# Patient Record
Sex: FEMALE | Race: WHITE | Employment: OTHER | URBAN - METROPOLITAN AREA
[De-identification: names, ages, dates, MRNs, and addresses within clinical notes are randomized per-mention and may not be internally consistent; named-entity substitution may affect disease eponyms.]

---

## 2017-06-20 ENCOUNTER — HOSPITAL ENCOUNTER (OUTPATIENT)
Dept: WOUND CARE | Age: 77
Discharge: HOME | End: 2017-06-20
Attending: PHYSICAL MEDICINE & REHABILITATION
Payer: MEDICARE

## 2017-06-20 ENCOUNTER — HOSPITAL ENCOUNTER (OUTPATIENT)
Dept: GENERAL RADIOLOGY | Age: 77
Discharge: HOME | End: 2017-06-20
Attending: PHYSICAL MEDICINE & REHABILITATION
Payer: MEDICARE

## 2017-06-20 ENCOUNTER — HOSPITAL ENCOUNTER (OUTPATIENT)
Dept: ULTRASOUND IMAGING | Age: 77
Discharge: HOME | End: 2017-06-20
Attending: PHYSICAL MEDICINE & REHABILITATION
Payer: MEDICARE

## 2017-06-20 DIAGNOSIS — S81.802A WOUND OF LEFT LEG: ICD-10-CM

## 2017-06-20 DIAGNOSIS — S81.802A UNSPECIFIED OPEN WOUND, LEFT LOWER LEG, INITIAL ENCOUNTER: ICD-10-CM

## 2017-06-20 PROCEDURE — 11042 DBRDMT SUBQ TIS 1ST 20SQCM/<: CPT

## 2017-06-20 PROCEDURE — 99215 OFFICE O/P EST HI 40 MIN: CPT

## 2017-06-27 PROCEDURE — 99213 OFFICE O/P EST LOW 20 MIN: CPT

## 2017-06-27 PROCEDURE — 97597 DBRDMT OPN WND 1ST 20 CM/<: CPT

## 2017-06-27 PROCEDURE — 29581 APPL MULTLAYER CMPRN SYS LEG: CPT

## 2017-06-30 PROCEDURE — 29581 APPL MULTLAYER CMPRN SYS LEG: CPT

## 2017-07-03 ENCOUNTER — HOSPITAL ENCOUNTER (OUTPATIENT)
Dept: WOUND CARE | Age: 77
Discharge: HOME | End: 2017-07-03
Attending: PHYSICAL MEDICINE & REHABILITATION
Payer: MEDICARE

## 2017-07-03 PROCEDURE — 77030013575 HC DRSG HYDROFERA HOLL -B

## 2017-07-03 PROCEDURE — 99214 OFFICE O/P EST MOD 30 MIN: CPT

## 2017-07-03 PROCEDURE — 29581 APPL MULTLAYER CMPRN SYS LEG: CPT

## 2017-07-03 PROCEDURE — A6021 COLLAGEN DRESSING <=16 SQ IN: HCPCS

## 2017-07-10 PROCEDURE — 29581 APPL MULTLAYER CMPRN SYS LEG: CPT

## 2017-07-10 PROCEDURE — 99214 OFFICE O/P EST MOD 30 MIN: CPT

## 2017-07-13 PROCEDURE — 29581 APPL MULTLAYER CMPRN SYS LEG: CPT

## 2017-07-17 PROBLEM — F51.01 PRIMARY INSOMNIA: Status: ACTIVE | Noted: 2017-07-17

## 2017-07-17 PROCEDURE — 29581 APPL MULTLAYER CMPRN SYS LEG: CPT

## 2017-07-20 PROCEDURE — 29581 APPL MULTLAYER CMPRN SYS LEG: CPT

## 2017-07-24 PROCEDURE — 29581 APPL MULTLAYER CMPRN SYS LEG: CPT

## 2017-07-28 PROCEDURE — 29581 APPL MULTLAYER CMPRN SYS LEG: CPT

## 2017-08-03 ENCOUNTER — HOSPITAL ENCOUNTER (OUTPATIENT)
Dept: WOUND CARE | Age: 77
Discharge: HOME | End: 2017-08-03
Attending: PHYSICAL MEDICINE & REHABILITATION
Payer: MEDICARE

## 2017-08-03 PROCEDURE — 29581 APPL MULTLAYER CMPRN SYS LEG: CPT

## 2017-08-07 PROCEDURE — 99213 OFFICE O/P EST LOW 20 MIN: CPT

## 2017-08-14 PROCEDURE — 99212 OFFICE O/P EST SF 10 MIN: CPT

## 2018-03-28 PROBLEM — E66.01 SEVERE OBESITY (BMI 35.0-39.9) WITH COMORBIDITY (HCC): Status: ACTIVE | Noted: 2018-03-28

## 2018-04-17 ENCOUNTER — HOSPITAL ENCOUNTER (OUTPATIENT)
Dept: WOUND CARE | Age: 78
Discharge: HOME | End: 2018-04-17
Attending: PHYSICAL MEDICINE & REHABILITATION
Payer: MEDICARE

## 2018-04-17 PROCEDURE — 29581 APPL MULTLAYER CMPRN SYS LEG: CPT

## 2018-04-17 PROCEDURE — 99215 OFFICE O/P EST HI 40 MIN: CPT

## 2018-04-17 PROCEDURE — 97597 DBRDMT OPN WND 1ST 20 CM/<: CPT

## 2018-04-20 ENCOUNTER — HOSPITAL ENCOUNTER (OUTPATIENT)
Dept: WOUND CARE | Age: 78
Discharge: HOME | End: 2018-04-20
Attending: PHYSICAL MEDICINE & REHABILITATION
Payer: MEDICARE

## 2018-04-20 PROCEDURE — 29581 APPL MULTLAYER CMPRN SYS LEG: CPT

## 2018-04-24 ENCOUNTER — HOSPITAL ENCOUNTER (OUTPATIENT)
Dept: WOUND CARE | Age: 78
Discharge: HOME | End: 2018-04-24
Attending: PHYSICAL MEDICINE & REHABILITATION
Payer: MEDICARE

## 2018-04-24 PROCEDURE — 29581 APPL MULTLAYER CMPRN SYS LEG: CPT

## 2018-04-24 PROCEDURE — 99213 OFFICE O/P EST LOW 20 MIN: CPT

## 2018-04-27 ENCOUNTER — HOSPITAL ENCOUNTER (OUTPATIENT)
Dept: WOUND CARE | Age: 78
Discharge: HOME | End: 2018-04-27
Attending: PHYSICAL MEDICINE & REHABILITATION
Payer: MEDICARE

## 2018-04-27 PROCEDURE — 29581 APPL MULTLAYER CMPRN SYS LEG: CPT

## 2018-05-01 ENCOUNTER — HOSPITAL ENCOUNTER (OUTPATIENT)
Dept: WOUND CARE | Age: 78
Discharge: HOME | End: 2018-05-01
Attending: PHYSICAL MEDICINE & REHABILITATION
Payer: MEDICARE

## 2018-05-01 PROCEDURE — 99213 OFFICE O/P EST LOW 20 MIN: CPT

## 2018-05-01 PROCEDURE — 29581 APPL MULTLAYER CMPRN SYS LEG: CPT

## 2018-05-07 ENCOUNTER — HOSPITAL ENCOUNTER (OUTPATIENT)
Dept: WOUND CARE | Age: 78
Discharge: HOME | End: 2018-05-07
Attending: PHYSICAL MEDICINE & REHABILITATION
Payer: MEDICARE

## 2018-05-07 PROCEDURE — 99213 OFFICE O/P EST LOW 20 MIN: CPT

## 2018-05-07 PROCEDURE — 29581 APPL MULTLAYER CMPRN SYS LEG: CPT

## 2018-05-14 ENCOUNTER — HOSPITAL ENCOUNTER (OUTPATIENT)
Dept: WOUND CARE | Age: 78
Discharge: HOME | End: 2018-05-14
Attending: PHYSICAL MEDICINE & REHABILITATION
Payer: MEDICARE

## 2018-05-14 PROCEDURE — 99213 OFFICE O/P EST LOW 20 MIN: CPT

## 2018-05-21 ENCOUNTER — HOSPITAL ENCOUNTER (OUTPATIENT)
Dept: WOUND CARE | Age: 78
Discharge: HOME | End: 2018-05-21
Attending: PHYSICAL MEDICINE & REHABILITATION
Payer: MEDICARE

## 2018-05-21 PROCEDURE — 99213 OFFICE O/P EST LOW 20 MIN: CPT

## 2018-05-21 PROCEDURE — 77030020057 HC DRSG MTRX CLLGN STGN -B

## 2018-05-29 ENCOUNTER — APPOINTMENT (OUTPATIENT)
Dept: WOUND CARE | Age: 78
End: 2018-05-29
Attending: PHYSICAL MEDICINE & REHABILITATION
Payer: MEDICARE

## 2018-09-11 ENCOUNTER — HOSPITAL ENCOUNTER (OUTPATIENT)
Dept: WOUND CARE | Age: 78
Discharge: HOME | End: 2018-09-11
Attending: SURGERY
Payer: MEDICARE

## 2018-09-11 ENCOUNTER — APPOINTMENT (OUTPATIENT)
Dept: GENERAL RADIOLOGY | Age: 78
DRG: 603 | End: 2018-09-11
Attending: SURGERY
Payer: MEDICARE

## 2018-09-11 ENCOUNTER — HOSPITAL ENCOUNTER (INPATIENT)
Age: 78
LOS: 1 days | Discharge: HOME HEALTH | DRG: 603 | End: 2018-09-14
Attending: SURGERY | Admitting: SURGERY
Payer: MEDICARE

## 2018-09-11 VITALS
SYSTOLIC BLOOD PRESSURE: 108 MMHG | DIASTOLIC BLOOD PRESSURE: 52 MMHG | TEMPERATURE: 98.7 F | OXYGEN SATURATION: 95 % | HEIGHT: 64 IN | WEIGHT: 214 LBS | BODY MASS INDEX: 36.54 KG/M2 | HEART RATE: 94 BPM

## 2018-09-11 PROBLEM — L03.116 CELLULITIS AND ABSCESS OF LEFT LOWER EXTREMITY: Status: ACTIVE | Noted: 2018-09-11

## 2018-09-11 PROBLEM — L02.416 CELLULITIS AND ABSCESS OF LEFT LOWER EXTREMITY: Status: ACTIVE | Noted: 2018-09-11

## 2018-09-11 LAB
CREAT SERPL-MCNC: 1.78 MG/DL (ref 0.6–1)
CRP SERPL-MCNC: 13.8 MG/DL (ref 0–0.9)
ERYTHROCYTE [DISTWIDTH] IN BLOOD BY AUTOMATED COUNT: 13.8 %
ERYTHROCYTE [SEDIMENTATION RATE] IN BLOOD: 119 MM/HR (ref 0–30)
HCT VFR BLD AUTO: 30.3 % (ref 35.8–46.3)
HGB BLD-MCNC: 9.7 G/DL (ref 11.7–15.4)
MCH RBC QN AUTO: 30.5 PG (ref 26.1–32.9)
MCHC RBC AUTO-ENTMCNC: 32 G/DL (ref 31.4–35)
MCV RBC AUTO: 95.3 FL (ref 79.6–97.8)
NRBC # BLD: 0 K/UL (ref 0–0.2)
PLATELET # BLD AUTO: 332 K/UL (ref 150–450)
PMV BLD AUTO: 10.9 FL (ref 9.4–12.3)
RBC # BLD AUTO: 3.18 M/UL (ref 4.05–5.2)
WBC # BLD AUTO: 10.5 K/UL (ref 4.3–11.1)

## 2018-09-11 PROCEDURE — 86140 C-REACTIVE PROTEIN: CPT

## 2018-09-11 PROCEDURE — 87070 CULTURE OTHR SPECIMN AEROBIC: CPT

## 2018-09-11 PROCEDURE — 99214 OFFICE O/P EST MOD 30 MIN: CPT

## 2018-09-11 PROCEDURE — 65270000029 HC RM PRIVATE

## 2018-09-11 PROCEDURE — 99218 HC RM OBSERVATION: CPT

## 2018-09-11 PROCEDURE — 11042 DBRDMT SUBQ TIS 1ST 20SQCM/<: CPT

## 2018-09-11 PROCEDURE — 87077 CULTURE AEROBIC IDENTIFY: CPT

## 2018-09-11 PROCEDURE — 87075 CULTR BACTERIA EXCEPT BLOOD: CPT

## 2018-09-11 PROCEDURE — 82565 ASSAY OF CREATININE: CPT

## 2018-09-11 PROCEDURE — 74011250636 HC RX REV CODE- 250/636: Performed by: SURGERY

## 2018-09-11 PROCEDURE — 74011000258 HC RX REV CODE- 258: Performed by: SURGERY

## 2018-09-11 PROCEDURE — 87186 SC STD MICRODIL/AGAR DIL: CPT

## 2018-09-11 PROCEDURE — 85652 RBC SED RATE AUTOMATED: CPT

## 2018-09-11 PROCEDURE — 85027 COMPLETE CBC AUTOMATED: CPT

## 2018-09-11 PROCEDURE — 73562 X-RAY EXAM OF KNEE 3: CPT

## 2018-09-11 PROCEDURE — 74011250637 HC RX REV CODE- 250/637: Performed by: SURGERY

## 2018-09-11 PROCEDURE — 36415 COLL VENOUS BLD VENIPUNCTURE: CPT

## 2018-09-11 PROCEDURE — 87040 BLOOD CULTURE FOR BACTERIA: CPT

## 2018-09-11 RX ORDER — GABAPENTIN 100 MG/1
100 CAPSULE ORAL 2 TIMES DAILY
Status: DISCONTINUED | OUTPATIENT
Start: 2018-09-11 | End: 2018-09-14 | Stop reason: HOSPADM

## 2018-09-11 RX ORDER — ACETAMINOPHEN 325 MG/1
650 TABLET ORAL
Status: DISCONTINUED | OUTPATIENT
Start: 2018-09-11 | End: 2018-09-14 | Stop reason: HOSPADM

## 2018-09-11 RX ORDER — SODIUM CHLORIDE 0.9 % (FLUSH) 0.9 %
5-10 SYRINGE (ML) INJECTION AS NEEDED
Status: DISCONTINUED | OUTPATIENT
Start: 2018-09-11 | End: 2018-09-14 | Stop reason: HOSPADM

## 2018-09-11 RX ORDER — SODIUM CHLORIDE 0.9 % (FLUSH) 0.9 %
5-10 SYRINGE (ML) INJECTION EVERY 8 HOURS
Status: DISCONTINUED | OUTPATIENT
Start: 2018-09-11 | End: 2018-09-14 | Stop reason: HOSPADM

## 2018-09-11 RX ORDER — ATORVASTATIN CALCIUM 40 MG/1
40 TABLET, FILM COATED ORAL DAILY
Status: DISCONTINUED | OUTPATIENT
Start: 2018-09-12 | End: 2018-09-14 | Stop reason: HOSPADM

## 2018-09-11 RX ORDER — ENOXAPARIN SODIUM 100 MG/ML
40 INJECTION SUBCUTANEOUS EVERY 24 HOURS
Status: DISCONTINUED | OUTPATIENT
Start: 2018-09-12 | End: 2018-09-14 | Stop reason: HOSPADM

## 2018-09-11 RX ORDER — OXYCODONE AND ACETAMINOPHEN 5; 325 MG/1; MG/1
1 TABLET ORAL
Status: DISCONTINUED | OUTPATIENT
Start: 2018-09-11 | End: 2018-09-14 | Stop reason: HOSPADM

## 2018-09-11 RX ORDER — PANTOPRAZOLE SODIUM 40 MG/1
40 TABLET, DELAYED RELEASE ORAL
Status: DISCONTINUED | OUTPATIENT
Start: 2018-09-12 | End: 2018-09-14 | Stop reason: HOSPADM

## 2018-09-11 RX ORDER — POTASSIUM CHLORIDE 20 MEQ/1
20 TABLET, EXTENDED RELEASE ORAL 2 TIMES DAILY
Status: DISCONTINUED | OUTPATIENT
Start: 2018-09-11 | End: 2018-09-12

## 2018-09-11 RX ORDER — LEVOTHYROXINE SODIUM 50 UG/1
25 TABLET ORAL DAILY
Status: DISCONTINUED | OUTPATIENT
Start: 2018-09-12 | End: 2018-09-14 | Stop reason: HOSPADM

## 2018-09-11 RX ORDER — TRAZODONE HYDROCHLORIDE 50 MG/1
50 TABLET ORAL
Status: DISCONTINUED | OUTPATIENT
Start: 2018-09-11 | End: 2018-09-14 | Stop reason: HOSPADM

## 2018-09-11 RX ADMIN — PIPERACILLIN SODIUM,TAZOBACTAM SODIUM 3.38 G: 3; .375 INJECTION, POWDER, FOR SOLUTION INTRAVENOUS at 21:05

## 2018-09-11 RX ADMIN — POTASSIUM CHLORIDE 20 MEQ: 20 TABLET, EXTENDED RELEASE ORAL at 21:09

## 2018-09-11 RX ADMIN — TRAZODONE HYDROCHLORIDE 50 MG: 50 TABLET ORAL at 21:09

## 2018-09-11 RX ADMIN — Medication 1 AMPULE: at 21:09

## 2018-09-11 RX ADMIN — ENOXAPARIN SODIUM 40 MG: 40 INJECTION, SOLUTION INTRAVENOUS; SUBCUTANEOUS at 23:51

## 2018-09-11 RX ADMIN — GABAPENTIN 100 MG: 100 CAPSULE ORAL at 21:09

## 2018-09-11 RX ADMIN — VANCOMYCIN HYDROCHLORIDE 2500 MG: 10 INJECTION, POWDER, LYOPHILIZED, FOR SOLUTION INTRAVENOUS at 20:58

## 2018-09-11 RX ADMIN — Medication 10 ML: at 21:02

## 2018-09-11 NOTE — PROGRESS NOTES
Called lab about needing blood draw prior to hanging antibiotics; Esteban العراقي states they are on the way

## 2018-09-11 NOTE — H&P
Admission Note Patient: Fozia Siddiqi MRN: [id number]  SSN: [id number] YOB: 1940  Age: 70 y.o. Sex: female Subjective: Chief Complaint: 
Left leg cellulitis with underlying hardware History of Present Illness:    
Wound Caused By: Trauma, fall ~ 8/30/2018 Associated Signs and Symptoms: Knee wound, pain , swelling Timing: constant Quality: wound and cellulitis Severity: Full thickness wound Has been seen at urgent care twice, started on PO abx and had IM injection x 1. Cellulitis has persisted. Evaluated today at wound center and discussed with ortho who concur that aggressive course warranted in order to avoid secondary infection of her knee hardware. Past Medical History:  
Diagnosis Date  Calculus of kidney  GERD (gastroesophageal reflux disease)  Headache(784.0)  Hypercholesterolemia  Hypertension  Primary insomnia 7/17/2017  Thyroid disease Past Surgical History:  
Procedure Laterality Date  HX COLONOSCOPY  Jan 2013 4 polyps, repeat 5 years  HX COLONOSCOPY  7/5/2016  
 repeat 5 yrs tubular adenoma  HX SALPINGO-OOPHORECTOMY  REMOVAL OF KIDNEY STONE Family History Problem Relation Age of Onset  Hypertension Mother  Arthritis-rheumatoid Mother  High Cholesterol Mother  Broken Bones Mother  Heart Failure Father  Heart Attack Father  Hypertension Brother  High Cholesterol Brother  Arthritis-osteo Brother Knee  High Cholesterol Brother  Hypertension Brother  Heart Attack Brother  Heart Attack Paternal Grandmother  Heart Attack Paternal Grandfather Social History Substance Use Topics  Smoking status: Never Smoker  Smokeless tobacco: Never Used  Alcohol use No  
   
Prior to Admission medications Medication Sig Start Date End Date Taking? Authorizing Provider atorvastatin (LIPITOR) 40 mg tablet Take 1 Tab by mouth daily. 8/1/18   Aminata Mooney MD  
levothyroxine (SYNTHROID) 25 mcg tablet Take 1 Tab by mouth every morning. 8/1/18   Aminata Mooney MD  
gabapentin (NEURONTIN) 100 mg capsule Take 1 Cap by mouth two (2) times a day. 8/1/18   Aminata Mooney MD  
traZODone (DESYREL) 50 mg tablet Take 1 Tab by mouth nightly. 8/1/18   Aminata Mooney MD  
losartan-hydroCHLOROthiazide (HYZAAR) 100-25 mg per tablet Take 1 Tab by mouth daily. 7/26/18   Joss Doherty MD  
potassium chloride (K-DUR, KLOR-CON) 20 mEq tablet TAKE 1 TABLET BY MOUTH 2 TIMES A DAY Patient taking differently: TAKE 1 TABLET BY MOUTH ONCE DAILY 7/10/18   Aminata Mooney MD  
FLOVENT  mcg/actuation inhaler  10/28/15   Historical Provider  
acetaminophen (TYLENOL ARTHRITIS PAIN) 650 mg CR tablet Take 650 mg by mouth every eight (8) hours. Historical Provider  
albuterol (VENTOLIN HFA) 90 mcg/actuation inhaler Take  by inhalation. Historical Provider PRILOSEC OTC PO take 1 Tab by mouth every morning. Historical Provider CALCIUM CITRATE + PO take 1 Tab by mouth two (2) times a day. Historical Provider Allergies Allergen Reactions  Pneumovax 23 [Pneumococcal 23-Janell Ps Vaccine] Other (comments) fever Review of Systems: 
 
CONSTITUTIONAL: No fever, chills HEAD: No headache EYES: No visual loss ENT: No hearing loss SKIN: No rash CARDIOVASCULAR: No chest pain RESPIRATORY: No shortness of breath GASTROINTESTINAL: No nausea, vomiting GENITOURINARY: No excessive urination NEUROLOGICAL: No weakness MUSCULOSKELETAL: No muscle pain. No neck pain HEMATOLOGIC: No easy bleeding LYMPHATICS: No lymphedema. PSYCHIATRIC: No current depression ENDOCRINOLOGIC: No high sugars ALLERGIES: No history of asthma, hives, eczema or rhinitis. Immunization History Administered Date(s) Administered  Influenza High Dose Vaccine PF 09/08/2016, 11/28/2017  Influenza Vaccine 10/10/2013, 10/09/2014  Influenza Vaccine PF 10/26/2015  Influenza Vaccine Split 10/29/2012  Pneumococcal Polysaccharide (PPSV-23) 06/28/2007, 08/27/2007  TDAP Vaccine 04/12/2012, 10/29/2012  Tdap 04/12/2012, 10/29/2012  Zoster 10/04/2011  Zoster Vaccine, Live 10/04/2011 There is no height or weight on file to calculate BMI. Counseling regarding nutrition done: No  
 
Current medications: 
Current Facility-Administered Medications Medication Dose Route Frequency Provider Last Rate Last Dose  sodium chloride (NS) flush 5-10 mL  5-10 mL IntraVENous Q8H Rickey Emmanuel MD      
 sodium chloride (NS) flush 5-10 mL  5-10 mL IntraVENous PRN Rickey Emmanuel MD      
 [START ON 9/12/2018] enoxaparin (LOVENOX) injection 40 mg  40 mg SubCUTAneous Q24H Rickey Emmanuel MD      
 alcohol 62% (NOZIN) nasal  1 Ampule  1 Ampule Topical Q12H Rickey Emmanuel MD      
 [START ON 9/12/2018] atorvastatin (LIPITOR) tablet 40 mg  40 mg Oral DAILY Rickey Emmanuel MD      
 [START ON 9/12/2018] levothyroxine (SYNTHROID) tablet 25 mcg  25 mcg Oral DAILY Rickey Emmanuel MD      
 gabapentin (NEURONTIN) capsule 100 mg  100 mg Oral BID Rickey Emmanuel MD      
 traZODone (DESYREL) tablet 50 mg  50 mg Oral QHS Rickey Emmanuel MD      
 [START ON 9/12/2018] losartan/hydroCHLOROthiazide (HYZAAR) 100/25 mg   Oral DAILY Rickey Emmanuel MD      
 potassium chloride (K-DUR, KLOR-CON) SR tablet 20 mEq  20 mEq Oral BID Rickey Emmanuel MD      
 [START ON 9/12/2018] pantoprazole (PROTONIX) tablet 40 mg  40 mg Oral ACB Champ Brooks MD      
 piperacillin-tazobactam (ZOSYN) 4.5 g in 0.9% sodium chloride (MBP/ADV) 100 mL MBP  4.5 g IntraVENous Helen Ganser, MD      
 
 
 
Objective:  
 
Physical Exam:  
 
 
General: well developed, well nourished Psych: cooperative. Pleasant. Neuro: alert and oriented to person/place/situation. Derm: Normal turgor for age. Left leg with swelling, redness. Marked. 2+ pitting edema HEENT: Normocephalic, atraumatic. EOMI. Neck: Normal range of motion. Chest: Good air entry bilaterally. Cardio: RRR Abdomen: Soft, nontender Left knee with FROM active and passive. Knee with wound 6 x 2 cm eschar, underlying fat, ecchymoses. Prepatellar fluid collection, fluctuant. Overlying skin insensate. Ulcer Description: Wound Knee Left; Anterior (Active) DRESSING STATUS Clean, dry, and intact 9/11/2018  2:23 PM  
Non-Pressure Injury Full thickness (subcut/muscle) 9/11/2018  2:23 PM  
Wound Length (cm) 6.5 cm 9/11/2018  2:23 PM  
Wound Width (cm) 2.2 cm 9/11/2018  2:23 PM  
Wound Depth (cm) 0.2 9/11/2018  2:23 PM  
Wound Surface area (cm^2) 14.3 cm^2 9/11/2018  2:23 PM  
Condition of Base Eschar;Tebbetts 9/11/2018  2:23 PM  
Condition of Edges Rolled/curled 9/11/2018  2:23 PM  
Epithelialization (%) 0 9/11/2018  2:23 PM  
Tissue Type Black 9/11/2018  2:23 PM  
Tissue Type Percent Black 50 % 9/11/2018  2:23 PM  
Tissue Type Percent Pink 25 9/11/2018  2:23 PM  
Tissue Type Percent Red 25 9/11/2018  2:23 PM  
Drainage Amount  Small  9/11/2018  2:23 PM  
Drainage Color Serous;Clear 9/11/2018  2:23 PM  
Wound Odor None 9/11/2018  2:23 PM  
Periwound Skin Condition Ecchymosis; Erythema, blanchable 9/11/2018  2:23 PM  
Cleansing and Cleansing Agents  Normal saline 9/11/2018  2:23 PM  
Number of days:0 Procedure:  
 
Procedure:  
Excisional debridement of left knee wound Indications: Left knee wound Operative Findings:  Left knee wound, underlying exposed fat. Some viable. Anesthesia:  Insensate Description of Procedure:  
Prior to procedure informed consent obtained from patient follow discussion of risks, benefits and alternatives. Surgical timeout performed.    
 
Devitalized skin and subcutaneous fat was sharply, surgically, excisionally, debrided to the level of more healthy appearing fat and some punctate bleeding using scissors and 15 blade. Minimal bleeding (<1 cc) encountered. No overt purulence encountered Initial wound measurements:  
6.5 x 2.2 x unknown cm Final wound measurements:   
6.5 x 2.3 x 5mm 100% debrided 6.5 x 2.3 x 5mmof material removed Material removed: Eschar, skin, subcutaneous fat Planned frequency of debridements:  Unknown, will continue debridement as tissue demarcates until healthy wound bed is encountered. Anesthesia: Insensate Assessment:  
 
Left knee wound and cellulitis, effusion, underlying hardware with cellulitis Plan:  
 
Debrided today. Plan to admit for IV abx given risk to underlying hardware, failure to resolve with aggressive outpatient treatment. Will check CBC, inflammatory markers. Consult ID and ortho. Discussed with Dr Jocelyne Hartmann and he is aware of patient. Will send blood cultures and wound cultures although sensitivity lowered by oral antibiotics that have already been started. Continue home meds for bp, thyroid, cholesterol, GERD. Signed By: Shadia Thomas MD   
 September 11, 2018

## 2018-09-11 NOTE — PROGRESS NOTES
09/11/18 4740 Dual Skin Pressure Injury Assessment Second Care Provider (Based on [address] [place] [address]) Stephy Flaherty RN Left knee with open area. Dry gauze covering knee. Compression sleeve to left leg. No skin breakdown noted to sacrum.

## 2018-09-11 NOTE — WOUND CARE
[address]  [address], [address] [facility]  Phone: [phone]  Fax: [phone]    Patient: Kita Lopez MRN: [id number]  SSN: [id number]    YOB: 1940  Age: 70 y.o. Sex: female       Return Appointment: Patient to call for follow up upon discharge from hospital with Ramso Rock MD    Instructions: Dry gauze dressing applied; to be admitted to Critical access hospital    Should you experience increased redness, swelling, pain, foul odor, size of wound(s), or have a temperature over 101 degrees please contact the [address] [place] [address] Road at [phone] or if after hours contact your primary care physician or go to the hospital emergency department.     Signed By: Dorys Peterson RN     September 11, 2018

## 2018-09-11 NOTE — WOUND CARE
09/11/18 1423   Wound Knee Left; Anterior   Date First Assessed/Time First Assessed: 09/11/18 1422   POA: Yes  Wound Type: Trauma  Location: Knee  Wound Description (Optional): #3  Orientation: Left; Anterior   DRESSING STATUS Clean, dry, and intact   DRESSING TYPE (Telfa and Band-aid)   Non-Pressure Injury Full thickness (subcut/muscle)   Wound Length (cm) 6.5 cm   Wound Width (cm) 2.2 cm   Wound Depth (cm) 0.2   Wound Surface area (cm^2) 14.3 cm^2   Condition of Base Eschar;Pink   Condition of Edges Rolled/curled   Epithelialization (%) 0   Tissue Type Black   Tissue Type Percent Black 50 %   Tissue Type Percent Pink 25   Tissue Type Percent Red 25   Drainage Amount  Small    Drainage Color Serous;Clear   Wound Odor None   Periwound Skin Condition Ecchymosis; Erythema, blanchable   Cleansing and Cleansing Agents  Normal saline

## 2018-09-11 NOTE — IP AVS SNAPSHOT
[facility] 
 
 
 145 [facility] [address] [facility] 
[phone] Patient: Niyah Perez MRN: [id number] EXU:[id number] About your hospitalization You were admitted on:  September 11, 2018 You last received care in the:  [facility] 2 SURGICAL You were discharged on:  September 14, 2018 Why you were hospitalized Your primary diagnosis was:  Not on File Your diagnoses also included:  Cellulitis And Abscess Of Left Lower Extremity Follow-up Information Follow up With Details Comments Contact Info Sonal Perez MD   [address] 
[phone] Your Scheduled Appointments Saturday September 15, 2018 To Be Determined START OF CARE with Andrés Jackson RN  
[facility] ([address]) [facility] ([address] [place]) Wednesday September 19, 2018  8:30 AM EDT  
RICH MAMMO SCREENING with SFE RICH BI ROOM 1 [address] [place] [address] ([facility]) [place] [address]  
[phone] ***** NOTE: Appointments for the Mobile Mammography UNIT CANNOT be made on My Chart *****  PATIENT ARRIVAL - Please report 30 minutes early to check in. GENERAL INSTRUCTIONS -  On the day of your exam do not use any bath powder, deodorant or lotions on the chest or armpit area. Wear two-piece outfit for ease of changing. Allow at least 1 hour for test. -  If scheduled at [facility], please register on the 1st floor before going upstairs. [address] [facility]. 2nd floor [facility] Wednesday September 19, 2018  8:50 AM EDT  
DEXA BONE DENSITY STDY AXIAL with SFE DEXA BI [employer] DEXA [address] [place] [address] ([address])  [place] 
 [address] [place] [address]  
[phone] MEDICATIONS -  Patient must bring a complete list of all medications currently taking to include prescriptions, over-the-counter meds, herbals, vitamins & any dietary supplements -  Patient must discontinue use of calcium, vitamins, or calcium supplements including antacids (calcium based) 24 hours before scan. GENERAL INSTRUCTIONS - Men should wear a jogging suit with NO metal.  Women should wear a sports bra with NO metal as well as clothes with no metal or buttons. PATIENT ARRIVAL -  Please report 15 minutes early to check in  
  
    
[facility] [address] [place] [address]. 2nd floor 66 [facility] for [facility] Discharge Orders None A check carly indicates which time of day the medication should be taken. My Medications CHANGE how you take these medications Instructions Each Dose to Equal  
 Morning Noon Evening Bedtime  
 potassium chloride 20 mEq tablet Commonly known as:  K-DURSUSANOR-CON What changed:  See the new instructions. TAKE 1 TABLET BY MOUTH 2 TIMES A DAY  
     
  
   
   
  
   
  
  
CONTINUE taking these medications Instructions Each Dose to Equal  
 Morning Noon Evening Bedtime  
 atorvastatin 40 mg tablet Commonly known as:  LIPITOR Take 1 Tab by mouth daily. 40 mg CALCIUM CITRATE + PO  
   
 take 1 Tab by mouth two (2) times a day. 1 Tab FLOVENT  mcg/actuation inhaler Generic drug:  fluticasone Notes to Patient:  Take on as needed schedule 
  
   
      
   
   
   
  
 gabapentin 100 mg capsule Commonly known as:  NEURONTIN Take 1 Cap by mouth two (2) times a day. 100 mg  
    
  
   
   
   
  
  
 levothyroxine 25 mcg tablet Commonly known as:  synthroid Take 1 Tab by mouth every morning. 25 mcg losartan-hydroCHLOROthiazide 100-25 mg per tablet Commonly known as:  HYZAAR Take 1 Tab by mouth daily. 1 Tab PRILOSEC OTC PO  
   
 take 1 Tab by mouth every morning. 1 Tab  
    
  
   
   
   
  
 traZODone 50 mg tablet Commonly known as:  Migel Purple Take 1 Tab by mouth nightly. 50 mg  
    
   
   
   
  
  
 TYLENOL ARTHRITIS PAIN 650 mg Tber Generic drug:  acetaminophen Notes to Patient:  Take on as needed schedule Take 650 mg by mouth every eight (8) hours. 650 mg VENTOLIN HFA 90 mcg/actuation inhaler Generic drug:  albuterol Notes to Patient:  Take on as needed schedule Take  by inhalation. Discharge Instructions DISCHARGE SUMMARY from Nurse PATIENT INSTRUCTIONS: 
 
After general anesthesia or intravenous sedation, for 24 hours or while taking prescription Narcotics: · Limit your activities · Do not drive and operate hazardous machinery · Do not make important personal or business decisions · Do  not drink alcoholic beverages · If you have not urinated within 8 hours after discharge, please contact your surgeon on call. Report the following to your surgeon: 
· Excessive pain, swelling, redness or odor of or around the surgical area · Temperature over 100.5 · Nausea and vomiting lasting longer than 4 hours or if unable to take medications · Any signs of decreased circulation or nerve impairment to extremity: change in color, persistent  numbness, tingling, coldness or increase pain · Any questions What to do at Home: 
Recommended activity: Activity as tolerated, per MD instructions If you experience any of the following symptoms fever > 100.5, nausea, vomiting, pain, chest pain and/or shortness of breath to ER please follow up with MD. 
 
*  Please give a list of your current medications to your Primary Care Provider. *  Please update this list whenever your medications are discontinued, doses are 
    changed, or new medications (including over-the-counter products) are added. *  Please carry medication information at all times in case of emergency situations. These are general instructions for a healthy lifestyle: No smoking/ No tobacco products/ Avoid exposure to second hand smoke Surgeon General's Warning:  Quitting smoking now greatly reduces serious risk to your health. Obesity, smoking, and sedentary lifestyle greatly increases your risk for illness A healthy diet, regular physical exercise & weight monitoring are important for maintaining a healthy lifestyle You may be retaining fluid if you have a history of heart failure or if you experience any of the following symptoms:  Weight gain of 3 pounds or more overnight or 5 pounds in a week, increased swelling in our hands or feet or shortness of breath while lying flat in bed. Please call your doctor as soon as you notice any of these symptoms; do not wait until your next office visit. Recognize signs and symptoms of STROKE: 
 
F-face looks uneven A-arms unable to move or move unevenly S-speech slurred or non-existent T-time-call 911 as soon as signs and symptoms begin-DO NOT go Back to bed or wait to see if you get better-TIME IS BRAIN. Warning Signs of HEART ATTACK Call 911 if you have these symptoms: 
? Chest discomfort. Most heart attacks involve discomfort in the center of the chest that lasts more than a few minutes, or that goes away and comes back. It can feel like uncomfortable pressure, squeezing, fullness, or pain. ? Discomfort in other areas of the upper body. Symptoms can include pain or discomfort in one or both arms, the back, neck, jaw, or stomach. ? Shortness of breath with or without chest discomfort. ? Other signs may include breaking out in a cold sweat, nausea, or lightheadedness. Don't wait more than five minutes to call [address]! Fast action can save your life. Calling 911 is almost always the fastest way to get lifesaving treatment. Emergency Medical Services staff can begin treatment when they arrive  up to an hour sooner than if someone gets to the hospital by car. The discharge information has been reviewed with the patient. The patient verbalized understanding. Discharge medications reviewed with the patient and appropriate educational materials and side effects teaching were provided. ___________________________________________________________________________________________________________________________________ Cellulitis: Care Instructions Your Care Instructions Cellulitis is a skin infection caused by bacteria, most often strep or staph. It often occurs after a break in the skin from a scrape, cut, bite, or puncture, or after a rash. Cellulitis may be treated without doing tests to find out what caused it. But your doctor may do tests, if needed, to look for a specific bacteria, like methicillin-resistant Staphylococcus aureus (MRSA). The doctor has checked you carefully, but problems can develop later. If you notice any problems or new symptoms, get medical treatment right away. Follow-up care is a key part of your treatment and safety. Be sure to make and go to all appointments, and call your doctor if you are having problems. It's also a good idea to know your test results and keep a list of the medicines you take. How can you care for yourself at home? · Take your antibiotics as directed. Do not stop taking them just because you feel better. You need to take the full course of antibiotics. · Prop up the infected area on pillows to reduce pain and swelling. Try to keep the area above the level of your heart as often as you can.  
· If your doctor told you how to care for your wound, follow your doctor's instructions. If you did not get instructions, follow this general advice: ¨ Wash the wound with clean water 2 times a day. Don't use hydrogen peroxide or alcohol, which can slow healing. ¨ You may cover the wound with a thin layer of petroleum jelly, such as Vaseline, and a nonstick bandage. ¨ Apply more petroleum jelly and replace the bandage as needed. · Be safe with medicines. Take pain medicines exactly as directed. ¨ If the doctor gave you a prescription medicine for pain, take it as prescribed. ¨ If you are not taking a prescription pain medicine, ask your doctor if you can take an over-the-counter medicine. To prevent cellulitis in the future · Try to prevent cuts, scrapes, or other injuries to your skin. Cellulitis most often occurs where there is a break in the skin. · If you get a scrape, cut, mild burn, or bite, wash the wound with clean water as soon as you can to help avoid infection. Don't use hydrogen peroxide or alcohol, which can slow healing. · If you have swelling in your legs (edema), support stockings and good skin care may help prevent leg sores and cellulitis. · Take care of your feet, especially if you have diabetes or other conditions that increase the risk of infection. Wear shoes and socks. Do not go barefoot. If you have athlete's foot or other skin problems on your feet, talk to your doctor about how to treat them. When should you call for help? Call your doctor now or seek immediate medical care if: 
  · You have signs that your infection is getting worse, such as: 
¨ Increased pain, swelling, warmth, or redness. ¨ Red streaks leading from the area. ¨ Pus draining from the area. ¨ A fever.  
  · You get a rash.  
 Watch closely for changes in your health, and be sure to contact your doctor if: 
  · You do not get better as expected. Where can you learn more? Go to http://jenna-maya.info/. Curtis Billings in the search box to learn more about \"Cellulitis: Care Instructions. \" Current as of: May 10, 2017 Content Version: 11.7 © 3654-9228 [employer]here[employer]. Care instructions adapted under license by [employer] (which disclaims liability or warranty for this information). If you have questions about a medical condition or this instruction, always ask your healthcare professional. [address] any warranty or liability for your use of this information. Skin Abscess: Care Instructions Your Care Instructions A skin abscess is a bacterial infection that forms a pocket of pus. A boil is a kind of skin abscess. The doctor may have cut an opening in the abscess so that the pus can drain out. You may have gauze in the cut so that the abscess will stay open and keep draining. You may need antibiotics. You will need to follow up with your doctor to make sure the infection has gone away. The doctor has checked you carefully, but problems can develop later. If you notice any problems or new symptoms, get medical treatment right away. Follow-up care is a key part of your treatment and safety. Be sure to make and go to all appointments, and call your doctor if you are having problems. It's also a good idea to know your test results and keep a list of the medicines you take. How can you care for yourself at home? · Apply warm and dry compresses, a heating pad set on low, or a hot water bottle 3 or 4 times a day for pain. Keep a cloth between the heat source and your skin. · If your doctor prescribed antibiotics, take them as directed. Do not stop taking them just because you feel better. You need to take the full course of antibiotics. · Take pain medicines exactly as directed. ¨ If the doctor gave you a prescription medicine for pain, take it as prescribed.  
¨ If you are not taking a prescription pain medicine, ask your doctor if you can take an over-the-counter medicine. · Keep your bandage clean and dry. Change the bandage whenever it gets wet or dirty, or at least one time a day. · If the abscess was packed with gauze: 
¨ Keep follow-up appointments to have the gauze changed or removed. If the doctor instructed you to remove the gauze, gently pull out all of the gauze when your doctor tells you to. ¨ After the gauze is removed, soak the area in warm water for 15 to 20 minutes 2 times a day, until the wound closes. When should you call for help? Call your doctor now or seek immediate medical care if: 
  · You have signs of worsening infection, such as: 
¨ Increased pain, swelling, warmth, or redness. ¨ Red streaks leading from the infected skin. ¨ Pus draining from the wound. ¨ A fever.  
 Watch closely for changes in your health, and be sure to contact your doctor if: 
  · You do not get better as expected. Where can you learn more? Go to http://jenna-maya.info/. Enter E794 in the search box to learn more about \"Skin Abscess: Care Instructions. \" Current as of: May 10, 2017 Content Version: 11.7 © 0081-5783 [employer]. Care instructions adapted under license by [employer] (which disclaims liability or warranty for this information). If you have questions about a medical condition or this instruction, always ask your healthcare professional. [place] [address] any warranty or liability for your use of this information. ACO Transitions of Care Introducing [employer] [address] Aga Garza offers a voluntary care coordination program to provide high quality service and care to [facility] fee-for-service beneficiaries. Martín Bauer was designed to help you enhance your health and well-being through the following services: ? Transitions of Care  support for individuals who are transitioning from one care setting to another (example: Hospital to home). ? Chronic and Complex Care Coordination  support for individuals and caregivers of those with serious or chronic illnesses or with more than one chronic (ongoing) condition and those who take a number of different medications. If you meet specific medical criteria, a [facility] may call you directly to coordinate your care with your primary care physician and your other care providers. For questions about the [facility] [place] programs, please, contact your physicians office. For general questions or additional information about Accountable Care Organizations: 
Please visit www.medicare.gov/acos. html or call 1-800-MEDICARE (2-635.896.3669) TTY users should call 9-452.482.9848. Introducing [facility] & HEALTH SERVICES! Dear Johnathon Leyva: Thank you for requesting a Mobibeam account. Our records indicate that you already have an active Mobibeam account. You can access your account anytime at [email or web address] [email or web address] Did you know that you can access your hospital and ER discharge instructions at any time in Mobibeam? You can also review all of your test results from your hospital stay or ER visit. Additional Information If you have questions, please visit the Frequently Asked Questions section of the Mobibeam website at [email or web address] Remember, Mobibeam is NOT to be used for urgent needs. For medical emergencies, dial 911. Now available from your iPhone and Android! Introducing Yasmani Ambriz As a [facility] patient, I wanted to make you aware of our electronic visit tool called Yasmani Ambriz. [facility] 24/7 allows you to connect within minutes with a medical provider 24 hours a day, seven days a week via a mobile device or tablet or logging into a secure website from your computer. You can access Yasmani Ambriz from anywhere in the [place]. A virtual visit might be right for you when you have a simple condition and feel like you just dont want to get out of bed, or cant get away from work for an appointment, when your regular [facility] provider is not available (evenings, weekends or holidays), or when youre out of town and need minor care. Electronic visits cost only $49 and if the Proenza Schouer 24/Nurien Software provider determines a prescription is needed to treat your condition, one can be electronically transmitted to a nearby pharmacy*. Please take a moment to enroll today if you have not already done so. The enrollment process is free and takes just a few minutes. To enroll, please download the Urge jessica to your tablet or phone, or visit [email or web address] to enroll on your computer. And, as an [address] [place] [address] patient with a 15Five account, the results of your visits will be scanned into your electronic medical record and your primary care provider will be able to view the scanned results. We urge you to continue to see your regular [facility] provider for your ongoing medical care. And while your primary care provider may not be the one available when you seek a Yasmani Lockemiranda virtual visit, the peace of mind you get from getting a real diagnosis real time can be priceless. For more information on Yasmani MEDArchonmaria estherfin, view our Frequently Asked Questions (FAQs) at [email or web address]. Sincerely, 
 
Layne Celeste MD 
Chief Medical Officer Anna Garza *:  certain medications cannot be prescribed via Yasmani MEDArchonmaria estherInSilico Medicine Unresulted Labs-Please follow up with your PCP about these lab tests Order Current Status CULTURE, BLOOD Preliminary result Providers Seen During Your Hospitalization Provider Specialty Primary office phone Olga Lidia Ramirez MD Plastic Surgery [phone] Immunizations Administered for This Admission Name Date Influenza Vaccine (Quad) PF 9/14/2018 Your Primary Care Physician (PCP) Primary Care Physician Office Phone Office Fax Ambrocio Bowling [phone] You are allergic to the following Allergen Reactions Pneumovax 23 (Pneumococcal 23-Janell Ps Vaccine) Other (comments) fever Recent Documentation Weight BMI OB Status Smoking Status 97.1 kg 36.73 kg/m2 Hysterectomy Never Smoker Emergency Contacts Name Discharge Info Relation Home Work Mobile Ana Luisa Mendes  Daughter [21] [phone] Yoni Lebron  Spouse [3] [phone] Patient Belongings The following personal items are in your possession at time of discharge: 
  Dental Appliances: With patient, Uppers, Lowers  Visual Aid: Glasses      Home Medications: None   Jewelry: None  Clothing: With patient, Undergarments, Socks, Shirt, Pants, Footwear    Other Valuables: At bedside Please provide this summary of care documentation to your next provider. Signatures-by signing, you are acknowledging that this After Visit Summary has been reviewed with you and you have received a copy. Patient Signature:  ____________________________________________________________ Date:  ____________________________________________________________  
  
[facility] Provider Signature:  ____________________________________________________________ Date:  ____________________________________________________________

## 2018-09-11 NOTE — PROCEDURES
Wound Center Debridement    Patient: Misty Small MRN: [id number]  SSN: [id number]    YOB: 1940  Age: 70 y.o.   Sex: female      September 11, 2018     Procedure Performed By: Migel Vargas MD    Wound: 3 Left  Trauma Other part of lower leg To Fat Layer    Type of Debridement:  Surgical      Time Out Taken: Yes    Pain Control: N/A    Level: Skin/Subcutaneous Tissue    Type of Tissue Removed: Necrotic/Eschar    Frequency of Debridements: PRN    Consent in chart     Instrument: Blade and Forceps     Bleeding: Minimal     Hemostasis: n/a      Procedural Pain: 0    Post-Procedural Pain: 0    Pre-Debridement Measurements: 6.5 x 2.2 x 0.2 cm    Post-Debridement Measurements: 6.5 x 2.3 x 0.3 cm    Surface Area Debrided: 14 sq. cm    Response to Procedure: tolerated the procedure well with no complications

## 2018-09-12 PROCEDURE — 99218 HC RM OBSERVATION: CPT

## 2018-09-12 PROCEDURE — 74011250636 HC RX REV CODE- 250/636: Performed by: SURGERY

## 2018-09-12 PROCEDURE — 74011250637 HC RX REV CODE- 250/637: Performed by: SURGERY

## 2018-09-12 PROCEDURE — 77030027138 HC INCENT SPIROMETER -A

## 2018-09-12 PROCEDURE — 74011000258 HC RX REV CODE- 258: Performed by: SURGERY

## 2018-09-12 RX ADMIN — TRAZODONE HYDROCHLORIDE 50 MG: 50 TABLET ORAL at 22:09

## 2018-09-12 RX ADMIN — POTASSIUM CHLORIDE 20 MEQ: 20 TABLET, EXTENDED RELEASE ORAL at 08:37

## 2018-09-12 RX ADMIN — GABAPENTIN 100 MG: 100 CAPSULE ORAL at 16:50

## 2018-09-12 RX ADMIN — Medication 10 ML: at 22:10

## 2018-09-12 RX ADMIN — OXYCODONE AND ACETAMINOPHEN 1 TABLET: 5; 325 TABLET ORAL at 01:16

## 2018-09-12 RX ADMIN — PIPERACILLIN SODIUM,TAZOBACTAM SODIUM 3.38 G: 3; .375 INJECTION, POWDER, FOR SOLUTION INTRAVENOUS at 11:22

## 2018-09-12 RX ADMIN — HYDROCHLOROTHIAZIDE: 25 TABLET ORAL at 08:36

## 2018-09-12 RX ADMIN — Medication 1 AMPULE: at 08:37

## 2018-09-12 RX ADMIN — GABAPENTIN 100 MG: 100 CAPSULE ORAL at 08:37

## 2018-09-12 RX ADMIN — PIPERACILLIN SODIUM,TAZOBACTAM SODIUM 3.38 G: 3; .375 INJECTION, POWDER, FOR SOLUTION INTRAVENOUS at 05:04

## 2018-09-12 RX ADMIN — ATORVASTATIN CALCIUM 40 MG: 40 TABLET, FILM COATED ORAL at 08:37

## 2018-09-12 RX ADMIN — LEVOTHYROXINE SODIUM 25 MCG: 50 TABLET ORAL at 08:37

## 2018-09-12 RX ADMIN — Medication 1 AMPULE: at 20:01

## 2018-09-12 RX ADMIN — PANTOPRAZOLE SODIUM 40 MG: 40 TABLET, DELAYED RELEASE ORAL at 06:35

## 2018-09-12 RX ADMIN — PIPERACILLIN SODIUM,TAZOBACTAM SODIUM 3.38 G: 3; .375 INJECTION, POWDER, FOR SOLUTION INTRAVENOUS at 19:58

## 2018-09-12 RX ADMIN — VANCOMYCIN HYDROCHLORIDE 750 MG: 10 INJECTION, POWDER, LYOPHILIZED, FOR SOLUTION INTRAVENOUS at 20:55

## 2018-09-12 NOTE — PROGRESS NOTES
Admission Note Patient: Aviva Newman MRN: [id number]  SSN: [id number] YOB: 1940  Age: 70 y.o. Sex: female Subjective: Chief Complaint: 
Left leg cellulitis with underlying hardware History of Present Illness:    
Wound Caused By: Trauma, fall ~ 8/30/2018 Associated Signs and Symptoms: Knee wound, pain , swelling Timing: constant Quality: wound and cellulitis Severity: Full thickness wound Has been seen at urgent care twice, started on PO abx and had IM injection x 1. Cellulitis has persisted. Evaluated today at wound center and discussed with ortho who concur that aggressive course warranted in order to avoid secondary infection of her knee hardware. 9/12/2018 No fevers or chills overnight. Has been in bed. No leukocytosis. ESR and CRP appropriately elevated. Knee XR looks ok. Cultures (wound and blood) negative. Seen by ID and ortho consulted. Has some CKD3 but denies any acute kidney issues. Past Medical History:  
Diagnosis Date  Calculus of kidney  GERD (gastroesophageal reflux disease)  Headache(784.0)  Hypercholesterolemia  Hypertension  Primary insomnia 7/17/2017  Thyroid disease Past Surgical History:  
Procedure Laterality Date  HX COLONOSCOPY  Jan 2013 4 polyps, repeat 5 years  HX COLONOSCOPY  7/5/2016  
 repeat 5 yrs tubular adenoma  HX SALPINGO-OOPHORECTOMY  REMOVAL OF KIDNEY STONE Family History Problem Relation Age of Onset  Hypertension Mother  Arthritis-rheumatoid Mother  High Cholesterol Mother  Broken Bones Mother  Heart Failure Father  Heart Attack Father  Hypertension Brother  High Cholesterol Brother  Arthritis-osteo Brother Knee  High Cholesterol Brother  Hypertension Brother  Heart Attack Brother  Heart Attack Paternal Grandmother  Heart Attack Paternal Grandfather Social History Substance Use Topics  Smoking status: Never Smoker  Smokeless tobacco: Never Used  Alcohol use No  
   
Prior to Admission medications Medication Sig Start Date End Date Taking? Authorizing Provider  
atorvastatin (LIPITOR) 40 mg tablet Take 1 Tab by mouth daily. 8/1/18   Merlin Aj MD  
levothyroxine (SYNTHROID) 25 mcg tablet Take 1 Tab by mouth every morning. 8/1/18   Merlin Aj MD  
gabapentin (NEURONTIN) 100 mg capsule Take 1 Cap by mouth two (2) times a day. 8/1/18   Merlin Aj MD  
traZODone (DESYREL) 50 mg tablet Take 1 Tab by mouth nightly. 8/1/18   Merlin Aj MD  
losartan-hydroCHLOROthiazide (HYZAAR) 100-25 mg per tablet Take 1 Tab by mouth daily. 7/26/18   Joss Doherty MD  
potassium chloride (K-DUR, KLOR-CON) 20 mEq tablet TAKE 1 TABLET BY MOUTH 2 TIMES A DAY Patient taking differently: TAKE 1 TABLET BY MOUTH ONCE DAILY 7/10/18   Merlin Aj MD  
FLOVENT  mcg/actuation inhaler  10/28/15   Historical Provider  
acetaminophen (TYLENOL ARTHRITIS PAIN) 650 mg CR tablet Take 650 mg by mouth every eight (8) hours. Historical Provider  
albuterol (VENTOLIN HFA) 90 mcg/actuation inhaler Take  by inhalation. Historical Provider PRILOSEC OTC PO take 1 Tab by mouth every morning. Historical Provider CALCIUM CITRATE + PO take 1 Tab by mouth two (2) times a day. Historical Provider Allergies Allergen Reactions  Pneumovax 23 [Pneumococcal 23-Janell Ps Vaccine] Other (comments) fever Review of Systems: 
 
CONSTITUTIONAL: No fever, chills HEAD: No headache EYES: No visual loss ENT: No hearing loss SKIN: No rash CARDIOVASCULAR: No chest pain RESPIRATORY: No shortness of breath GASTROINTESTINAL: No nausea, vomiting GENITOURINARY: No excessive urination NEUROLOGICAL: No weakness MUSCULOSKELETAL: Left leg swelling, pain as above HEMATOLOGIC: No easy bleeding LYMPHATICS: No lymphedema. PSYCHIATRIC: No current depression ENDOCRINOLOGIC: No high sugars ALLERGIES: No history of asthma, hives, eczema or rhinitis. Immunization History Administered Date(s) Administered  Influenza High Dose Vaccine PF 09/08/2016, 11/28/2017  Influenza Vaccine 10/10/2013, 10/09/2014  Influenza Vaccine PF 10/26/2015  Influenza Vaccine Split 10/29/2012  Pneumococcal Polysaccharide (PPSV-23) 06/28/2007, 08/27/2007  TDAP Vaccine 04/12/2012, 10/29/2012  Tdap 04/12/2012, 10/29/2012  Zoster 10/04/2011  Zoster Vaccine, Live 10/04/2011 There is no height or weight on file to calculate BMI. Counseling regarding nutrition done: No  
 
Current medications: 
Current Facility-Administered Medications Medication Dose Route Frequency Provider Last Rate Last Dose  vancomycin (VANCOCIN) 750 mg in  ml infusion  750 mg IntraVENous Q24H Alfredo Guaman MD      
 sodium chloride (NS) flush 5-10 mL  5-10 mL IntraVENous Q8H Alfredo Guaman MD   10 mL at 09/11/18 2102  sodium chloride (NS) flush 5-10 mL  5-10 mL IntraVENous PRN Alfredo Guaman MD      
 enoxaparin (LOVENOX) injection 40 mg  40 mg SubCUTAneous Q24H Alfredo Guaman MD   40 mg at 09/11/18 2351  alcohol 62% (NOZIN) nasal  1 Ampule  1 Ampule Topical Q12H Alfredo Guaman MD   1 Ampule at 09/12/18 0837  
 atorvastatin (LIPITOR) tablet 40 mg  40 mg Oral DAILY Alfredo Guaman MD   40 mg at 09/12/18 3072  levothyroxine (SYNTHROID) tablet 25 mcg  25 mcg Oral DAILY Alfredo Guaman MD   25 mcg at 09/12/18 0837  
 gabapentin (NEURONTIN) capsule 100 mg  100 mg Oral BID Alfredo Guaman MD   100 mg at 09/12/18 0837  
 traZODone (DESYREL) tablet 50 mg  50 mg Oral QHS Alfredo Guaman MD   50 mg at 09/11/18 2109  losartan/hydroCHLOROthiazide (HYZAAR) 100/25 mg   Oral DAILY Mark Brooks MD      
 pantoprazole (PROTONIX) tablet 40 mg  40 mg Oral ACB Alfredo Guaman MD   40 mg at 09/12/18 9715  acetaminophen (TYLENOL) tablet 650 mg  650 mg Oral Q6H PRN Mark Anu MD Cecilia      
 oxyCODONE-acetaminophen (PERCOCET) 5-325 mg per tablet 1 Tab  1 Tab Oral Q4H PRN Riri Munoz MD   1 Tab at 09/12/18 7505  piperacillin-tazobactam (ZOSYN) 3.375 g in 0.9% sodium chloride (MBP/ADV) 100 mL  3.375 g IntraVENous Q8H Riri Munoz MD 25 mL/hr at 09/12/18 1122 3.375 g at 09/12/18 1122 Objective:  
 
Physical Exam:  
 
Visit Vitals  /71 (BP 1 Location: Right arm, BP Patient Position: At rest)  Pulse 77  Temp 98.5 °F (36.9 °C)  Resp 18  SpO2 92% General: well developed, well nourished Psych: cooperative. Pleasant. Neuro: alert and oriented to person/place/situation. Derm: Normal turgor for age. Left leg with swelling, redness. Marked. 2+ pitting edema. HEENT: Normocephalic, atraumatic. EOMI. Neck: Normal range of motion. Chest: Good air entry bilaterally. Cardio: RRR Abdomen: Soft, nontender Left knee with FROM active and passive. Knee with wound 6 x 2 cm wound, some fat in place, underlying fat, ecchymoses. Prepatellar fluid collection, fluctuant. Overlying skin insensate. Assessment:  
 
Left knee wound and cellulitis, effusion, underlying hardware Plan: - Will follow ortho rec - Appreciate ID input. Discussed vancomycin toxicity with patient and CKD and she understands. Hopefully can de-escalate that as soon as possible. - Follow cultures - Continue home meds for bp, thyroid, cholesterol, GERD 
- Up and OOB. Will consult PT as well. Signed By: Riri Munoz MD   
 September 12, 2018

## 2018-09-12 NOTE — PROGRESS NOTES
END OF SHIFT NOTE: Dressing from wound care c/d/i; pain well controlled with percocet throughout shift. INTAKE/OUTPUT 
09/11 0701 - 09/12 0700 In: -  
Out: 200 [Urine:200] Voiding: YES Catheter: NO 
Drain:   
 
 
Stool:  0 occurrences. Characteristics: 
  
 
Emesis: 0 occurrences. Characteristics: VITAL SIGNS Patient Vitals for the past 12 hrs: 
 Temp Pulse Resp BP SpO2  
09/12/18 0407 98.5 °F (36.9 °C) 82 21 122/69 93 % 09/12/18 0008 99.1 °F (37.3 °C) 88 21 115/76 95 % 09/11/18 1925 98.5 °F (36.9 °C) 84 22 138/76 94 % Pain Assessment Pain Intensity 1: 4 (09/12/18 0116) Pain Location 1: Head 
Pain Intervention(s) 1: Medication (see MAR) Ambulating Yes Shift report given to oncoming nurse at the bedside.  
 
David Machuca RN

## 2018-09-12 NOTE — CONSULTS
Infectious Disease Consult    Today's Date: 9/12/2018   Admit Date: 9/11/2018    Impression:   · L knee cellulitis complicated by TKA and tendon/ligament repair with mesh  · PVD-wears compression hose   · Hx of yina partial TKAs (2009)  · TIARRA    Plan:   ·  Continue Vancomycin and Zosyn for now. Will adjust once gram stain results from wound cx. · Await ortho recommendations-would like to evaluate if HW affected. Anti-infectives:   · Vanc (9/11-  · Zosyn (9/11-    Subjective:   Date of Consultation:  September 12, 2018  Referring Physician: Cecilia     Patient is a 70 y.o. female admitted from [facility] wound [place] on 9/11 for IV abx related to L knee cellulitis. She states site occurred after she fell in her bedroom ~2 weeks ago. Patient notes hx of poor wound healing with previous traumas and went to urgent care 9/2--she was given prescription for Keflex. Site continued to worsen and went back to urgent care on 9/7--at that time, she was given prescription for doxycycline, IM CTX, and order for wound care. Yesterday, she went to wound care, where Dr Stewart Cardenas performed debridement. Cx pending. She was admitted due to appearance and concerns for TKA/mesh involvement. Ortho consulted. Afebrile without leukocytosis. , CRP 13.8, creatinine 1.78. Started on Vanc/Zosyn. She notes subjective fevers and sweats at home. Denies diarrhea, N/V, SOB, dysuria.     -Significant PMH for HTN, CKD3, OA s/p yina TKAs, PVD. 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 9) with comorbidity ([address] E66.01    Cellulitis and abscess of left lower extremity L03.116, L02.416     Past Medical History:   Diagnosis Date    Calculus of kidney     GERD (gastroesophageal reflux disease)     Headache(784.0)     Hypercholesterolemia     Hypertension     Primary insomnia 7/17/2017    Thyroid disease       Family History   Problem Relation Age of Onset    Hypertension Mother     Arthritis-rheumatoid Mother     High Cholesterol Mother     Broken Bones Mother     Heart Failure Father     Heart Attack Father     Hypertension Brother     High Cholesterol Brother     Arthritis-osteo Brother      Knee    High Cholesterol Brother     Hypertension Brother     Heart Attack Brother     Heart Attack Paternal Grandmother     Heart Attack Paternal Grandfather       Social History   Substance Use Topics    Smoking status: Never Smoker    Smokeless tobacco: Never Used    Alcohol use No     Past Surgical History:   Procedure Laterality Date    HX COLONOSCOPY  Jan 2013    4 polyps, repeat 5 years    HX COLONOSCOPY  7/5/2016    repeat 5 yrs tubular adenoma    HX SALPINGO-OOPHORECTOMY      REMOVAL OF KIDNEY STONE        Prior to Admission medications    Medication Sig Start Date End Date Taking? Authorizing Provider   atorvastatin (LIPITOR) 40 mg tablet Take 1 Tab by mouth daily. 8/1/18   Amintaa Mooney MD   levothyroxine (SYNTHROID) 25 mcg tablet Take 1 Tab by mouth every morning. 8/1/18   Aminata Mooney MD   gabapentin (NEURONTIN) 100 mg capsule Take 1 Cap by mouth two (2) times a day. 8/1/18   Aminata Mooney MD   traZODone (DESYREL) 50 mg tablet Take 1 Tab by mouth nightly. 8/1/18   Aminata Mooney MD   losartan-hydroCHLOROthiazide (HYZAAR) 100-25 mg per tablet Take 1 Tab by mouth daily.  7/26/18   Aminata Mooney MD   potassium chloride (K-DUR, KLOR-CON) 20 mEq tablet TAKE 1 TABLET BY MOUTH 2 TIMES A DAY  Patient taking differently: TAKE 1 TABLET BY MOUTH ONCE DAILY 7/10/18   Cassandra Gardner MD   FLOVENT  mcg/actuation inhaler  10/28/15   Historical Provider   acetaminophen (TYLENOL ARTHRITIS PAIN) 650 mg CR tablet Take 650 mg by mouth every eight (8) hours. Historical Provider   albuterol (VENTOLIN HFA) 90 mcg/actuation inhaler Take  by inhalation. Historical Provider   PRILOSEC OTC PO take 1 Tab by mouth every morning. Historical Provider   CALCIUM CITRATE + PO take 1 Tab by mouth two (2) times a day. Historical Provider       Allergies   Allergen Reactions    Pneumovax 23 [Pneumococcal 23-Janell Ps Vaccine] Other (comments)     fever        Review of Systems:  A comprehensive review of systems was negative except for that written in the History of Present Illness. Objective:     Visit Vitals    /66 (BP 1 Location: Right arm, BP Patient Position: At rest)    Pulse 63    Temp 98.5 °F (36.9 °C)    Resp 19    SpO2 95%     Temp (24hrs), Av.7 °F (37.1 °C), Min:98.5 °F (36.9 °C), Max:99.1 °F (37.3 °C)       Lines:  Peripheral IV:            Physical Exam:    General:  Alert, cooperative, well noursished, well developed, appears stated age   Eyes:  Sclera anicteric. Pupils equally round and reactive to light. Mouth/Throat: Mucous membranes normal, oral pharynx clear   Neck: Supple   Lungs:   Clear to auscultation bilaterally, good effort   CV:  Regular rate and rhythm,no murmur, click, rub or gallop   Abdomen:   Soft, non-tender. bowel sounds normal. non-distended   Extremities: + 3 pitting edema to LLE. L anterior medial knee wound (6.5x2. 3x0.3cm), + erythema down calf to proximal knee, calor, tenderness   Skin: Skin color, texture, turgor normal. no acute rash or lesions   Lymph nodes: Cervical and supraclavicular normal   Musculoskeletal: No swelling or deformity   Lines/Devices:  Intact, no erythema, drainage or tenderness   Psych: Alert and oriented, normal mood affect given the setting       Data Review:     CBC:  Recent Labs      09/11/18 2004   WBC  10.5   HGB  9.7*   HCT  30.3*   PLT  332       BMP:  Recent Labs      09/11/18 2004   CREA  1.78*       LFTS:  No results for input(s): TBILI, ALT, SGOT, AP, TP, ALB in the last 72 hours. Microbiology:     All Micro Results     Procedure Component Value Units Date/Time    CULTURE, BLOOD [[id number]] Collected:  09/11/18 2004    Order Status:  Completed Specimen:  Blood from Blood Updated:  09/12/18 0747     Special Requests: --        RIGHT  Antecubital       Culture result: NO GROWTH AFTER 11 HOURS             Imaging:   L knee xray WNL.      Signed By: Toby Benavides NP     September 12, 2018

## 2018-09-12 NOTE — PROGRESS NOTES
Spoke to Ms. Nayak in room 201 about Case Management and discharge planning. She is here for a left knee wound. Her discharge plan is to return home with her  (he has pulmonary fibrosis) in the [address] [place] [address].  Ms. Darell Austin says she was independent with ADLs prior to his admit. She has assistance from her children (e.g., groceries, medication), including her son who works here at [employer] as a pharmacist.   
 
Await medical workup, then re-discuss discharge plan. Will follow and assist with discharge planning. Care Management Interventions Plan discussed with Pt/Family/Caregiver:  Yes

## 2018-09-12 NOTE — PROGRESS NOTES
Pharmacokinetic Consult to Pharmacist 
 
Ellis Novak is a 70 y.o. female being treated for left leg cellulitis w/ underlying hardware with Zosyn and vancomycin. Lab Results Component Value Date/Time BUN 14 07/24/2018 10:30 AM  
 Creatinine 1.78 (H) 09/11/2018 08:04 PM  
 WBC 10.5 09/11/2018 08:04 PM  
  
Estimated Creatinine Clearance: 30.4 mL/min (based on Cr of 1.78). CULTURES: 
All Micro Results Procedure Component Value Units Date/Time CULTURE, BLOOD [[id number]] Collected:  09/11/18 2004 Order Status:  Completed Specimen:  Blood from Blood Updated:  09/11/18 2030 Day 1 of vancomycin. Goal trough is 15-20. Vancomycin dose initiated at 2500 mg x 1 then 750 mg Q 24 hrs. The calculated trough is 16 and a trough will be drawn prior to the 3rd dose. Will continue to follow patient. Thank you, OBIE! Cecilia, Pharm D, [facility] Clinical [facility]

## 2018-09-13 LAB
CREAT SERPL-MCNC: 1.22 MG/DL (ref 0.6–1)
HCT VFR BLD AUTO: 27.9 % (ref 35.8–46.3)
HGB BLD-MCNC: 8.9 G/DL (ref 11.7–15.4)
POTASSIUM SERPL-SCNC: 3.7 MMOL/L (ref 3.5–5.1)

## 2018-09-13 PROCEDURE — G8979 MOBILITY GOAL STATUS: HCPCS

## 2018-09-13 PROCEDURE — 99218 HC RM OBSERVATION: CPT

## 2018-09-13 PROCEDURE — G8978 MOBILITY CURRENT STATUS: HCPCS

## 2018-09-13 PROCEDURE — 36569 INSJ PICC 5 YR+ W/O IMAGING: CPT | Performed by: INTERNAL MEDICINE

## 2018-09-13 PROCEDURE — 74011000258 HC RX REV CODE- 258: Performed by: INTERNAL MEDICINE

## 2018-09-13 PROCEDURE — 97161 PT EVAL LOW COMPLEX 20 MIN: CPT

## 2018-09-13 PROCEDURE — 74011250636 HC RX REV CODE- 250/636: Performed by: INTERNAL MEDICINE

## 2018-09-13 PROCEDURE — 36415 COLL VENOUS BLD VENIPUNCTURE: CPT

## 2018-09-13 PROCEDURE — 82565 ASSAY OF CREATININE: CPT

## 2018-09-13 PROCEDURE — 97530 THERAPEUTIC ACTIVITIES: CPT

## 2018-09-13 PROCEDURE — 74011000258 HC RX REV CODE- 258: Performed by: SURGERY

## 2018-09-13 PROCEDURE — 74011250636 HC RX REV CODE- 250/636: Performed by: SURGERY

## 2018-09-13 PROCEDURE — 85018 HEMOGLOBIN: CPT

## 2018-09-13 PROCEDURE — 65270000029 HC RM PRIVATE

## 2018-09-13 PROCEDURE — 74011250637 HC RX REV CODE- 250/637: Performed by: SURGERY

## 2018-09-13 PROCEDURE — 77030020263 HC SOL INJ SOD CL0.9% LFCR 1000ML

## 2018-09-13 PROCEDURE — 84132 ASSAY OF SERUM POTASSIUM: CPT

## 2018-09-13 PROCEDURE — 02HV33Z INSERTION OF INFUSION DEVICE INTO SUPERIOR VENA CAVA, PERCUTANEOUS APPROACH: ICD-10-PCS | Performed by: INTERNAL MEDICINE

## 2018-09-13 RX ORDER — VANCOMYCIN/0.9 % SOD CHLORIDE 1.5G/250ML
1500 PLASTIC BAG, INJECTION (ML) INTRAVENOUS EVERY 24 HOURS
Status: DISCONTINUED | OUTPATIENT
Start: 2018-09-13 | End: 2018-09-14

## 2018-09-13 RX ORDER — POTASSIUM CHLORIDE 20 MEQ/1
20 TABLET, EXTENDED RELEASE ORAL DAILY
Status: DISCONTINUED | OUTPATIENT
Start: 2018-09-13 | End: 2018-09-14 | Stop reason: HOSPADM

## 2018-09-13 RX ADMIN — PIPERACILLIN SODIUM,TAZOBACTAM SODIUM 3.38 G: 3; .375 INJECTION, POWDER, FOR SOLUTION INTRAVENOUS at 03:25

## 2018-09-13 RX ADMIN — POTASSIUM CHLORIDE 20 MEQ: 20 TABLET, EXTENDED RELEASE ORAL at 13:31

## 2018-09-13 RX ADMIN — GABAPENTIN 100 MG: 100 CAPSULE ORAL at 08:56

## 2018-09-13 RX ADMIN — PANTOPRAZOLE SODIUM 40 MG: 40 TABLET, DELAYED RELEASE ORAL at 06:39

## 2018-09-13 RX ADMIN — Medication 1 AMPULE: at 08:55

## 2018-09-13 RX ADMIN — ATORVASTATIN CALCIUM 40 MG: 40 TABLET, FILM COATED ORAL at 08:56

## 2018-09-13 RX ADMIN — HYDROCHLOROTHIAZIDE: 25 TABLET ORAL at 08:56

## 2018-09-13 RX ADMIN — Medication 1 AMPULE: at 22:47

## 2018-09-13 RX ADMIN — Medication 10 ML: at 22:00

## 2018-09-13 RX ADMIN — LEVOTHYROXINE SODIUM 25 MCG: 50 TABLET ORAL at 07:55

## 2018-09-13 RX ADMIN — Medication 10 ML: at 06:41

## 2018-09-13 RX ADMIN — ENOXAPARIN SODIUM 40 MG: 40 INJECTION, SOLUTION INTRAVENOUS; SUBCUTANEOUS at 00:07

## 2018-09-13 RX ADMIN — GABAPENTIN 100 MG: 100 CAPSULE ORAL at 17:55

## 2018-09-13 RX ADMIN — CEFTRIAXONE SODIUM 2 G: 2 INJECTION, POWDER, FOR SOLUTION INTRAMUSCULAR; INTRAVENOUS at 13:31

## 2018-09-13 RX ADMIN — Medication 10 ML: at 13:31

## 2018-09-13 RX ADMIN — VANCOMYCIN HYDROCHLORIDE 1500 MG: 10 INJECTION, POWDER, LYOPHILIZED, FOR SOLUTION INTRAVENOUS at 14:24

## 2018-09-13 RX ADMIN — TRAZODONE HYDROCHLORIDE 50 MG: 50 TABLET ORAL at 22:47

## 2018-09-13 NOTE — PHYSICIAN ADVISORY
Letter of Determination: Upgrade from Observation to Inpatient Status This patient was originally hospitalized as Outpatient Status with Observation Services on 9/11/2018 for cellulitis to left knee. This patient now meets for Inpatient Admission in accordance with CMS regulation Section 43 .3. Specifically, patient's stay is now over Two Midnights and was medically necessary. The patient's stay was medically necessary based on history of prior total knee arthroplasty with hardware in place, failure of outpatient management, and treatment with intraveous vancomycin and Zosyn. Consistent with CMS guidelines, patient meets for inpatient status. It is our recommendation that this patient's hospitalization status should be upgraded from OBSERVATION to INPATIENT status.  
  
The final decision regarding the patient's hospitalization status depends on the attending physician's judgement. Donavon Chaim KEENAN, JACOB, Physician Advisor ARELY Bradshaw

## 2018-09-13 NOTE — PROGRESS NOTES
Dispo update:  CM consult from ID noted. [phone]phone [phone][facility] to cost out the IV Vancomycin and IV Rocephin if they were given at home. Then will discuss with Ms. Nayak in room 201 as to whether she prefers to do infusions at home, or go to infusion center daily. Also, faxed order to [facility] (fax [phone]; phone [phone]) for rolling walker. Will order home health PT after decision made about IV abx (home versus OP infusion center), and whether home health RN is needed, or not.

## 2018-09-13 NOTE — PROGRESS NOTES
Pharmacokinetic Consult to Pharmacist 
 
Armida Franca is a 70 y.o. female being treated for left leg cellulitis w/ underlying hardware with ceftriaxone and vancomycin. Lab Results Component Value Date/Time BUN 14 07/24/2018 10:30 AM  
 Creatinine 1.22 (H) 09/13/2018 04:01 AM  
 WBC 10.5 09/11/2018 08:04 PM  
  
Estimated Creatinine Clearance: 44.4 mL/min (based on Cr of 1.22). CULTURES: 
All Micro Results Procedure Component Value Units Date/Time CULTURE, BLOOD [[id number]] Collected:  09/11/18 2004 Order Status:  Completed Specimen:  Blood from Blood Updated:  09/13/18 0575 Special Requests: --     
  RIGHT Antecubital 
  
  Culture result: NO GROWTH 2 DAYS Day 3 of vancomycin. Goal trough is 15-20. Markers of renal function have improved since admit. I will empirically adjust the dose to 1500mg q24h to begin now based on patient specific parameters. If plan to discharge on vancomycin, I would consider a trough level within the next 48-72h to assess appropriateness of regimen given change in renal function. Will continue to follow patient. Please call with any questions. Thank you, Emma Barnhart, PharmD, [facility] Clinical Pharmacist 
[phone]

## 2018-09-13 NOTE — PROGRESS NOTES
Infectious Disease Note Today's Date: 2018 Admit Date: 2018 Impression: · L knee cellulitis complicated by partial TKA · PVD-wears compression hose · Hx of yina partial TKAs () · TIARRA Plan:  
·  Continue Vancomycin. Start Rocephin. Duration TBD · Await ortho recommendations-would like to evaluate if HW affected. Dr Nugent Ip to discuss with Wash Leidy today Anti-infectives: · Vanc (- 
· Zosyn (-)--CTX (- Subjective:  
Updated patient on plans. I will ask CM to run home abx cost for patient. Allergies Allergen Reactions  Pneumovax 23 [Pneumococcal 23-Janell Ps Vaccine] Other (comments) fever Review of Systems:  A comprehensive review of systems was negative except for that written in the History of Present Illness. Objective:  
 
Visit Vitals  /70 (BP 1 Location: Right arm, BP Patient Position: At rest)  Pulse 86  Temp 98.4 °F (36.9 °C)  Resp 18  SpO2 91% Temp (24hrs), Av.1 °F (37.3 °C), Min:98 °F (36.7 °C), Max:100.1 °F (37.8 °C) Lines:  Peripheral IV:   
   
 
 
Physical Exam:   
General:  Alert, cooperative, well noursished, well developed, appears stated age Eyes:  Sclera anicteric. Pupils equally round and reactive to light. Mouth/Throat: Mucous membranes normal, oral pharynx clear Neck: Supple Lungs:   Clear to auscultation bilaterally, good effort CV:  Regular rate and rhythm,no murmur, click, rub or gallop Abdomen:   Soft, non-tender. bowel sounds normal. non-distended Extremities: + 3 pitting edema to LLE. L anterior medial knee wound (6.5x2. 3x0.3cm), + erythema down calf to proximal knee, calor, tenderness Skin: Skin color, texture, turgor normal. no acute rash or lesions Lymph nodes: Cervical and supraclavicular normal  
Musculoskeletal: No swelling or deformity Lines/Devices:  Intact, no erythema, drainage or tenderness Psych: Alert and oriented, normal mood affect given the setting Data Review: CBC: 
Recent Labs  
   09/13/18 
 0401  09/11/18 2004 WBC   --   10.5 HGB  8.9*  9.7* HCT  27.9*  30.3* PLT   --   332 BMP: 
Recent Labs  
   09/13/18 
 0401  09/11/18 2004 CREA  1.22*  1.78* K  3.7   --   
 
 
LFTS: 
No results for input(s): TBILI, ALT, SGOT, AP, TP, ALB in the last 72 hours. Microbiology:  
 
All Micro Results Procedure Component Value Units Date/Time CULTURE, BLOOD [[id number]] Collected:  09/11/18 2004 Order Status:  Completed Specimen:  Blood from Blood Updated:  09/13/18 6992 Special Requests: --     
  RIGHT Antecubital 
  
  Culture result: NO GROWTH 2 DAYS Imaging:  
L knee xray WNL. Signed By: Noelle Nolan NP September 13, 2018

## 2018-09-13 NOTE — PROGRESS NOTES
PICC line placement ordered for long term IV antibiotics. PICC will be placed tomorrow morning before patient discharges. Spoke with primary nurse, Yolette Keyes and pt has working PIV.

## 2018-09-13 NOTE — PROGRESS NOTES
Problem: Mobility Impaired (Adult and Pediatric) Goal: *Acute Goals and Plan of Care (Insert Text) Discharge Goals: 
(1.)Ms. Miri Ford will move from supine to sit and sit to supine , scoot up and down and roll side to side in bed with INDEPENDENT within 7 treatment day(s). (2.)Ms. Miri Ford will transfer from bed to chair and chair to bed with MODIFIED INDEPENDENCE using the least restrictive device within 7 treatment day(s). (3.)Ms. Miri Ford will ambulate with MODIFIED INDEPENDENCE for 500+ feet with the least restrictive device within 7 treatment day(s). (4.)Ms. Miri Ford will ascend/descends 4 steps with handrail with STAND BY ASSIST within 7 treatment days. ________________________________________________________________________________________________ PHYSICAL THERAPY: Initial Assessment, Treatment Day: Day of Assessment, AM 9/13/2018 OBSERVATION: Hospital Day: 3 [place] / Product Type: Medicare /  
  
NAME/AGE/GENDER: Benita Amaro is a 70 y.o. female PRIMARY DIAGNOSIS: lt leg cellulitis Cellulitis and abscess of left lower extremity <principal problem not specified> <principal problem not specified> 
  
  
ICD-10: Treatment Diagnosis:  
 · Generalized Muscle Weakness (M62.81) · Difficulty in walking, Not elsewhere classified (R26.2) · History of falling (Z91.81) Precaution/Allergies: 
Pneumovax 23 [pneumococcal 23-layton ps vaccine] ASSESSMENT:  
 
Ms. Miri Ford is a 70 y.o. Female with primary diagnosis listed above. Pt is sitting in bedside chair upon contact, A&O x 4, and agreeable to PT Evaluation. Pt states she lives with  in two story home with all needs on first floor with 4 steps to enter with a hand rail, and a walk in shower with seat and grab bars.  Pt states she is independent with ambulation at baseline, though she has been increasingly unsteady and grabbing at furniture when she walks around the house since her fall two weeks ago when she hurt her L knee. Pt states she drives and is the caregiver for her , who requires full time assistance. Pt states she has 4 grown children around that can help if she needs it. Pt reports 1/10 L knee pain currently. Pt performed STS with SBA, demonstrating good static standing balance. Pt ambulated 100ft in hallway with CGA and HHA x1, demonstrating slow, antalgic gait with weight shifted off of L leg and decreased R step length before returning to bedside chair. Pt became unsteady towards end of ambulation, reaching for furniture with other hand and requiring cuing for safety awareness and pacing. Discussed benefits of AD use to improve safety with ambulation and decrease fall risk, pt agreeable to AD use with encouragement. Pt performed additional STS with SBA and RW. Pt ambulated 20ft around room with RW and SBA, demonstrating steadier/smoother gait with verbal/visual cuing for RW management before returning to sitting EOB. Discussed DC needs and PT POC with patient. Pt left upright in bedside chair with all needs met and within reach. Eamon Montanez will benefit from skilled PT (medically necessary) to address decreased strength, decreased balance, decreased functional tolerance, decreased cardiopulmonary endurance affecting participation in basic ADLs and functional tasks. Discussed DC needs with SW. Pt may benefit from HHPT upon discharge from hospital. 
 
This section established at most recent assessment PROBLEM LIST (Impairments causing functional limitations): 1. Decreased Strength 2. Decreased Transfer Abilities 3. Decreased Ambulation Ability/Technique 4. Decreased Balance 5. Increased Pain 6. Decreased Activity Tolerance 7. Decreased Pacing Skills 8. Decreased Work Simplification/Energy Conservation Techniques 9.  Decreased Gentry with Home Exercise Program 
 INTERVENTIONS PLANNED: (Benefits and precautions of physical therapy have been discussed with the patient.) 1. Balance Exercise 2. Bed Mobility 3. Family Education 4. Gait Training 5. Home Exercise Program (HEP) 6. Manual Therapy 7. Neuromuscular Re-education/Strengthening 8. Range of Motion (ROM) 9. Therapeutic Activites 10. Therapeutic Exercise/Strengthening 11. Transfer Training 12. Group Therapy TREATMENT PLAN: Frequency/Duration: 3 times a week for duration of hospital stay Rehabilitation Potential For Stated Goals: Good RECOMMENDED REHABILITATION/EQUIPMENT: (at time of discharge pending progress): Due to the probability of continued deficits (see above) this patient will likely need continued skilled physical therapy after discharge. Equipment:  
? Walkers, Type: Rolling [place]  HISTORY:  
History of Present Injury/Illness (Reason for Referral): Chief Complaint: 
Left leg cellulitis with underlying hardware 
  
History of Present Illness:    
Wound Caused By: Trauma, fall ~ 8/30/2018 Associated Signs and Symptoms: Knee wound, pain , swelling Timing: constant Quality: wound and cellulitis Severity: Full thickness wound 
  
Has been seen at urgent care twice, started on PO abx and had IM injection x 1. Cellulitis has persisted. Evaluated today at wound center and discussed with ortho who concur that aggressive course warranted in order to avoid secondary infection of her knee hardware. Past Medical History/Comorbidities: Ms. Rafi Morales  has a past medical history of Calculus of kidney; GERD (gastroesophageal reflux disease); Headache(784.0); Hypercholesterolemia; Hypertension; Primary insomnia (7/17/2017); and Thyroid disease. Ms. Rafi Morales  has a past surgical history that includes hx salpingo-oophorectomy; pr removal of kidney stone; hx colonoscopy (Jan 2013); and hx colonoscopy (7/5/2016). Social History/Living Environment:  
Home Environment: Private residence # Steps to Enter: 4 Rails to Enter: Yes Hand Rails : Right Wheelchair Ramp: No 
One/Two Story Residence: Two story, live on 1st floor Living Alone: No 
Support Systems: Spouse/Significant Other/Partner Patient Expects to be Discharged to[de-identified] Private residence Current DME Used/Available at Home: Grab bars, Shower chair, Wheelchair, Walker, rolling Tub or Shower Type: Shower Prior Level of Function/Work/Activity: 
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  
Most Recent Physical Functioning:  
Gross Assessment: 
AROM: Within functional limits Strength: Generally decreased, functional 
Coordination: Within functional limits Tone: Normal 
Sensation: Impaired (decreased in L foot compared to R) Posture: 
  
Balance: 
Sitting: Intact Standing: Impaired Standing - Static: Good Standing - Dynamic : Fair Bed Mobility: 
  
Wheelchair Mobility: 
  
Transfers: 
Sit to Stand: Stand-by assistance Stand to Sit: Stand-by assistance Gait: 
  
Base of Support: Shift to right Speed/Nathalie: Shuffled; Slow Step Length: Right shortened Gait Abnormalities: Antalgic;Decreased step clearance; Path deviations Distance (ft): 100 Feet (ft) (+20) Assistive Device: Other (comment); Walker, rolling (HHA x 1) Ambulation - Level of Assistance: Contact guard assistance; Other (comment) (SBA with RW) Interventions: Visual/Demos; Verbal cues; Safety awareness training Body Structures Involved: 1. Heart 2. Lungs 3. Bones 4. Joints 5. Muscles 6. Ligaments Body Functions Affected: 1. Cardio 2. Respiratory 3. Neuromusculoskeletal 
4. Movement Related Activities and Participation Affected: 1. Mobility 2. Self Care 3. Domestic Life 4. Interpersonal Interactions and Relationships 5. Community, Social and [employer] [place] Number of elements that affect the Plan of Care: 4+: HIGH COMPLEXITY CLINICAL PRESENTATION:  
Presentation: Evolving clinical presentation with changing clinical characteristics: MODERATE COMPLEXITY CLINICAL DECISION MAKING:  
[facility] AM-PAC 6 Clicks Basic Mobility Inpatient Short Form How much difficulty does the patient currently have. .. Unable A Lot A Little None 1. Turning over in bed (including adjusting bedclothes, sheets and blankets)? [] 1   [] 2   [] 3   [x] 4  
2. Sitting down on and standing up from a chair with arms ( e.g., wheelchair, bedside commode, etc.)   [] 1   [] 2   [x] 3   [] 4  
3. Moving from lying on back to sitting on the side of the bed? [] 1   [] 2   [] 3   [x] 4 How much help from another person does the patient currently need. .. Total A Lot A Little None 4. Moving to and from a bed to a chair (including a wheelchair)? [] 1   [] 2   [x] 3   [] 4  
5. Need to walk in hospital room? [] 1   [] 2   [x] 3   [] 4  
6. Climbing 3-5 steps with a railing? [] 1   [x] 2   [] 3   [] 4  
© 2007, Trustees of [facility], under license to [employer]. All rights reserved Score:  Initial: 19 Most Recent: X (Date: -- ) Interpretation of Tool:  Represents activities that are increasingly more difficult (i.e. Bed mobility, Transfers, Gait). Score 24 23 22-20 19-15 14-10 9-7 6 Modifier CH CI CJ CK CL CM CN   
 
? Mobility - Walking and Moving Around:  
  - CURRENT STATUS: CK - 40%-59% impaired, limited or restricted  - GOAL STATUS: CJ - 20%-39% impaired, limited or restricted  - D/C STATUS:  ---------------To be determined--------------- [place] / [place] B / Product Type: Medicare /   
 
Medical Necessity:    
· Patient demonstrates good rehab potential due to higher previous functional level. Reason for Services/Other Comments: · Patient continues to require skilled intervention due to impaired activity tolerance and functional mobility. Use of outcome tool(s) and clinical judgement create a POC that gives a: Clear prediction of patient's progress: LOW COMPLEXITY  
  
 
 
 
TREATMENT:  
(In addition to Assessment/Re-Assessment sessions the following treatments were rendered) Pre-treatment Symptoms/Complaints: \"My knee hurts when I move\" Pain: Initial:  
Pain Intensity 1: 1 Pain Location 1: Knee Pain Orientation 1: Left Pain Intervention(s) 1: Ambulation/Increased Activity  Post Session:  Increased with mobility, 1/10 after In addition to PT Evaluation: 
Therapeutic Activity: (    8 minutes): Therapeutic activities including Chair transfers, Ambulation on level ground and education on AD use, fall prevention, RW management, safety awareness, and pacing to improve mobility, strength, balance and coordination. Required minimal Visual/Demos; Verbal cues; Safety awareness training to promote dynamic balance in standing. Braces/Orthotics/Lines/Etc:  
· O2 Device: Room air Treatment/Session Assessment:   
· Response to Treatment:  See above · Interdisciplinary Collaboration:  
o Physical Therapist 
o Registered Nurse 
o  · After treatment position/precautions:  
o Up in chair 
o Bed/Chair-wheels locked 
o Bed in low position 
o Call light within reach · Compliance with Program/Exercises: Will assess as treatment progresses. · Recommendations/Intent for next treatment session: \"Next visit will focus on advancements to more challenging activities and reduction in assistance provided\". Total Treatment Duration: PT Patient Time In/Time Out Time In: 1030 Time Out: 1050 Deny Hartman, PT

## 2018-09-13 NOTE — CONSULTS
[facility]    Willy Boyle  MR#: [id number]  : 1940  ACCOUNT #: [de-identified]   DATE OF SERVICE: 2018    CHIEF COMPLAINT:  Left knee wound and cellulitis. HISTORY OF PRESENT ILLNESS:  This is a 61-year-old female who has been admitted to the care of Dr. Kevin Jordan. She reports that about 2 weeks ago she fell and sustained a direct blow to the anterior aspect of her left knee. She has been under the care of someone at an urgent treatment center, and more recently Dr. Kevin Jordan, for a wound about the medial aspect of the left knee. She has a laceration that does not seem to penetrate through the dermis that is about 3 inches x 3/4 of an inch medial to the left patella. There is a conflicting history from my office personal. The patient has clarified her history for me. 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 This procedure is in this patient's chart at our office. By looking at this lady's x-rays from this admission it is apparent that such a procedure was not done on  this patient. This patient reports that she probably has not seen Dr. Fatimah Esquivel in a couple of years and that she has not had any surgery on her left knee since . She said she had a partial knee replacement done in  and that it has been doing well until the recent injury. PAST FAMILY AND SOCIAL HISTORY:  I have no significant additions or deletions to that already recorded in the chart. PHYSICAL EXAMINATION:    GENERAL APPEARANCE:  She is alert, oriented and an excellent historian. The exam takes place with her sitting on the edge of her hospital bed. EXTREMITIES:  Her left knee has a parapatellar incision, consistent with total knee replacement.   It is slightly lateral to the patella. She has an open wound that is about 3 inches x 3/4 of an inch on the medial aspect slightly inferior to the patella. She can fully extend her knee. There is redness that begins at the level cephalad edge of the patella and extends distally to the proximal tibial area ( about 2\" below the tibial tubecle). There is fluctuance inferior to the patella encompassing a circular area that is about 1.5 inches in diameter. There does not seem to be an effusion of the knee. Her knee is not unusually tender; however, she reports that she has neuropathy and can \"barely feel her feet. \"  The toes have brisk capillary filling, but I cannot feel a posterior tibial or tibial pulse. X-RAYS:  Three x-rays of the left knee are reviewed by me and these show a knee arthroplasty in place that is a unicondylar medial knee replacement. There are speckled calcifications in the subcutaneous fat noted anterior to the tibia and there is ossification at the inferior pole of the patella that may represent ossification of the patellar ligament. I do not note a fracture. DIAGNOSIS:  Cellulitis of the left knee. DISCUSSION:  Our interoffice communication indicated that Dr. Willean Shone will evaluate and treat this problem. Based on her exam now, I do not believe any urgent surgery is needed. I think her long-term plan is best optimized if her operating surgeon, Dr. Willean Shone, makes decisions in conjunction with the treatment team here.       Norbert Ly MD       [facility] / R[facility]  D: 09/12/2018 17:43     T: 09/12/2018 18:04  JOB #: [id number]  CC: Minda Denver MD

## 2018-09-14 ENCOUNTER — HOME HEALTH ADMISSION (OUTPATIENT)
Dept: HOME HEALTH SERVICES | Facility: HOME HEALTH | Age: 78
End: 2018-09-14
Payer: MEDICARE

## 2018-09-14 VITALS
WEIGHT: 214 LBS | BODY MASS INDEX: 36.73 KG/M2 | SYSTOLIC BLOOD PRESSURE: 121 MMHG | DIASTOLIC BLOOD PRESSURE: 68 MMHG | HEART RATE: 90 BPM | RESPIRATION RATE: 18 BRPM | TEMPERATURE: 99.6 F | OXYGEN SATURATION: 94 %

## 2018-09-14 PROCEDURE — 90686 IIV4 VACC NO PRSV 0.5 ML IM: CPT | Performed by: SURGERY

## 2018-09-14 PROCEDURE — 74011250636 HC RX REV CODE- 250/636: Performed by: SURGERY

## 2018-09-14 PROCEDURE — 90471 IMMUNIZATION ADMIN: CPT

## 2018-09-14 PROCEDURE — 77030018719 HC DRSG PTCH ANTIMIC J&J -A

## 2018-09-14 PROCEDURE — 74011000258 HC RX REV CODE- 258: Performed by: NURSE PRACTITIONER

## 2018-09-14 PROCEDURE — 77030018786 HC NDL GD F/USND BARD -B

## 2018-09-14 PROCEDURE — 74011250636 HC RX REV CODE- 250/636: Performed by: NURSE PRACTITIONER

## 2018-09-14 PROCEDURE — 74011250637 HC RX REV CODE- 250/637: Performed by: SURGERY

## 2018-09-14 PROCEDURE — 76937 US GUIDE VASCULAR ACCESS: CPT

## 2018-09-14 PROCEDURE — C1751 CATH, INF, PER/CENT/MIDLINE: HCPCS

## 2018-09-14 RX ADMIN — INFLUENZA VIRUS VACCINE 0.5 ML: 15; 15; 15; 15 SUSPENSION INTRAMUSCULAR at 17:27

## 2018-09-14 RX ADMIN — CEFEPIME HYDROCHLORIDE 2 G: 2 INJECTION, POWDER, FOR SOLUTION INTRAVENOUS at 11:18

## 2018-09-14 RX ADMIN — Medication 5 ML: at 06:24

## 2018-09-14 RX ADMIN — LEVOTHYROXINE SODIUM 25 MCG: 50 TABLET ORAL at 07:46

## 2018-09-14 RX ADMIN — ENOXAPARIN SODIUM 40 MG: 40 INJECTION, SOLUTION INTRAVENOUS; SUBCUTANEOUS at 00:00

## 2018-09-14 RX ADMIN — POTASSIUM CHLORIDE 20 MEQ: 20 TABLET, EXTENDED RELEASE ORAL at 07:48

## 2018-09-14 RX ADMIN — PANTOPRAZOLE SODIUM 40 MG: 40 TABLET, DELAYED RELEASE ORAL at 06:23

## 2018-09-14 RX ADMIN — PANTOPRAZOLE SODIUM 40 MG: 40 TABLET, DELAYED RELEASE ORAL at 07:30

## 2018-09-14 RX ADMIN — GABAPENTIN 100 MG: 100 CAPSULE ORAL at 07:46

## 2018-09-14 RX ADMIN — Medication 1 AMPULE: at 07:45

## 2018-09-14 RX ADMIN — ATORVASTATIN CALCIUM 40 MG: 40 TABLET, FILM COATED ORAL at 07:46

## 2018-09-14 RX ADMIN — HYDROCHLOROTHIAZIDE: 25 TABLET ORAL at 07:48

## 2018-09-14 NOTE — PROGRESS NOTES
Infectious Disease Note Today's Date: 2018 Admit Date: 2018 Impression: · L knee cellulitis complicated by ?hematoma to partial TKA · PVD-wears compression hose · Hx of yina partial TKAs () · TIARRA-improved Plan: · Switch to Cefepime 2g IV q8h Duration: minimum 2 weeks, EOT 18. · Patient has an office appt with Maria D Michel next week · Place PICC. OPAT order written. ID appt:  at 940AM 
 
35 min spent with patient/ 50% c/c/c as in discussing outpt abx plans with case management and patient, coordinating ID plans. Anti-infectives: · Vanc (-) · Zosyn (-)--CTX (-) · Cefepime (- Subjective:  
 69 yo female seen at [facility] for L knee cellulitis. She states site occurred after she fell in her bedroom. Patient notes hx of poor wound healing with previous traumas and went to urgent care --she was given prescription for Keflex. Site continued to worsen and went back to urgent care on --at that time, she was given prescription for doxycycline, IM CTX, and order for wound care. She went to wound care, where Dr Dori Astudillo performed debridement. Cx+ GNRx2. Discharged home with Cefepime for 2 weeks. -Doing well, L knee stable-no change appreciated overnight. Allergies Allergen Reactions  Pneumovax 23 [Pneumococcal 23-Janell Ps Vaccine] Other (comments) fever Review of Systems:  A comprehensive review of systems was negative except for that written in the History of Present Illness. Objective:  
 
Visit Vitals  /71  Pulse 79  Temp 98.7 °F (37.1 °C)  Resp 17  SpO2 93% Temp (24hrs), Av.2 °F (37.3 °C), Min:98.2 °F (36.8 °C), Max:100 °F (37.8 °C) Lines:  Peripheral IV:   
   
 
 
Physical Exam:   
General:  Alert, cooperative, well noursished, well developed, appears stated age Eyes:  Sclera anicteric. Pupils equally round and reactive to light. Mouth/Throat: Mucous membranes normal, oral pharynx clear Neck: Supple Lungs:   Clear to auscultation bilaterally, good effort CV:  Regular rate and rhythm,no murmur, click, rub or gallop Abdomen:   Soft, non-tender. bowel sounds normal. non-distended Extremities: + 3 pitting edema to LLE. L anterior medial knee wound (6.5x2. 3x0.3cm), + erythema down calf to proximal knee, calor, tenderness Skin: Skin color, texture, turgor normal. no acute rash or lesions Lymph nodes: Cervical and supraclavicular normal  
Musculoskeletal: No swelling or deformity Lines/Devices:  Intact, no erythema, drainage or tenderness Psych: Alert and oriented, normal mood affect given the setting Data Review: CBC: 
Recent Labs  
   09/13/18 
 0401  09/11/18 2004 WBC   --   10.5 HGB  8.9*  9.7* HCT  27.9*  30.3* PLT   --   332 BMP: 
Recent Labs  
   09/13/18 0401 09/11/18 2004 CREA  1.22*  1.78* K  3.7   --   
 
 
LFTS: 
No results for input(s): TBILI, ALT, SGOT, AP, TP, ALB in the last 72 hours. Microbiology:  
 
All Micro Results Procedure Component Value Units Date/Time CULTURE, BLOOD [[id number]] Collected:  09/11/18 2004 Order Status:  Completed Specimen:  Blood from Blood Updated:  09/14/18 0730 Special Requests: --     
  RIGHT Antecubital 
  
  Culture result: NO GROWTH 3 DAYS Imaging:  
L knee xray WNL. Signed By: Toby Benavides NP September 14, 2018

## 2018-09-14 NOTE — PROGRESS NOTES
PICC PLACEMENT NOTE PRE-PROCEDURE VERIFICATION Correct Patient: Yes (Time out preformed)Heather Reina rn in agreement with time out. Consent Procedure: Yes Appropriate Site: Yes Temperature: Temp: 98.7 °F (37.1 °C), Temperature Source: Temp Source: Oral 
 
Recent Labs  
   09/13/18 
 0401  09/11/18 2004 CREA  1.22*  1.78* PLT   --   332 WBC   --   10.5 Allergies: Pneumovax 23 [pneumococcal 23-layton ps vaccine] Education materials for PICC Care given to family: yes. See Patient Education activity for further details. PICC Booklet placed on bedside table. PROCEDURE DETAIL A double lumen PICC line was started for antibiotic therapy. The following documentation is in addition to the PICC properties in the lines/airways flowsheet : 
Lot #: [id number] 
xylocaine used: yes Mid-Arm Circumference: 39 (cm) Internal Catheter Length: 38 (cm) Internal Catheter Total Length: 38 (cm) Vein Selection for PICC:right basilic Central Line Bundle followed yes Complication Related to Insertion: none Ports flushed with positive blood return in each port. Guidewires removed intact. The placement was verified by ecg technology: yes. The ecg technology results state the tip location is on the right side and the tip overlies the lower  superior vena cava. Primary nurse notified. Line is okay to use: yes Carina Lebron RN

## 2018-09-14 NOTE — PROGRESS NOTES
Dispo update:  [facility] updated about revised IV abx order (Cefepime 2 grams IV q 12 hours). Await rounding by Dr. Justa Hughes, and if medically stable, discharge home.

## 2018-09-14 NOTE — PROGRESS NOTES
Admission Note Patient: Yaritza Cochran MRN: [id number]  SSN: [id number] YOB: 1940  Age: 70 y.o. Sex: female Subjective: Chief Complaint: 
Left leg cellulitis with underlying hardware History of Present Illness:    
Wound Caused By: Trauma, fall ~ 8/30/2018 Associated Signs and Symptoms: Knee wound, pain , swelling Timing: constant Quality: wound and cellulitis Severity: Full thickness wound Has been seen at urgent care twice, started on PO abx and had IM injection x 1. Cellulitis has persisted. Evaluated today at wound center and discussed with ortho who concur that aggressive course warranted in order to avoid secondary infection of her knee hardware. 9/12/2018 No fevers or chills overnight. Has been in bed. No leukocytosis. ESR and CRP appropriately elevated. Knee XR looks ok. Cultures (wound and blood) negative. Seen by ID and ortho consulted. Has some CKD3 but denies any acute kidney issues. 9/13/2018 Overall unchanged. Scheduled for PICC placement. Past Medical History:  
Diagnosis Date  Calculus of kidney  GERD (gastroesophageal reflux disease)  Headache(784.0)  Hypercholesterolemia  Hypertension  Primary insomnia 7/17/2017  Thyroid disease Past Surgical History:  
Procedure Laterality Date  HX COLONOSCOPY  Jan 2013 4 polyps, repeat 5 years  HX COLONOSCOPY  7/5/2016  
 repeat 5 yrs tubular adenoma  HX SALPINGO-OOPHORECTOMY  REMOVAL OF KIDNEY STONE Family History Problem Relation Age of Onset  Hypertension Mother  Arthritis-rheumatoid Mother  High Cholesterol Mother  Broken Bones Mother  Heart Failure Father  Heart Attack Father  Hypertension Brother  High Cholesterol Brother  Arthritis-osteo Brother Knee  High Cholesterol Brother  Hypertension Brother  Heart Attack Brother  Heart Attack Paternal Grandmother  Heart Attack Paternal Grandfather Social History Substance Use Topics  Smoking status: Never Smoker  Smokeless tobacco: Never Used  Alcohol use No  
   
Prior to Admission medications Medication Sig Start Date End Date Taking? Authorizing Provider  
atorvastatin (LIPITOR) 40 mg tablet Take 1 Tab by mouth daily. 8/1/18  Yes Joss Doherty MD  
levothyroxine (SYNTHROID) 25 mcg tablet Take 1 Tab by mouth every morning. 8/1/18  Yes Joss Doherty MD  
gabapentin (NEURONTIN) 100 mg capsule Take 1 Cap by mouth two (2) times a day. 8/1/18  Yes Ramon Elliott MD  
traZODone (DESYREL) 50 mg tablet Take 1 Tab by mouth nightly. 8/1/18  Yes Joss Doherty MD  
losartan-hydroCHLOROthiazide (HYZAAR) 100-25 mg per tablet Take 1 Tab by mouth daily. 7/26/18  Yes Joss Doherty MD  
potassium chloride (K-DUR, KLOR-CON) 20 mEq tablet TAKE 1 TABLET BY MOUTH 2 TIMES A DAY Patient taking differently: TAKE 1 TABLET BY MOUTH ONCE DAILY 7/10/18  Yes Ramon Elliott MD  
FLOVENT  mcg/actuation inhaler  10/28/15  Yes Historical Provider PRILOSEC OTC PO take 1 Tab by mouth every morning. Yes Historical Provider CALCIUM CITRATE + PO take 1 Tab by mouth two (2) times a day. Yes Historical Provider  
acetaminophen (TYLENOL ARTHRITIS PAIN) 650 mg CR tablet Take 650 mg by mouth every eight (8) hours. Historical Provider  
albuterol (VENTOLIN HFA) 90 mcg/actuation inhaler Take  by inhalation. Historical Provider Allergies Allergen Reactions  Pneumovax 23 [Pneumococcal 23-Janell Ps Vaccine] Other (comments) fever Review of Systems: 
 
CONSTITUTIONAL: No fever, chills HEAD: No headache EYES: No visual loss ENT: No hearing loss SKIN: No rash CARDIOVASCULAR: No chest pain RESPIRATORY: No shortness of breath GASTROINTESTINAL: No nausea, vomiting GENITOURINARY: No excessive urination NEUROLOGICAL: No weakness MUSCULOSKELETAL: Left leg swelling, pain as above HEMATOLOGIC: No easy bleeding LYMPHATICS: No lymphedema. PSYCHIATRIC: No current depression ENDOCRINOLOGIC: No high sugars ALLERGIES: No history of asthma, hives, eczema or rhinitis. Immunization History Administered Date(s) Administered  Influenza High Dose Vaccine PF 09/08/2016, 11/28/2017  Influenza Vaccine 10/10/2013, 10/09/2014  Influenza Vaccine PF 10/26/2015  Influenza Vaccine Split 10/29/2012  Pneumococcal Polysaccharide (PPSV-23) 06/28/2007, 08/27/2007  TDAP Vaccine 04/12/2012, 10/29/2012  Tdap 04/12/2012, 10/29/2012  Zoster 10/04/2011  Zoster Vaccine, Live 10/04/2011 There is no height or weight on file to calculate BMI. Counseling regarding nutrition done: No  
 
Current medications: 
Current Facility-Administered Medications Medication Dose Route Frequency Provider Last Rate Last Dose  influenza vaccine 2018-19 (6 mos+)(PF) (FLUARIX QUAD/FLULAVAL QUAD) injection 0.5 mL  0.5 mL Intra[facility] PRIOR TO DISCHARGE Matilde Vasquez MD      
 cefTRIAXone (ROCEPHIN) 2 g in 0.9% sodium chloride (MBP/ADV) 50 mL  2 g IntraVENous Q24H Luis Eduardo Anaya  mL/hr at 09/13/18 1331 2 g at 09/13/18 1331  
 vancomycin (VANCOCIN) 1500 mg in  ml infusion  1,500 mg IntraVENous Q24H Luis Eduardo Anaya .3 mL/hr at 09/13/18 1424 1,500 mg at 09/13/18 1424  potassium chloride (K-DUR, KLOR-CON) SR tablet 20 mEq  20 mEq Oral DAILY Yoanna Brooks MD   20 mEq at 09/13/18 1331  
 sodium chloride (NS) flush 5-10 mL  5-10 mL IntraVENous Q8H Yoanna Brooks MD   10 mL at 09/13/18 1331  
 sodium chloride (NS) flush 5-10 mL  5-10 mL IntraVENous PRN Matilde Vasquez MD      
 enoxaparin (LOVENOX) injection 40 mg  40 mg SubCUTAneous Q24H Matilde Vasquez MD   40 mg at 09/13/18 0007  alcohol 62% (NOZIN) nasal  1 Ampule  1 Ampule Topical Q12H Matilde Vasquez MD   1 Ampule at 09/13/18 3063  atorvastatin (LIPITOR) tablet 40 mg  40 mg Oral DAILY Jose Jackson MD   40 mg at 09/13/18 7926  levothyroxine (SYNTHROID) tablet 25 mcg  25 mcg Oral DAILY Iannadine Neida Brooks MD   25 mcg at 09/13/18 0755  
 gabapentin (NEURONTIN) capsule 100 mg  100 mg Oral BID Jose Jackson MD   100 mg at 09/13/18 1755  traZODone (DESYREL) tablet 50 mg  50 mg Oral QHS Jose Jackson MD   50 mg at 09/12/18 2209  losartan/hydroCHLOROthiazide (HYZAAR) 100/25 mg   Oral DAILY Vaishail Brooks MD      
 pantoprazole (PROTONIX) tablet 40 mg  40 mg Oral ACB Jose Jackson MD   40 mg at 09/13/18 7068  acetaminophen (TYLENOL) tablet 650 mg  650 mg Oral Q6H PRN Jose Jackson MD      
 oxyCODONE-acetaminophen (PERCOCET) 5-325 mg per tablet 1 Tab  1 Tab Oral Q4H PRN Jose Jackson MD   1 Tab at 09/12/18 0850 Objective:  
 
Physical Exam:  
 
Visit Vitals  /73  Pulse 85  Temp 99.1 °F (37.3 °C)  Resp 18  SpO2 94% General: well developed, well nourished Psych: cooperative. Pleasant. Neuro: alert and oriented to person/place/situation. Derm: Normal turgor for age. Left leg with swelling, redness. Marked. 2+ pitting edema. HEENT: Normocephalic, atraumatic. EOMI. Neck: Normal range of motion. Chest: Good air entry bilaterally. Cardio: RRR Abdomen: Soft, nontender Left knee with FROM active and passive. Knee with wound 6 x 2 cm wound, some underlying fat, ecchymoses. Prepatellar fluid collection, fluctuant. Overlying skin insensate. Lama type lesion with larger area of skin questionable. Assessment:  
 
Left knee wound and cellulitis, effusion, underlying hardware, unimproved despite aggressive inpatient treatment. Plan: - Reviewed ortho note. Will discuss. - Appreciate ID input. Discussed vancomycin toxicity with patient and CKD and she understands. Hopefully can de-escalate that as soon as possible. - Follow cultures - Continue home meds for bp, thyroid, cholesterol, GERD 
- Up and OOB. Will consult PT as well. Signed By: Ivory Lee MD   
 September 13, 2018

## 2018-09-14 NOTE — PROGRESS NOTES
Dispo update:  Spoke to MsTherese Oumou Alston in room 201 again about discharge planning. Agreed to home IV abx (Cefepime 2 grams IV q 8 hours) with services from [facility] (faxed them new orders at fax [phone] and updated Ms. Delaney Gómez, RN liaison) and [facility] RN and PT. Order, face to face, and referral placed for [facility]. Await PICC placement, visit from [facility] liaison, Cefepime dose, and then anticipate discharge home later today.   Also updated Dr. Minerva Childers MD.

## 2018-09-14 NOTE — DISCHARGE INSTRUCTIONS
DISCHARGE SUMMARY from Nurse    PATIENT INSTRUCTIONS:    After general anesthesia or intravenous sedation, for 24 hours or while taking prescription Narcotics:  · Limit your activities  · Do not drive and operate hazardous machinery  · Do not make important personal or business decisions  · Do  not drink alcoholic beverages  · If you have not urinated within 8 hours after discharge, please contact your surgeon on call. Report the following to your surgeon:  · Excessive pain, swelling, redness or odor of or around the surgical area  · Temperature over 100.5  · Nausea and vomiting lasting longer than 4 hours or if unable to take medications  · Any signs of decreased circulation or nerve impairment to extremity: change in color, persistent  numbness, tingling, coldness or increase pain  · Any questions    What to do at Home:  Recommended activity: Activity as tolerated, per MD instructions    If you experience any of the following symptoms fever > 100.5, nausea, vomiting, pain, chest pain and/or shortness of breath to ER please follow up with MD.    *  Please give a list of your current medications to your Primary Care Provider. *  Please update this list whenever your medications are discontinued, doses are      changed, or new medications (including over-the-counter products) are added. *  Please carry medication information at all times in case of emergency situations. These are general instructions for a healthy lifestyle:    No smoking/ No tobacco products/ Avoid exposure to second hand smoke  Surgeon General's Warning:  Quitting smoking now greatly reduces serious risk to your health.     Obesity, smoking, and sedentary lifestyle greatly increases your risk for illness    A healthy diet, regular physical exercise & weight monitoring are important for maintaining a healthy lifestyle    You may be retaining fluid if you have a history of heart failure or if you experience any of the following symptoms: Weight gain of 3 pounds or more overnight or 5 pounds in a week, increased swelling in our hands or feet or shortness of breath while lying flat in bed. Please call your doctor as soon as you notice any of these symptoms; do not wait until your next office visit. Recognize signs and symptoms of STROKE:    F-face looks uneven    A-arms unable to move or move unevenly    S-speech slurred or non-existent    T-time-call 911 as soon as signs and symptoms begin-DO NOT go       Back to bed or wait to see if you get better-TIME IS BRAIN. Warning Signs of HEART ATTACK     Call 911 if you have these symptoms:   Chest discomfort. Most heart attacks involve discomfort in the center of the chest that lasts more than a few minutes, or that goes away and comes back. It can feel like uncomfortable pressure, squeezing, fullness, or pain.  Discomfort in other areas of the upper body. Symptoms can include pain or discomfort in one or both arms, the back, neck, jaw, or stomach.  Shortness of breath with or without chest discomfort.  Other signs may include breaking out in a cold sweat, nausea, or lightheadedness. Don't wait more than five minutes to call 911 - MINUTES MATTER! Fast action can save your life. Calling 911 is almost always the fastest way to get lifesaving treatment. Emergency Medical Services staff can begin treatment when they arrive -- up to an hour sooner than if someone gets to the hospital by car. The discharge information has been reviewed with the patient. The patient verbalized understanding. Discharge medications reviewed with the patient and appropriate educational materials and side effects teaching were provided. ___________________________________________________________________________________________________________________________________       Cellulitis: Care Instructions  Your Care Instructions    Cellulitis is a skin infection caused by bacteria, most often strep or staph.  It often occurs after a break in the skin from a scrape, cut, bite, or puncture, or after a rash. Cellulitis may be treated without doing tests to find out what caused it. But your doctor may do tests, if needed, to look for a specific bacteria, like methicillin-resistant Staphylococcus aureus (MRSA). The doctor has checked you carefully, but problems can develop later. If you notice any problems or new symptoms, get medical treatment right away. Follow-up care is a key part of your treatment and safety. Be sure to make and go to all appointments, and call your doctor if you are having problems. It's also a good idea to know your test results and keep a list of the medicines you take. How can you care for yourself at home? · Take your antibiotics as directed. Do not stop taking them just because you feel better. You need to take the full course of antibiotics. · Prop up the infected area on pillows to reduce pain and swelling. Try to keep the area above the level of your heart as often as you can. · If your doctor told you how to care for your wound, follow your doctor's instructions. If you did not get instructions, follow this general advice:  ¨ Wash the wound with clean water 2 times a day. Don't use hydrogen peroxide or alcohol, which can slow healing. ¨ You may cover the wound with a thin layer of petroleum jelly, such as Vaseline, and a nonstick bandage. ¨ Apply more petroleum jelly and replace the bandage as needed. · Be safe with medicines. Take pain medicines exactly as directed. ¨ If the doctor gave you a prescription medicine for pain, take it as prescribed. ¨ If you are not taking a prescription pain medicine, ask your doctor if you can take an over-the-counter medicine. To prevent cellulitis in the future  · Try to prevent cuts, scrapes, or other injuries to your skin. Cellulitis most often occurs where there is a break in the skin.   · If you get a scrape, cut, mild burn, or bite, wash the wound with clean water as soon as you can to help avoid infection. Don't use hydrogen peroxide or alcohol, which can slow healing. · If you have swelling in your legs (edema), support stockings and good skin care may help prevent leg sores and cellulitis. · Take care of your feet, especially if you have diabetes or other conditions that increase the risk of infection. Wear shoes and socks. Do not go barefoot. If you have athlete's foot or other skin problems on your feet, talk to your doctor about how to treat them. When should you call for help? Call your doctor now or seek immediate medical care if:    · You have signs that your infection is getting worse, such as:  ¨ Increased pain, swelling, warmth, or redness. ¨ Red streaks leading from the area. ¨ Pus draining from the area. ¨ A fever.     · You get a rash.    Watch closely for changes in your health, and be sure to contact your doctor if:    · You do not get better as expected. Where can you learn more? Go to http://jenna-maya.info/. Cassandra Red in the search box to learn more about \"Cellulitis: Care Instructions. \"  Current as of: May 10, 2017  Content Version: 11.7  © 4442-0413 [employer]. Care instructions adapted under license by [employer] (which disclaims liability or warranty for this information). If you have questions about a medical condition or this instruction, always ask your healthcare professional. [place] [address] any warranty or liability for your use of this information. Skin Abscess: Care Instructions  Your Care Instructions    A skin abscess is a bacterial infection that forms a pocket of pus. A boil is a kind of skin abscess. The doctor may have cut an opening in the abscess so that the pus can drain out. You may have gauze in the cut so that the abscess will stay open and keep draining. You may need antibiotics.  You will need to follow up with your doctor to make sure the infection has gone away. The doctor has checked you carefully, but problems can develop later. If you notice any problems or new symptoms, get medical treatment right away. Follow-up care is a key part of your treatment and safety. Be sure to make and go to all appointments, and call your doctor if you are having problems. It's also a good idea to know your test results and keep a list of the medicines you take. How can you care for yourself at home? · Apply warm and dry compresses, a heating pad set on low, or a hot water bottle 3 or 4 times a day for pain. Keep a cloth between the heat source and your skin. · If your doctor prescribed antibiotics, take them as directed. Do not stop taking them just because you feel better. You need to take the full course of antibiotics. · Take pain medicines exactly as directed. ¨ If the doctor gave you a prescription medicine for pain, take it as prescribed. ¨ If you are not taking a prescription pain medicine, ask your doctor if you can take an over-the-counter medicine. · Keep your bandage clean and dry. Change the bandage whenever it gets wet or dirty, or at least one time a day. · If the abscess was packed with gauze:  ¨ Keep follow-up appointments to have the gauze changed or removed. If the doctor instructed you to remove the gauze, gently pull out all of the gauze when your doctor tells you to. ¨ After the gauze is removed, soak the area in warm water for 15 to 20 minutes 2 times a day, until the wound closes. When should you call for help? Call your doctor now or seek immediate medical care if:    · You have signs of worsening infection, such as:  ¨ Increased pain, swelling, warmth, or redness. ¨ Red streaks leading from the infected skin. ¨ Pus draining from the wound. ¨ A fever.    Watch closely for changes in your health, and be sure to contact your doctor if:    · You do not get better as expected. Where can you learn more?   Go to http://jenna-maya.info/. Enter Y029 in the search box to learn more about \"Skin Abscess: Care Instructions. \"  Current as of: May 10, 2017  Content Version: 11.7  © 1198-9895 [employer], [facility]. Care instructions adapted under license by [employer] (which disclaims liability or warranty for this information). If you have questions about a medical condition or this instruction, always ask your healthcare professional. [place] [address] any warranty or liability for your use of this information.

## 2018-09-14 NOTE — PROGRESS NOTES
[facility] Face to Face Encounter Patients Name: Marco A Jackson    YOB: 1940 Ordering Physician: Dr. Mechelle Wadsworth MD  
 
Primary Diagnosis: lt leg cellulitis Cellulitis and abscess of left lower extremity Cellulitis and abscess of left lower extremity Date of Face to Face:   9/14/2018 Face to Face Encounter findings are related to primary reason for home care:   yes. 1. I certify that the patient needs intermittent care as follows: skilled nursing care:  teaching/training of IV abx, PICC dressing changes, blood draws 
physical therapy: strengthening, stretching/ROM, transfer training, gait/stair training, balance training and pt/caregiver education 2. I certify that this patient is homebound, that is: 1) patient requires the use of a walker device, special transportation, or assistance of another to leave the home; or 2) patient's condition makes leaving the home medically contraindicated; and 3) patient has a normal inability to leave the home and leaving the home requires considerable and taxing effort. Patient may leave the home for infrequent and short duration for medical reasons, and occasional absences for non-medical reasons. Homebound status is due to the following functional limitations: Patient currently under activity restrictions secondary to recent surgical procedure, this hinders their ability to safely leave the home. 3. I certify that this patient is under my care and that I, or a nurse practitioner or [phone], or clinical nurse specialist, or certified nurse midwife, working with me, had a Face-to-Face Encounter that meets the physician Face-to-Face Encounter requirements. The following are the clinical findings from the [address] [place] [address] encounter that support the need for skilled services and is a summary of the encounter: see hospital chart See hospital chart Mino Meyers RN 
9/14/2018 THE FOLLOWING TO BE COMPLETED BY THE COMMUNITY PHYSICIAN: 
 
I concur with the findings described above from the F2F encounter that this patient is homebound and in need of a skilled service. Certifying Physician: _____________________________________ Printed Certifying Physician Name: _____________________________________ Date: _________________

## 2018-09-15 ENCOUNTER — HOME CARE VISIT (OUTPATIENT)
Dept: SCHEDULING | Facility: HOME HEALTH | Age: 78
End: 2018-09-15
Payer: MEDICARE

## 2018-09-15 LAB
BACTERIA SPEC CULT: ABNORMAL
BACTERIA SPEC CULT: ABNORMAL
GRAM STN SPEC: ABNORMAL
GRAM STN SPEC: ABNORMAL
SERVICE CMNT-IMP: ABNORMAL

## 2018-09-15 PROCEDURE — 400013 HH SOC

## 2018-09-15 PROCEDURE — 3331090001 HH PPS REVENUE CREDIT

## 2018-09-15 PROCEDURE — 3331090002 HH PPS REVENUE DEBIT

## 2018-09-15 PROCEDURE — G0299 HHS/HOSPICE OF RN EA 15 MIN: HCPCS

## 2018-09-16 VITALS
DIASTOLIC BLOOD PRESSURE: 80 MMHG | TEMPERATURE: 98.9 F | OXYGEN SATURATION: 95 % | HEART RATE: 105 BPM | RESPIRATION RATE: 20 BRPM | SYSTOLIC BLOOD PRESSURE: 140 MMHG

## 2018-09-16 LAB
BACTERIA SPEC CULT: NORMAL
SERVICE CMNT-IMP: NORMAL

## 2018-09-16 PROCEDURE — 3331090002 HH PPS REVENUE DEBIT

## 2018-09-16 PROCEDURE — 3331090001 HH PPS REVENUE CREDIT

## 2018-09-17 ENCOUNTER — HOME CARE VISIT (OUTPATIENT)
Dept: SCHEDULING | Facility: HOME HEALTH | Age: 78
End: 2018-09-17
Payer: MEDICARE

## 2018-09-17 ENCOUNTER — HOSPITAL ENCOUNTER (OUTPATIENT)
Dept: LAB | Age: 78
Discharge: HOME | End: 2018-09-17
Attending: INTERNAL MEDICINE
Payer: MEDICARE

## 2018-09-17 ENCOUNTER — PATIENT OUTREACH (OUTPATIENT)
Dept: CASE MANAGEMENT | Age: 78
End: 2018-09-17

## 2018-09-17 LAB
ALBUMIN SERPL-MCNC: 2.6 G/DL (ref 3.2–4.6)
ALBUMIN/GLOB SERPL: 0.5 {RATIO}
ALP SERPL-CCNC: 69 U/L (ref 50–130)
ALT SERPL-CCNC: 27 U/L (ref 12–65)
AST SERPL-CCNC: 20 U/L (ref 15–37)
BASOPHILS # BLD: 0.1 K/UL (ref 0–0.2)
BASOPHILS NFR BLD: 1 % (ref 0–2)
BILIRUB DIRECT SERPL-MCNC: 0.1 MG/DL
BILIRUB SERPL-MCNC: 0.3 MG/DL (ref 0.2–1.1)
CREAT SERPL-MCNC: 1.8 MG/DL (ref 0.6–1)
CRP SERPL-MCNC: 4.6 MG/DL (ref 0–0.9)
DIFFERENTIAL METHOD BLD: ABNORMAL
EOSINOPHIL # BLD: 0.1 K/UL (ref 0–0.8)
EOSINOPHIL NFR BLD: 1 % (ref 0.5–7.8)
ERYTHROCYTE [DISTWIDTH] IN BLOOD BY AUTOMATED COUNT: 13.4 %
GLOBULIN SER CALC-MCNC: 5.1 G/DL (ref 2.3–3.5)
HCT VFR BLD AUTO: 32.2 % (ref 35.8–46.3)
HGB BLD-MCNC: 9.8 G/DL (ref 11.7–15.4)
IMM GRANULOCYTES # BLD: 0.1 K/UL (ref 0–0.5)
IMM GRANULOCYTES NFR BLD AUTO: 1 % (ref 0–5)
LYMPHOCYTES # BLD: 1.3 K/UL (ref 0.5–4.6)
LYMPHOCYTES NFR BLD: 13 % (ref 13–44)
MCH RBC QN AUTO: 29.8 PG (ref 26.1–32.9)
MCHC RBC AUTO-ENTMCNC: 30.4 G/DL (ref 31.4–35)
MCV RBC AUTO: 97.9 FL (ref 79.6–97.8)
MONOCYTES # BLD: 1.1 K/UL (ref 0.1–1.3)
MONOCYTES NFR BLD: 11 % (ref 4–12)
NEUTS SEG # BLD: 7.5 K/UL (ref 1.7–8.2)
NEUTS SEG NFR BLD: 74 % (ref 43–78)
NRBC # BLD: 0 K/UL (ref 0–0.2)
PLATELET # BLD AUTO: 321 K/UL (ref 150–450)
PMV BLD AUTO: 11.3 FL (ref 9.4–12.3)
PROT SERPL-MCNC: 7.7 G/DL
RBC # BLD AUTO: 3.29 M/UL (ref 4.05–5.2)
WBC # BLD AUTO: 10.1 K/UL (ref 4.3–11.1)

## 2018-09-17 PROCEDURE — 82565 ASSAY OF CREATININE: CPT

## 2018-09-17 PROCEDURE — G0299 HHS/HOSPICE OF RN EA 15 MIN: HCPCS

## 2018-09-17 PROCEDURE — 85025 COMPLETE CBC W/AUTO DIFF WBC: CPT

## 2018-09-17 PROCEDURE — 3331090001 HH PPS REVENUE CREDIT

## 2018-09-17 PROCEDURE — A6402 STERILE GAUZE <= 16 SQ IN: HCPCS

## 2018-09-17 PROCEDURE — 80076 HEPATIC FUNCTION PANEL: CPT

## 2018-09-17 PROCEDURE — 86140 C-REACTIVE PROTEIN: CPT

## 2018-09-17 PROCEDURE — A4450 NON-WATERPROOF TAPE: HCPCS

## 2018-09-17 PROCEDURE — 3331090002 HH PPS REVENUE DEBIT

## 2018-09-17 NOTE — PROGRESS NOTES
This note will not be viewable in [address] Transition of Care Discharge Follow-up Questionnaire Date/Time of Call: 
 9/17/18 12:59pm  
What was the patient hospitalized for? Cellulitis And Abscess Of Left Lower Extremity Does the patient understand his/her diagnosis and/or treatment and what happened during the hospitalization? Yes, spoke with patient, she states her understanding of diagnosis and treatment; and is agreeable to call Patient is doing well, reports her daughter is a nurse and is looking after her care Did the patient receive discharge instructions? Yes   
CM Assessed Risk for Readmission:  
 
 
Patient stated Risk for Readmission: Low to moderate r/t diagnosis symptoms not resolving If this were to get worse Review any discharge instructions (see discharge instructions/AVS in [facility]). Ask patient if they understand these. Do they have any questions? Reviewed, understanding is stated, no questions at this time. Were home services ordered (nursing, PT, OT, ST, etc.)? Yes, [facility] RN/PT & [facility] for IV therapy If so, has the first visit occurred? If not, why? (Assist with coordination of services if necessary.) 
 yes Was any DME ordered? Yes, rolling walker - [employer] If so, has it been received? If not, why?  (Assist patient in obtaining DME orders &/or equipment if necessary.) yes Complete a review of all medications (new, continued and discontinued meds per the D/C instructions and medication tab in [facility]). Completed Started IV antibiotic therapy; no changes to routine medications Were all new prescriptions filled? If not, why?  (Assist patient in obtaining medications if necessary  escalate for CCM &/or SW if ongoing issues are verbalized by pt or anticipated) Yes Does the patient understand the purpose and dosing instructions for all medications? (If patient has questions, provide explanation and education.) Yes, understanding of medications Does the patient have any problems in performing ADLs? (If patient is unable to perform ADLs  what is the limiting factor(s)? Do they have a support system that can assist? If no support system is present, discuss possible assistance that they may be able to obtain. Escalate for CCM/SW if ongoing issues are verbalized by pt or anticipated) Independent with ADLs Does the patient have all follow-up appointments scheduled? 7 day f/up with PCP?  
(f/up with PCP may be w/in 14 days if patient has a f/up with their specialist w/in 7 days) 7-14 day f/up with specialist?  
(or per discharge instructions) If f/up has not been made  what actions has the care coordinator made to accomplish this? Has transportation been arranged? Yes Dr. Pipe Hickman- patient declines at this time stating I have an appointment with everyone under the sun, Ill hold off seeing him right now Dr. Humberto Man 9/21/18 Yes, no transportation issues Any other questions or concerns expressed by the patient? No further questions or concerns at this time. Patient states her gratitude for follow up. Contact information for [facility] was given, instructed to call with questions or concerns. Schedule next appointment with LARISSA MENEZES Coordinator or refer to RN Case Manager/ per the workflow guidelines. When is care coordinators next follow-up call scheduled? If referred for CCM  what RN care manager was the referral assigned? Community Care Coordinator will follow per workflow guidelines. 3 to 4 weeks PAIGE Call Completed By: Fortunato Stauffer LPN Community Care Coordinator

## 2018-09-18 VITALS
OXYGEN SATURATION: 98 % | HEART RATE: 73 BPM | TEMPERATURE: 97.9 F | SYSTOLIC BLOOD PRESSURE: 136 MMHG | DIASTOLIC BLOOD PRESSURE: 80 MMHG | RESPIRATION RATE: 20 BRPM

## 2018-09-18 PROCEDURE — 3331090001 HH PPS REVENUE CREDIT

## 2018-09-18 PROCEDURE — 3331090002 HH PPS REVENUE DEBIT

## 2018-09-19 LAB
BACTERIA SPEC CULT: NORMAL
SERVICE CMNT-IMP: NORMAL

## 2018-09-19 PROCEDURE — 3331090001 HH PPS REVENUE CREDIT

## 2018-09-19 PROCEDURE — 3331090002 HH PPS REVENUE DEBIT

## 2018-09-20 ENCOUNTER — HOME CARE VISIT (OUTPATIENT)
Dept: SCHEDULING | Facility: HOME HEALTH | Age: 78
End: 2018-09-20
Payer: MEDICARE

## 2018-09-20 PROCEDURE — 3331090002 HH PPS REVENUE DEBIT

## 2018-09-20 PROCEDURE — 3331090001 HH PPS REVENUE CREDIT

## 2018-09-21 ENCOUNTER — HOSPITAL ENCOUNTER (OUTPATIENT)
Dept: WOUND CARE | Age: 78
Discharge: HOME | End: 2018-09-21
Attending: SURGERY
Payer: MEDICARE

## 2018-09-21 VITALS
RESPIRATION RATE: 18 BRPM | HEART RATE: 97 BPM | OXYGEN SATURATION: 96 % | WEIGHT: 214.2 LBS | BODY MASS INDEX: 36.57 KG/M2 | HEIGHT: 64 IN | DIASTOLIC BLOOD PRESSURE: 94 MMHG | SYSTOLIC BLOOD PRESSURE: 152 MMHG | TEMPERATURE: 97.9 F

## 2018-09-21 PROCEDURE — 29581 APPL MULTLAYER CMPRN SYS LEG: CPT

## 2018-09-21 PROCEDURE — 3331090002 HH PPS REVENUE DEBIT

## 2018-09-21 PROCEDURE — 3331090001 HH PPS REVENUE CREDIT

## 2018-09-21 NOTE — WOUND CARE
[address] [place] [address] [address] Phone: [phone] Fax: [phone] Patient: Claudio Ellis MRN: [id number]  SSN: [id number] YOB: 1940  Age: 70 y.o. Sex: female Return Appointment: 1 week with Stewart Joseph MD 
 
Instructions: Cleanse with normal saline. Apply Hydrofera Ready to wound base. Cover with Coversite. Change dressing 3 times per week. Wrap left lower leg from toes to just below knee with Coban 2. Change wrap in 1 week at wound center. Return to wound center in 1 week. Should you experience increased redness, swelling, pain, foul odor, size of wound(s), or have a temperature over 101 degrees please contact the [address] [place] [address] Road at [phone] or if after hours contact your primary care physician or go to the hospital emergency department. Signed By: Gerry Vazquez, PT, [address] [facility],[address] September 21, 2018

## 2018-09-21 NOTE — PROGRESS NOTES
Patient: Page Boogie MRN: [id number]  SSN: [id number] YOB: 1940  Age: 70 y.o. Sex: female   
  
Subjective:  
  
Chief Complaint: 
Left leg cellulitis with underlying hardware 
  
History of Present Illness:    
Wound Caused By: Trauma, fall ~ 8/30/2018 Associated Signs and Symptoms: Knee wound, pain , swelling Timing: constant Quality: wound and cellulitis Severity: Full thickness wound 
  
9/13/2018 Has been seen at urgent care twice, started on PO abx and had IM injection x 1. Cellulitis has persisted. Evaluated today at wound center and discussed with ortho who concur that aggressive course warranted in order to avoid secondary infection of her knee hardware. 9/21/2018 Admittted following previous evaluation in wound clinic. Cultures grew GNR and has been on IV abx. Re-evaluated by Dr Sandee Clancy earlier this week. Treating for bursitis. Sees ID next week. Getting twice daily infusions.  
  
  
  
    
Past Medical History:  
Diagnosis Date  Calculus of kidney    
 GERD (gastroesophageal reflux disease)    
 Headache(784.0)    
 Hypercholesterolemia    
 Hypertension    
 Primary insomnia 7/17/2017  Thyroid disease    
  
     
Past Surgical History:  
Procedure Laterality Date  HX COLONOSCOPY   Jan 2013  
  4 polyps, repeat 5 years  HX COLONOSCOPY   7/5/2016  
  repeat 5 yrs tubular adenoma  HX SALPINGO-OOPHORECTOMY      
 REMOVAL OF KIDNEY STONE      
  
      
Family History Problem Relation Age of Onset  Hypertension Mother    
 Arthritis-rheumatoid Mother    
 High Cholesterol Mother    
 Broken Bones Mother    
 Heart Failure Father    
 Heart Attack Father    
 Hypertension Brother    
 High Cholesterol Brother    
 Arthritis-osteo Brother    
    Knee  High Cholesterol Brother    
 Hypertension Brother    
 Heart Attack Brother    
 Heart Attack Paternal Grandmother    
 Heart Attack Paternal Grandfather    
  
    
Social History Substance Use Topics  Smoking status: Never Smoker  Smokeless tobacco: Never Used  Alcohol use No  
   
       
Prior to Admission medications Medication Sig Start Date End Date Taking? Authorizing Provider  
atorvastatin (LIPITOR) 40 mg tablet Take 1 Tab by mouth daily. 8/1/18   Yes Joss Doherty MD  
levothyroxine (SYNTHROID) 25 mcg tablet Take 1 Tab by mouth every morning. 8/1/18   Yes Joss Doherty MD  
gabapentin (NEURONTIN) 100 mg capsule Take 1 Cap by mouth two (2) times a day. 8/1/18   Yes Joss Doherty MD  
traZODone (DESYREL) 50 mg tablet Take 1 Tab by mouth nightly. 8/1/18   Yes Joss Doherty MD  
losartan-hydroCHLOROthiazide (HYZAAR) 100-25 mg per tablet Take 1 Tab by mouth daily. 7/26/18   Yes Christi Weir MD  
potassium chloride (K-DUR, KLOR-CON) 20 mEq tablet TAKE 1 TABLET BY MOUTH 2 TIMES A DAY Patient taking differently: TAKE 1 TABLET BY MOUTH ONCE DAILY 7/10/18   Yes Christi Weir MD  
FLOVENT  mcg/actuation inhaler   10/28/15   Yes Historical Provider PRILOSEC OTC PO take 1 Tab by mouth every morning.     Yes Historical Provider CALCIUM CITRATE + PO take 1 Tab by mouth two (2) times a day.     Yes Historical Provider  
acetaminophen (TYLENOL ARTHRITIS PAIN) 650 mg CR tablet Take 650 mg by mouth every eight (8) hours.       Historical Provider  
albuterol (VENTOLIN HFA) 90 mcg/actuation inhaler Take  by inhalation.       Historical Provider  
  
     
Allergies Allergen Reactions  Pneumovax 23 [Pneumococcal 23-Janell Ps Vaccine] Other (comments)  
    fever  
  
  
Review of Systems: 
  
CONSTITUTIONAL: No fever, chills HEAD: No headache EYES: No visual loss ENT: No hearing loss SKIN: No rash CARDIOVASCULAR: No chest pain RESPIRATORY: No shortness of breath GASTROINTESTINAL: No nausea, vomiting GENITOURINARY: No excessive urination NEUROLOGICAL: No weakness MUSCULOSKELETAL: Left leg swelling, pain as above HEMATOLOGIC: No easy bleeding LYMPHATICS: No lymphedema. PSYCHIATRIC: No current depression ENDOCRINOLOGIC: No high sugars ALLERGIES: No history of asthma, hives, eczema or rhinitis. 
  
  
    
Immunization History Administered Date(s) Administered  Influenza High Dose Vaccine PF 09/08/2016, 11/28/2017  Influenza Vaccine 10/10/2013, 10/09/2014  Influenza Vaccine PF 10/26/2015  Influenza Vaccine Split 10/29/2012  Pneumococcal Polysaccharide (PPSV-23) 06/28/2007, 08/27/2007  TDAP Vaccine 04/12/2012, 10/29/2012  Tdap 04/12/2012, 10/29/2012  Zoster 10/04/2011  Zoster Vaccine, Live 10/04/2011  
  
  
There is no height or weight on file to calculate BMI. 
  
Counseling regarding nutrition done: No  
  
Current medications: 
         
Current Facility-Administered Medications Medication Dose Route Frequency Provider Last Rate Last Dose  influenza vaccine 2018-19 (6 mos+)(PF) (FLUARIX QUAD/FLULAVAL QUAD) injection 0.5 mL  0.5 mL IntraMUSCular PRIOR TO DISCHARGE Evette Laurent MD        
 cefTRIAXone (ROCEPHIN) 2 g in 0.9% sodium chloride (MBP/ADV) 50 mL  2 g IntraVENous Q24H Dayana Coley  mL/hr at 09/13/18 1331 2 g at 09/13/18 1331  
 vancomycin (VANCOCIN) 1500 mg in  ml infusion  1,500 mg IntraVENous Q24H Dayana Coley .3 mL/hr at 09/13/18 1424 1,500 mg at 09/13/18 1424  potassium chloride (K-DUR, KLOR-CON) SR tablet 20 mEq  20 mEq Oral DAILY Evette Laurent MD    20 mEq at 09/13/18 1331  
 sodium chloride (NS) flush 5-10 mL  5-10 mL IntraVENous Q8H Evette Laurent MD    10 mL at 09/13/18 1331  
 sodium chloride (NS) flush 5-10 mL  5-10 mL IntraVENous PRN Evette Laurent MD        
 enoxaparin (LOVENOX) injection 40 mg  40 mg SubCUTAneous Q24H Evette Laurent MD    40 mg at 09/13/18 0007  alcohol 62% (NOZIN) nasal  1 Ampule  1 Ampule Topical Q12H Evette Laurent MD    1 Ampule at 09/13/18 0180  atorvastatin (LIPITOR) tablet 40 mg  40 mg Oral DAILY Pricilla Richardson MD    40 mg at 09/13/18 3372  levothyroxine (SYNTHROID) tablet 25 mcg  25 mcg Oral DAILY Pricilla Richardson MD    25 mcg at 09/13/18 0755  
 gabapentin (NEURONTIN) capsule 100 mg  100 mg Oral BID Pricilla Richardson MD    100 mg at 09/13/18 1755  traZODone (DESYREL) tablet 50 mg  50 mg Oral QHS Pricilla Richardson MD    50 mg at 09/12/18 2209  losartan/hydroCHLOROthiazide (HYZAAR) 100/25 mg    Oral DAILY Pricilla Richardson MD        
 pantoprazole (PROTONIX) tablet 40 mg  40 mg Oral ACB Pricilla Richardson MD    40 mg at 09/13/18 1555  acetaminophen (TYLENOL) tablet 650 mg  650 mg Oral Q6H PRN Pricilla Richardson MD        
 oxyCODONE-acetaminophen (PERCOCET) 5-325 mg per tablet 1 Tab  1 Tab Oral Q4H PRN Pricilla Richardson MD    1 Tab at 09/12/18 0116  
  
  
  
Objective:  
  
Physical Exam:  
Visit Vitals  BP (!) 152/94 (BP 1 Location: Left arm)  Pulse 97  Temp 97.9 °F (36.6 °C)  Resp 18  Ht 5' 4\" (1.626 m)  Wt 97.2 kg (214 lb 3.2 oz)  SpO2 96%  BMI 36.77 kg/m2  
 
  
  
General: well developed, well nourished Psych: cooperative. Pleasant. Neuro: alert and oriented to person/place/situation. Derm: Normal turgor for age. Left leg with swelling, redness. Marked. 2+ pitting edema. HEENT: Normocephalic, atraumatic. EOMI. Neck: Normal range of motion. Chest: Good air entry bilaterally. Cardio: RRR Abdomen: Soft, nontender 
  
Left knee with FROM active and passive. Knee with wound 6 x 2 cm wound, some underlying fat, ecchymoses. Prepatellar fluid collection, fluctuant. Overlying skin insensate. Lama type lesion with larger area of skin questionable. Wound Knee Left; Anterior (Active) DRESSING STATUS Clean, dry, and intact 9/11/2018  2:23 PM  
Non-Pressure Injury Full thickness (subcut/muscle) 9/11/2018  2:23 PM  
Wound Length (cm) 6.5 cm 9/11/2018  2:23 PM  
Wound Width (cm) 2.2 cm 9/11/2018  2:23 PM  
 Wound Depth (cm) 0.2 9/11/2018  2:23 PM  
Wound Surface area (cm^2) 14.3 cm^2 9/11/2018  2:23 PM  
Condition of Base Eschar;[place] 9/11/2018  2:23 PM  
Condition of Edges Rolled/curled 9/11/2018  2:23 PM  
Epithelialization (%) 0 9/11/2018  2:23 PM  
Tissue Type Black 9/11/2018  2:23 PM  
Tissue Type Percent Black 50 % 9/11/2018  2:23 PM  
Tissue Type Percent Pink 25 9/11/2018  2:23 PM  
Tissue Type Percent Red 25 9/11/2018  2:23 PM  
Drainage Amount  Small  9/11/2018  2:23 PM  
Drainage Color Serous;Clear 9/11/2018  2:23 PM  
Wound Odor None 9/11/2018  2:23 PM  
Periwound Skin Condition Ecchymosis; Erythema, blanchable 9/11/2018  2:23 PM  
Cleansing and Cleansing Agents  Normal saline 9/11/2018  2:23 PM  
Number of days:10  
   
  
  
Assessment:  
  
Left knee wound, cellulitis, improving. 
  
Plan:  
  
  
- Reviewed ortho recs. Continue aggressive wound care. Restart compression for edema. Contact for any worsening cellulitis.  Daughter nurse and son pharmacist helping with infusions.

## 2018-09-21 NOTE — PROGRESS NOTES
09/21/18 1021 Wound Knee Left; Anterior Date First Assessed/Time First Assessed: 09/11/18 1422   POA: Yes  Wound Type: Trauma  Location: Knee  Wound Description (Optional): #3  Orientation: Left; Anterior DRESSING STATUS Clean, dry, and intact DRESSING TYPE (dry gauze and tape to secure) Non-Pressure Injury Full thickness (subcut/muscle) Wound Length (cm) 4.5 cm Wound Width (cm) 1.5 cm Wound Depth (cm) 0.2 Wound Surface area (cm^2) 6.75 cm^2 Change in Wound Size % 52.8 Condition of Base Eschar;Slough; Epithelializing Condition of Edges Open Epithelialization (%) 10 Tissue Type Percent Black 50 % Tissue Type Percent Red 40 Drainage Amount  Small Drainage Color Serosanguinous Wound Odor None Periwound Skin Condition Erythema, non-blanchable Cleansing and Cleansing Agents  Normal saline Dressing Type Applied (Hydrofera Ready, Coversite) Procedure Tolerated Well

## 2018-09-22 PROCEDURE — 3331090002 HH PPS REVENUE DEBIT

## 2018-09-22 PROCEDURE — 3331090001 HH PPS REVENUE CREDIT

## 2018-09-23 PROCEDURE — 3331090002 HH PPS REVENUE DEBIT

## 2018-09-23 PROCEDURE — 3331090001 HH PPS REVENUE CREDIT

## 2018-09-24 ENCOUNTER — HOME CARE VISIT (OUTPATIENT)
Dept: SCHEDULING | Facility: HOME HEALTH | Age: 78
End: 2018-09-24
Payer: MEDICARE

## 2018-09-24 ENCOUNTER — HOSPITAL ENCOUNTER (OUTPATIENT)
Dept: LAB | Age: 78
Discharge: HOME | End: 2018-09-24
Attending: INTERNAL MEDICINE
Payer: MEDICARE

## 2018-09-24 VITALS
TEMPERATURE: 97.5 F | OXYGEN SATURATION: 97 % | HEART RATE: 89 BPM | RESPIRATION RATE: 28 BRPM | DIASTOLIC BLOOD PRESSURE: 70 MMHG | SYSTOLIC BLOOD PRESSURE: 116 MMHG

## 2018-09-24 LAB
ALBUMIN SERPL-MCNC: 2.9 G/DL (ref 3.2–4.6)
ALBUMIN/GLOB SERPL: 0.6 {RATIO}
ALP SERPL-CCNC: 79 U/L (ref 50–136)
ALT SERPL-CCNC: 17 U/L (ref 12–65)
AST SERPL-CCNC: 18 U/L (ref 15–37)
BASOPHILS # BLD: 0.1 K/UL (ref 0–0.2)
BASOPHILS NFR BLD: 1 % (ref 0–2)
BILIRUB DIRECT SERPL-MCNC: 0.1 MG/DL
BILIRUB SERPL-MCNC: 0.4 MG/DL (ref 0.2–1.1)
CREAT SERPL-MCNC: 1.42 MG/DL (ref 0.6–1)
CRP SERPL-MCNC: 0.5 MG/DL (ref 0–0.9)
DIFFERENTIAL METHOD BLD: ABNORMAL
EOSINOPHIL # BLD: 0.1 K/UL (ref 0–0.8)
EOSINOPHIL NFR BLD: 1 % (ref 0.5–7.8)
ERYTHROCYTE [DISTWIDTH] IN BLOOD BY AUTOMATED COUNT: 13.2 %
GLOBULIN SER CALC-MCNC: 4.6 G/DL (ref 2.3–3.5)
HCT VFR BLD AUTO: 31.3 % (ref 35.8–46.3)
HGB BLD-MCNC: 9.9 G/DL (ref 11.7–15.4)
IMM GRANULOCYTES # BLD: 0 K/UL (ref 0–0.5)
IMM GRANULOCYTES NFR BLD AUTO: 1 % (ref 0–5)
LYMPHOCYTES # BLD: 1.4 K/UL (ref 0.5–4.6)
LYMPHOCYTES NFR BLD: 19 % (ref 13–44)
MCH RBC QN AUTO: 29.6 PG (ref 26.1–32.9)
MCHC RBC AUTO-ENTMCNC: 31.6 G/DL (ref 31.4–35)
MCV RBC AUTO: 93.7 FL (ref 79.6–97.8)
MONOCYTES # BLD: 0.9 K/UL (ref 0.1–1.3)
MONOCYTES NFR BLD: 12 % (ref 4–12)
NEUTS SEG # BLD: 4.8 K/UL (ref 1.7–8.2)
NEUTS SEG NFR BLD: 66 % (ref 43–78)
NRBC # BLD: 0 K/UL (ref 0–0.2)
PLATELET # BLD AUTO: 203 K/UL (ref 150–450)
PMV BLD AUTO: 12.2 FL (ref 9.4–12.3)
PROT SERPL-MCNC: 7.5 G/DL
RBC # BLD AUTO: 3.34 M/UL (ref 4.05–5.2)
WBC # BLD AUTO: 7.3 K/UL (ref 4.3–11.1)

## 2018-09-24 PROCEDURE — 80076 HEPATIC FUNCTION PANEL: CPT

## 2018-09-24 PROCEDURE — G0299 HHS/HOSPICE OF RN EA 15 MIN: HCPCS

## 2018-09-24 PROCEDURE — 3331090002 HH PPS REVENUE DEBIT

## 2018-09-24 PROCEDURE — A4216 STERILE WATER/SALINE, 10 ML: HCPCS

## 2018-09-24 PROCEDURE — 86140 C-REACTIVE PROTEIN: CPT

## 2018-09-24 PROCEDURE — A6219 GAUZE <= 16 SQ IN W/BORDER: HCPCS

## 2018-09-24 PROCEDURE — A6210 FOAM DRG >16<=48 SQ IN W/O B: HCPCS

## 2018-09-24 PROCEDURE — A6216 NON-STERILE GAUZE<=16 SQ IN: HCPCS

## 2018-09-24 PROCEDURE — A6454 SELF-ADHER BAND W>=3" <5"/YD: HCPCS

## 2018-09-24 PROCEDURE — 82565 ASSAY OF CREATININE: CPT

## 2018-09-24 PROCEDURE — 3331090001 HH PPS REVENUE CREDIT

## 2018-09-24 PROCEDURE — 85025 COMPLETE CBC W/AUTO DIFF WBC: CPT

## 2018-09-25 PROCEDURE — 3331090002 HH PPS REVENUE DEBIT

## 2018-09-25 PROCEDURE — 3331090001 HH PPS REVENUE CREDIT

## 2018-09-26 PROCEDURE — 3331090001 HH PPS REVENUE CREDIT

## 2018-09-26 PROCEDURE — 3331090002 HH PPS REVENUE DEBIT

## 2018-09-27 ENCOUNTER — HOME CARE VISIT (OUTPATIENT)
Dept: SCHEDULING | Facility: HOME HEALTH | Age: 78
End: 2018-09-27
Payer: MEDICARE

## 2018-09-27 VITALS
DIASTOLIC BLOOD PRESSURE: 78 MMHG | SYSTOLIC BLOOD PRESSURE: 136 MMHG | RESPIRATION RATE: 18 BRPM | HEART RATE: 87 BPM | TEMPERATURE: 97.7 F | OXYGEN SATURATION: 95 %

## 2018-09-27 PROCEDURE — G0299 HHS/HOSPICE OF RN EA 15 MIN: HCPCS

## 2018-09-27 PROCEDURE — 3331090001 HH PPS REVENUE CREDIT

## 2018-09-27 PROCEDURE — 3331090002 HH PPS REVENUE DEBIT

## 2018-09-28 PROCEDURE — 3331090001 HH PPS REVENUE CREDIT

## 2018-09-28 PROCEDURE — 3331090002 HH PPS REVENUE DEBIT

## 2018-09-28 NOTE — DISCHARGE SUMMARY
Physician Discharge Summary     Patient ID:  Kyrie Mckinnon  [id number]  47 y.o.  1940    Allergies: Pneumovax 23 [pneumococcal 23-layton ps vaccine]    Admit Date: 9/11/2018    Discharge Date: 9/14/2018    * Admission Diagnoses: lt leg cellulitis  Cellulitis and abscess of left lower extremity  Cellulitis and abscess of left lower extremity    * Discharge Diagnoses:     Hospital Problems as of 9/14/2018  Date Reviewed: 8/1/2018          Codes Class Noted - Resolved POA    Cellulitis and abscess of left lower extremity ICD-10-CM: L03.116, L02.416  ICD-9-CM: 682.6  9/11/2018 - Present Yes              * Procedures for this admission:   * No surgery found *      * Disposition: Home with home health    * Discharged Condition: good    Problem List as of 9/14/2018  Date Reviewed: 8/1/2018          Codes Class Noted - Resolved    Cellulitis and abscess of left lower extremity ICD-10-CM: L03.116, L02.416  ICD-9-CM: 682.6  9/11/2018 - Present        Severe obesity (BMI 35.0-39. 9) with comorbidity ([facility] [address] ICD-10-CM: E66.01  ICD-9-CM: 278.01  3/28/2018 - Present        Primary insomnia ICD-10-CM: F51.01  ICD-9-CM: 307.42  7/17/2017 - Present        Right upper quadrant abdominal pain ICD-10-CM: R10.11  ICD-9-CM: 789.01  12/14/2016 - Present    Overview Signed 12/14/2016 10:18 AM by Earnest Cheadle, MD     S/P thorough eval Dr Diane Lagos, gastroenterologist including EGD, colonoscopy, abd U/S.   Lasts only minutes about once a week, worse when constipated             Raynaud's phenomenon ICD-10-CM: I73.00  ICD-9-CM: 443.0  2/25/2015 - Present        Post Menopausal Osteopenia ICD-10-CM: M89.9  ICD-9-CM: 733.90  2/25/2015 - Present        Chronic kidney disease, stage III (moderate) ICD-10-CM: N18.3  ICD-9-CM: 585.3  11/12/2014 - Present        Pedal edema ICD-10-CM: R60.0  ICD-9-CM: 782.3  1/28/2014 - Present    Overview Signed 1/28/2014  4:03 PM by Earnest Cheadle, MD     Worse on left             Chronic pain syndrome ICD-10-CM: G89.4  ICD-9-CM: 338.4  11/11/2013 - Present    Overview Addendum 1/14/2016  3:45 PM by Fiorella Rios MD     Chronic feet, hands, hips osteoarthritis, S/P bilateral TKA, gets pain with walking 100 ft             Colon polyps ICD-10-CM: K63.5  ICD-9-CM: 211.3  4/30/2013 - Present    Overview Signed 4/30/2013 10:01 AM by Fiorella Rios MD     Next colo due Jan 2018             Asthma ICD-10-CM: J45.909  ICD-9-CM: 493.90  10/29/2012 - Present        HTN (hypertension) ICD-10-CM: I10  ICD-9-CM: 401.9  8/16/2012 - Present        Hyperlipidemia ICD-10-CM: E78.5  ICD-9-CM: 272.4  8/16/2012 - Present    Overview Addendum 10/28/2012  6:29 PM by Fiorella Rios MD     Calcium score of 140, Goal LDL under 130             Hypothyroidism ICD-10-CM: E03.9  ICD-9-CM: 244.9  8/16/2012 - Present        Female stress incontinence ICD-10-CM: N39.3  ICD-9-CM: 625.6  8/16/2012 - Present        Umbilical hernia LYNN-19-QA: K42.9  ICD-9-CM: 553.1  8/16/2012 - Present        S/P total knee arthroplasty ICD-10-CM: Z96.659  ICD-9-CM: V43.65  8/16/2012 - Present    Overview Addendum 8/16/2012  8:38 AM by [address]     Bilateral--2009             Kidney stones ICD-10-CM: N20.0  ICD-9-CM: 592.0  8/16/2012 - Present        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  ICD-9-CM: 346.80  8/16/2012 - Present        GERD (gastroesophageal reflux disease) ICD-10-CM: K21.9  ICD-9-CM: 530.81  8/16/2012 - Present    Overview Addendum 7/29/2014  2:30 PM by Fiorella Rios MD     Previous dilatation stricture, EGD Jan 2013 showed some mild esophagitis             RESOLVED: Urinary urgency ICD-10-CM: R39.15  ICD-9-CM: 788.63  4/30/2013 - 11/11/2013        RESOLVED: Pyuria ICD-10-CM: N39.0  ICD-9-CM: 791.9  4/30/2013 - 11/11/2013        RESOLVED: Metabolic syndrome XWZ-01-CF: F45.72  ICD-9-CM: 277.7  8/16/2012 - 12/14/2016            Past Medical History:   Diagnosis Date    Calculus of kidney     GERD (gastroesophageal reflux disease)     Headache(784.0)     Hypercholesterolemia     Hypertension     Primary insomnia 7/17/2017    Thyroid disease       Past Surgical History:   Procedure Laterality Date    HX COLONOSCOPY  Jan 2013    4 polyps, repeat 5 years    HX COLONOSCOPY  7/5/2016    repeat 5 yrs tubular adenoma    HX SALPINGO-OOPHORECTOMY      REMOVAL OF KIDNEY STONE       Family History   Problem Relation Age of Onset    Hypertension Mother     Arthritis-rheumatoid Mother     High Cholesterol Mother     Broken Bones Mother     Heart Failure Father     Heart Attack Father     Hypertension Brother     High Cholesterol Brother     Arthritis-osteo Brother      Knee    High Cholesterol Brother     Hypertension Brother     Heart Attack Brother     Heart Attack Paternal Grandmother     Heart Attack Paternal Grandfather       Social History   Substance Use Topics    Smoking status: Never Smoker    Smokeless tobacco: Never Used    Alcohol use No     Allergies   Allergen Reactions    Pneumovax 23 [Pneumococcal 23-Janell Ps Vaccine] Other (comments)     fever      Prior to Admission medications    Medication Sig Start Date End Date Taking? Authorizing Provider   levothyroxine (SYNTHROID) 25 mcg tablet Take 1 Tab by mouth every morning. 8/1/18  Yes Joss Doherty MD   gabapentin (NEURONTIN) 100 mg capsule Take 1 Cap by mouth two (2) times a day. 8/1/18  Yes Almaz Carolina MD   traZODone (DESYREL) 50 mg tablet Take 1 Tab by mouth nightly. 8/1/18  Yes Joss Doherty MD   losartan-hydroCHLOROthiazide (HYZAAR) 100-25 mg per tablet Take 1 Tab by mouth daily. 7/26/18  Yes Joss Doherty MD   potassium chloride (K-DUR, KLOR-CON) 20 mEq tablet TAKE 1 TABLET BY MOUTH 2 TIMES A DAY  Patient taking differently: TAKE 1 TABLET BY MOUTH ONCE DAILY 7/10/18  Yes Almaz Carolina MD   FLOVENT  mcg/actuation inhaler Take 2 Puffs by inhalation two (2) times a day. 10/28/15  Yes Historical Provider   PRILOSEC OTC PO Take 40 mg by mouth every morning.    Yes Historical Provider   CALCIUM CITRATE + PO take 1 Tab by mouth two (2) times a day. Yes Historical Provider   acetaminophen (TYLENOL) 500 mg capsule Take 100 mg by mouth two (2) times a day. 2/1/18   Ramon Elliott MD   atorvastatin (LIPITOR) 40 mg tablet Take 1 Tab by mouth daily. 9/17/18   Joss Doherty MD   cefepime 2 gram 2 g IV syringe 2 g by IntraVENous route two (2) times a day. via IV push via PICC over 3-5 minutes every 12 hours thru 9.28.18 9/13/18 9/28/18  Historical Provider   sodium chloride (NORMAL SALINE FLUSH) 5-10 mL by IntraVENous route two (2) times a day. before and after IV antibiotic thru 9.28.18 9/13/18 9/28/18  Historical Provider   heparin sodium,porcine (HEPARIN LOCK FLUSH IV) 5 mL by IntraVENous route two (2) times a day. after final NS flush 9/13/18 9/28/18  Historical Provider   acetaminophen (TYLENOL ARTHRITIS PAIN) 650 mg CR tablet Take 650 mg by mouth every eight (8) hours as needed (pain). Historical Provider   albuterol (VENTOLIN HFA) 90 mcg/actuation inhaler Take 2 Puffs by inhalation every six (6) hours as needed for Wheezing or Shortness of Breath. Historical Provider       * Hospital Course: 69 y/o admitted from the wound clinic with left leg cellulitis, underlying knee hardware and risk of prosthetic infection. Previous injury with open wound to the knee. Placed on broad spectrum abx. ID consulted. Improved slowly with abx and transitioned to PO. Discharge Exam:  A&O x3  RRR  Resp clear  Left leg with some knee swelling. Irregular wound with some eschar, no crepitance.         DIscharge Medications:   Discharge Medication List as of 9/14/2018  5:29 PM      CONTINUE these medications which have NOT CHANGED    Details   levothyroxine (SYNTHROID) 25 mcg tablet Take 1 Tab by mouth every morning., Normal, Disp-90 Tab, R-3      gabapentin (NEURONTIN) 100 mg capsule Take 1 Cap by mouth two (2) times a day., Normal, Disp-60 Cap, R-3      traZODone (DESYREL) 50 mg tablet Take 1 Tab by mouth nightly., Normal, Disp-30 Tab, R-3      atorvastatin (LIPITOR) 40 mg tablet Take 1 Tab by mouth daily. , Normal, Disp-90 Tab, R-3      losartan-hydroCHLOROthiazide (HYZAAR) 100-25 mg per tablet Take 1 Tab by mouth daily. , Normal, Disp-90 Tab, R-3      potassium chloride (K-DUR, KLOR-CON) 20 mEq tablet TAKE 1 TABLET BY MOUTH 2 TIMES A DAY, Normal, Disp-180 Tab, R-3      FLOVENT  mcg/actuation inhaler Historical Med, R-3      PRILOSEC OTC PO take 1 Tab by mouth every morning., Historical Med      CALCIUM CITRATE + PO take 1 Tab by mouth two (2) times a day., Historical Med      acetaminophen (TYLENOL ARTHRITIS PAIN) 650 mg CR tablet Take 650 mg by mouth every eight (8) hours. , Historical Med      albuterol (VENTOLIN HFA) 90 mcg/actuation inhaler Take  by inhalation. , Historical Med              * Follow-up Care/Patient Instructions: The patient is to contact my office if she develops a temperature greater than 101°F, intractable pain, and/or significant hematuria. Activity: As tolerated  Diet: regular  Wound Care: [place] with hydrofera every other day    Follow-up Information     Follow up With Details Comments Contact Moo Martin MD   [address] [address]  [phone]          Follow-up with my office in one week. Patient to have KUB prior to office visit.     Signed:  Jose Jackson MD  September 28, 2018   8:58 AM

## 2018-09-29 PROCEDURE — 3331090001 HH PPS REVENUE CREDIT

## 2018-09-29 PROCEDURE — 3331090002 HH PPS REVENUE DEBIT

## 2018-09-30 PROCEDURE — 3331090001 HH PPS REVENUE CREDIT

## 2018-09-30 PROCEDURE — 3331090002 HH PPS REVENUE DEBIT

## 2018-10-01 ENCOUNTER — HOME CARE VISIT (OUTPATIENT)
Dept: SCHEDULING | Facility: HOME HEALTH | Age: 78
End: 2018-10-01
Payer: MEDICARE

## 2018-10-01 ENCOUNTER — HOSPITAL ENCOUNTER (OUTPATIENT)
Dept: INFUSION THERAPY | Age: 78
Discharge: HOME | End: 2018-10-01
Payer: MEDICARE

## 2018-10-01 VITALS
OXYGEN SATURATION: 96 % | SYSTOLIC BLOOD PRESSURE: 143 MMHG | RESPIRATION RATE: 16 BRPM | HEART RATE: 86 BPM | DIASTOLIC BLOOD PRESSURE: 80 MMHG | TEMPERATURE: 98.3 F

## 2018-10-01 VITALS
DIASTOLIC BLOOD PRESSURE: 70 MMHG | OXYGEN SATURATION: 97 % | SYSTOLIC BLOOD PRESSURE: 132 MMHG | HEART RATE: 86 BPM | RESPIRATION RATE: 18 BRPM | TEMPERATURE: 97.9 F

## 2018-10-01 LAB
ALBUMIN SERPL-MCNC: 2.7 G/DL (ref 3.2–4.6)
ALBUMIN/GLOB SERPL: 0.6 {RATIO} (ref 1.2–3.5)
ALP SERPL-CCNC: 75 U/L (ref 50–136)
ALT SERPL-CCNC: 18 U/L (ref 12–65)
AST SERPL-CCNC: 22 U/L (ref 15–37)
BASOPHILS # BLD: 0.1 K/UL (ref 0–0.2)
BASOPHILS NFR BLD: 1 % (ref 0–2)
BILIRUB DIRECT SERPL-MCNC: 0.1 MG/DL
BILIRUB SERPL-MCNC: 0.4 MG/DL (ref 0.2–1.1)
CREAT SERPL-MCNC: 1.9 MG/DL (ref 0.6–1)
CRP SERPL-MCNC: <0.3 MG/DL (ref 0–0.9)
DIFFERENTIAL METHOD BLD: ABNORMAL
EOSINOPHIL # BLD: 0.1 K/UL (ref 0–0.8)
EOSINOPHIL NFR BLD: 2 % (ref 0.5–7.8)
ERYTHROCYTE [DISTWIDTH] IN BLOOD BY AUTOMATED COUNT: 13.8 % (ref 11.9–14.6)
GLOBULIN SER CALC-MCNC: 4.8 G/DL (ref 2.3–3.5)
HCT VFR BLD AUTO: 31.5 % (ref 35.8–46.3)
HGB BLD-MCNC: 10.1 G/DL (ref 11.7–15.4)
IMM GRANULOCYTES # BLD: 0 K/UL (ref 0–0.5)
IMM GRANULOCYTES NFR BLD AUTO: 1 % (ref 0–5)
LYMPHOCYTES # BLD: 1.3 K/UL (ref 0.5–4.6)
LYMPHOCYTES NFR BLD: 16 % (ref 13–44)
MCH RBC QN AUTO: 29.6 PG (ref 26.1–32.9)
MCHC RBC AUTO-ENTMCNC: 32.1 G/DL (ref 31.4–35)
MCV RBC AUTO: 92.4 FL (ref 79.6–97.8)
MONOCYTES # BLD: 1 K/UL (ref 0.1–1.3)
MONOCYTES NFR BLD: 13 % (ref 4–12)
NEUTS SEG # BLD: 5.6 K/UL (ref 1.7–8.2)
NEUTS SEG NFR BLD: 69 % (ref 43–78)
NRBC # BLD: 0 K/UL (ref 0–0.2)
PLATELET # BLD AUTO: 182 K/UL (ref 150–450)
PMV BLD AUTO: 12.5 FL (ref 9.4–12.3)
PROT SERPL-MCNC: 7.5 G/DL (ref 6.3–8.2)
RBC # BLD AUTO: 3.41 M/UL (ref 4.05–5.25)
WBC # BLD AUTO: 8.2 K/UL (ref 4.3–11.1)

## 2018-10-01 PROCEDURE — 82565 ASSAY OF CREATININE: CPT

## 2018-10-01 PROCEDURE — 86140 C-REACTIVE PROTEIN: CPT

## 2018-10-01 PROCEDURE — 3331090001 HH PPS REVENUE CREDIT

## 2018-10-01 PROCEDURE — 80076 HEPATIC FUNCTION PANEL: CPT

## 2018-10-01 PROCEDURE — 3331090002 HH PPS REVENUE DEBIT

## 2018-10-01 PROCEDURE — 74011250636 HC RX REV CODE- 250/636: Performed by: INTERNAL MEDICINE

## 2018-10-01 PROCEDURE — 85025 COMPLETE CBC W/AUTO DIFF WBC: CPT

## 2018-10-01 PROCEDURE — 36592 COLLECT BLOOD FROM PICC: CPT

## 2018-10-01 PROCEDURE — G0299 HHS/HOSPICE OF RN EA 15 MIN: HCPCS

## 2018-10-01 RX ORDER — SODIUM CHLORIDE 0.9 % (FLUSH) 0.9 %
10 SYRINGE (ML) INJECTION EVERY 8 HOURS
Status: COMPLETED | OUTPATIENT
Start: 2018-10-01 | End: 2018-10-01

## 2018-10-01 RX ORDER — HEPARIN 100 UNIT/ML
600 SYRINGE INTRAVENOUS AS NEEDED
Status: ACTIVE | OUTPATIENT
Start: 2018-10-01 | End: 2018-10-01

## 2018-10-01 RX ADMIN — HEPARIN 600 UNITS: 100 SYRINGE at 14:45

## 2018-10-01 RX ADMIN — Medication 10 ML: at 14:45

## 2018-10-01 NOTE — PROGRESS NOTES
Arrived to the [facility]. Assessment and PICC flush completed. Patient tolerated well. Any issues or concerns during appointment: Patient had orders for cathflo. Blood return noted on admission. Patient was not given cathflo, only saline flush and heparin. Patient aware of next infusion appointment: Patient does not have any follow-up appointments scheduled at this time. Discharged via wheelchair, with son. Patient advised to call Dr. Fani Mcdermott office if she has any problems or concerns. Patient verbalized understanding.

## 2018-10-02 PROCEDURE — 3331090001 HH PPS REVENUE CREDIT

## 2018-10-02 PROCEDURE — 3331090002 HH PPS REVENUE DEBIT

## 2018-10-03 ENCOUNTER — HOME CARE VISIT (OUTPATIENT)
Dept: HOME HEALTH SERVICES | Facility: HOME HEALTH | Age: 78
End: 2018-10-03
Payer: MEDICARE

## 2018-10-03 PROCEDURE — 3331090002 HH PPS REVENUE DEBIT

## 2018-10-03 PROCEDURE — 3331090001 HH PPS REVENUE CREDIT

## 2018-10-04 PROCEDURE — 3331090001 HH PPS REVENUE CREDIT

## 2018-10-04 PROCEDURE — 3331090002 HH PPS REVENUE DEBIT

## 2018-10-05 ENCOUNTER — HOSPITAL ENCOUNTER (OUTPATIENT)
Dept: WOUND CARE | Age: 78
Discharge: HOME | End: 2018-10-05
Attending: SURGERY
Payer: MEDICARE

## 2018-10-05 ENCOUNTER — HOME CARE VISIT (OUTPATIENT)
Dept: HOME HEALTH SERVICES | Facility: HOME HEALTH | Age: 78
End: 2018-10-05
Payer: MEDICARE

## 2018-10-05 ENCOUNTER — APPOINTMENT (OUTPATIENT)
Dept: GENERAL RADIOLOGY | Age: 78
End: 2018-10-05
Attending: EMERGENCY MEDICINE
Payer: MEDICARE

## 2018-10-05 ENCOUNTER — HOSPITAL ENCOUNTER (EMERGENCY)
Age: 78
Discharge: HOME | End: 2018-10-05
Attending: EMERGENCY MEDICINE
Payer: MEDICARE

## 2018-10-05 VITALS
HEART RATE: 81 BPM | OXYGEN SATURATION: 96 % | TEMPERATURE: 97.8 F | DIASTOLIC BLOOD PRESSURE: 73 MMHG | HEIGHT: 64 IN | SYSTOLIC BLOOD PRESSURE: 121 MMHG | WEIGHT: 201.4 LBS | RESPIRATION RATE: 18 BRPM | BODY MASS INDEX: 34.38 KG/M2

## 2018-10-05 VITALS
TEMPERATURE: 97.9 F | BODY MASS INDEX: 34.31 KG/M2 | HEART RATE: 84 BPM | RESPIRATION RATE: 16 BRPM | OXYGEN SATURATION: 99 % | WEIGHT: 201 LBS | SYSTOLIC BLOOD PRESSURE: 140 MMHG | DIASTOLIC BLOOD PRESSURE: 80 MMHG | HEIGHT: 64 IN

## 2018-10-05 DIAGNOSIS — R11.2 NON-INTRACTABLE VOMITING WITH NAUSEA, UNSPECIFIED VOMITING TYPE: Primary | ICD-10-CM

## 2018-10-05 DIAGNOSIS — R19.7 DIARRHEA, UNSPECIFIED TYPE: ICD-10-CM

## 2018-10-05 DIAGNOSIS — E86.0 DEHYDRATION: ICD-10-CM

## 2018-10-05 DIAGNOSIS — K85.90 ACUTE PANCREATITIS, UNSPECIFIED COMPLICATION STATUS, UNSPECIFIED PANCREATITIS TYPE: ICD-10-CM

## 2018-10-05 LAB
ALBUMIN SERPL-MCNC: 2.9 G/DL (ref 3.2–4.6)
ALBUMIN/GLOB SERPL: 0.6 {RATIO}
ALP SERPL-CCNC: 77 U/L (ref 50–136)
ALT SERPL-CCNC: 16 U/L (ref 12–65)
ANION GAP SERPL CALC-SCNC: 15 MMOL/L
AST SERPL-CCNC: 26 U/L (ref 15–37)
BASOPHILS # BLD: 0.1 K/UL (ref 0–0.2)
BASOPHILS NFR BLD: 1 % (ref 0–2)
BILIRUB SERPL-MCNC: 0.4 MG/DL (ref 0.2–1.1)
BUN SERPL-MCNC: 44 MG/DL (ref 8–23)
CALCIUM SERPL-MCNC: 9.4 MG/DL (ref 8.3–10.4)
CHLORIDE SERPL-SCNC: 108 MMOL/L (ref 98–107)
CO2 SERPL-SCNC: 18 MMOL/L (ref 21–32)
CREAT SERPL-MCNC: 2.22 MG/DL (ref 0.6–1)
DIFFERENTIAL METHOD BLD: ABNORMAL
EOSINOPHIL # BLD: 0.1 K/UL (ref 0–0.8)
EOSINOPHIL NFR BLD: 1 % (ref 0.5–7.8)
ERYTHROCYTE [DISTWIDTH] IN BLOOD BY AUTOMATED COUNT: 14.3 %
GLOBULIN SER CALC-MCNC: 5.1 G/DL (ref 2.3–3.5)
GLUCOSE SERPL-MCNC: 76 MG/DL (ref 65–100)
HCT VFR BLD AUTO: 33.2 % (ref 35.8–46.3)
HGB BLD-MCNC: 10.5 G/DL (ref 11.7–15.4)
IMM GRANULOCYTES # BLD: 0 K/UL (ref 0–0.5)
IMM GRANULOCYTES NFR BLD AUTO: 0 % (ref 0–5)
LACTATE BLD-SCNC: 1 MMOL/L (ref 0.5–1.9)
LIPASE SERPL-CCNC: 773 U/L (ref 73–393)
LYMPHOCYTES # BLD: 1.1 K/UL (ref 0.5–4.6)
LYMPHOCYTES NFR BLD: 13 % (ref 13–44)
MCH RBC QN AUTO: 29.5 PG (ref 26.1–32.9)
MCHC RBC AUTO-ENTMCNC: 31.6 G/DL (ref 31.4–35)
MCV RBC AUTO: 93.3 FL (ref 79.6–97.8)
MONOCYTES # BLD: 0.9 K/UL (ref 0.1–1.3)
MONOCYTES NFR BLD: 11 % (ref 4–12)
NEUTS SEG # BLD: 6.1 K/UL (ref 1.7–8.2)
NEUTS SEG NFR BLD: 74 % (ref 43–78)
NRBC # BLD: 0 K/UL (ref 0–0.2)
PLATELET # BLD AUTO: 186 K/UL (ref 150–450)
PMV BLD AUTO: 12.3 FL (ref 9.4–12.3)
POTASSIUM SERPL-SCNC: 3.3 MMOL/L (ref 3.5–5.1)
PROT SERPL-MCNC: 8 G/DL
RBC # BLD AUTO: 3.56 M/UL (ref 4.05–5.2)
SODIUM SERPL-SCNC: 141 MMOL/L (ref 136–145)
WBC # BLD AUTO: 8.3 K/UL (ref 4.3–11.1)

## 2018-10-05 PROCEDURE — 71045 X-RAY EXAM CHEST 1 VIEW: CPT

## 2018-10-05 PROCEDURE — 83690 ASSAY OF LIPASE: CPT

## 2018-10-05 PROCEDURE — 80053 COMPREHEN METABOLIC PANEL: CPT

## 2018-10-05 PROCEDURE — 85025 COMPLETE CBC W/AUTO DIFF WBC: CPT

## 2018-10-05 PROCEDURE — 81003 URINALYSIS AUTO W/O SCOPE: CPT | Performed by: EMERGENCY MEDICINE

## 2018-10-05 PROCEDURE — 3331090001 HH PPS REVENUE CREDIT

## 2018-10-05 PROCEDURE — 74011250636 HC RX REV CODE- 250/636: Performed by: EMERGENCY MEDICINE

## 2018-10-05 PROCEDURE — 3331090002 HH PPS REVENUE DEBIT

## 2018-10-05 PROCEDURE — 96361 HYDRATE IV INFUSION ADD-ON: CPT | Performed by: EMERGENCY MEDICINE

## 2018-10-05 PROCEDURE — 83605 ASSAY OF LACTIC ACID: CPT

## 2018-10-05 PROCEDURE — 96374 THER/PROPH/DIAG INJ IV PUSH: CPT | Performed by: EMERGENCY MEDICINE

## 2018-10-05 PROCEDURE — 29581 APPL MULTLAYER CMPRN SYS LEG: CPT

## 2018-10-05 PROCEDURE — 99284 EMERGENCY DEPT VISIT MOD MDM: CPT | Performed by: EMERGENCY MEDICINE

## 2018-10-05 RX ORDER — ONDANSETRON 2 MG/ML
4 INJECTION INTRAMUSCULAR; INTRAVENOUS
Status: COMPLETED | OUTPATIENT
Start: 2018-10-05 | End: 2018-10-05

## 2018-10-05 RX ORDER — ONDANSETRON 4 MG/1
4 TABLET, ORALLY DISINTEGRATING ORAL
Qty: 20 TAB | Refills: 0 | Status: SHIPPED | OUTPATIENT
Start: 2018-10-05

## 2018-10-05 RX ADMIN — SODIUM CHLORIDE 1000 ML: 900 INJECTION, SOLUTION INTRAVENOUS at 19:46

## 2018-10-05 RX ADMIN — ONDANSETRON 4 MG: 2 INJECTION, SOLUTION INTRAMUSCULAR; INTRAVENOUS at 19:46

## 2018-10-05 RX ADMIN — SODIUM CHLORIDE 1000 ML: 900 INJECTION, SOLUTION INTRAVENOUS at 20:45

## 2018-10-05 NOTE — WOUND CARE
[address] [place] [address], [address] Phone: [phone] Fax: [phone] Patient: Pino Jalloh MRN: [id number]  SSN: [id number] YOB: 1940  Age: 70 y.o. Sex: female Return Appointment: 1 week with Mohinder Beckham MD 
 
Instructions: Left knee Cleanse wound and periwound with wound cleanser or normal saline. Hydrofera Ready: Cut to wound size, place in wound bed, shiny side out. Secure with Covrsite bandage. Change 3x weekly. (M,W,F) Coban 2 to left lower leg. Change weekly. Home health to obtain Cdiff antigen test. 
Patient to contact I/D for instructions about continuing IV antbx. Make sure to drink plenty of fluids. Should you experience increased redness, swelling, pain, foul odor, size of wound(s), or have a temperature over 101 degrees please contact the [address] [place] [address] Road at [phone] or if after hours contact your primary care physician or go to the hospital emergency department. Signed By: Alysia Sierra October 5, 2018

## 2018-10-05 NOTE — WOUND CARE
10/05/18 1548 Wound Knee Left; Anterior Date First Assessed/Time First Assessed: 09/11/18 1422   POA: Yes  Wound Type: Trauma  Location: Knee  Wound Description (Optional): #3  Orientation: Left; Anterior DRESSING STATUS Clean, dry, and intact DRESSING TYPE (hydrofera ready, covrsite) Non-Pressure Injury Full thickness (subcut/muscle) Wound Length (cm) 3 cm Wound Width (cm) 2 cm Wound Depth (cm) 0.2 Wound Surface area (cm^2) 6 cm^2 Change in Wound Size % 58.04 Condition of Base Granulation;Slough Condition of Edges Open Tissue Type Percent Pink 50 Tissue Type Percent Yellow 50 Drainage Amount  Small Drainage Color Serous Wound Odor None Periwound Skin Condition Edematous; Erythema, blanchable Cleansing and Cleansing Agents  Normal saline; Soap and water

## 2018-10-05 NOTE — ED TRIAGE NOTES
Pt complains of N/V/D and dehydration. States she was discharged from DT on Friday after having an infection in her left knee. Pt states she has a PICC line in her right arm and has been receiving antibiotics. States her knee is better, but states \"I feel dehydrated, haven't been able to eat, and I feel rotten. \"

## 2018-10-05 NOTE — ED PROVIDER NOTES
HPI Comments: Patient admitted 9/11/2018 from wound care clinic for a left knee cellulitis. Was discharged home after 3 days on IV cefepime due to gram-negative rods. Has continued  With the IV antibiotics through a PICC line with 3 or 4 more days left of treatment. 3 days ago started with nausea vomiting and diarrhea. For the past month during this infection patient has had decreased appetite with some weight loss. Daughter is concerned about dehydration. Protein for further evaluation. States the wound is looking good. Patient is a 70 y.o. female presenting with vomiting. The history is provided by the patient. No  was used. Vomiting This is a new problem. The current episode started more than 2 days ago. The problem occurs 2 to 4 times per day. The problem has not changed since onset. The emesis has an appearance of stomach contents. There has been no fever. Associated symptoms include diarrhea. Pertinent negatives include no chills, no fever, no abdominal pain, no headaches, no myalgias, no cough, no URI and no headaches. Risk factors include recent antibiotics. Past Medical History:  
Diagnosis Date  Calculus of kidney  GERD (gastroesophageal reflux disease)  Headache(784.0)  Hypercholesterolemia  Hypertension  Primary insomnia 7/17/2017  Thyroid disease Past Surgical History:  
Procedure Laterality Date  HX COLONOSCOPY  Jan 2013 4 polyps, repeat 5 years  HX COLONOSCOPY  7/5/2016  
 repeat 5 yrs tubular adenoma  HX SALPINGO-OOPHORECTOMY  REMOVAL OF KIDNEY STONE Family History:  
Problem Relation Age of Onset  Hypertension Mother  Arthritis-rheumatoid Mother  High Cholesterol Mother  Broken Bones Mother  Heart Failure Father  Heart Attack Father  Hypertension Brother  High Cholesterol Brother  Arthritis-osteo Brother Knee  High Cholesterol Brother  Hypertension Brother  Heart Attack Brother  Heart Attack Paternal Grandmother  Heart Attack Paternal Grandfather Social History Social History  Marital status:  Spouse name: N/A  
 Number of children: N/A  
 Years of education: N/A Occupational History  Not on file. Social History Main Topics  Smoking status: Never Smoker  Smokeless tobacco: Never Used  Alcohol use No  
 Drug use: No  
 Sexual activity: Not on file Other Topics Concern  Not on file Social History Narrative Lives with  who is chronically ill with end stage lung disease and early dementia and depression. 5 children, daughter Hodan and son Jean Pierre Chavez (PharmD) have her [de-identified], sons Matthew Mo and June Cameron have durable POA ALLERGIES: Pneumovax 23 [pneumococcal 23-layton ps vaccine] Review of Systems Constitutional: Negative for chills and fever. HENT: Negative for rhinorrhea and sore throat. Eyes: Negative for pain and redness. Respiratory: Negative for cough, chest tightness, shortness of breath and wheezing. Cardiovascular: Negative for chest pain and leg swelling. Gastrointestinal: Positive for diarrhea, nausea and vomiting. Negative for abdominal pain. Genitourinary: Negative for dysuria and hematuria. Musculoskeletal: Negative for back pain, gait problem, myalgias, neck pain and neck stiffness. Skin: Negative for color change and rash. Neurological: Negative for weakness, numbness and headaches. Vitals:  
 10/05/18 1910 BP: 140/86 Pulse: 89 Resp: 17 Temp: 97.9 °F (36.6 °C) SpO2: 96% Weight: 91.2 kg (201 lb) Height: 5' 4\" (1.626 m) Physical Exam  
Constitutional: She is oriented to person, place, and time. She appears well-developed and well-nourished. HENT:  
Head: Normocephalic and atraumatic. Neck: Normal range of motion. Neck supple. Cardiovascular: Normal rate and regular rhythm. No murmur heard. Pulmonary/Chest: Effort normal and breath sounds normal. She has no wheezes. Abdominal: Soft. Bowel sounds are normal. There is no tenderness. Musculoskeletal: Normal range of motion. She exhibits edema (mild left knee with mild erythema and central wound healing well per daughter. ) and tenderness. Neurological: She is alert and oriented to person, place, and time. Skin: Skin is warm and dry. There is erythema (mild Left knee. ). Nursing note and vitals reviewed. MDM Number of Diagnoses or Management Options Diagnosis management comments: Nausea vomiting and diarrhea and some dehydration and mild pancreatitis. Almost finished with cefepime for left knee infection. We will discharge with nausea medication and increase fluids at home. Patient agrees to plan and wishes to go home rather than be admitted to the hospital.  Has close follow-up appointment on Monday with infectious disease. Amount and/or Complexity of Data Reviewed Clinical lab tests: ordered and reviewed Tests in the radiology section of CPT®: ordered and reviewed Tests in the medicine section of CPT®: ordered and reviewed Patient Progress Patient progress: stable ED Course Procedures Results Include: 
 
Recent Results (from the past 24 hour(s)) METABOLIC PANEL, COMPREHENSIVE Collection Time: 10/05/18  7:34 PM  
Result Value Ref Range Sodium 141 136 - 145 mmol/L Potassium 3.3 (L) 3.5 - 5.1 mmol/L Chloride 108 (H) 98 - 107 mmol/L  
 CO2 18 (L) 21 - 32 mmol/L Anion gap 15 mmol/L Glucose 76 65 - 100 mg/dL BUN 44 (H) 8 - 23 MG/DL Creatinine 2.22 (H) 0.6 - 1.0 MG/DL  
 GFR est AA 28 (L) >60 ml/min/1.73m2 GFR est non-AA 23 ml/min/1.73m2 Calcium 9.4 8.3 - 10.4 MG/DL Bilirubin, total 0.4 0.2 - 1.1 MG/DL  
 ALT (SGPT) 16 12 - 65 U/L  
 AST (SGOT) 26 15 - 37 U/L Alk. phosphatase 77 50 - 136 U/L Protein, total 8.0 g/dL Albumin 2.9 (L) 3.2 - 4.6 g/dL Globulin 5.1 (H) 2.3 - 3.5 g/dL A-G Ratio 0.6 CBC WITH AUTOMATED DIFF Collection Time: 10/05/18  7:34 PM  
Result Value Ref Range WBC 8.3 4.3 - 11.1 K/uL  
 RBC 3.56 (L) 4.05 - 5.2 M/uL  
 HGB 10.5 (L) 11.7 - 15.4 g/dL HCT 33.2 (L) 35.8 - 46.3 % MCV 93.3 79.6 - 97.8 FL  
 MCH 29.5 26.1 - 32.9 PG  
 MCHC 31.6 31.4 - 35.0 g/dL  
 RDW 14.3 % PLATELET 025 337 - 342 K/uL MPV 12.3 9.4 - 12.3 FL ABSOLUTE NRBC 0.00 0.0 - 0.2 K/uL  
 DF AUTOMATED NEUTROPHILS 74 43 - 78 % LYMPHOCYTES 13 13 - 44 % MONOCYTES 11 4.0 - 12.0 % EOSINOPHILS 1 0.5 - 7.8 % BASOPHILS 1 0.0 - 2.0 % IMMATURE GRANULOCYTES 0 0.0 - 5.0 %  
 ABS. NEUTROPHILS 6.1 1.7 - 8.2 K/UL  
 ABS. LYMPHOCYTES 1.1 0.5 - 4.6 K/UL  
 ABS. MONOCYTES 0.9 0.1 - 1.3 K/UL  
 ABS. EOSINOPHILS 0.1 0.0 - 0.8 K/UL  
 ABS. BASOPHILS 0.1 0.0 - 0.2 K/UL  
 ABS. IMM. GRANS. 0.0 0.0 - 0.5 K/UL  
LIPASE Collection Time: 10/05/18  7:34 PM  
Result Value Ref Range Lipase 773 (H) 73 - 393 U/L  
POC LACTIC ACID Collection Time: 10/05/18  7:40 PM  
Result Value Ref Range Lactic Acid (POC) 1.0 0.5 - 1.9 mmol/L  
 
 XR CHEST PORT (Final result) Result time: 10/05/18 20:01:29  
  Final result by Ezequiel Ahmadi MD (10/05/18 20:01:29)  
  Impression:  
  IMPRESSION: No acute cardiopulmonary abnormality. The PICC catheter terminates 
in a proximal position over the right subclavian.  
  Narrative:  
  Portable chest x-ray Clinical indications: Fatigue, nausea and vomiting, cough for three days FINDINGS: Single AP view of the chest submitted without comparison show the 
lungs to be well-expanded and clear. No pleural effusion or pneumothorax. The 
cardiac silhouette and mediastinum are unremarkable. A right-sided PICC catheter 
is in place. Note the catheter terminates in a proximal position over the right 
subclavian.

## 2018-10-06 PROCEDURE — 3331090001 HH PPS REVENUE CREDIT

## 2018-10-06 PROCEDURE — 3331090002 HH PPS REVENUE DEBIT

## 2018-10-06 NOTE — DISCHARGE INSTRUCTIONS
Dehydration: Care Instructions  Your Care Instructions  Dehydration happens when your body loses too much fluid. This might happen when you do not drink enough water or you lose large amounts of fluids from your body because of diarrhea, vomiting, or sweating. Severe dehydration can be life-threatening. Water and minerals called electrolytes help put your body fluids back in balance. Learn the early signs of fluid loss, and drink more fluids to prevent dehydration. Follow-up care is a key part of your treatment and safety. Be sure to make and go to all appointments, and call your doctor if you are having problems. It's also a good idea to know your test results and keep a list of the medicines you take. How can you care for yourself at home? · To prevent dehydration, drink plenty of fluids, enough so that your urine is light yellow or clear like water. Choose water and other caffeine-free clear liquids until you feel better. If you have kidney, heart, or liver disease and have to limit fluids, talk with your doctor before you increase the amount of fluids you drink. · If you do not feel like eating or drinking, try taking small sips of water, sports drinks, or other rehydration drinks. · Get plenty of rest.  To prevent dehydration  · Add more fluids to your diet and daily routine, unless your doctor has told you not to. · During hot weather, drink more fluids. Drink even more fluids if you exercise a lot. Stay away from drinks with alcohol or caffeine. · Watch for the symptoms of dehydration. These include:  ¨ A dry, sticky mouth. ¨ Dark yellow urine, and not much of it. ¨ Dry and sunken eyes. ¨ Feeling very tired. · Learn what problems can lead to dehydration. These include:  ¨ Diarrhea, fever, and vomiting. ¨ Any illness with a fever, such as pneumonia or the flu. ¨ Activities that cause heavy sweating, such as endurance races and heavy outdoor work in hot or humid weather.   ¨ Alcohol or drug abuse or withdrawal.  ¨ Certain medicines, such as cold and allergy pills (antihistamines), diet pills (diuretics), and laxatives. ¨ Certain diseases, such as diabetes, cancer, and heart or kidney disease. When should you call for help? Call 911 anytime you think you may need emergency care. For example, call if:    · You passed out (lost consciousness).    Call your doctor now or seek immediate medical care if:    · You are confused and cannot think clearly.     · You are dizzy or lightheaded, or you feel like you may faint.     · You have signs of needing more fluids. You have sunken eyes and a dry mouth, and you pass only a little dark urine.     · You cannot keep fluids down.    Watch closely for changes in your health, and be sure to contact your doctor if:    · You are not making tears.     · Your skin is very dry and sags slowly back into place after you pinch it.     · Your mouth and eyes are very dry. Where can you learn more? Go to http://jenna-maya.info/. Enter S681 in the search box to learn more about \"Dehydration: Care Instructions. \"  Current as of: November 20, 2017  Content Version: 11.8  © 8268-9750 [employer]. Care instructions adapted under license by [employer] (which disclaims liability or warranty for this information). If you have questions about a medical condition or this instruction, always ask your healthcare professional. [place] [address] any warranty or liability for your use of this information. Nausea and Vomiting: Care Instructions  Your Care Instructions    When you are nauseated, you may feel weak and sweaty and notice a lot of saliva in your mouth. Nausea often leads to vomiting. Most of the time you do not need to worry about nausea and vomiting, but they can be signs of other illnesses. Two common causes of nausea and vomiting are stomach flu and food poisoning.  Nausea and vomiting from viral stomach flu will usually start to improve within 24 hours. Nausea and vomiting from food poisoning may last from 12 to 48 hours. The doctor has checked you carefully, but problems can develop later. If you notice any problems or new symptoms, get medical treatment right away. Follow-up care is a key part of your treatment and safety. Be sure to make and go to all appointments, and call your doctor if you are having problems. It's also a good idea to know your test results and keep a list of the medicines you take. How can you care for yourself at home? · To prevent dehydration, drink plenty of fluids, enough so that your urine is light yellow or clear like water. Choose water and other caffeine-free clear liquids until you feel better. If you have kidney, heart, or liver disease and have to limit fluids, talk with your doctor before you increase the amount of fluids you drink. · Rest in bed until you feel better. · When you are able to eat, try clear soups, mild foods, and liquids until all symptoms are gone for 12 to 48 hours. Other good choices include dry toast, crackers, cooked cereal, and gelatin dessert, such as Jell-O. When should you call for help? Call 911 anytime you think you may need emergency care. For example, call if:    · You passed out (lost consciousness).    Call your doctor now or seek immediate medical care if:    · You have symptoms of dehydration, such as:  ¨ Dry eyes and a dry mouth. ¨ Passing only a little dark urine. ¨ Feeling thirstier than usual.     · You have new or worsening belly pain.     · You have a new or higher fever.     · You vomit blood or what looks like coffee grounds.    Watch closely for changes in your health, and be sure to contact your doctor if:    · You have ongoing nausea and vomiting.     · Your vomiting is getting worse.     · Your vomiting lasts longer than 2 days.     · You are not getting better as expected. Where can you learn more?   Go to http://jenna-maya.info/. Enter 25 [id number] in the search box to learn more about \"Nausea and Vomiting: Care Instructions. \"  Current as of: November 20, 2017  Content Version: 11.8  © 2340-4006 [employer]. Care instructions adapted under license by [employer] (which disclaims liability or warranty for this information). If you have questions about a medical condition or this instruction, always ask your healthcare professional. [address] any warranty or liability for your use of this information. Pancreatitis: Care Instructions  Your Care Instructions    The pancreas is an organ behind the stomach. It makes hormones and enzymes to help your body digest food. But if these enzymes attack the pancreas, it can get inflamed. This is called pancreatitis. Most cases are caused by gallstones or by heavy alcohol use. If you take care of yourself at home, it will help you get better. It will also help you avoid more problems with your pancreas. Follow-up care is a key part of your treatment and safety. Be sure to make and go to all appointments, and call your doctor if you are having problems. It's also a good idea to know your test results and keep a list of the medicines you take. How can you care for yourself at home? · Drink clear liquids and eat bland foods until you feel better. [place] foods include rice, dry toast, and crackers. They also include bananas and applesauce. · Eat a low-fat diet until your doctor says your pancreas is healed. · Do not drink alcohol. Tell your doctor if you need help to quit. Counseling, support groups, and sometimes medicines can help you stay sober. · Be safe with medicines. Read and follow all instructions on the label. ¨ If the doctor gave you a prescription medicine for pain, take it as prescribed.   ¨ If you are not taking a prescription pain medicine, ask your doctor if you can take an over-the-counter medicine. · If your doctor prescribed antibiotics, take them as directed. Do not stop taking them just because you feel better. You need to take the full course of antibiotics. · Get extra rest until you feel better. To prevent future problems with your pancreas  · Do not drink alcohol. · Tell your doctors and pharmacist that you've had pancreatitis. They can help you avoid medicines that may cause this problem again. When should you call for help? Call 911 anytime you think you may need emergency care. For example, call if:    · You vomit blood or what looks like coffee grounds.     · Your stools are maroon or very bloody.    Call your doctor now or seek immediate medical care if:    · You have new or worse belly pain.     · Your stools are black and look like tar, or they have streaks of blood.     · You are vomiting.    Watch closely for changes in your health, and be sure to contact your doctor if:    · You do not get better as expected. Where can you learn more? Go to http://jenna-maya.info/. Enter V241 in the search box to learn more about \"Pancreatitis: Care Instructions. \"  Current as of: March 28, 2018  Content Version: 11.8  © 4549-3169 [employer]. Care instructions adapted under license by [employer] (which disclaims liability or warranty for this information). If you have questions about a medical condition or this instruction, always ask your healthcare professional. [place] [address] any warranty or liability for your use of this information.

## 2018-10-06 NOTE — ED NOTES
I have reviewed discharge instructions with the patient. The patient verbalized understanding. Patient left ED via Discharge Method: ambulatory to Home with daughter. Opportunity for questions and clarification provided. Patient given 1 scripts. To continue your aftercare when you leave the hospital, you may receive an automated call from our care team to check in on how you are doing. This is a free service and part of our promise to provide the best care and service to meet your aftercare needs.  If you have questions, or wish to unsubscribe from this service please call [phone]. Thank you for Choosing our [facility].

## 2018-10-07 PROCEDURE — 3331090002 HH PPS REVENUE DEBIT

## 2018-10-07 PROCEDURE — 3331090001 HH PPS REVENUE CREDIT

## 2018-10-08 ENCOUNTER — HOME CARE VISIT (OUTPATIENT)
Dept: SCHEDULING | Facility: HOME HEALTH | Age: 78
End: 2018-10-08
Payer: MEDICARE

## 2018-10-08 ENCOUNTER — HOSPITAL ENCOUNTER (OUTPATIENT)
Dept: LAB | Age: 78
Discharge: HOME | DRG: 683 | End: 2018-10-08
Attending: INTERNAL MEDICINE
Payer: MEDICARE

## 2018-10-08 VITALS
SYSTOLIC BLOOD PRESSURE: 155 MMHG | HEART RATE: 87 BPM | TEMPERATURE: 97.4 F | OXYGEN SATURATION: 99 % | RESPIRATION RATE: 20 BRPM | DIASTOLIC BLOOD PRESSURE: 86 MMHG

## 2018-10-08 LAB
ALBUMIN SERPL-MCNC: 2.7 G/DL (ref 3.2–4.6)
ALBUMIN/GLOB SERPL: 0.6 {RATIO}
ALP SERPL-CCNC: 76 U/L (ref 50–136)
ALT SERPL-CCNC: 15 U/L (ref 12–65)
AST SERPL-CCNC: 20 U/L (ref 15–37)
BASOPHILS # BLD: 0.1 K/UL (ref 0–0.2)
BASOPHILS NFR BLD: 1 % (ref 0–2)
BILIRUB DIRECT SERPL-MCNC: 0.1 MG/DL
BILIRUB SERPL-MCNC: 0.4 MG/DL (ref 0.2–1.1)
CREAT SERPL-MCNC: 2.63 MG/DL (ref 0.6–1)
CRP SERPL-MCNC: 1.1 MG/DL (ref 0–0.9)
DIFFERENTIAL METHOD BLD: ABNORMAL
EOSINOPHIL # BLD: 0.1 K/UL (ref 0–0.8)
EOSINOPHIL NFR BLD: 1 % (ref 0.5–7.8)
ERYTHROCYTE [DISTWIDTH] IN BLOOD BY AUTOMATED COUNT: 14.6 %
GLOBULIN SER CALC-MCNC: 4.2 G/DL (ref 2.3–3.5)
HCT VFR BLD AUTO: 27.7 % (ref 35.8–46.3)
HGB BLD-MCNC: 8.5 G/DL (ref 11.7–15.4)
IMM GRANULOCYTES # BLD: 0 K/UL (ref 0–0.5)
IMM GRANULOCYTES NFR BLD AUTO: 0 % (ref 0–5)
LYMPHOCYTES # BLD: 1.3 K/UL (ref 0.5–4.6)
LYMPHOCYTES NFR BLD: 16 % (ref 13–44)
MCH RBC QN AUTO: 29.4 PG (ref 26.1–32.9)
MCHC RBC AUTO-ENTMCNC: 30.7 G/DL (ref 31.4–35)
MCV RBC AUTO: 95.8 FL (ref 79.6–97.8)
MONOCYTES # BLD: 1 K/UL (ref 0.1–1.3)
MONOCYTES NFR BLD: 11 % (ref 4–12)
NEUTS SEG # BLD: 6 K/UL (ref 1.7–8.2)
NEUTS SEG NFR BLD: 71 % (ref 43–78)
NRBC # BLD: 0 K/UL (ref 0–0.2)
PLATELET # BLD AUTO: 158 K/UL (ref 150–450)
PMV BLD AUTO: 13.8 FL (ref 9.4–12.3)
PROT SERPL-MCNC: 6.9 G/DL
RBC # BLD AUTO: 2.89 M/UL (ref 4.05–5.2)
WBC # BLD AUTO: 8.5 K/UL (ref 4.3–11.1)

## 2018-10-08 PROCEDURE — 86140 C-REACTIVE PROTEIN: CPT

## 2018-10-08 PROCEDURE — 80076 HEPATIC FUNCTION PANEL: CPT

## 2018-10-08 PROCEDURE — 36415 COLL VENOUS BLD VENIPUNCTURE: CPT

## 2018-10-08 PROCEDURE — 3331090001 HH PPS REVENUE CREDIT

## 2018-10-08 PROCEDURE — 85025 COMPLETE CBC W/AUTO DIFF WBC: CPT

## 2018-10-08 PROCEDURE — 3331090002 HH PPS REVENUE DEBIT

## 2018-10-08 PROCEDURE — G0299 HHS/HOSPICE OF RN EA 15 MIN: HCPCS

## 2018-10-08 PROCEDURE — 82565 ASSAY OF CREATININE: CPT

## 2018-10-09 ENCOUNTER — HOME CARE VISIT (OUTPATIENT)
Dept: SCHEDULING | Facility: HOME HEALTH | Age: 78
End: 2018-10-09
Payer: MEDICARE

## 2018-10-09 VITALS
DIASTOLIC BLOOD PRESSURE: 62 MMHG | OXYGEN SATURATION: 98 % | TEMPERATURE: 97.5 F | SYSTOLIC BLOOD PRESSURE: 142 MMHG | HEART RATE: 72 BPM | RESPIRATION RATE: 18 BRPM

## 2018-10-09 PROCEDURE — G0299 HHS/HOSPICE OF RN EA 15 MIN: HCPCS

## 2018-10-09 PROCEDURE — 3331090002 HH PPS REVENUE DEBIT

## 2018-10-09 PROCEDURE — 3331090001 HH PPS REVENUE CREDIT

## 2018-10-10 ENCOUNTER — PATIENT OUTREACH (OUTPATIENT)
Dept: CASE MANAGEMENT | Age: 78
End: 2018-10-10

## 2018-10-10 PROCEDURE — 3331090001 HH PPS REVENUE CREDIT

## 2018-10-10 PROCEDURE — 3331090002 HH PPS REVENUE DEBIT

## 2018-10-10 NOTE — PROGRESS NOTES
This note will not be viewable in [address] Transitions of Care  Follow up Outreach Note Outreach type Phone call: spoke with patients daughter, Karen Ingram Home visit:  
Date/Time of Outreach: 10/10/18 11:54am  
 
Has patient attended PCP or specialist follow-up appointments since last contact? What was outcome of appointment? When is next follow-up scheduled? Patient has had follow ups with Dr. Justen Garvin, wound center, infectious disease. She is progressing well and will follow with multiple upcoming appointments Review medications. Any medication changes since last outreach? Does patient have any questions or issues related to their medications? No medication changes at this time. Patient has completed antibiotics and will be evaluated for further need. Home health active? If yes  any issue? Progress? Yes, progressing well Referrals needed? 
(CM, SW, HH, etc. ) No 
Offered CCM, Karen Ingram indicated patient is improving and they are confident in current treatment plan and felt there is no need at this time. Other issues/Miscellaneous? (Transportation, access to meals, ability to perform ADLs, adequate caregiver support, etc.) No new needs or concerns were identified. Karen Ingram stated gratitude for follow up. Next Outreach Scheduled? Graduation from program? 
 N/A Yes Next Steps/Goals (if applicable): N/A Outreach completed by: 
 Cary Tucker LPN Community Care Coordinator

## 2018-10-11 ENCOUNTER — HOME CARE VISIT (OUTPATIENT)
Dept: SCHEDULING | Facility: HOME HEALTH | Age: 78
End: 2018-10-11
Payer: MEDICARE

## 2018-10-11 ENCOUNTER — HOSPITAL ENCOUNTER (OUTPATIENT)
Dept: LAB | Age: 78
Discharge: HOME | DRG: 683 | End: 2018-10-11
Attending: INTERNAL MEDICINE
Payer: MEDICARE

## 2018-10-11 ENCOUNTER — HOSPITAL ENCOUNTER (INPATIENT)
Age: 78
LOS: 4 days | Discharge: HOME | DRG: 683 | End: 2018-10-15
Attending: EMERGENCY MEDICINE | Admitting: INTERNAL MEDICINE
Payer: MEDICARE

## 2018-10-11 DIAGNOSIS — R53.1 WEAKNESS: ICD-10-CM

## 2018-10-11 DIAGNOSIS — E87.6 HYPOKALEMIA: ICD-10-CM

## 2018-10-11 DIAGNOSIS — N28.9 RENAL INSUFFICIENCY: Primary | ICD-10-CM

## 2018-10-11 PROBLEM — N18.30 ACUTE RENAL FAILURE SUPERIMPOSED ON STAGE 3 CHRONIC KIDNEY DISEASE (HCC): Status: ACTIVE | Noted: 2018-10-11

## 2018-10-11 PROBLEM — N17.9 ACUTE RENAL FAILURE (ARF) (HCC): Status: ACTIVE | Noted: 2018-10-11

## 2018-10-11 PROBLEM — E86.0 DEHYDRATION: Status: ACTIVE | Noted: 2018-10-11

## 2018-10-11 PROBLEM — I95.1 ORTHOSTATIC HYPOTENSION: Status: ACTIVE | Noted: 2018-10-11

## 2018-10-11 LAB
ALBUMIN SERPL-MCNC: 2.9 G/DL (ref 3.2–4.6)
ALBUMIN/GLOB SERPL: 0.6 {RATIO} (ref 1.2–3.5)
ALP SERPL-CCNC: 77 U/L (ref 50–136)
ALT SERPL-CCNC: 14 U/L (ref 12–65)
ANION GAP SERPL CALC-SCNC: 17 MMOL/L (ref 7–16)
ANION GAP SERPL CALC-SCNC: 19 MMOL/L
APPEARANCE UR: ABNORMAL
AST SERPL-CCNC: 16 U/L (ref 15–37)
BACTERIA URNS QL MICRO: 0 /HPF
BASOPHILS # BLD: 0.1 K/UL (ref 0–0.2)
BASOPHILS NFR BLD: 1 % (ref 0–2)
BILIRUB DIRECT SERPL-MCNC: 0.1 MG/DL
BILIRUB SERPL-MCNC: 0.4 MG/DL (ref 0.2–1.1)
BILIRUB UR QL: ABNORMAL
BUN SERPL-MCNC: 52 MG/DL (ref 8–23)
BUN SERPL-MCNC: 53 MG/DL (ref 8–23)
CALCIUM SERPL-MCNC: 7.9 MG/DL (ref 8.3–10.4)
CALCIUM SERPL-MCNC: 8.2 MG/DL (ref 8.3–10.4)
CASTS URNS QL MICRO: ABNORMAL /LPF
CHLORIDE SERPL-SCNC: 112 MMOL/L (ref 98–107)
CHLORIDE SERPL-SCNC: 113 MMOL/L (ref 98–107)
CK SERPL-CCNC: 131 U/L (ref 21–215)
CO2 SERPL-SCNC: 15 MMOL/L (ref 21–32)
CO2 SERPL-SCNC: 17 MMOL/L (ref 21–32)
COLOR UR: YELLOW
CREAT SERPL-MCNC: 2.8 MG/DL (ref 0.6–1)
CREAT SERPL-MCNC: 2.94 MG/DL (ref 0.6–1)
CREAT UR-MCNC: 108 MG/DL
DIFFERENTIAL METHOD BLD: ABNORMAL
EOSINOPHIL # BLD: 0.1 K/UL (ref 0–0.8)
EOSINOPHIL NFR BLD: 1 % (ref 0.5–7.8)
EPI CELLS #/AREA URNS HPF: ABNORMAL /HPF
ERYTHROCYTE [DISTWIDTH] IN BLOOD BY AUTOMATED COUNT: 14.6 %
GLOBULIN SER CALC-MCNC: 4.7 G/DL (ref 2.3–3.5)
GLUCOSE BLD STRIP.AUTO-MCNC: 76 MG/DL (ref 65–100)
GLUCOSE SERPL-MCNC: 53 MG/DL (ref 65–100)
GLUCOSE SERPL-MCNC: 73 MG/DL (ref 65–100)
GLUCOSE UR STRIP.AUTO-MCNC: NEGATIVE MG/DL
HCT VFR BLD AUTO: 32.5 % (ref 35.8–46.3)
HGB BLD-MCNC: 10.4 G/DL (ref 11.7–15.4)
HGB UR QL STRIP: ABNORMAL
IMM GRANULOCYTES # BLD: 0 K/UL (ref 0–0.5)
IMM GRANULOCYTES NFR BLD AUTO: 0 % (ref 0–5)
KETONES UR QL STRIP.AUTO: 40 MG/DL
LEUKOCYTE ESTERASE UR QL STRIP.AUTO: ABNORMAL
LIPASE SERPL-CCNC: 442 U/L (ref 73–393)
LYMPHOCYTES # BLD: 1.4 K/UL (ref 0.5–4.6)
LYMPHOCYTES NFR BLD: 18 % (ref 13–44)
MAGNESIUM SERPL-MCNC: 0.8 MG/DL (ref 1.8–2.4)
MCH RBC QN AUTO: 29.4 PG (ref 26.1–32.9)
MCHC RBC AUTO-ENTMCNC: 32 G/DL (ref 31.4–35)
MCV RBC AUTO: 91.8 FL (ref 79.6–97.8)
MONOCYTES # BLD: 0.9 K/UL (ref 0.1–1.3)
MONOCYTES NFR BLD: 12 % (ref 4–12)
NEUTS SEG # BLD: 5.2 K/UL (ref 1.7–8.2)
NEUTS SEG NFR BLD: 68 % (ref 43–78)
NITRITE UR QL STRIP.AUTO: NEGATIVE
NRBC # BLD: 0 K/UL (ref 0–0.2)
PH UR STRIP: 5.5 [PH] (ref 5–9)
PLATELET # BLD AUTO: 162 K/UL (ref 150–450)
PMV BLD AUTO: 12.9 FL (ref 9.4–12.3)
POTASSIUM SERPL-SCNC: 2.8 MMOL/L (ref 3.5–5.1)
POTASSIUM SERPL-SCNC: 3 MMOL/L (ref 3.5–5.1)
PROT SERPL-MCNC: 7.6 G/DL (ref 6.3–8.2)
PROT UR STRIP-MCNC: 100 MG/DL
RBC # BLD AUTO: 3.54 M/UL (ref 4.05–5.2)
RBC #/AREA URNS HPF: ABNORMAL /HPF
SODIUM SERPL-SCNC: 146 MMOL/L (ref 136–145)
SODIUM SERPL-SCNC: 147 MMOL/L (ref 136–145)
SODIUM UR-SCNC: 59 MMOL/L
SP GR UR REFRACTOMETRY: 1.02 (ref 1–1.02)
TSH SERPL DL<=0.005 MIU/L-ACNC: 1.1 UIU/ML (ref 0.36–3.74)
UROBILINOGEN UR QL STRIP.AUTO: 0.2 EU/DL (ref 0.2–1)
WBC # BLD AUTO: 7.7 K/UL (ref 4.3–11.1)
WBC URNS QL MICRO: ABNORMAL /HPF

## 2018-10-11 PROCEDURE — 74011000250 HC RX REV CODE- 250: Performed by: INTERNAL MEDICINE

## 2018-10-11 PROCEDURE — 84443 ASSAY THYROID STIM HORMONE: CPT

## 2018-10-11 PROCEDURE — 96360 HYDRATION IV INFUSION INIT: CPT | Performed by: EMERGENCY MEDICINE

## 2018-10-11 PROCEDURE — 74011250636 HC RX REV CODE- 250/636: Performed by: INTERNAL MEDICINE

## 2018-10-11 PROCEDURE — G0299 HHS/HOSPICE OF RN EA 15 MIN: HCPCS

## 2018-10-11 PROCEDURE — 94760 N-INVAS EAR/PLS OXIMETRY 1: CPT

## 2018-10-11 PROCEDURE — 82570 ASSAY OF URINE CREATININE: CPT

## 2018-10-11 PROCEDURE — 83735 ASSAY OF MAGNESIUM: CPT

## 2018-10-11 PROCEDURE — 3331090001 HH PPS REVENUE CREDIT

## 2018-10-11 PROCEDURE — 80048 BASIC METABOLIC PNL TOTAL CA: CPT

## 2018-10-11 PROCEDURE — 82962 GLUCOSE BLOOD TEST: CPT

## 2018-10-11 PROCEDURE — 74011250636 HC RX REV CODE- 250/636: Performed by: EMERGENCY MEDICINE

## 2018-10-11 PROCEDURE — 3331090002 HH PPS REVENUE DEBIT

## 2018-10-11 PROCEDURE — 82550 ASSAY OF CK (CPK): CPT

## 2018-10-11 PROCEDURE — 84300 ASSAY OF URINE SODIUM: CPT

## 2018-10-11 PROCEDURE — 94640 AIRWAY INHALATION TREATMENT: CPT

## 2018-10-11 PROCEDURE — 80076 HEPATIC FUNCTION PANEL: CPT

## 2018-10-11 PROCEDURE — 85025 COMPLETE CBC W/AUTO DIFF WBC: CPT

## 2018-10-11 PROCEDURE — 99284 EMERGENCY DEPT VISIT MOD MDM: CPT | Performed by: EMERGENCY MEDICINE

## 2018-10-11 PROCEDURE — 65270000029 HC RM PRIVATE

## 2018-10-11 PROCEDURE — 83690 ASSAY OF LIPASE: CPT

## 2018-10-11 PROCEDURE — 74011250637 HC RX REV CODE- 250/637: Performed by: INTERNAL MEDICINE

## 2018-10-11 PROCEDURE — 81001 URINALYSIS AUTO W/SCOPE: CPT

## 2018-10-11 RX ORDER — GABAPENTIN 100 MG/1
100 CAPSULE ORAL 2 TIMES DAILY
Status: DISCONTINUED | OUTPATIENT
Start: 2018-10-12 | End: 2018-10-15 | Stop reason: HOSPADM

## 2018-10-11 RX ORDER — POTASSIUM CHLORIDE 20 MEQ/1
40 TABLET, EXTENDED RELEASE ORAL 2 TIMES DAILY
Status: DISCONTINUED | OUTPATIENT
Start: 2018-10-12 | End: 2018-10-11

## 2018-10-11 RX ORDER — SODIUM CHLORIDE 9 MG/ML
1000 INJECTION, SOLUTION INTRAVENOUS ONCE
Status: COMPLETED | OUTPATIENT
Start: 2018-10-11 | End: 2018-10-11

## 2018-10-11 RX ORDER — NALOXONE HYDROCHLORIDE 0.4 MG/ML
0.4 INJECTION, SOLUTION INTRAMUSCULAR; INTRAVENOUS; SUBCUTANEOUS AS NEEDED
Status: DISCONTINUED | OUTPATIENT
Start: 2018-10-11 | End: 2018-10-15 | Stop reason: HOSPADM

## 2018-10-11 RX ORDER — HYDROCODONE BITARTRATE AND ACETAMINOPHEN 5; 325 MG/1; MG/1
1 TABLET ORAL
Status: DISCONTINUED | OUTPATIENT
Start: 2018-10-11 | End: 2018-10-15 | Stop reason: HOSPADM

## 2018-10-11 RX ORDER — SODIUM CHLORIDE 0.9 % (FLUSH) 0.9 %
5-10 SYRINGE (ML) INJECTION AS NEEDED
Status: DISCONTINUED | OUTPATIENT
Start: 2018-10-11 | End: 2018-10-15 | Stop reason: HOSPADM

## 2018-10-11 RX ORDER — LANSOPRAZOLE 30 MG/1
30 TABLET, ORALLY DISINTEGRATING, DELAYED RELEASE ORAL
Status: DISCONTINUED | OUTPATIENT
Start: 2018-10-12 | End: 2018-10-12

## 2018-10-11 RX ORDER — HEPARIN SODIUM 5000 [USP'U]/ML
5000 INJECTION, SOLUTION INTRAVENOUS; SUBCUTANEOUS EVERY 8 HOURS
Status: DISCONTINUED | OUTPATIENT
Start: 2018-10-11 | End: 2018-10-15 | Stop reason: HOSPADM

## 2018-10-11 RX ORDER — POTASSIUM CHLORIDE 14.9 MG/ML
20 INJECTION INTRAVENOUS
Status: DISPENSED | OUTPATIENT
Start: 2018-10-11 | End: 2018-10-12

## 2018-10-11 RX ORDER — ALBUTEROL SULFATE 0.83 MG/ML
2.5 SOLUTION RESPIRATORY (INHALATION)
Status: DISCONTINUED | OUTPATIENT
Start: 2018-10-11 | End: 2018-10-15 | Stop reason: HOSPADM

## 2018-10-11 RX ORDER — ATORVASTATIN CALCIUM 40 MG/1
40 TABLET, FILM COATED ORAL DAILY
Status: DISCONTINUED | OUTPATIENT
Start: 2018-10-12 | End: 2018-10-15 | Stop reason: HOSPADM

## 2018-10-11 RX ORDER — SODIUM CHLORIDE 9 MG/ML
125 INJECTION, SOLUTION INTRAVENOUS CONTINUOUS
Status: DISCONTINUED | OUTPATIENT
Start: 2018-10-11 | End: 2018-10-11

## 2018-10-11 RX ORDER — BISACODYL 5 MG
5 TABLET, DELAYED RELEASE (ENTERIC COATED) ORAL DAILY PRN
Status: DISCONTINUED | OUTPATIENT
Start: 2018-10-11 | End: 2018-10-15 | Stop reason: HOSPADM

## 2018-10-11 RX ORDER — SODIUM CHLORIDE 0.9 % (FLUSH) 0.9 %
5-10 SYRINGE (ML) INJECTION EVERY 8 HOURS
Status: DISCONTINUED | OUTPATIENT
Start: 2018-10-11 | End: 2018-10-15 | Stop reason: HOSPADM

## 2018-10-11 RX ORDER — ACETAMINOPHEN 325 MG/1
650 TABLET ORAL
Status: DISCONTINUED | OUTPATIENT
Start: 2018-10-11 | End: 2018-10-15 | Stop reason: HOSPADM

## 2018-10-11 RX ORDER — BUDESONIDE 0.5 MG/2ML
500 INHALANT ORAL
Status: DISCONTINUED | OUTPATIENT
Start: 2018-10-11 | End: 2018-10-15 | Stop reason: HOSPADM

## 2018-10-11 RX ORDER — LEVOTHYROXINE SODIUM 50 UG/1
25 TABLET ORAL
Status: DISCONTINUED | OUTPATIENT
Start: 2018-10-12 | End: 2018-10-15 | Stop reason: HOSPADM

## 2018-10-11 RX ORDER — TRAZODONE HYDROCHLORIDE 50 MG/1
50 TABLET ORAL
Status: DISCONTINUED | OUTPATIENT
Start: 2018-10-11 | End: 2018-10-15 | Stop reason: HOSPADM

## 2018-10-11 RX ORDER — MAGNESIUM SULFATE HEPTAHYDRATE 40 MG/ML
4 INJECTION, SOLUTION INTRAVENOUS ONCE
Status: COMPLETED | OUTPATIENT
Start: 2018-10-11 | End: 2018-10-12

## 2018-10-11 RX ORDER — ONDANSETRON 2 MG/ML
4 INJECTION INTRAMUSCULAR; INTRAVENOUS
Status: DISCONTINUED | OUTPATIENT
Start: 2018-10-11 | End: 2018-10-15 | Stop reason: HOSPADM

## 2018-10-11 RX ORDER — POTASSIUM CHLORIDE, DEXTROSE MONOHYDRATE AND SODIUM CHLORIDE 300; 5; 900 MG/100ML; G/100ML; MG/100ML
INJECTION, SOLUTION INTRAVENOUS CONTINUOUS
Status: DISCONTINUED | OUTPATIENT
Start: 2018-10-11 | End: 2018-10-12

## 2018-10-11 RX ADMIN — Medication 10 ML: at 22:35

## 2018-10-11 RX ADMIN — SODIUM CHLORIDE 1000 ML: 900 INJECTION, SOLUTION INTRAVENOUS at 19:50

## 2018-10-11 RX ADMIN — POTASSIUM CHLORIDE 20 MEQ: 14.9 INJECTION, SOLUTION INTRAVENOUS at 22:32

## 2018-10-11 RX ADMIN — SODIUM CHLORIDE 1000 ML/HR: 900 INJECTION, SOLUTION INTRAVENOUS at 21:07

## 2018-10-11 RX ADMIN — TRAZODONE HYDROCHLORIDE 50 MG: 50 TABLET ORAL at 22:37

## 2018-10-11 RX ADMIN — HEPARIN SODIUM 5000 UNITS: 5000 INJECTION INTRAVENOUS; SUBCUTANEOUS at 22:37

## 2018-10-11 RX ADMIN — MAGNESIUM SULFATE HEPTAHYDRATE 4 G: 40 INJECTION, SOLUTION INTRAVENOUS at 23:36

## 2018-10-11 RX ADMIN — BUDESONIDE 500 MCG: 0.5 INHALANT RESPIRATORY (INHALATION) at 23:19

## 2018-10-11 NOTE — IP AVS SNAPSHOT
Katie Adams 
 
 
 [address] 
[phone] Patient: Stephon Ross MRN: [id number] ZER:[birth year] About your hospitalization You were admitted on:  October 11, 2018 You last received care in the:  [facility] 8 MED SURG You were discharged on:  October 15, 2018 Why you were hospitalized Your primary diagnosis was:  Acute Renal Failure Superimposed On Stage 3 Chronic Kidney Disease (Hcc) Your diagnoses also included:  Severe Obesity (Bmi 35.0-39. 9) With Comorbidity (Hcc), S/P Total Knee Arthroplasty, Primary Insomnia, Hyperlipidemia, Hypothyroidism, Htn (Hypertension), Gerd (Gastroesophageal Reflux Disease), Chronic Pain Syndrome, Hypokalemia, Dehydration, Orthostatic Hypotension, Acute Pancreatitis, Acute Gastric Ulcer Without Hemorrhage Or Perforation Follow-up Information Follow up With Details Comments Contact Info Eryn Mullen MD On 10/19/2018 9:00am  - Office phone number has changed [phone] [facility] [address] 
[phone] [facility] In 4 weeks Office will call you with appt date and time. [place] [address] 
[phone] Your Scheduled Appointments Friday October 19, 2018  9:00 AM EDT Office Visit with Eryn Mullen MD  
[address] [address] [phone] Discharge Orders None A check carly indicates which time of day the medication should be taken. My Medications START taking these medications Instructions Each Dose to Equal  
 Morning Noon Evening Bedtime  
 magnesium oxide 400 mg tablet Commonly known as:  MAG-OX Your last dose was:  10/15/18 am  
Your next dose is:  10/15/18 pm  
   
 Take 1 Tab by mouth two (2) times a day. Indications: hypomagnesemia  400 mg  
    
  
   
   
  
   
  
 pantoprazole 40 mg tablet Commonly known as:  PROTONIX Your last dose was:  10/15/18 am  
Your next dose is:  10/15/18 before dinner Take 1 Tab by mouth two (2) times a day. Indications: Gastric Ulcer 40 mg  
    
  
   
   
  
   
  
 sucralfate 1 gram tablet Commonly known as:  Khoa Bride Your next dose is:  10/15/18 before next meal  
   
 Take 1 Tab by mouth four (4) times daily for 30 days. Indications: Gastric Ulcer 1 g CHANGE how you take these medications Instructions Each Dose to Equal  
 Morning Noon Evening Bedtime  
 potassium chloride 20 mEq tablet Commonly known as:  K-DUR, KLOR-CON What changed:  See the new instructions. Your last dose was:  10/15/18 am  
Your next dose is:  10/15/18 pm  
   
 TAKE 1 TABLET BY MOUTH 2 TIMES A DAY TYLENOL ARTHRITIS PAIN 650 mg Criss Kobus Generic drug:  acetaminophen What changed:  Another medication with the same name was removed. Continue taking this medication, and follow the directions you see here. Your next dose is:  Per as needed schedule Take 650 mg by mouth every eight (8) hours as needed (pain). 650 mg CONTINUE taking these medications Instructions Each Dose to Equal  
 Morning Noon Evening Bedtime  
 atorvastatin 40 mg tablet Commonly known as:  LIPITOR Your last dose was:  10/15/18 am  
Your next dose is:  10/16/18 am  
   
 Take 1 Tab by mouth daily. 40 mg CALCIUM CITRATE + PO Your next dose is:  Resume home schedule  
   
 take 1 Tab by mouth two (2) times a day. 1 Tab FLOVENT  mcg/actuation inhaler Generic drug:  fluticasone Your next dose is:  Resume home schedule Take 2 Puffs by inhalation two (2) times a day. 2 Puff  
    
  
   
   
  
   
  
 gabapentin 100 mg capsule Commonly known as:  NEURONTIN  
 Your last dose was:  10/15/18 am  
Your next dose is:  10/15/18 pm  
   
 Take 1 Cap by mouth two (2) times a day. 100 mg  
    
  
   
   
   
  
  
 levothyroxine 25 mcg tablet Commonly known as:  synthroid Your last dose was:  10/15/18 am  
Your next dose is:  10/16/18 am  
   
 Take 1 Tab by mouth every morning. 25 mcg  
    
  
   
   
   
  
 ondansetron 4 mg disintegrating tablet Commonly known as:  ZOFRAN ODT Your next dose is:  Per as needed schedule Take 1 Tab by mouth every eight (8) hours as needed for Nausea. 4 mg  
    
   
   
   
  
 traZODone 50 mg tablet Commonly known as:  Zenobia Hail Your last dose was:  10/14/2018 9pm  
Your next dose is:  10/15/18 bedtime Take 1 Tab by mouth nightly. 50 mg VENTOLIN HFA 90 mcg/actuation inhaler Generic drug:  albuterol Your next dose is:  Per as needed schedule Take 2 Puffs by inhalation every six (6) hours as needed for Wheezing or Shortness of Breath. 2 Puff STOP taking these medications   
 0.9% sodium chloride with KCl 20 mEq/L 20 mEq/L infusion  
   
  
 cefepime 2 gram 2 g IV syringe HEPARIN LOCK FLUSH (PORCINE) IV  
   
  
 losartan-hydroCHLOROthiazide 100-25 mg per tablet Commonly known as:  HYZAAR  
   
  
 NORMAL SALINE FLUSH INJECTION  
   
  
 PRILOSEC OTC PO Where to Get Your Medications Information on where to get these meds will be given to you by the nurse or doctor. ! Ask your nurse or doctor about these medications  
  magnesium oxide 400 mg tablet  
 pantoprazole 40 mg tablet  
 sucralfate 1 gram tablet Discharge Instructions Monitor blood pressure twice a day and record to take to your follow up with Dr. Radha Longo. Call [facility] to schedule follow up. Peptic Ulcer Disease: Care Instructions Your Care Instructions Peptic ulcers are sores on the inside of the stomach or the small intestine (such as a duodenal ulcer). They are most often caused by an infection with bacteria or from use of nonsteroidal anti-inflammatory drugs (NSAIDs). NSAIDs include aspirin, ibuprofen (Advil), and naproxen (Aleve). Your doctor may have prescribed medicine to reduce stomach acid. You also may need to take antibiotics if your peptic ulcers are caused by an infection. You can help yourself heal and keep ulcers from coming back by making some changes in your lifestyle. Quit smoking. Limit alcohol. Follow-up care is a key part of your treatment and safety. Be sure to make and go to all appointments, and call your doctor if you are having problems. It's also a good idea to know your test results and keep a list of the medicines you take. How can you care for yourself at home? · Be safe with medicines. Take your medicines exactly as prescribed. Call your doctor if you think you are having a problem with your medicine. · Do not take aspirin or other NSAIDs such as ibuprofen (Advil or Motrin) or naproxen (Aleve). Ask your doctor what you can take for pain. · Do not smoke. Smoking can make ulcers worse. If you need help quitting, talk to your doctor about stop-smoking programs and medicines. These can increase your chances of quitting for good. · Drink in moderation or avoid drinking alcohol. · Eat a balanced diet of small, frequent meals. See a dietitian if you need help planning your meals. When should you call for help? [id number] anytime you think you may need emergency care. For example, call if: 
  · You vomit blood or what looks like coffee grounds.  
  · You pass maroon or very bloody stools.  
 Call your doctor now or seek immediate medical care if: 
  · You have new or worse belly pain.  
  · Your stools are black and look like tar, or they have streaks of blood.  
  · You vomit.  Watch closely for changes in your health, and be sure to contact your doctor if: 
  · You do not get better as expected. Where can you learn more? Go to http://jenna-maya.info/. Enter S713 in the search box to learn more about \"Peptic Ulcer Disease: Care Instructions. \" Current as of: July 24, 2017 Content Version: 11.8 © 5849-6251 Mapori. Care instructions adapted under license by [employer] (which disclaims liability or warranty for this information). If you have questions about a medical condition or this instruction, always ask your healthcare professional. [place] [address] any warranty or liability for your use of this information. Acute Kidney Injury: Care Instructions Your Care Instructions Acute kidney injury (TIARRA) is a sudden decrease in kidney function. This can happen over a period of hours, days or, in some cases, weeks. TIARRA used to be called acute renal failure, but kidney failure doesn't always happen with TIARRA. Common causes of TIARRA are dehydration, blood loss, and medicines. When TIARRA happens, the kidneys have trouble removing waste and excess fluids from the body. The waste and fluids build up and become harmful. Kidney function may return to normal if the cause of TIARRA is treated quickly. Your chance of a full recovery depends on what caused the problem, how quickly the cause was treated, and what other medical problems you have. A machine may be used to help your kidneys remove waste and fluids for a short period of time. This is called dialysis. Follow-up care is a key part of your treatment and safety. Be sure to make and go to all appointments, and call your doctor if you are having problems. It's also a good idea to know your test results and keep a list of the medicines you take. How can you care for yourself at home? · Talk to your doctor about how much fluid you should drink. · Eat a balanced diet. Talk to your doctor or a dietitian about what type of diet may be best for you. You may need to limit sodium, potassium, and phosphorus. · If you need dialysis, follow the instructions and schedule for dialysis that your doctor gives you. · Do not smoke. Smoking can make your condition worse. If you need help quitting, talk to your doctor about stop-smoking programs and medicines. These can increase your chances of quitting for good. · Do not drink alcohol. · Review all of your medicines with your doctor. Do not take any medicines, including nonsteroidal anti-inflammatory drugs (NSAIDs) such as ibuprofen (Advil, Motrin) or naproxen (Aleve), unless your doctor says it is safe for you to do so. · Make sure that anyone treating you for any health problem knows that you have had TIARRA. When should you call for help? Call 911 anytime you think you may need emergency care. For example, call if: 
  · You passed out (lost consciousness).  
 Call your doctor now or seek immediate medical care if: 
  · You have new or worse nausea and vomiting.  
  · You have much less urine than normal, or you have no urine.  
  · You are feeling confused or cannot think clearly.  
  · You have new or more blood in your urine.  
  · You have new swelling.  
  · You are dizzy or lightheaded, or feel like you may faint.  
 Watch closely for changes in your health, and be sure to contact your doctor if: 
  · You do not get better as expected. Where can you learn more? Go to http://jenna-maya.info/. Enter S178 in the search box to learn more about \"Acute Kidney Injury: Care Instructions. \" Current as of: March 15, 2018 Content Version: 11.8 © 6886-5006 Fantrotter. Care instructions adapted under license by [employer] (which disclaims liability or warranty for this information).  If you have questions about a medical condition or this instruction, always ask your healthcare professional. [place] [address] any warranty or liability for your use of this information. DISCHARGE SUMMARY from Nurse PATIENT INSTRUCTIONS: 
 
After general anesthesia or intravenous sedation, for 24 hours or while taking prescription Narcotics: · Limit your activities · Do not drive and operate hazardous machinery · Do not make important personal or business decisions · Do  not drink alcoholic beverages · If you have not urinated within 8 hours after discharge, please contact your surgeon on call. Report the following to your surgeon: 
· Excessive pain, swelling, redness or odor of or around the surgical area · Temperature over 100.5 · Nausea and vomiting lasting longer than 4 hours or if unable to take medications · Any signs of decreased circulation or nerve impairment to extremity: change in color, persistent  numbness, tingling, coldness or increase pain · Any questions What to do at Home: 
Recommended activity: Activity as tolerated. If you experience any of the following symptoms temp > 101.5, unrelieved pain, nausea or vomiting, shortness of breath or fatigue not relieved with rest, please follow up with MD. 
 
*  Please give a list of your current medications to your Primary Care Provider. *  Please update this list whenever your medications are discontinued, doses are 
    changed, or new medications (including over-the-counter products) are added. *  Please carry medication information at all times in case of emergency situations. These are general instructions for a healthy lifestyle: No smoking/ No tobacco products/ Avoid exposure to second hand smoke Surgeon General's Warning:  Quitting smoking now greatly reduces serious risk to your health. Obesity, smoking, and sedentary lifestyle greatly increases your risk for illness A healthy diet, regular physical exercise & weight monitoring are important for maintaining a healthy lifestyle You may be retaining fluid if you have a history of heart failure or if you experience any of the following symptoms:  Weight gain of 3 pounds or more overnight or 5 pounds in a week, increased swelling in our hands or feet or shortness of breath while lying flat in bed. Please call your doctor as soon as you notice any of these symptoms; do not wait until your next office visit. Recognize signs and symptoms of STROKE: 
 
F-face looks uneven A-arms unable to move or move unevenly S-speech slurred or non-existent T-time-call 911 as soon as signs and symptoms begin-DO NOT go Back to bed or wait to see if you get better-TIME IS BRAIN. Warning Signs of HEART ATTACK Call 911 if you have these symptoms: 
? Chest discomfort. Most heart attacks involve discomfort in the center of the chest that lasts more than a few minutes, or that goes away and comes back. It can feel like uncomfortable pressure, squeezing, fullness, or pain. ? Discomfort in other areas of the upper body. Symptoms can include pain or discomfort in one or both arms, the back, neck, jaw, or stomach. ? Shortness of breath with or without chest discomfort. ? Other signs may include breaking out in a cold sweat, nausea, or lightheadedness. Don't wait more than five minutes to call [address]! Fast action can save your life. Calling 911 is almost always the fastest way to get lifesaving treatment. Emergency Medical Services staff can begin treatment when they arrive  up to an hour sooner than if someone gets to the hospital by car. The discharge information has been reviewed with the patient. The patient verbalized understanding. Discharge medications reviewed with the patient and appropriate educational materials and side effects teaching were provided.  
___________________________________________________________________________ ________________________________________________________ ACO Transitions of Care Introducing [place] [address] Aga Garza offers a voluntary care coordination program to provide high quality service and care to [facility] fee-for-service beneficiaries. Osmani Vizcaino was designed to help you enhance your health and well-being through the following services: ? Transitions of Care  support for individuals who are transitioning from one care setting to another (example: Hospital to home). ? Chronic and Complex Care Coordination  support for individuals and caregivers of those with serious or chronic illnesses or with more than one chronic (ongoing) condition and those who take a number of different medications. If you meet specific medical criteria, a [address] [place] [address] Rd may call you directly to coordinate your care with your primary care physician and your other care providers. For questions about the [facility] programs, please, contact your physicians office. For general questions or additional information about Accountable Care Organizations: 
Please visit www.medicare.gov/acos. html or call 1-800-MEDICARE (0-902.154.9328) TTY users should call 8-341.373.4228. CultureIQ Announcement We are excited to announce that we are making your provider's discharge notes available to you in CultureIQ. You will see these notes when they are completed and signed by the physician that discharged you from your recent hospital stay. If you have any questions or concerns about any information you see in Veristormhart, please call the Health Information Department where you were seen or reach out to your Primary Care Provider for more information about your plan of care. Introducing [facility]! Dear Kirsten Locke: Thank you for requesting a CultureIQ account.   Our records indicate that you already have an active Velotton account. You can access your account anytime at [email or web address] Did you know that you can access your hospital and ER discharge instructions at any time in Velotton? You can also review all of your test results from your hospital stay or ER visit. Additional Information If you have questions, please visit the Frequently Asked Questions section of the Velotton website at [email or web address] Remember, Velotton is NOT to be used for urgent needs. For medical emergencies, dial 911. Now available from your iPhone and Android! Introducing Yasmani Ambriz As a [facility] patient, I wanted to make you aware of our electronic visit tool called Yasmani Ambriz. Aeluros 24/Pirate Pay allows you to connect within minutes with a medical provider 24 hours a day, seven days a week via a mobile device or tablet or logging into a secure website from your computer. You can access Yasmani Ambriz from anywhere in the [place]. A virtual visit might be right for you when you have a simple condition and feel like you just dont want to get out of bed, or cant get away from work for an appointment, when your regular [employer] provider is not available (evenings, weekends or holidays), or when youre out of town and need minor care. Electronic visits cost only $49 and if the Aeluros 24/Pirate Pay provider determines a prescription is needed to treat your condition, one can be electronically transmitted to a nearby pharmacy*. Please take a moment to enroll today if you have not already done so. The enrollment process is free and takes just a few minutes. To enroll, please download the [employer]ogenta Systems/Pirate Pay jessica to your tablet or phone, or visit [email or web address] to enroll on your computer.    
And, as an [address] [place] [address] patient with a [employer] account, the results of your visits will be scanned into your electronic medical record and your primary care provider will be able to view the scanned results. We urge you to continue to see your regular [facility] provider for your ongoing medical care. And while your primary care provider may not be the one available when you seek a Yasmani Quickfin virtual visit, the peace of mind you get from getting a real diagnosis real time can be priceless. For more information on Aridis [employer]smaria estherfin, view our Frequently Asked Questions (FAQs) at [email or web address]. Sincerely, 
 
Rodney Piedra MD 
Chief Medical Officer [place] *:  certain medications cannot be prescribed via Adiana Providers Seen During Your Hospitalization Provider Specialty Primary office phone Samantha Travis MD Emergency Medicine [phone] Kam Harrington MD Internal Medicine [phone] Immunizations Administered for This Admission Name Date  
 TB Skin Test (PPD) Intradermal  Deferred (), 10/12/2018 Your Primary Care Physician (PCP) Primary Care Physician Office Phone Office Fax Lorna Guzman [phone] You are allergic to the following Allergen Reactions Pneumovax 23 (Pneumococcal 23-Janell Ps Vaccine) Other (comments) fever Recent Documentation Height Weight BMI OB Status Smoking Status 1.626 m 94.7 kg 35.82 kg/m2 Hysterectomy Never Smoker Emergency Contacts Name Discharge Info Relation Home Work Mobile Ana Luisa Mendes  Daughter [21] [phone] Rella Cushing  Spouse [3] [phone] Patient Belongings The following personal items are in your possession at time of discharge: 
  Dental Appliances: Uppers, With patient, Lowers  Visual Aid: None      Home Medications: None   Jewelry: None  Clothing: Shirt, Pajamas, With patient    Other Valuables: None Please provide this summary of care documentation to your next provider. Signatures-by signing, you are acknowledging that this After Visit Summary has been reviewed with you and you have received a copy. Patient Signature:  ____________________________________________________________ Date:  ____________________________________________________________  
  
Alfaro Livings Provider Signature:  ____________________________________________________________ Date:  ____________________________________________________________

## 2018-10-11 NOTE — ED NOTES
Patient reports generalized fatigue and dizziness. States \"I fell this morning at 6 in the morning because I stood up real fast because I was trying to get to the bathroom, I thought I was about to throw up\". Orthostatic BP placed in chart.

## 2018-10-11 NOTE — ED TRIAGE NOTES
Pt states he fell and got an infection and they placed a PIC line and was given 2 rounds of antibiotics. The last dose was last Saturday. Pt states she feels weak and dehydrated. Pt was given 2 l ns last Friday and 500 ml ns Wednesday. Pt states she is just not bouncing back like she should. Pt reports that home health did blood work today.

## 2018-10-11 NOTE — IP AVS SNAPSHOT
Ana Nigel 
 
 
 [address] [address] [address] [facility] 
[phone] Patient: Yosi Scott MRN: [id number] IBA:[id number] A check carly indicates which time of day the medication should be taken. My Medications START taking these medications Instructions Each Dose to Equal  
 Morning Noon Evening Bedtime  
 magnesium oxide 400 mg tablet Commonly known as:  MAG-OX Your last dose was:  10/15/18 am  
Your next dose is:  10/15/18 pm  
   
 Take 1 Tab by mouth two (2) times a day. Indications: hypomagnesemia 400 mg  
    
  
   
   
  
   
  
 pantoprazole 40 mg tablet Commonly known as:  PROTONIX Your last dose was:  10/15/18 am  
Your next dose is:  10/15/18 before dinner Take 1 Tab by mouth two (2) times a day. Indications: Gastric Ulcer 40 mg  
    
  
   
   
  
   
  
 sucralfate 1 gram tablet Commonly known as:  Wendy Bushy Your next dose is:  10/15/18 before next meal  
   
 Take 1 Tab by mouth four (4) times daily for 30 days. Indications: Gastric Ulcer 1 g CHANGE how you take these medications Instructions Each Dose to Equal  
 Morning Noon Evening Bedtime  
 potassium chloride 20 mEq tablet Commonly known as:  K-DUR, KLOR-CON What changed:  See the new instructions. Your last dose was:  10/15/18 am  
Your next dose is:  10/15/18 pm  
   
 TAKE 1 TABLET BY MOUTH 2 TIMES A DAY TYLENOL ARTHRITIS PAIN 650 mg [facility] Doctor Generic drug:  acetaminophen What changed:  Another medication with the same name was removed. Continue taking this medication, and follow the directions you see here. Your next dose is:  Per as needed schedule Take 650 mg by mouth every eight (8) hours as needed (pain). 650 mg CONTINUE taking these medications Instructions Each Dose to Equal  
 Morning Noon Evening Bedtime atorvastatin 40 mg tablet Commonly known as:  LIPITOR Your last dose was:  10/15/18 am  
Your next dose is:  10/16/18 am  
   
 Take 1 Tab by mouth daily. 40 mg CALCIUM CITRATE + PO Your next dose is:  Resume home schedule  
   
 take 1 Tab by mouth two (2) times a day. 1 Tab FLOVENT  mcg/actuation inhaler Generic drug:  fluticasone Your next dose is:  Resume home schedule Take 2 Puffs by inhalation two (2) times a day. 2 Puff  
    
  
   
   
  
   
  
 gabapentin 100 mg capsule Commonly known as:  NEURONTIN Your last dose was:  10/15/18 am  
Your next dose is:  10/15/18 pm  
   
 Take 1 Cap by mouth two (2) times a day. 100 mg  
    
  
   
   
   
  
  
 levothyroxine 25 mcg tablet Commonly known as:  synthroid Your last dose was:  10/15/18 am  
Your next dose is:  10/16/18 am  
   
 Take 1 Tab by mouth every morning. 25 mcg  
    
  
   
   
   
  
 ondansetron 4 mg disintegrating tablet Commonly known as:  ZOFRAN ODT Your next dose is:  Per as needed schedule Take 1 Tab by mouth every eight (8) hours as needed for Nausea. 4 mg  
    
   
   
   
  
 traZODone 50 mg tablet Commonly known as:  Donzella Eaton Your last dose was:  10/14/2018 9pm  
Your next dose is:  10/15/18 bedtime Take 1 Tab by mouth nightly. 50 mg VENTOLIN HFA 90 mcg/actuation inhaler Generic drug:  albuterol Your next dose is:  Per as needed schedule Take 2 Puffs by inhalation every six (6) hours as needed for Wheezing or Shortness of Breath. 2 Puff STOP taking these medications   
 0.9% sodium chloride with KCl 20 mEq/L 20 mEq/L infusion  
   
  
 cefepime 2 gram 2 g IV syringe HEPARIN LOCK FLUSH (PORCINE) IV  
   
  
 losartan-hydroCHLOROthiazide 100-25 mg per tablet Commonly known as:  HYZAAR  
   
  
 NORMAL SALINE FLUSH INJECTION  
   
  
 PRILOSEC OTC PO Where to Get Your Medications Information on where to get these meds will be given to you by the nurse or doctor. ! Ask your nurse or doctor about these medications  
  magnesium oxide 400 mg tablet  
 pantoprazole 40 mg tablet  
 sucralfate 1 gram tablet

## 2018-10-12 ENCOUNTER — APPOINTMENT (OUTPATIENT)
Dept: CT IMAGING | Age: 78
DRG: 683 | End: 2018-10-12
Attending: INTERNAL MEDICINE
Payer: MEDICARE

## 2018-10-12 ENCOUNTER — ANESTHESIA (OUTPATIENT)
Dept: ENDOSCOPY | Age: 78
DRG: 683 | End: 2018-10-12
Payer: MEDICARE

## 2018-10-12 ENCOUNTER — ANESTHESIA EVENT (OUTPATIENT)
Dept: ENDOSCOPY | Age: 78
DRG: 683 | End: 2018-10-12
Payer: MEDICARE

## 2018-10-12 PROBLEM — K85.90 ACUTE PANCREATITIS: Status: ACTIVE | Noted: 2018-10-12

## 2018-10-12 LAB
ANION GAP SERPL CALC-SCNC: 15 MMOL/L (ref 7–16)
ATRIAL RATE: 65 BPM
BUN SERPL-MCNC: 45 MG/DL (ref 8–23)
CALCIUM SERPL-MCNC: 7.6 MG/DL (ref 8.3–10.4)
CALCULATED P AXIS, ECG09: 23 DEGREES
CALCULATED R AXIS, ECG10: 17 DEGREES
CALCULATED T AXIS, ECG11: 35 DEGREES
CHLORIDE SERPL-SCNC: 122 MMOL/L (ref 98–107)
CO2 SERPL-SCNC: 15 MMOL/L (ref 21–32)
CREAT SERPL-MCNC: 2.42 MG/DL (ref 0.6–1)
DIAGNOSIS, 93000: NORMAL
ERYTHROCYTE [DISTWIDTH] IN BLOOD BY AUTOMATED COUNT: 14.8 %
GLUCOSE SERPL-MCNC: 115 MG/DL (ref 65–100)
HCT VFR BLD AUTO: 27.8 % (ref 35.8–46.3)
HGB BLD-MCNC: 8.9 G/DL (ref 11.7–15.4)
MAGNESIUM SERPL-MCNC: 2.5 MG/DL (ref 1.8–2.4)
MCH RBC QN AUTO: 29.5 PG (ref 26.1–32.9)
MCHC RBC AUTO-ENTMCNC: 32 G/DL (ref 31.4–35)
MCV RBC AUTO: 92.1 FL (ref 79.6–97.8)
NRBC # BLD: 0 K/UL (ref 0–0.2)
P-R INTERVAL, ECG05: 176 MS
PLATELET # BLD AUTO: 136 K/UL (ref 150–450)
PMV BLD AUTO: 13.7 FL (ref 9.4–12.3)
POTASSIUM SERPL-SCNC: 3 MMOL/L (ref 3.5–5.1)
Q-T INTERVAL, ECG07: 424 MS
QRS DURATION, ECG06: 84 MS
QTC CALCULATION (BEZET), ECG08: 440 MS
RBC # BLD AUTO: 3.02 M/UL (ref 4.05–5.2)
SODIUM SERPL-SCNC: 152 MMOL/L (ref 136–145)
VENTRICULAR RATE, ECG03: 65 BPM
WBC # BLD AUTO: 5.6 K/UL (ref 4.3–11.1)

## 2018-10-12 PROCEDURE — 83735 ASSAY OF MAGNESIUM: CPT

## 2018-10-12 PROCEDURE — 74011250637 HC RX REV CODE- 250/637: Performed by: INTERNAL MEDICINE

## 2018-10-12 PROCEDURE — 74011000258 HC RX REV CODE- 258: Performed by: INTERNAL MEDICINE

## 2018-10-12 PROCEDURE — 74011250636 HC RX REV CODE- 250/636: Performed by: INTERNAL MEDICINE

## 2018-10-12 PROCEDURE — 74011250636 HC RX REV CODE- 250/636: Performed by: ANESTHESIOLOGY

## 2018-10-12 PROCEDURE — 36415 COLL VENOUS BLD VENIPUNCTURE: CPT

## 2018-10-12 PROCEDURE — 85027 COMPLETE CBC AUTOMATED: CPT

## 2018-10-12 PROCEDURE — 97535 SELF CARE MNGMENT TRAINING: CPT

## 2018-10-12 PROCEDURE — 94760 N-INVAS EAR/PLS OXIMETRY 1: CPT

## 2018-10-12 PROCEDURE — 97530 THERAPEUTIC ACTIVITIES: CPT

## 2018-10-12 PROCEDURE — 76060000031 HC ANESTHESIA FIRST 0.5 HR: Performed by: INTERNAL MEDICINE

## 2018-10-12 PROCEDURE — 3331090002 HH PPS REVENUE DEBIT

## 2018-10-12 PROCEDURE — 97166 OT EVAL MOD COMPLEX 45 MIN: CPT

## 2018-10-12 PROCEDURE — 93005 ELECTROCARDIOGRAM TRACING: CPT | Performed by: INTERNAL MEDICINE

## 2018-10-12 PROCEDURE — 94640 AIRWAY INHALATION TREATMENT: CPT

## 2018-10-12 PROCEDURE — 0DB78ZX EXCISION OF STOMACH, PYLORUS, VIA NATURAL OR ARTIFICIAL OPENING ENDOSCOPIC, DIAGNOSTIC: ICD-10-PCS | Performed by: INTERNAL MEDICINE

## 2018-10-12 PROCEDURE — 88312 SPECIAL STAINS GROUP 1: CPT

## 2018-10-12 PROCEDURE — 74011250636 HC RX REV CODE- 250/636

## 2018-10-12 PROCEDURE — 97162 PT EVAL MOD COMPLEX 30 MIN: CPT

## 2018-10-12 PROCEDURE — 76040000025: Performed by: INTERNAL MEDICINE

## 2018-10-12 PROCEDURE — 74011636320 HC RX REV CODE- 636/320: Performed by: INTERNAL MEDICINE

## 2018-10-12 PROCEDURE — 3331090001 HH PPS REVENUE CREDIT

## 2018-10-12 PROCEDURE — 88305 TISSUE EXAM BY PATHOLOGIST: CPT

## 2018-10-12 PROCEDURE — 74011250637 HC RX REV CODE- 250/637: Performed by: PHYSICIAN ASSISTANT

## 2018-10-12 PROCEDURE — 65270000029 HC RM PRIVATE

## 2018-10-12 PROCEDURE — 86580 TB INTRADERMAL TEST: CPT | Performed by: INTERNAL MEDICINE

## 2018-10-12 PROCEDURE — 77030020245 HC SOL INJ 5% D/0.9%NACL

## 2018-10-12 PROCEDURE — 74011000250 HC RX REV CODE- 250: Performed by: INTERNAL MEDICINE

## 2018-10-12 PROCEDURE — 80048 BASIC METABOLIC PNL TOTAL CA: CPT

## 2018-10-12 PROCEDURE — 74011000302 HC RX REV CODE- 302: Performed by: INTERNAL MEDICINE

## 2018-10-12 PROCEDURE — 77030009426 HC FCPS BIOP ENDOSC BSC -B: Performed by: INTERNAL MEDICINE

## 2018-10-12 RX ORDER — DEXTROSE MONOHYDRATE AND SODIUM CHLORIDE 5; .45 G/100ML; G/100ML
125 INJECTION, SOLUTION INTRAVENOUS CONTINUOUS
Status: DISCONTINUED | OUTPATIENT
Start: 2018-10-12 | End: 2018-10-14

## 2018-10-12 RX ORDER — SODIUM CHLORIDE, SODIUM LACTATE, POTASSIUM CHLORIDE, CALCIUM CHLORIDE 600; 310; 30; 20 MG/100ML; MG/100ML; MG/100ML; MG/100ML
100 INJECTION, SOLUTION INTRAVENOUS CONTINUOUS
Status: DISCONTINUED | OUTPATIENT
Start: 2018-10-12 | End: 2018-10-12

## 2018-10-12 RX ORDER — LIDOCAINE HYDROCHLORIDE 20 MG/ML
INJECTION, SOLUTION EPIDURAL; INFILTRATION; INTRACAUDAL; PERINEURAL AS NEEDED
Status: DISCONTINUED | OUTPATIENT
Start: 2018-10-12 | End: 2018-10-12 | Stop reason: HOSPADM

## 2018-10-12 RX ORDER — SODIUM CHLORIDE, SODIUM LACTATE, POTASSIUM CHLORIDE, CALCIUM CHLORIDE 600; 310; 30; 20 MG/100ML; MG/100ML; MG/100ML; MG/100ML
INJECTION, SOLUTION INTRAVENOUS
Status: DISCONTINUED | OUTPATIENT
Start: 2018-10-12 | End: 2018-10-12 | Stop reason: HOSPADM

## 2018-10-12 RX ORDER — SUCRALFATE 1 G/10ML
1 SUSPENSION ORAL
Status: DISCONTINUED | OUTPATIENT
Start: 2018-10-12 | End: 2018-10-15 | Stop reason: HOSPADM

## 2018-10-12 RX ORDER — SODIUM CHLORIDE 0.9 % (FLUSH) 0.9 %
5-10 SYRINGE (ML) INJECTION AS NEEDED
Status: DISCONTINUED | OUTPATIENT
Start: 2018-10-12 | End: 2018-10-12 | Stop reason: SDUPTHER

## 2018-10-12 RX ORDER — PROPOFOL 10 MG/ML
INJECTION, EMULSION INTRAVENOUS AS NEEDED
Status: DISCONTINUED | OUTPATIENT
Start: 2018-10-12 | End: 2018-10-12 | Stop reason: HOSPADM

## 2018-10-12 RX ORDER — POTASSIUM CHLORIDE 14.9 MG/ML
20 INJECTION INTRAVENOUS
Status: COMPLETED | OUTPATIENT
Start: 2018-10-12 | End: 2018-10-12

## 2018-10-12 RX ORDER — LANSOPRAZOLE 30 MG/1
30 TABLET, ORALLY DISINTEGRATING, DELAYED RELEASE ORAL
Status: DISCONTINUED | OUTPATIENT
Start: 2018-10-12 | End: 2018-10-15 | Stop reason: HOSPADM

## 2018-10-12 RX ORDER — POTASSIUM CHLORIDE 20 MEQ/1
20 TABLET, EXTENDED RELEASE ORAL 2 TIMES DAILY
Status: DISCONTINUED | OUTPATIENT
Start: 2018-10-12 | End: 2018-10-15 | Stop reason: HOSPADM

## 2018-10-12 RX ADMIN — SUCRALFATE 1 G: 1 SUSPENSION ORAL at 21:00

## 2018-10-12 RX ADMIN — POTASSIUM CHLORIDE 20 MEQ: 14.9 INJECTION, SOLUTION INTRAVENOUS at 16:12

## 2018-10-12 RX ADMIN — HEPARIN SODIUM 5000 UNITS: 5000 INJECTION INTRAVENOUS; SUBCUTANEOUS at 21:00

## 2018-10-12 RX ADMIN — POTASSIUM CHLORIDE: 2 INJECTION, SOLUTION, CONCENTRATE INTRAVENOUS at 13:00

## 2018-10-12 RX ADMIN — SODIUM CHLORIDE, SODIUM LACTATE, POTASSIUM CHLORIDE, CALCIUM CHLORIDE: 600; 310; 30; 20 INJECTION, SOLUTION INTRAVENOUS at 11:55

## 2018-10-12 RX ADMIN — TRAZODONE HYDROCHLORIDE 50 MG: 50 TABLET ORAL at 21:00

## 2018-10-12 RX ADMIN — LEVOTHYROXINE SODIUM 25 MCG: 50 TABLET ORAL at 05:39

## 2018-10-12 RX ADMIN — ONDANSETRON 4 MG: 2 INJECTION INTRAMUSCULAR; INTRAVENOUS at 16:10

## 2018-10-12 RX ADMIN — HEPARIN SODIUM 5000 UNITS: 5000 INJECTION INTRAVENOUS; SUBCUTANEOUS at 05:39

## 2018-10-12 RX ADMIN — TUBERCULIN PURIFIED PROTEIN DERIVATIVE 5 UNITS: 5 INJECTION, SOLUTION INTRADERMAL at 05:40

## 2018-10-12 RX ADMIN — DEXTROSE MONOHYDRATE, SODIUM CHLORIDE, AND POTASSIUM CHLORIDE: 50; 9; 2.98 INJECTION, SOLUTION INTRAVENOUS at 09:00

## 2018-10-12 RX ADMIN — GABAPENTIN 100 MG: 100 CAPSULE ORAL at 10:03

## 2018-10-12 RX ADMIN — ONDANSETRON 4 MG: 2 INJECTION INTRAMUSCULAR; INTRAVENOUS at 06:22

## 2018-10-12 RX ADMIN — Medication 10 ML: at 13:02

## 2018-10-12 RX ADMIN — DEXTROSE MONOHYDRATE AND SODIUM CHLORIDE 125 ML/HR: 5; .45 INJECTION, SOLUTION INTRAVENOUS at 14:22

## 2018-10-12 RX ADMIN — GABAPENTIN 100 MG: 100 CAPSULE ORAL at 17:15

## 2018-10-12 RX ADMIN — BUDESONIDE 500 MCG: 0.5 INHALANT RESPIRATORY (INHALATION) at 08:03

## 2018-10-12 RX ADMIN — ATORVASTATIN CALCIUM 40 MG: 40 TABLET, FILM COATED ORAL at 10:03

## 2018-10-12 RX ADMIN — PROPOFOL 60 MG: 10 INJECTION, EMULSION INTRAVENOUS at 12:00

## 2018-10-12 RX ADMIN — DIATRIZOATE MEGLUMINE AND DIATRIZOATE SODIUM 15 ML: 660; 100 LIQUID ORAL; RECTAL at 06:22

## 2018-10-12 RX ADMIN — SODIUM CHLORIDE, SODIUM LACTATE, POTASSIUM CHLORIDE, AND CALCIUM CHLORIDE 100 ML/HR: 600; 310; 30; 20 INJECTION, SOLUTION INTRAVENOUS at 11:40

## 2018-10-12 RX ADMIN — DEXTROSE MONOHYDRATE, SODIUM CHLORIDE, AND POTASSIUM CHLORIDE: 50; 9; 2.98 INJECTION, SOLUTION INTRAVENOUS at 00:47

## 2018-10-12 RX ADMIN — LIDOCAINE HYDROCHLORIDE 30 MG: 20 INJECTION, SOLUTION EPIDURAL; INFILTRATION; INTRACAUDAL; PERINEURAL at 12:00

## 2018-10-12 RX ADMIN — POTASSIUM CHLORIDE 20 MEQ: 14.9 INJECTION, SOLUTION INTRAVENOUS at 14:23

## 2018-10-12 RX ADMIN — LANSOPRAZOLE 30 MG: 30 TABLET, ORALLY DISINTEGRATING, DELAYED RELEASE ORAL at 05:39

## 2018-10-12 RX ADMIN — Medication 10 ML: at 05:41

## 2018-10-12 RX ADMIN — Medication 5 ML: at 21:00

## 2018-10-12 NOTE — PROGRESS NOTES
Hospitalist Progress Note 10/12/2018 Admit Date: 10/11/2018  6:45 PM  
NAME: Sandra Jones :  1940 MRN:  [id number] Attending: Lorraine Luna MD 
PCP:  Yanni Diego MD 
 
SUBJECTIVE:  
Svetlana Gutierrez is a 71 yo F admitted 10/11 with TIARRA after 2 weeks of nausea and vomiting (at least twice a day for the last 1.5-2 weeks) while receiving 3 week course of cefepime for recent L leg/knee cellulitis/bursitis. She also had syncopal event at home, felt to be due to dehydration/orthostatic hypotension. She reports she had episode of vomiting this AM after oral contrast for CT abdomen. Her Cr has slightly improved from yesterday; sodium level is trending up. Review of Systems negative with exception of pertinent positives noted above PHYSICAL EXAM  
 
Visit Vitals  /63 (BP 1 Location: Left arm, BP Patient Position: At rest)  Pulse 67  Temp 98.1 °F (36.7 °C)  Resp 18  Ht 5' 4\" (1.626 m)  Wt 92.8 kg (204 lb 9.6 oz)  SpO2 96%  BMI 35.12 kg/m2 Temp (24hrs), Av.1 °F (36.7 °C), Min:97.4 °F (36.3 °C), Max:98.5 °F (36.9 °C) Patient Vitals for the past 24 hrs: 
 Temp Pulse Resp BP SpO2  
10/12/18 0803 - - - - 96 % 10/12/18 0707 98.1 °F (36.7 °C) 67 18 136/63 95 % 10/12/18 0333 98.4 °F (36.9 °C) 66 18 149/68 97 % 10/11/18 2319 - - - - 97 % 10/11/18 2208 98.5 °F (36.9 °C) 75 18 179/83 98 % 10/11/18 1910 - - - - 97 % 10/11/18 1908 - 67 18 167/72 97 % 10/11/18 1842 97.4 °F (36.3 °C) 90 16 152/85 97 % Oxygen Therapy O2 Sat (%): 96 % (10/12/18 0803) Pulse via Oximetry: 80 beats per minute (10/12/18 0803) O2 Device: Room air (10/12/18 9682) Intake/Output Summary (Last 24 hours) at 10/12/18 1852 Last data filed at 10/12/18 4057 Gross per 24 hour Intake           1162.5 ml Output              150 ml Net           1012.5 ml  
  
General: No acute distress, weak appearing   
Lungs:  CTA Bilaterally. Heart:  Regular rate and rhythm,  No murmur, rub, or gallop Abdomen: Soft, Non distended, Non tender, Positive bowel sounds, no mass appreciated on exam 
Extremities: No cyanosis, clubbing or edema Neurologic:  No focal deficits Recent Results (from the past 24 hour(s)) METABOLIC PANEL, BASIC Collection Time: 10/11/18 11:30 AM  
Result Value Ref Range Sodium 146 (H) 136 - 145 mmol/L Potassium 3.0 (L) 3.5 - 5.1 mmol/L Chloride 112 (H) 98 - 107 mmol/L  
 CO2 15 (L) 21 - 32 mmol/L Anion gap 19 mmol/L Glucose 53 (L) 65 - 100 mg/dL BUN 53 (H) 8 - 23 MG/DL Creatinine 2.80 (H) 0.6 - 1.0 MG/DL  
 GFR est AA 21 (L) >60 ml/min/1.73m2 GFR est non-AA 17 ml/min/1.73m2 Calcium 7.9 (L) 8.3 - 10.4 MG/DL  
GLUCOSE, POC Collection Time: 10/11/18  6:48 PM  
Result Value Ref Range Glucose (POC) 76 65 - 100 mg/dL METABOLIC PANEL, BASIC Collection Time: 10/11/18  7:46 PM  
Result Value Ref Range Sodium 147 (H) 136 - 145 mmol/L Potassium 2.8 (LL) 3.5 - 5.1 mmol/L Chloride 113 (H) 98 - 107 mmol/L  
 CO2 17 (L) 21 - 32 mmol/L Anion gap 17 (H) 7 - 16 mmol/L Glucose 73 65 - 100 mg/dL BUN 52 (H) 8 - 23 MG/DL Creatinine 2.94 (H) 0.6 - 1.0 MG/DL  
 GFR est AA 20 (L) >60 ml/min/1.73m2 GFR est non-AA 17 (L) >60 ml/min/1.73m2 Calcium 8.2 (L) 8.3 - 10.4 MG/DL  
CBC WITH AUTOMATED DIFF Collection Time: 10/11/18  7:46 PM  
Result Value Ref Range WBC 7.7 4.3 - 11.1 K/uL  
 RBC 3.54 (L) 4.05 - 5.2 M/uL  
 HGB 10.4 (L) 11.7 - 15.4 g/dL HCT 32.5 (L) 35.8 - 46.3 % MCV 91.8 79.6 - 97.8 FL  
 MCH 29.4 26.1 - 32.9 PG  
 MCHC 32.0 31.4 - 35.0 g/dL  
 RDW 14.6 % PLATELET 753 973 - 359 K/uL MPV 12.9 (H) 9.4 - 12.3 FL ABSOLUTE NRBC 0.00 0.0 - 0.2 K/uL  
 DF AUTOMATED NEUTROPHILS 68 43 - 78 % LYMPHOCYTES 18 13 - 44 % MONOCYTES 12 4.0 - 12.0 % EOSINOPHILS 1 0.5 - 7.8 % BASOPHILS 1 0.0 - 2.0 % IMMATURE GRANULOCYTES 0 0.0 - 5.0 % ABS. NEUTROPHILS 5.2 1.7 - 8.2 K/UL  
 ABS. LYMPHOCYTES 1.4 0.5 - 4.6 K/UL  
 ABS. MONOCYTES 0.9 0.1 - 1.3 K/UL  
 ABS. EOSINOPHILS 0.1 0.0 - 0.8 K/UL  
 ABS. BASOPHILS 0.1 0.0 - 0.2 K/UL  
 ABS. IMM. GRANS. 0.0 0.0 - 0.5 K/UL HEPATIC FUNCTION PANEL Collection Time: 10/11/18  7:46 PM  
Result Value Ref Range Protein, total 7.6 6.3 - 8.2 g/dL Albumin 2.9 (L) 3.2 - 4.6 g/dL Globulin 4.7 (H) 2.3 - 3.5 g/dL A-G Ratio 0.6 (L) 1.2 - 3.5 Bilirubin, total 0.4 0.2 - 1.1 MG/DL Bilirubin, direct 0.1 <0.4 MG/DL Alk. phosphatase 77 50 - 136 U/L  
 AST (SGOT) 16 15 - 37 U/L  
 ALT (SGPT) 14 12 - 65 U/L  
MAGNESIUM Collection Time: 10/11/18  7:46 PM  
Result Value Ref Range Magnesium 0.8 (LL) 1.8 - 2.4 mg/dL CK Collection Time: 10/11/18  7:46 PM  
Result Value Ref Range  21 - 215 U/L  
TSH 3RD GENERATION Collection Time: 10/11/18  7:46 PM  
Result Value Ref Range TSH 1.100 0.358 - 3.740 uIU/mL  
LIPASE Collection Time: 10/11/18  7:46 PM  
Result Value Ref Range Lipase 442 (H) 73 - 393 U/L  
URINALYSIS W/ RFLX MICROSCOPIC Collection Time: 10/11/18 10:05 PM  
Result Value Ref Range Color YELLOW Appearance CLOUDY Specific gravity 1.016 1.001 - 1.023    
 pH (UA) 5.5 5.0 - 9.0 Protein 100 (A) NEG mg/dL Glucose NEGATIVE  mg/dL Ketone 40 (A) NEG mg/dL Bilirubin MODERATE (A) NEG Blood MODERATE (A) NEG Urobilinogen 0.2 0.2 - 1.0 EU/dL Nitrites NEGATIVE  NEG Leukocyte Esterase SMALL (A) NEG    
 WBC 5-10 0 /hpf  
 RBC 3-5 0 /hpf Epithelial cells 0-3 0 /hpf Bacteria 0 0 /hpf Casts 5-10 0 /lpf SODIUM, UR, RANDOM Collection Time: 10/11/18 10:05 PM  
Result Value Ref Range Sodium,urine random 59 MMOL/L  
CREATININE, UR, RANDOM Collection Time: 10/11/18 10:05 PM  
Result Value Ref Range Creatinine, urine 108.00 mg/dL METABOLIC PANEL, BASIC Collection Time: 10/12/18  5:50 AM  
Result Value Ref Range Sodium 152 (H) 136 - 145 mmol/L Potassium 3.0 (L) 3.5 - 5.1 mmol/L Chloride 122 (H) 98 - 107 mmol/L  
 CO2 15 (L) 21 - 32 mmol/L Anion gap 15 7 - 16 mmol/L Glucose 115 (H) 65 - 100 mg/dL BUN 45 (H) 8 - 23 MG/DL Creatinine 2.42 (H) 0.6 - 1.0 MG/DL  
 GFR est AA 25 (L) >60 ml/min/1.73m2 GFR est non-AA 21 (L) >60 ml/min/1.73m2 Calcium 7.6 (L) 8.3 - 10.4 MG/DL  
CBC W/O DIFF Collection Time: 10/12/18  5:50 AM  
Result Value Ref Range WBC 5.6 4.3 - 11.1 K/uL  
 RBC 3.02 (L) 4.05 - 5.2 M/uL HGB 8.9 (L) 11.7 - 15.4 g/dL HCT 27.8 (L) 35.8 - 46.3 % MCV 92.1 79.6 - 97.8 FL  
 MCH 29.5 26.1 - 32.9 PG  
 MCHC 32.0 31.4 - 35.0 g/dL  
 RDW 14.8 % PLATELET 766 (L) 305 - 450 K/uL MPV 13.7 (H) 9.4 - 12.3 FL ABSOLUTE NRBC 0.00 0.0 - 0.2 K/uL MAGNESIUM Collection Time: 10/12/18  5:50 AM  
Result Value Ref Range Magnesium 2.5 (H) 1.8 - 2.4 mg/dL EKG, 12 LEAD, INITIAL Collection Time: 10/12/18  7:59 AM  
Result Value Ref Range Ventricular Rate 65 BPM  
 Atrial Rate 65 BPM  
 P-R Interval 176 ms QRS Duration 84 ms Q-T Interval 424 ms QTC Calculation (Bezet) 440 ms Calculated P Axis 23 degrees Calculated R Axis 17 degrees Calculated T Axis 35 degrees Diagnosis Sinus rhythm with Premature atrial complexes Otherwise normal ECG When compared with ECG of 29-DEC-2008 10:33, 
Premature atrial complexes are now Present Confirmed by Sierra Phelan MD (), Kristen Velazquez ([id number]) on 10/12/2018 9:33:34 AM 
  
 
 
ASSESSMENT Hospital Problems as of 10/12/2018  Date Reviewed: 8/1/2018 Codes Class Noted - Resolved POA Acute pancreatitis ICD-10-CM: K85.90 ICD-9-CM: 338.2  10/12/2018 - Present Yes * (Principal)Acute renal failure superimposed on stage 3 chronic [facility] disease ([facility] ICD-10-CM: N17.9, N18.3 ICD-9-CM: 584.9, 585.3  10/11/2018 - Present Yes Hypokalemia ICD-10-CM: E87.6 ICD-9-CM: 276.8  10/11/2018 - Present Yes Dehydration ICD-10-CM: E86.0 ICD-9-CM: 276.51  10/11/2018 - Present Yes Orthostatic hypotension ICD-10-CM: I95.1 ICD-9-CM: 458.0  10/11/2018 - Present Yes Severe obesity (BMI 35.0-39. 9) with comorbidity ([facility] ICD-10-CM: E66.01 
ICD-9-CM: 278.01  3/28/2018 - Present Yes Primary insomnia ICD-10-CM: F51.01 
ICD-9-CM: 307.42  7/17/2017 - Present Yes Chronic pain syndrome ICD-10-CM: G89.4 ICD-9-CM: 338.4  11/11/2013 - Present Yes Overview Addendum 1/14/2016  3:45 PM by Charissa Shabazz MD  
  Chronic feet, hands, hips osteoarthritis, S/P bilateral TKA, gets pain with walking 100 ft 
  
  
   
 HTN (hypertension) ICD-10-CM: I10 
ICD-9-CM: 401.9  8/16/2012 - Present Yes Hyperlipidemia ICD-10-CM: E78.5 ICD-9-CM: 272.4  8/16/2012 - Present Yes Overview Addendum 10/28/2012  6:29 PM by Charissa Shabazz MD  
  Calcium score of 140, Goal LDL under 130 Hypothyroidism ICD-10-CM: E03.9 ICD-9-CM: 244.9  8/16/2012 - Present Yes S/P total knee arthroplasty ICD-10-CM: E69.337 ICD-9-CM: V43.65  8/16/2012 - Present Yes Overview Addendum 8/16/2012  8:38 AM by Sapphire Mora Bilateral--2009 GERD (gastroesophageal reflux disease) ICD-10-CM: K21.9 ICD-9-CM: 530.81  8/16/2012 - Present Yes Overview Addendum 7/29/2014  2:30 PM by Charissa Shabazz MD  
  Previous dilatation stricture, EGD Jan 2013 showed some mild esophagitis Plan: TIARRA on stage 3 CKD · Change IVF from D5- NS to D5 1/4 saline due to increasing sodium. · Await nephro consult. · Follow up CT abdomen to rule out hydronephrosis. · Strict I/O. Hypokalemia · Replete in IVF. Nausea/vomiting · Due to length and severity, will consult GI for evaluation for EGD. As this occurred while on Cefepime, ? abx related or possible candidal infection due to abx. Possible abdominal mass · CT pending. HTN 
· BP meds on hold. Monitor. Hypothyroidism · Synthroid DVT Prophylaxis: Heparin Signed By: Sandee Sumner MD   
 October 12, 2018

## 2018-10-12 NOTE — MED STUDENT NOTES
Gastroenterology Consult Referring Physician: Dr. Shanika Harley Consult Date: 10/12/2018 Subjective: Chief Complaint: general fatigue, dry mouth, NV, loss of appetite History of Present Illness: Veronica Harris is a 70 y.o. female with a PMH of HTN, GERD, TIARRA on CKD stage III, recent hospitalization for L knee cellulitis/bursitis due to fall is being seen in consultation for an EGD to assess for possible Candidiasis. Pt states that she was hospitalized for broken left knee 3 weeks ago and was placed on Cefepime for the past three weeks after discharge, which has caused her to increasingly feel ill. Pt presented to the ER last night d/t worsening of generalized weakness and dehydration. Pt complains of dry mouth, oligouria and NV for about a week. She complains of poor oral intake and weight loss as well. Pt was positive for changes in vision, malaise, weakness, change in appetite, painful and dry tongue. She denies fevers, SOB, chest pain, heartburn, dysphagia, hematemesis, melena, hematochezia, urinary changes, HA. Past Medical History:  
Diagnosis Date  Calculus of kidney  GERD (gastroesophageal reflux disease)  Headache(784.0)  Hypercholesterolemia  Hypertension  Primary insomnia 7/17/2017  Thyroid disease Past Surgical History:  
Procedure Laterality Date  HX COLONOSCOPY  Jan 2013 4 polyps, repeat 5 years  HX COLONOSCOPY  7/5/2016  
 repeat 5 yrs tubular adenoma  HX SALPINGO-OOPHORECTOMY  REMOVAL OF KIDNEY STONE Family History Problem Relation Age of Onset  Hypertension Mother  Arthritis-rheumatoid Mother  High Cholesterol Mother  Broken Bones Mother  Heart Failure Father  Heart Attack Father  Hypertension Brother  High Cholesterol Brother  Arthritis-osteo Brother Knee  High Cholesterol Brother  Hypertension Brother  Heart Attack Brother  Heart Attack Paternal Grandmother  Heart Attack Paternal Grandfather Social History Substance Use Topics  Smoking status: Never Smoker  Smokeless tobacco: Never Used  Alcohol use No  
  
Allergies Allergen Reactions  Pneumovax 23 [Pneumococcal 23-Ajnell Ps Vaccine] Other (comments) fever Current Facility-Administered Medications Medication Dose Route Frequency  dextrose 5 % - 0.2% NaCl 1,000 mL with potassium chloride 40 mEq infusion   IntraVENous CONTINUOUS  
 sodium chloride (NS) flush 5-10 mL  5-10 mL IntraVENous Q8H  
 sodium chloride (NS) flush 5-10 mL  5-10 mL IntraVENous PRN  
 acetaminophen (TYLENOL) tablet 650 mg  650 mg Oral Q4H PRN  
 HYDROcodone-acetaminophen (NORCO) 5-325 mg per tablet 1 Tab  1 Tab Oral Q4H PRN  
 naloxone (NARCAN) injection 0.4 mg  0.4 mg IntraVENous PRN  
 ondansetron (ZOFRAN) injection 4 mg  4 mg IntraVENous Q4H PRN  
 bisacodyl (DULCOLAX) tablet 5 mg  5 mg Oral DAILY PRN  
 heparin (porcine) injection 5,000 Units  5,000 Units SubCUTAneous Q8H  
 albuterol (PROVENTIL VENTOLIN) nebulizer solution 2.5 mg  2.5 mg Inhalation Q6H PRN  
 atorvastatin (LIPITOR) tablet 40 mg  40 mg Oral DAILY  budesonide (PULMICORT) 500 mcg/2 ml nebulizer suspension  500 mcg Inhalation BID RT  
 gabapentin (NEURONTIN) capsule 100 mg  100 mg Oral BID  levothyroxine (SYNTHROID) tablet 25 mcg  25 mcg Oral 7am  
 lansoprazole (PREVACID SOLUTAB) disintegrating tablet 30 mg  30 mg Oral ACB  traZODone (DESYREL) tablet 50 mg  50 mg Oral QHS  tuberculin injection 5 Units  5 Units IntraDERMal ONCE Review of Systems: A detailed 10 organ review of systems is obtained with pertinent positives as listed in the History of Present Illness and Past Medical History. All others are negative. Objective:  
 
Physical Exam: 
Visit Vitals  /63 (BP 1 Location: Left arm, BP Patient Position: At rest)  Pulse 67  Temp 98.1 °F (36.7 °C)  Resp 18  Ht 5' 4\" (1.626 m)  Wt 92.8 kg (204 lb 9.6 oz)  SpO2 96%  BMI 35.12 kg/m2 Skin:  No jc erythema. No telangiectasias on the chest wall. Ecchymosis noted on left arm. HEENT: NCAT. Extra-occular muscles are intact. The neck is supple. No goiter. Tongue was dry, red and painful. Throat was erythematous. Cardiovascular: Regular rate and rhythm. No murmurs, gallops, or rubs. Respiratory:  Comfortable breathing with no accessory muscle use. Clear breath sounds with no wheezes, rales, or rhonchi. GI:  Abdomen soft, and nontender to palpation in all four quadrants. Active bowel sounds. No lesions noted. Rectal:  Deferred Musculoskeletal:  No pitting edema of the lower legs. Moves all extremities. Neurological:  Gross memory appears intact. Patient is alert and oriented. Psychiatric:  Mood appears appropriate with judgement intact. Lab/Data Review: 
CMP:  
Lab Results Component Value Date/Time  (H) 10/12/2018 05:50 AM  
 K 3.0 (L) 10/12/2018 05:50 AM  
  (H) 10/12/2018 05:50 AM  
 CO2 15 (L) 10/12/2018 05:50 AM  
 AGAP 15 10/12/2018 05:50 AM  
  (H) 10/12/2018 05:50 AM  
 BUN 45 (H) 10/12/2018 05:50 AM  
 CREA 2.42 (H) 10/12/2018 05:50 AM  
 GFRAA 25 (L) 10/12/2018 05:50 AM  
 GFRNA 21 (L) 10/12/2018 05:50 AM  
 CA 7.6 (L) 10/12/2018 05:50 AM  
 MG 2.5 (H) 10/12/2018 05:50 AM  
 ALB 2.9 (L) 10/11/2018 07:46 PM  
 TP 7.6 10/11/2018 07:46 PM  
 GLOB 4.7 (H) 10/11/2018 07:46 PM  
 AGRAT 0.6 (L) 10/11/2018 07:46 PM  
 SGOT 16 10/11/2018 07:46 PM  
 ALT 14 10/11/2018 07:46 PM  
 
CBC:  
Lab Results Component Value Date/Time WBC 5.6 10/12/2018 05:50 AM  
 HGB 8.9 (L) 10/12/2018 05:50 AM  
 HCT 27.8 (L) 10/12/2018 05:50 AM  
  (L) 10/12/2018 05:50 AM  
 
Recent Glucose Results:  
Lab Results Component Value Date/Time  (H) 10/12/2018 05:50 AM  
 GLU 73 10/11/2018 07:46 PM  
 
COAGS: No results found for: APTT, PTP, INR Liver Panel:  
Lab Results Component Value Date/Time ALB 2.9 (L) 10/11/2018 07:46 PM  
 CBIL 0.1 10/11/2018 07:46 PM  
 TP 7.6 10/11/2018 07:46 PM  
 GLOB 4.7 (H) 10/11/2018 07:46 PM  
 AGRAT 0.6 (L) 10/11/2018 07:46 PM  
 SGOT 16 10/11/2018 07:46 PM  
 ALT 14 10/11/2018 07:46 PM  
 AP 77 10/11/2018 07:46 PM  
 
Pancreatic Markers:  
Lab Results Component Value Date/Time LPSE 442 (H) 10/11/2018 07:46 PM  
 
 
 
Assessment/Plan:  
 
Principal Problem: 
  Acute renal failure superimposed on stage 3 chronic kidney disease ([facility] [facility] (10/11/2018) Active Problems: 
  HTN (hypertension) (8/16/2012) Hyperlipidemia (8/16/2012) Overview: Calcium score of 140, Goal LDL under 130 Hypothyroidism (8/16/2012) S/P total knee arthroplasty (8/16/2012) Overview: Bilateral--2009 GERD (gastroesophageal reflux disease) (8/16/2012) Overview: Previous dilatation stricture, EGD Jan 2013 showed some mild esophagitis Chronic pain syndrome (11/11/2013) Overview: Chronic feet, hands, hips osteoarthritis, S/P bilateral TKA, gets pain  
    with walking 100 ft 
 
  Primary insomnia (7/17/2017) Severe obesity (BMI 35.0-39. 9) with comorbidity ([facility] (3/28/2018) Hypokalemia (10/11/2018) Dehydration (10/11/2018) Orthostatic hypotension (10/11/2018) Acute pancreatitis (10/12/2018) 69 yo female with h/o HTN, GERD, TIARRA on CKD stage III, acute pancreatitis presenting with generalized fatigue, dehydration, and dry mouth being consulted for possible Candidiasis. Plan for EGD. IV PPI prophylaxis. NPO. Continue current medications. Continue monitor labs. *ATTENTION:  This note has been created by a medical student for educational purposes only. Please do not refer to the content of this note for clinical decision-making, billing, or other purposes. Please see attending physicians note to obtain clinical information on this patient. *

## 2018-10-12 NOTE — PROGRESS NOTES
Attempted to meet with patient to discuss discharge planning, but she was off the floor for a procedure at the time of my visit. Patient is reportedly independent at baseline. Likely no discharge planning needs. Case Management will follow for discharge planning. Care Management Interventions PCP Verified by CM: Yes Transition of Care Consult (CM Consult): Discharge Planning Discharge Durable Medical Equipment: No 
Physical Therapy Consult: Yes Occupational Therapy Consult: Yes Speech Therapy Consult: No 
Current Support Network: Own Home Confirm Follow Up Transport: Family Plan discussed with Pt/Family/Caregiver: Yes Freedom of Choice Offered: Yes Discharge Location Discharge Placement: Home

## 2018-10-12 NOTE — PROGRESS NOTES
Lives with  who is chronically ill with end stage lung disease and early dementia and depression. 5 children, daughter Khoa Hall and son Justa Mccauley (PharmD) have her [de-identified], sons Rahat Lr and Yolette Cornejo have durable POA  Per notes Cristopher Clancy, staff [address], [address]  /   [email or web address]

## 2018-10-12 NOTE — CONSULTS
[facility] Consult    Subjective:     Joseluis Page is a 70 y.o.  female who is being seen for TIARRA. This is a pleasant lady with a history of stage 3 CKD - baseline creatinine in the low 1's. She fell and skinned her knee at the end of August and was on IV Cefipime for most of September. About 2 weeks into therapy she developed nausea and vomiting. Very little PO intake for the last 2 weeks. She stopped her chronic medications as well during that time. She was hospitalized, startedon IV fluids. Creatinine has trended up to a peak of 2.9 and is down to2.4. She has become more hypernatremic.        Past Medical History:   Diagnosis Date    Calculus of kidney     GERD (gastroesophageal reflux disease)     Headache(784.0)     Hypercholesterolemia     Hypertension     Primary insomnia 7/17/2017    Thyroid disease       Past Surgical History:   Procedure Laterality Date    HX COLONOSCOPY  Jan 2013    4 polyps, repeat 5 years    HX COLONOSCOPY  7/5/2016    repeat 5 yrs tubular adenoma    HX SALPINGO-OOPHORECTOMY      REMOVAL OF KIDNEY STONE       Family History   Problem Relation Age of Onset    Hypertension Mother     Arthritis-rheumatoid Mother     High Cholesterol Mother     Broken Bones Mother     Heart Failure Father     Heart Attack Father     Hypertension Brother     High Cholesterol Brother     Arthritis-osteo Brother      Knee    High Cholesterol Brother     Hypertension Brother     Heart Attack Brother     Heart Attack Paternal Grandmother     Heart Attack Paternal Grandfather       Social History   Substance Use Topics    Smoking status: Never Smoker    Smokeless tobacco: Never Used    Alcohol use No       Current Facility-Administered Medications   Medication Dose Route Frequency Provider Last Rate Last Dose    lactated Ringers infusion  100 mL/hr IntraVENous CONTINUOUS Lanny Llamas  mL/hr at 10/12/18 1140 100 mL/hr at 10/12/18 1140    lactated Ringers infusion  100 mL/hr IntraVENous CONTINUOUS Grant Perez MD        sodium chloride (NS) flush 5-10 mL  5-10 mL IntraVENous PRN Grant Perez MD        lansoprazole (PREVACID SOLUTAB) disintegrating tablet 30 mg  30 mg Oral ACB&D JULIANNE Garcia        sucralfate (CARAFATE) 100 mg/mL oral suspension 1 g  1 g Oral AC&HS JULIANNE Garcia        dextrose 5 % - 0.45% NaCl infusion  125 mL/hr IntraVENous CONTINUOUS Sukh Worley MD        sodium chloride (NS) flush 5-10 mL  5-10 mL IntraVENous Q8H Neill Osgood, MD   10 mL at 10/12/18 1302    sodium chloride (NS) flush 5-10 mL  5-10 mL IntraVENous PRN Neill Osgood, MD        acetaminophen (TYLENOL) tablet 650 mg  650 mg Oral Q4H PRN Neill Osgood, MD        HYDROcodone-acetaminophen (NORCO) 5-325 mg per tablet 1 Tab  1 Tab Oral Q4H PRN Neill Osgood, MD        naloxone (NARCAN) injection 0.4 mg  0.4 mg IntraVENous PRN Neill Osgood, MD        ondansetron (ZOFRAN) injection 4 mg  4 mg IntraVENous Q4H PRN Neill Osgood, MD   4 mg at 10/12/18 0622    bisacodyl (DULCOLAX) tablet 5 mg  5 mg Oral DAILY PRN Neill Osgood, MD        heparin (porcine) injection 5,000 Units  5,000 Units SubCUTAneous Zuleika Nur MD   Stopped at 10/12/18 1333    albuterol (PROVENTIL VENTOLIN) nebulizer solution 2.5 mg  2.5 mg Inhalation Q6H PRN Neill Osgood, MD        atorvastatin (LIPITOR) tablet 40 mg  40 mg Oral DAILY Neill Osgood, MD   40 mg at 10/12/18 1003    budesonide (PULMICORT) 500 mcg/2 ml nebulizer suspension  500 mcg Inhalation BID RT Neill Osgood, MD   500 mcg at 10/12/18 0803    gabapentin (NEURONTIN) capsule 100 mg  100 mg Oral BID Neill Osgood, MD   100 mg at 10/12/18 1003    levothyroxine (SYNTHROID) tablet 25 mcg  25 mcg Oral 7am Neill Osgood, MD   25 mcg at 10/12/18 0539    traZODone (DESYREL) tablet 50 mg  50 mg Oral QHS Neill Osgood, MD   50 mg at 10/11/18 2237    tuberculin injection 5 Units  5 Units IntraDERMal Mala Cuadra MD   5 Units at 10/12/18 0540        Allergies   Allergen Reactions    Pneumovax 23 [Pneumococcal 23-Janell Ps Vaccine] Other (comments)     fever        Review of Systems:  Pertinent items are noted in the History of Present Illness. Objective: Intake and Output:    10/12 0701 - 10/12 1900  In: 1362.5 [I.V.:1362.5]  Out: 0   10/10 1901 - 10/12 0700  In: 100 [I.V.:100]  Out: 150 [Urine:150]    Physical Exam:   General appearance: alert, cooperative, no distress, appears stated age     Neck: supple, symmetrical, trachea midline, no adenopathy, thyroid: not enlarged, symmetric, no tenderness/mass/nodules, no carotid bruit and no JVD  Lungs: clear to auscultation bilaterally  Heart: regular rate and rhythm, S1, S2 normal, no murmur, click, rub or gallop  Abdomen: soft, non-tender.  Bowel sounds normal. No masses,  no organomegaly  Extremities: extremities normal, atraumatic, no cyanosis or edema           Data Review:   Recent Results (from the past 24 hour(s))   GLUCOSE, POC    Collection Time: 10/11/18  6:48 PM   Result Value Ref Range    Glucose (POC) 76 65 - 549 mg/dL   METABOLIC PANEL, BASIC    Collection Time: 10/11/18  7:46 PM   Result Value Ref Range    Sodium 147 (H) 136 - 145 mmol/L    Potassium 2.8 (LL) 3.5 - 5.1 mmol/L    Chloride 113 (H) 98 - 107 mmol/L    CO2 17 (L) 21 - 32 mmol/L    Anion gap 17 (H) 7 - 16 mmol/L    Glucose 73 65 - 100 mg/dL    BUN 52 (H) 8 - 23 MG/DL    Creatinine 2.94 (H) 0.6 - 1.0 MG/DL    GFR est AA 20 (L) >60 ml/min/1.73m2    GFR est non-AA 17 (L) >60 ml/min/1.73m2    Calcium 8.2 (L) 8.3 - 10.4 MG/DL   CBC WITH AUTOMATED DIFF    Collection Time: 10/11/18  7:46 PM   Result Value Ref Range    WBC 7.7 4.3 - 11.1 K/uL    RBC 3.54 (L) 4.05 - 5.2 M/uL    HGB 10.4 (L) 11.7 - 15.4 g/dL    HCT 32.5 (L) 35.8 - 46.3 %    MCV 91.8 79.6 - 97.8 FL    MCH 29.4 26.1 - 32.9 PG    MCHC 32.0 31.4 - 35.0 g/dL    RDW 14.6 %    PLATELET 345 910 - 212 K/uL    MPV 12.9 (H) 9.4 - 12.3 FL    ABSOLUTE NRBC 0.00 0.0 - 0.2 K/uL    DF AUTOMATED      NEUTROPHILS 68 43 - 78 %    LYMPHOCYTES 18 13 - 44 %    MONOCYTES 12 4.0 - 12.0 %    EOSINOPHILS 1 0.5 - 7.8 %    BASOPHILS 1 0.0 - 2.0 %    IMMATURE GRANULOCYTES 0 0.0 - 5.0 %    ABS. NEUTROPHILS 5.2 1.7 - 8.2 K/UL    ABS. LYMPHOCYTES 1.4 0.5 - 4.6 K/UL    ABS. MONOCYTES 0.9 0.1 - 1.3 K/UL    ABS. EOSINOPHILS 0.1 0.0 - 0.8 K/UL    ABS. BASOPHILS 0.1 0.0 - 0.2 K/UL    ABS. IMM. GRANS. 0.0 0.0 - 0.5 K/UL   HEPATIC FUNCTION PANEL    Collection Time: 10/11/18  7:46 PM   Result Value Ref Range    Protein, total 7.6 6.3 - 8.2 g/dL    Albumin 2.9 (L) 3.2 - 4.6 g/dL    Globulin 4.7 (H) 2.3 - 3.5 g/dL    A-G Ratio 0.6 (L) 1.2 - 3.5      Bilirubin, total 0.4 0.2 - 1.1 MG/DL    Bilirubin, direct 0.1 <0.4 MG/DL    Alk.  phosphatase 77 50 - 136 U/L    AST (SGOT) 16 15 - 37 U/L    ALT (SGPT) 14 12 - 65 U/L   MAGNESIUM    Collection Time: 10/11/18  7:46 PM   Result Value Ref Range    Magnesium 0.8 (LL) 1.8 - 2.4 mg/dL   CK    Collection Time: 10/11/18  7:46 PM   Result Value Ref Range     21 - 215 U/L   TSH 3RD GENERATION    Collection Time: 10/11/18  7:46 PM   Result Value Ref Range    TSH 1.100 0.358 - 3.740 uIU/mL   LIPASE    Collection Time: 10/11/18  7:46 PM   Result Value Ref Range    Lipase 442 (H) 73 - 393 U/L   URINALYSIS W/ RFLX MICROSCOPIC    Collection Time: 10/11/18 10:05 PM   Result Value Ref Range    Color YELLOW      Appearance CLOUDY      Specific gravity 1.016 1.001 - 1.023      pH (UA) 5.5 5.0 - 9.0      Protein 100 (A) NEG mg/dL    Glucose NEGATIVE  mg/dL    Ketone 40 (A) NEG mg/dL    Bilirubin MODERATE (A) NEG      Blood MODERATE (A) NEG      Urobilinogen 0.2 0.2 - 1.0 EU/dL    Nitrites NEGATIVE  NEG      Leukocyte Esterase SMALL (A) NEG      WBC 5-10 0 /hpf    RBC 3-5 0 /hpf    Epithelial cells 0-3 0 /hpf    Bacteria 0 0 /hpf    Casts 5-10 0 /lpf   SODIUM, UR, RANDOM    Collection Time: 10/11/18 10:05 PM   Result Value Ref Range    Sodium,urine random 59 MMOL/L CREATININE, UR, RANDOM    Collection Time: 10/11/18 10:05 PM   Result Value Ref Range    Creatinine, urine 001.94 mg/dL   METABOLIC PANEL, BASIC    Collection Time: 10/12/18  5:50 AM   Result Value Ref Range    Sodium 152 (H) 136 - 145 mmol/L    Potassium 3.0 (L) 3.5 - 5.1 mmol/L    Chloride 122 (H) 98 - 107 mmol/L    CO2 15 (L) 21 - 32 mmol/L    Anion gap 15 7 - 16 mmol/L    Glucose 115 (H) 65 - 100 mg/dL    BUN 45 (H) 8 - 23 MG/DL    Creatinine 2.42 (H) 0.6 - 1.0 MG/DL    GFR est AA 25 (L) >60 ml/min/1.73m2    GFR est non-AA 21 (L) >60 ml/min/1.73m2    Calcium 7.6 (L) 8.3 - 10.4 MG/DL   CBC W/O DIFF    Collection Time: 10/12/18  5:50 AM   Result Value Ref Range    WBC 5.6 4.3 - 11.1 K/uL    RBC 3.02 (L) 4.05 - 5.2 M/uL    HGB 8.9 (L) 11.7 - 15.4 g/dL    HCT 27.8 (L) 35.8 - 46.3 %    MCV 92.1 79.6 - 97.8 FL    MCH 29.5 26.1 - 32.9 PG    MCHC 32.0 31.4 - 35.0 g/dL    RDW 14.8 %    PLATELET 514 (L) 126 - 450 K/uL    MPV 13.7 (H) 9.4 - 12.3 FL    ABSOLUTE NRBC 0.00 0.0 - 0.2 K/uL   MAGNESIUM    Collection Time: 10/12/18  5:50 AM   Result Value Ref Range    Magnesium 2.5 (H) 1.8 - 2.4 mg/dL   EKG, 12 LEAD, INITIAL    Collection Time: 10/12/18  7:59 AM   Result Value Ref Range    Ventricular Rate 65 BPM    Atrial Rate 65 BPM    P-R Interval 176 ms    QRS Duration 84 ms    Q-T Interval 424 ms    QTC Calculation (Bezet) 440 ms    Calculated P Axis 23 degrees    Calculated R Axis 17 degrees    Calculated T Axis 35 degrees    Diagnosis       Sinus rhythm with Premature atrial complexes  Otherwise normal ECG  When compared with ECG of 29-DEC-2008 10:33,  Premature atrial complexes are now Present  Confirmed by MARIAMA KEENAN (), Pippa Carrillo ([id number]) on 10/12/2018 9:33:34 AM             Assessment:     Principal Problem:    Acute renal failure superimposed on stage 3 chronic kidney disease ([address] (10/11/2018)    Active Problems:    HTN (hypertension) (8/16/2012)      Hyperlipidemia (8/16/2012)      Overview: Calcium score of 140, Goal LDL under 130      Hypothyroidism (8/16/2012)      S/P total knee arthroplasty (8/16/2012)      Overview: Bilateral--2009      GERD (gastroesophageal reflux disease) (8/16/2012)      Overview: Previous dilatation stricture, EGD Jan 2013 showed some mild esophagitis      Chronic pain syndrome (11/11/2013)      Overview: Chronic feet, hands, hips osteoarthritis, S/P bilateral TKA, gets pain       with walking 100 ft      Primary insomnia (7/17/2017)      Severe obesity (BMI 35.0-39. 9) with comorbidity ([address] (3/28/2018)      Hypokalemia (10/11/2018)      Dehydration (10/11/2018)      Orthostatic hypotension (10/11/2018)      Acute pancreatitis (10/12/2018)        Plan: This is a pleasant lady with what appears to be prerenal azotemia. Her hypernatremia is secondary to the same but exacerbated by hypertonic IV fluid (saline plus potassium). Will change her fluids to 1/2 NS as I would continue to provide some crystalloid and continue to replete potassium. She is likely intracellularly potassium depleted given her history. AIN from antibiotics is a less likely possibility. Thank you for the kind courtesy of this consultation. Will make further adjustments to the medical regimen as the clinical course dictates and follow very closely with you.       Signed By: Sukh Worley MD     October 12, 2018

## 2018-10-12 NOTE — H&P
HOSPITALIST H&P/CONSULTNAME:  Holley Willard Age:  70 y.o. 
:   1940 MRN:   [id number] PCP: Katya Cerna MD 
Consulting MD: Treatment Team: Attending Provider: Derrell Garnica MD; Primary Nurse: Randy Sanders RN 
HPI:  
 
70 F with PMH of HTN, GERD, HLD, Hypothyroidism, CKD stage III, recent hospitalization for L knee cellulitis/burisitis due to fall complicated by ?hematoma to partial TKA s/p Cefepime for 3 weeks per ID/Plasctic & Ortho surgery. Today presented to ER with cc of progressively worsening generalized weakness and fall at home. Reports she woke up this am, felt nauseous and got out of bed quickly to vomit but she felt lightheaded and passed out. She hit her left led with a small skin tear. No seizure like activity noted. Daughter also provides history and says Pt was also having nausea, vomiting episodes twice daily since last week and was seen at ER for Mild Acute pancreatitis. Since then Her PO intake has been poor and her Cr has been slowly going up from 1.22 last month to 2.9 today. Labs also showed k 2.8, Na 147, CO2 was 17. Orthostatics were +ve in ER with drop in SBP from 150 to 120. She was given IVF in ER and hospitalist asked to admit. Pt still feels weak but less dizzy. Reports last vomiting was earlier today, nb/nb. Also reports a tender Rt Lateral region abd mass. Her UOP has decreased as well. Denies Dysuria, fever, CP, SOB, Diarrhea, blood in stool. Case was discussed with ER MD. 
10 point ROS done and is negative except as noted in HPI. Past Medical History:  
Diagnosis Date  Calculus of kidney  GERD (gastroesophageal reflux disease)  Headache(784.0)  Hypercholesterolemia  Hypertension  Primary insomnia 2017  Thyroid disease Past Surgical History:  
Procedure Laterality Date  HX COLONOSCOPY  2013 4 polyps, repeat 5 years  HX COLONOSCOPY  2016  
 repeat 5 yrs tubular adenoma  HX SALPINGO-OOPHORECTOMY  REMOVAL OF KIDNEY STONE Prior to Admission Medications Prescriptions Last Dose Informant Patient Reported? Taking?  
0.9 % sodium chloride (NORMAL SALINE FLUSH INJECTION)   Yes No  
Sig: 10 mL by IntraVENous route two (2) times a day. before and after IV antibiotics 2x daily CALCIUM CITRATE + PO   Yes No  
Sig: take 1 Tab by mouth two (2) times a day. FLOVENT  mcg/actuation inhaler   Yes No  
Sig: Take 2 Puffs by inhalation two (2) times a day. PRILOSEC OTC PO   Yes No  
Sig: Take 40 mg by mouth every morning. acetaminophen (TYLENOL ARTHRITIS PAIN) 650 mg CR tablet   Yes No  
Sig: Take 650 mg by mouth every eight (8) hours as needed (pain). acetaminophen (TYLENOL) 500 mg capsule   Yes No  
Sig: Take 100 mg by mouth two (2) times a day. albuterol (VENTOLIN HFA) 90 mcg/actuation inhaler   Yes No  
Sig: Take 2 Puffs by inhalation every six (6) hours as needed for Wheezing or Shortness of Breath. atorvastatin (LIPITOR) 40 mg tablet   No No  
Sig: Take 1 Tab by mouth daily. cefepime 2 gram 2 g IV syringe   Yes No  
Si g by IntraVENous route two (2) times a day.  
gabapentin (NEURONTIN) 100 mg capsule   No No  
Sig: Take 1 Cap by mouth two (2) times a day. heparin sodium,porcine (HEPARIN LOCK FLUSH, PORCINE, IV)   Yes No  
Si mL by IntraVENous route two (2) times a day. levothyroxine (SYNTHROID) 25 mcg tablet   No No  
Sig: Take 1 Tab by mouth every morning. losartan-hydroCHLOROthiazide (HYZAAR) 100-25 mg per tablet   No No  
Sig: Take 1 Tab by mouth daily. ondansetron (ZOFRAN ODT) 4 mg disintegrating tablet   No No  
Sig: Take 1 Tab by mouth every eight (8) hours as needed for Nausea. potassium chloride (K-DUR, KLOR-CON) 20 mEq tablet   No No  
Sig: TAKE 1 TABLET BY MOUTH 2 TIMES A DAY Patient taking differently: TAKE 1 TABLET BY MOUTH ONCE DAILY  
traZODone (DESYREL) 50 mg tablet   No No  
Sig: Take 1 Tab by mouth nightly. Facility-Administered Medications: None Home meds reconciled. Allergies Allergen Reactions  Pneumovax 23 [Pneumococcal 23-Janell Ps Vaccine] Other (comments) fever Social History Substance Use Topics  Smoking status: Never Smoker  Smokeless tobacco: Never Used  Alcohol use No  
  
Family History Problem Relation Age of Onset  Hypertension Mother  Arthritis-rheumatoid Mother  High Cholesterol Mother  Broken Bones Mother  Heart Failure Father  Heart Attack Father  Hypertension Brother  High Cholesterol Brother  Arthritis-osteo Brother Knee  High Cholesterol Brother  Hypertension Brother  Heart Attack Brother  Heart Attack Paternal Grandmother  Heart Attack Paternal Grandfather Immunization History Administered Date(s) Administered  Influenza High Dose Vaccine PF 2016, 2017  Influenza Vaccine 10/10/2013, 10/09/2014  Influenza Vaccine (Quad) PF 2018  Influenza Vaccine PF 10/26/2015  Influenza Vaccine Split 10/29/2012  Pneumococcal Polysaccharide (PPSV-23) 2007, 2007  TDAP Vaccine 2012, 10/29/2012  Tdap 2012, 10/29/2012  Zoster 10/04/2011  Zoster Vaccine, Live 10/04/2011 Objective:  
 
Visit Vitals  /72 (BP 1 Location: Left arm, BP Patient Position: Supine)  Pulse 67  Temp 97.4 °F (36.3 °C)  Resp 18  Ht 5' 4\" (1.626 m)  Wt 88.5 kg (195 lb)  SpO2 97%  BMI 33.47 kg/m2 Temp (24hrs), Av.4 °F (36.3 °C), Min:97.4 °F (36.3 °C), Max:97.4 °F (36.3 °C) Oxygen Therapy O2 Sat (%): 97 % (10/11/18 1910) Pulse via Oximetry: 90 beats per minute (10/11/18 1842) O2 Device: Room air (10/11/18 1910) Physical Exam: 
 
General:    Alert, cooperative, mild distress Head:   NCAT. No obvious deformity Nose:  Nares normal. No drainage Lungs:   CTABL. No wheezing/rhonchi/rales Heart:   RRR. No m/r/g. Abdomen:   Soft, Mildly tender mass Rt lat region, mild epigastric tenderness, non distended. Bowel sounds normal.  
Extremities: No cyanosis. LLE wrapped in compression bandage. Skin:     No rashes or lesions. Not Jaundiced Neurologic: Moves all extremities. no gross focal deficits Data Review:  
Recent Results (from the past 24 hour(s)) METABOLIC PANEL, BASIC Collection Time: 10/11/18 11:30 AM  
Result Value Ref Range Sodium 146 (H) 136 - 145 mmol/L Potassium 3.0 (L) 3.5 - 5.1 mmol/L Chloride 112 (H) 98 - 107 mmol/L  
 CO2 15 (L) 21 - 32 mmol/L Anion gap 19 mmol/L Glucose 53 (L) 65 - 100 mg/dL BUN 53 (H) 8 - 23 MG/DL Creatinine 2.80 (H) 0.6 - 1.0 MG/DL  
 GFR est AA 21 (L) >60 ml/min/1.73m2 GFR est non-AA 17 ml/min/1.73m2 Calcium 7.9 (L) 8.3 - 10.4 MG/DL  
GLUCOSE, POC Collection Time: 10/11/18  6:48 PM  
Result Value Ref Range Glucose (POC) 76 65 - 100 mg/dL METABOLIC PANEL, BASIC Collection Time: 10/11/18  7:46 PM  
Result Value Ref Range Sodium 147 (H) 136 - 145 mmol/L Potassium 2.8 (LL) 3.5 - 5.1 mmol/L Chloride 113 (H) 98 - 107 mmol/L  
 CO2 17 (L) 21 - 32 mmol/L Anion gap 17 (H) 7 - 16 mmol/L Glucose 73 65 - 100 mg/dL BUN 52 (H) 8 - 23 MG/DL Creatinine 2.94 (H) 0.6 - 1.0 MG/DL  
 GFR est AA 20 (L) >60 ml/min/1.73m2 GFR est non-AA 17 (L) >60 ml/min/1.73m2 Calcium 8.2 (L) 8.3 - 10.4 MG/DL  
CBC WITH AUTOMATED DIFF Collection Time: 10/11/18  7:46 PM  
Result Value Ref Range WBC 7.7 4.3 - 11.1 K/uL  
 RBC 3.54 (L) 4.05 - 5.2 M/uL  
 HGB 10.4 (L) 11.7 - 15.4 g/dL HCT 32.5 (L) 35.8 - 46.3 % MCV 91.8 79.6 - 97.8 FL  
 MCH 29.4 26.1 - 32.9 PG  
 MCHC 32.0 31.4 - 35.0 g/dL  
 RDW 14.6 % PLATELET 908 688 - 566 K/uL MPV 12.9 (H) 9.4 - 12.3 FL ABSOLUTE NRBC 0.00 0.0 - 0.2 K/uL  
 DF AUTOMATED NEUTROPHILS 68 43 - 78 % LYMPHOCYTES 18 13 - 44 % MONOCYTES 12 4.0 - 12.0 % EOSINOPHILS 1 0.5 - 7.8 % BASOPHILS 1 0.0 - 2.0 % IMMATURE GRANULOCYTES 0 0.0 - 5.0 %  
 ABS. NEUTROPHILS 5.2 1.7 - 8.2 K/UL  
 ABS. LYMPHOCYTES 1.4 0.5 - 4.6 K/UL  
 ABS. MONOCYTES 0.9 0.1 - 1.3 K/UL  
 ABS. EOSINOPHILS 0.1 0.0 - 0.8 K/UL  
 ABS. BASOPHILS 0.1 0.0 - 0.2 K/UL  
 ABS. IMM. GRANS. 0.0 0.0 - 0.5 K/UL HEPATIC FUNCTION PANEL Collection Time: 10/11/18  7:46 PM  
Result Value Ref Range Protein, total 7.6 6.3 - 8.2 g/dL Albumin 2.9 (L) 3.2 - 4.6 g/dL Globulin 4.7 (H) 2.3 - 3.5 g/dL A-G Ratio 0.6 (L) 1.2 - 3.5 Bilirubin, total 0.4 0.2 - 1.1 MG/DL Bilirubin, direct 0.1 <0.4 MG/DL Alk. phosphatase 77 50 - 136 U/L  
 AST (SGOT) 16 15 - 37 U/L  
 ALT (SGPT) 14 12 - 65 U/L Imaging /Procedures /Studies: 
I personally reviewed all labs, imaging, and other studies this admission: 
 
EKG not done, will order. CXR Results  (Last 48 hours) None CT Results  (Last 48 hours) None Assessment and Plan: Active Hospital Problems Diagnosis Date Noted  Acute renal failure superimposed on stage 3 chronic kidney disease ([facility] 10/11/2018  Hypokalemia 10/11/2018  Dehydration 10/11/2018  Severe obesity (BMI 35.0-39. 9) with comorbidity ([facility] 03/28/2018  Primary insomnia 07/17/2017  Chronic pain syndrome 11/11/2013 Chronic feet, hands, hips osteoarthritis, S/P bilateral TKA, gets pain with walking 100 ft  S/P total knee arthroplasty 08/16/2012 Bilateral--2009  Hyperlipidemia 08/16/2012 Calcium score of 140, Goal LDL under 130  Hypothyroidism 08/16/2012  
 HTN (hypertension) 08/16/2012  GERD (gastroesophageal reflux disease) 08/16/2012 Previous dilatation stricture, EGD Jan 2013 showed some mild esophagitis PLAN 
 
· Admit to inpt medical bed · Pt has progressive worsening Renal failure likely due to dehydration and also has been taking HCTZ and ARB.  Cr has worsened despite getting IVF infusions as outpt and she is not able to hydrate herself enough. · TIARRA on CKD: Switch IVF d5ns with KCL, trend Cr, check urine lytes and UA, Nephro consult in am. Follow CT abd for hydronephrosis. Place thomas if unable to have good UOP. · Hypokalemia: Replete K aggressively with IV KCL 40mEq and also add to d5ns. Trend K.  
· Hypomagnesemia: Critically low Mg 0.8. Give 4Gm IV MgSulfate and trend in am.  
· Metabolic Acidosis: Should improve with hydration. Trend bicarb. · Abd mass: Follow CT abd with PO contrast.  
· Nausea/vomiting likely due to recent pancreatitis: Prn meds, Add on Lipase, last checked was >700. If nl then start Clears if she tolerates. · HTN: Hold BP meds given orthostatic, Hold hctz and ARB due to renal failure. Monitor BP and use alternate meds if needed. Will also avoid Norvasc given chronic swelling of legs. · HLD: Resume statin. Will check CPK r/o Rhabdo. · GERD: on PPI · Hypothyroid: Resume Synthroid. Check TSH. · Resume pertinent home meds. · PT/OT eval, place ppd, may need rehab. FEN:  NPO for now with Ice Chips, start clears once Lipase results. DVT ppx:  hsq Code status: Full Code Estimated LOS:  2-3 days Risk assessment:  High risk pt. Plan of care discussed with: patient Signed By: Coco Shetty MD   
 October 11, 2018

## 2018-10-12 NOTE — PROGRESS NOTES
Problem: Self Care Deficits Care Plan (Adult) Goal: *Acute Goals and Plan of Care (Insert Text) 1. Patient will complete full body bathing and dressing with mod I and adaptive equipment as needed. 2. Patient will complete toileting with mod I.  
3. Patient will tolerate 25 minutes of OT treatment with less than 2 rest breaks to increase activity tolerance for ADLs. 4. Patient will complete functional transfers with mod I and adaptive equipment as needed. 5. Patient will complete grooming tasks in supported sitting at sink level at mod I. Timeframe: 7 visits OCCUPATIONAL THERAPY: Initial Assessment, Daily Note, Treatment Day: Day of Assessment and AM 10/12/2018 INPATIENT: Hospital Day: 2 [place] / Product Type: Medicare /  
  
NAME/AGE/GENDER: Isaura Bryson is a 70 y.o. female PRIMARY DIAGNOSIS:  Nausea and vomiting, intractability of vomiting not specified, unspecified vomiting type [R11.2] Acute renal failure superimposed on stage 3 chronic kidney disease (HCC) Acute renal failure superimposed on stage 3 chronic kidney disease ([facility] [place] [address] Procedure(s) (LRB): ESOPHAGOGASTRODUODENOSCOPY (EGD) (N/A) ICD-10: Treatment Diagnosis:  
 · Generalized Muscle Weakness (M62.81) · Other lack of cordination (R27.8) · Difficulty in walking, Not elsewhere classified (R26.2) Precautions/Allergies: 
  falls, Pneumovax 23 [pneumococcal 23-layton ps vaccine] ASSESSMENT:  
 
Ms. Karmen Conley is a pleasant 69 YO R dominant WF admitted with primary diagnosis of acute renal failure. Pt was recently discharged about 2 weeks ago after a fall and a L knee wound that had gotten infected. Scheduled for an EGD later today so is now NPO. Reports has not eaten or drunk much in last 2 weeks due to IV antibiotics. Pt up in recliner upon contact visiting with her son, who is pharmacist here on the 5th floor.  Pt A & O x4.  Reports her other child is a daughter and she is an RN. Pt reports she lives with her  (she is normally the caregiver for him, but they are interviewing 24/7 sitters for help at home). They live in a 2 story home but have living quarters on the main level with 4 steps at entrance, B HRs available. BR is handicapped accessible with a walk in shower, GBs, hand held nozzle, shower chair, and rails on toilet. Pt was independent with ADLs, driving, independent with IADLs, but has  for heavy cleaning every 2 weeks. Pt ambulates independently though she has a RW for when she feels unsteady or admits to cruising furniture. Pt states she has 2 falls in the last 6 months. Pt's L knee is still swollen due to prior wound, which pt states is slowly improving, and covered in bandage (pt was just seen by wound nurse), but denies L knee pain currently. Pt B UEs are WFLs for basic self care tasks though is limited by RA changes in B hands making [facility] tasks challenging. Reports has multiple tools and gadgets at home to manage tasks. Unable to make complete fists, but can straighten out fingers with deviations noted in joints. Has multiple bruises on hands. Strength is weak overall for age. Pt sit to stand with min A to CGA and was able to march in place using her RW. Demonstrates good static and dynamic sitting balance, though unable to do anything with her L LE due to bandage on knee. Reports she had just used the BR with PT and washed hands so she was fatigued from that small task. Returned to sitting in chair with CGA. B feet elevated and all needs left in reach. Pt is functioning below her baseline and could benefit from skilled OT (medically necessary) to address decreased strength, decreased balance, decreased functional tolerance, decreased cardiopulmonary endurance affecting participation in basic ADLs and functional tasks. Would like to return home with paid CGs.   [facility] might be beneficial as well. This section established at most recent assessment PROBLEM LIST (Impairments causing functional limitations): 1. Decreased Strength 2. Decreased ADL/Functional Activities 3. Decreased Transfer Abilities 4. Decreased Ambulation Ability/Technique 5. Decreased Balance 6. Decreased Activity Tolerance 7. Decreased Flexibility/Joint Mobility INTERVENTIONS PLANNED: (Benefits and precautions of occupational therapy have been discussed with the patient.) 1. Activities of daily living training 2. Adaptive equipment training 3. Balance training 4. Clothing management 5. Donning&doffing training 6. Therapeutic activity 7. Therapeutic exercise TREATMENT PLAN: Frequency/Duration: Follow patient 3x per week to address above goals. Rehabilitation Potential For Stated Goals: Good RECOMMENDED REHABILITATION/EQUIPMENT: (at time of discharge pending progress): Due to the probability of continued deficits (see above) this patient will likely need continued skilled occupational therapy after discharge. Equipment: ? TBD base on progress, has some DME already. OCCUPATIONAL PROFILE AND HISTORY:  
History of Present Injury/Illness (Reason for Referral): 
See H & P Past Medical History/Comorbidities: Ms. Paula Resendiz  has a past medical history of Calculus of kidney; GERD (gastroesophageal reflux disease); Headache(784.0); Hypercholesterolemia; Hypertension; Primary insomnia (7/17/2017); and Thyroid disease. Ms. Paula Resendiz  has a past surgical history that includes hx salpingo-oophorectomy; pr removal of kidney stone; hx colonoscopy (Jan 2013); and hx colonoscopy (7/5/2016). Social History/Living Environment:  
Home Environment: Private residence # Steps to Enter: 4 Rails to Enter: Yes Hand Rails : Right One/Two Story Residence: Two story, live on 1st floor # of Interior Steps: 12 Height of Each Step (in): 8 inches Interior Rails: Both Lift Chair Available:  No 
 Living Alone: No 
Support Systems: Spouse/Significant Other/Partner, Child(amrik) Patient Expects to be Discharged to[de-identified] Private residence Current DME Used/Available at Home: Walker, rolling, Wheelchair, [employer], Shower chair Tub or Shower Type: Shower Prior Level of Function/Work/Activity: 
Was independent with ADLs and IADLs, driving, CG for spouse until pt got sick, hiring outside sitters currently Dominant Side:  
      RIGHT Number of Personal Factors/Comorbidities that affect the Plan of Care: Extensive review of physical, cognitive, and psychosocial performance (3+):  HIGH COMPLEXITY ASSESSMENT OF OCCUPATIONAL PERFORMANCE[de-identified]  
Activities of Daily Living:  
Basic ADLs (From Assessment) Complex ADLs (From Assessment) Feeding: Setup, Additional time (has decreased appetite, now NPO for testing) Oral Facial Hygiene/Grooming: Moderate assistance Bathing: Moderate assistance Upper Body Dressing: Moderate assistance Lower Body Dressing: Moderate assistance Toileting: Moderate assistance Grooming/Bathing/Dressing Activities of Daily Living Cognitive Retraining Safety/Judgement: Awareness of environment; Fall prevention Functional Transfers Bathroom Mobility: Contact guard assistance Toilet Transfer : Contact guard assistance Tub Transfer: Moderate assistance Shower Transfer: Contact guard assistance Bed/Mat Mobility Rolling:  (up in chair upon contact) Supine to Sit: Additional time;Contact guard assistance Sit to Stand: Contact guard assistance Bed to Chair: Contact guard assistance Scooting: Additional time;Stand-by assistance Most Recent Physical Functioning:  
Gross Assessment: 
AROM: Generally decreased, functional 
Strength: Generally decreased, functional 
Coordination: Generally decreased, functional (within limits of RA changes in B hands) Tone: Normal 
Sensation: Impaired (diminished to B finger tips) Posture: 
  
Balance: Sitting: Intact Standing: Impaired Standing - Static: Fair Standing - Dynamic : Fair Bed Mobility: 
Rolling:  (up in chair upon contact) Supine to Sit: Additional time;Contact guard assistance Scooting: Additional time;Stand-by assistance Wheelchair Mobility: 
  
Transfers: 
Sit to Stand: Contact guard assistance Stand to Sit: Contact guard assistance Bed to Chair: Contact guard assistance Patient Vitals for the past 6 hrs: 
 BP BP Patient Position SpO2 Pulse 10/12/18 0707 136/63 At rest 95 % 67  
10/12/18 0803 - - 96 % - Mental Status Neurologic State: Alert Orientation Level: Appropriate for age, Oriented X4 Cognition: Appropriate for age attention/concentration, Appropriate safety awareness, Appropriate decision making, Follows commands Perception: Appears intact Perseveration: No perseveration noted Safety/Judgement: Awareness of environment, Fall prevention Physical Skills Involved: 1. Range of Motion 2. Balance 3. Strength 4. Activity Tolerance 5. Sensation 6. Fine Motor Control 7. Pain (acute) 8. Skin Integrity Cognitive Skills Affected (resulting in the inability to perform in a timely and safe manner): 1. none Psychosocial Skills Affected: 1. Habits/Routines 2. Environmental Adaptation 3. Social Interaction 4. Self-Awareness 5. Social Roles Number of elements that affect the Plan of Care: 5+:  HIGH COMPLEXITY CLINICAL DECISION MAKING:  
[employer] AM-PAC 6 Clicks Daily Activity Inpatient Short Form How much help from another person does the patient currently need. .. Total A Lot A Little None 1. Putting on and taking off regular lower body clothing? [] 1   [x] 2   [] 3   [] 4  
2. Bathing (including washing, rinsing, drying)? [] 1   [x] 2   [] 3   [] 4  
3.   Toileting, which includes using toilet, bedpan or urinal?   [] 1   [x] 2   [] 3   [] 4  
 4.  Putting on and taking off regular upper body clothing? [] 1   [x] 2   [] 3   [] 4  
5. Taking care of personal grooming such as brushing teeth? [] 1   [] 2   [x] 3   [] 4  
6. Eating meals? [] 1   [] 2   [x] 3   [] 4  
© 2007, Trustees of [facility] [employer], under license to [employer]. All rights reserved Score:  Initial: 14, completed 10/12/2018 Most Recent: X (Date: -- ) Interpretation of Tool:  Represents activities that are increasingly more difficult (i.e. Bed mobility, Transfers, Gait). Score 24 23 22-20 19-15 14-10 9-7 6 Modifier CH CI CJ CK CL CM CN   
 
? Self Care:  
  - CURRENT STATUS: CL - 60%-79% impaired, limited or restricted  - GOAL STATUS: CI - 1%-19% impaired, limited or restricted  - D/C STATUS:  ---------------To be determined--------------- [place] / [place] B / Product Type: Medicare /   
 
Medical Necessity:    
· Patient demonstrates good rehab potential due to higher previous functional level. Reason for Services/Other Comments: 
· Patient continues to require skilled intervention due to s/p above and decreased ADLs and mobility. Use of outcome tool(s) and clinical judgement create a POC that gives a: MODERATE COMPLEXITY  
 
 
 
TREATMENT:  
(In addition to Assessment/Re-Assessment sessions the following treatments were rendered) Pre-treatment Symptoms/Complaints:  \"I am just tired and don't have any energy. This is not like me. \" 
Pain: Initial:  
Pain Intensity 1: 0  Post Session:  No complaint of pain with OT Self Care: (15 mins): Procedure(s) (per grid) utilized to improve and/or restore self-care/home management as related to dressing, bathing, toileting, grooming and self feeding. Required moderate visual, verbal, manual, tactile and   cueing to facilitate activities of daily living skills and compensatory activities. Evaluation: 15 mins Braces/Orthotics/Lines/Etc:  
· IV 
· adult brief · O2 Device: Room air Treatment/Session Assessment:   
· Response to Treatment:  Tolerated fair, Mod A overall, fatigues quickly with all tasks · Interdisciplinary Collaboration:  
o Occupational Therapist 
o Registered Nurse 
o  
o Certified Nursing Assistant/Patient Care Technician · After treatment position/precautions:  
o Up in chair 
o Bed/Chair-wheels locked 
o Bed in low position 
o Call light within reach 
o RN notified 
o Family at bedside 
o Nurse at bedside o B feet elevated, tray table across pt with all needs in reach · Compliance with Program/Exercises: compliant most of the time. · Recommendations/Intent for next treatment session: \"Next visit will focus on advancements to more challenging activities and reduction in assistance provided\". Total Treatment Duration:   30 mins OT Patient Time In/Time Out Time In: 9308 Time Out: 1048 Ailyn Barakat, OTv    Ailyn Barakat, MS, OTR/L

## 2018-10-12 NOTE — ED PROVIDER NOTES
HPI Comments: 70-year-old white female had a minor fall August 29 causing an abrasion to her right knee. This abrasion became infected requiring admission to the hospital in mid September. Discharged home with a PICC line in place and has received IV cefepime for approximately 3 weeks however she discontinued this herself 5 days ago due to feeling weak and \"dehydrated\". She has had decreased appetite with frequent nausea and vomiting. No diarrhea. Overall the infection seems to have responded well to the antibiotics. She does report dizziness and faint feeling with standing. She has received IV infusions of normal saline over the past week however she was still feeling dehydrated. Patient is a 70 y.o. female presenting with fatigue. The history is provided by the patient. Fatigue Associated symptoms include vomiting and nausea. Pertinent negatives include no shortness of breath, no chest pain and no headaches. Past Medical History:  
Diagnosis Date  Calculus of kidney  GERD (gastroesophageal reflux disease)  Headache(784.0)  Hypercholesterolemia  Hypertension  Primary insomnia 7/17/2017  Thyroid disease Past Surgical History:  
Procedure Laterality Date  HX COLONOSCOPY  Jan 2013 4 polyps, repeat 5 years  HX COLONOSCOPY  7/5/2016  
 repeat 5 yrs tubular adenoma  HX SALPINGO-OOPHORECTOMY  REMOVAL OF KIDNEY STONE Family History:  
Problem Relation Age of Onset  Hypertension Mother  Arthritis-rheumatoid Mother  High Cholesterol Mother  Broken Bones Mother  Heart Failure Father  Heart Attack Father  Hypertension Brother  High Cholesterol Brother  Arthritis-osteo Brother Knee  High Cholesterol Brother  Hypertension Brother  Heart Attack Brother  Heart Attack Paternal Grandmother  Heart Attack Paternal Grandfather Social History Social History  Marital status:   
 Spouse name: N/A  
 Number of children: N/A  
 Years of education: N/A Occupational History  Not on file. Social History Main Topics  Smoking status: Never Smoker  Smokeless tobacco: Never Used  Alcohol use No  
 Drug use: No  
 Sexual activity: Not on file Other Topics Concern  Not on file Social History Narrative Lives with  who is chronically ill with end stage lung disease and early dementia and depression. 5 children, daughter Payton Tineo and son Jennifer Rogers (PharmD) have her [de-identified], sons Darren Trejo and Ben Saavedra have durable POA ALLERGIES: Pneumovax 23 [pneumococcal 23-layton ps vaccine] Review of Systems Constitutional: Positive for fatigue. Negative for fever. HENT: Negative for congestion. Respiratory: Negative for cough and shortness of breath. Cardiovascular: Negative for chest pain. Gastrointestinal: Positive for nausea and vomiting. Negative for abdominal pain. Genitourinary: Negative for dysuria. Musculoskeletal: Negative for back pain and neck pain. Skin: Negative for rash. Neurological: Negative for headaches. Vitals:  
 10/11/18 1842 10/11/18 1908 10/11/18 1910 BP: 152/85 167/72 Pulse: 90 67 Resp: 16 18 Temp: 97.4 °F (36.3 °C) SpO2: 97% 97% 97% Weight: 88.5 kg (195 lb) Height: 5' 4\" (1.626 m) Physical Exam  
Constitutional: She appears well-developed and well-nourished. No distress. HENT:  
Head: Normocephalic and atraumatic. Mouth/Throat: Oropharynx is clear and moist.  
Eyes: Conjunctivae are normal. Pupils are equal, round, and reactive to light. Neck: Normal range of motion. Neck supple. Cardiovascular: Normal rate and regular rhythm. No murmur heard. Pulmonary/Chest: Effort normal and breath sounds normal. She has no wheezes. Abdominal: Soft. She exhibits no distension. There is no tenderness. Musculoskeletal:  
Diffuse erythema anterior aspect of the left knee. Neurological: She is alert. Skin: Skin is warm and dry. Psychiatric: She has a normal mood and affect. Nursing note and vitals reviewed. MDM Number of Diagnoses or Management Options Diagnosis management comments: Lab work reveals worsening renal function with creatinine 2.94, BUN 52, anion gap 17, CO2 17 hypokalemia 2.8 and mildly elevated sodium 147. CBC unremarkable except for mild anemia. LFTs normal. Patient is being hydrated with normal saline. She still has not been able to void for us. because of the worsening renal function likely due to dehydration associated with orthostatic changes and acidosis will discuss with hospitalist for possible admission. Amount and/or Complexity of Data Reviewed Clinical lab tests: ordered and reviewed Risk of Complications, Morbidity, and/or Mortality Presenting problems: moderate Diagnostic procedures: moderate Management options: moderate ED Course Procedures

## 2018-10-12 NOTE — PROGRESS NOTES
Report received from [place], RN to 6 [facility], RN and April, RN. Mrs. Denise Gordon is alert and oriented x4. Pt has red irritated areas in folds of groin, buttocks, and under breasts. Left knee is swollen and erythematous; there is a dressing on LLE where patient reports being treated for old wound. She has a PICC line which is not being used and is to be d/c'd later today. Bilateral breath sounds are diminished; S1 and S2 heard. Pt complains of no pain; \"nausea is better\"; c/o generalized weakness. Patient in no apparent distress. Bed in locked, low position. Will continue to monitor patient.

## 2018-10-12 NOTE — PROGRESS NOTES
Pt refused prevacid and sucralfate due to nausea. PICC line was removed with minimal bleeding; 4x4 and tape applied tightly. Left knee dressing changed per order; patient tolerated well.

## 2018-10-12 NOTE — PROGRESS NOTES
Gave pt gastrografin and pt vomited it up called Dr. Saranya Lester informed him of events. CT order placed on hold until pt can tolerate gastrografin. CT notified and oncoming Nurse aware of situation.

## 2018-10-12 NOTE — PROGRESS NOTES
Problem: Nutrition Deficit Goal: *Optimize nutritional status Nutrition Reason for assessment: Referral received from nursing admission Malnutrition Screening Tool for recently lost 14-23# without trying and eating poorly due to decreased appetite. Assessment:  
Diet order(s): NPO Food/Nutrition Patient History:  The patient is noted to have a h/o HTN, obesity and CKD stage 3. She was not in room during RD rounds. She is currently in endo for EGD d/t nausea with vomiting. RD to obtain more nutrition history during follow up. Weight history in the EMR cannot be verified as accurate due to unknown weight source (pt stated vs estimated vs measured). Weight Loss Metrics 10/12/2018 10/11/2018 10/5/2018 10/5/2018 Today's Wt 204 lb 9.6 oz  201 lb 201 lb 6.4 oz BMI  35.12 kg/m2 34.5 kg/m2 34.57 kg/m2 Weight Loss Metrics 9/28/2018 9/21/2018 9/14/2018 9/11/2018 Today's Wt 209 lb 214 lb 3.2 oz 214 lb BMI 35.87 kg/m2 36.77 kg/m2  36.73 kg/m2 Weight Loss Metrics 9/11/2018 9/11/2018 8/1/2018 3/28/2018 Today's Wt 214 lb  219 lb 6.4 oz 218 lb 9.6 oz BMI  36.73 kg/m2 36.51 kg/m2 36.38 kg/m2 According to the EMR the patient has potentially lost ~15# over the past ~7 months. This is a clinically insignificant weight loss of 6.8% over the past 7 months. Anthropometrics:Height: 5' 4\" (162.6 cm),  Weight: 92.8 kg (204 lb 9.6 oz), Weight Source: Bed, Body mass index is 35.12 kg/(m^2). BMI class of overweight for age >71. Macronutrient needs: EER:  9325-2940 kcal /day (15-20 kcal/kg listed BW) EPR:  44-55 grams protein/day (0.8-1 grams/kg IBW) (CKD- GFR 21) Intake/Comparative Standards: Current NPO status does not meet estimated needs. Nutrition Diagnosis: Inadequate oral intake related to altered gi fucntion as evidenced by pt nausea and vomiting, currently NPO for EGD Intervention: 
Meals and snacks: Advance diet as medically appropriate Nutrition Supplement Therapy: none at this time Nutrition Discharge Plan: too soon to be determined Karime [address], [address] [place] [address], [phone]

## 2018-10-12 NOTE — PROGRESS NOTES
Pt having nausea and vomiting prior to admission gave PRN zofran before Gastrografin for CT. Will continue to monitor pt.

## 2018-10-12 NOTE — ED NOTES
TRANSFER - OUT REPORT: 
 
Verbal report given to BARRON Gould  on [address]  being transferred to [address] [place] [address] for routine progression of care Report consisted of patients Situation, Background, Assessment and  
Recommendations(SBAR). Information from the following report(s) SBAR was reviewed with the receiving nurse. Lines: PICC Double Lumen 93/48/44 Basilic (Active) Peripheral IV 10/11/18 Left Antecubital (Active) Site Assessment Clean, dry, & intact 10/11/2018  8:19 PM  
Phlebitis Assessment 0 10/11/2018  8:19 PM  
Infiltration Assessment 0 10/11/2018  8:19 PM  
Dressing Status Clean, dry, & intact 10/11/2018  8:19 PM  
  
 
Opportunity for questions and clarification was provided. Patient transported with: 
 [employer]

## 2018-10-12 NOTE — PROGRESS NOTES
Dual skin assessment done by this RN and Yue Kim RN. Pt has red irritated areas between skin folds of the groin, buttocks, and under breast. Pt's left leg is dry, flaky and swollen, left knee has small blue dressing on a wound on knee some surgical scars noted on left knee as well. Left knee is red and swollen. Pt has Picc line in place with dressing clean dry and intact on right upper arm. Pt has scattered bruising on arms bilaterally.

## 2018-10-12 NOTE — PROGRESS NOTES
TRANSFER - IN REPORT: 
 
Verbal report received from David Siddiqi RN (name) on Kim Escudero  being received from ER (unit) for routine progression of care Report consisted of patients Situation, Background, Assessment and  
Recommendations(SBAR). Information from the following report(s) SBAR, Kardex, ED Summary and MAR was reviewed with the receiving nurse. Opportunity for questions and clarification was provided. Assessment completed upon patients arrival to unit and care assumed.

## 2018-10-12 NOTE — PROGRESS NOTES
Problem: Interdisciplinary Rounds Goal: Interdisciplinary Rounds Interdisciplinary team rounds were held 10/12/2018 with the following team members:Care Management, Physical Therapy, Physician and Clinical Coordinator. Plan of care discussed. See clinical pathway and/or care plan for interventions and desired outcomes.

## 2018-10-12 NOTE — PROGRESS NOTES
TRANSFER - OUT REPORT: 
 
Verbal report given to Krista on [address]  being transferred to [address] [place] [address] for routine post - op Report consisted of patients Situation, Background, Assessment and  
Recommendations(SBAR). Information from the following report(s) Procedure Summary was reviewed with the receiving nurse. Lines: PICC Double Lumen 98/40/54 Basilic (Active) Central Line Being Utilized No 10/12/2018  3:33 AM  
Criteria for Appropriate Use Long term IV/antibiotic administration 10/12/2018  3:33 AM  
Site Assessment Clean, dry, & intact 10/12/2018  3:33 AM  
Phlebitis Assessment 0 10/12/2018  3:33 AM  
Infiltration Assessment 0 10/12/2018  3:33 AM  
Date of Last Dressing Change 10/12/18 10/12/2018  3:33 AM  
Dressing Status New 10/12/2018  3:33 AM  
Dressing Type Bacteriocidal;Disk with Chlorhexadine gluconate (CHG); Tape;Transparent 10/12/2018  3:33 AM  
Hub Color/Line Status Capped 10/12/2018  3:33 AM  
Hub Color/Line Status Capped 10/12/2018  3:33 AM  
   
Peripheral IV 10/11/18 Left Antecubital (Active) Site Assessment Clean, dry, & intact 10/12/2018 11:35 AM  
Phlebitis Assessment 0 10/12/2018 11:35 AM  
Infiltration Assessment 0 10/12/2018 11:35 AM  
Dressing Status Clean, dry, & intact 10/12/2018 11:35 AM  
Dressing Type Transparent;Tape 10/12/2018 11:35 AM  
Hub Color/Line Status Pink; Infusing 10/12/2018 11:35 AM  
   
Peripheral IV 10/11/18 Left Arm (Active) Site Assessment Clean, dry, & intact 10/12/2018  3:33 AM  
Phlebitis Assessment 0 10/12/2018  3:33 AM  
Infiltration Assessment 0 10/12/2018  3:33 AM  
Dressing Status Clean, dry, & intact 10/12/2018  3:33 AM  
Dressing Type Tape;Transparent 10/12/2018  3:33 AM  
Hub Color/Line Status Flushed;Patent 10/12/2018  3:33 AM  
  
 
Opportunity for questions and clarification was provided. Patient transported with: 
 [employer]

## 2018-10-12 NOTE — CONSULTS
[facility] Consult Note       Referring Physician:  Farhan Mccann    Consult Date:  10/12/2018    Admit Date:  10/11/2018    Chief Complaint:  Nausea and vomiting    Subjective:     History of Present Illness:  Patient is a 70 y.o. female patient of Dr. Naren Wilks HTN, GERD, HLD, Hypothyroidism, CKD stage III, recent hospitalization for L knee cellulitis/burisitis due to fall complicated by possible hematoma to partial TKA s/p Cefepime for 3 weeks admitted 10/12 with TIARRA after several episodes N/V who is seen in consultation at the request of Dr. Angela Albert for nausea and vomiting, consider EGD. She has had nausea and emesis daily since the end of her cefepime course over a week ago. Symptoms exacerbated by eating or drinking. She has been unable to take pills, including Synthroid, for about a week. Denies melena, NSAID use, dysphagia, odynophagia, abdominal pain, fever, weight loss. PMH:  Past Medical History:   Diagnosis Date    Calculus of kidney     GERD (gastroesophageal reflux disease)     Headache(784.0)     Hypercholesterolemia     Hypertension     Primary insomnia 7/17/2017    Thyroid disease        PSH:  Past Surgical History:   Procedure Laterality Date    HX COLONOSCOPY  Jan 2013    4 polyps, repeat 5 years    HX COLONOSCOPY  7/5/2016    repeat 5 yrs tubular adenoma    HX SALPINGO-OOPHORECTOMY      REMOVAL OF KIDNEY STONE         Allergies: Allergies   Allergen Reactions    Pneumovax 23 [Pneumococcal 23-Janell Ps Vaccine] Other (comments)     fever       Home Medications:  Prior to Admission medications    Medication Sig Start Date End Date Taking? Authorizing Provider   ondansetron (ZOFRAN ODT) 4 mg disintegrating tablet Take 1 Tab by mouth every eight (8) hours as needed for Nausea. 10/5/18  Yes Hortencia Treadwell III, MD   heparin sodium,porcine (HEPARIN LOCK FLUSH, PORCINE, IV) 5 mL by IntraVENous route two (2) times a day.  9/29/18  Yes Cassandra Nichols MD   0.9 % sodium chloride (NORMAL SALINE FLUSH INJECTION) 10 mL by IntraVENous route two (2) times a day. before and after IV antibiotics 2x daily 9/29/18  Yes Gisela Coombs MD   atorvastatin (LIPITOR) 40 mg tablet Take 1 Tab by mouth daily. 9/17/18  Yes Joss Doherty MD   levothyroxine (SYNTHROID) 25 mcg tablet Take 1 Tab by mouth every morning. 8/1/18  Yes Joss Doherty MD   gabapentin (NEURONTIN) 100 mg capsule Take 1 Cap by mouth two (2) times a day. 8/1/18  Yes Katya Cerna MD   traZODone (DESYREL) 50 mg tablet Take 1 Tab by mouth nightly. 8/1/18  Yes Joss Doherty MD   losartan-hydroCHLOROthiazide (HYZAAR) 100-25 mg per tablet Take 1 Tab by mouth daily. 7/26/18  Yes Joss Doherty MD   potassium chloride (K-DUR, KLOR-CON) 20 mEq tablet TAKE 1 TABLET BY MOUTH 2 TIMES A DAY  Patient taking differently: TAKE 1 TABLET BY MOUTH ONCE DAILY 7/10/18  Yes Katya Cerna MD   FLOVENT  mcg/actuation inhaler Take 2 Puffs by inhalation two (2) times a day. 10/28/15  Yes Historical Provider   acetaminophen (TYLENOL ARTHRITIS PAIN) 650 mg CR tablet Take 650 mg by mouth every eight (8) hours as needed (pain). Yes Historical Provider   albuterol (VENTOLIN HFA) 90 mcg/actuation inhaler Take 2 Puffs by inhalation every six (6) hours as needed for Wheezing or Shortness of Breath. Yes Historical Provider   PRILOSEC OTC PO Take 40 mg by mouth every morning. Yes Historical Provider   CALCIUM CITRATE + PO take 1 Tab by mouth two (2) times a day. Yes Historical Provider   cefepime 2 gram 2 g IV syringe 2 g by IntraVENous route two (2) times a day. 10/6/18   Gisela Coombs MD   acetaminophen (TYLENOL) 500 mg capsule Take 100 mg by mouth two (2) times a day.  2/1/18   Katya Cerna MD       Hospital Medications:  Current Facility-Administered Medications   Medication Dose Route Frequency    dextrose 5 % - 0.2% NaCl 1,000 mL with potassium chloride 40 mEq infusion   IntraVENous CONTINUOUS    sodium chloride (NS) flush 5-10 mL 5-10 mL IntraVENous Q8H    sodium chloride (NS) flush 5-10 mL  5-10 mL IntraVENous PRN    acetaminophen (TYLENOL) tablet 650 mg  650 mg Oral Q4H PRN    HYDROcodone-acetaminophen (NORCO) 5-325 mg per tablet 1 Tab  1 Tab Oral Q4H PRN    naloxone (NARCAN) injection 0.4 mg  0.4 mg IntraVENous PRN    ondansetron (ZOFRAN) injection 4 mg  4 mg IntraVENous Q4H PRN    bisacodyl (DULCOLAX) tablet 5 mg  5 mg Oral DAILY PRN    heparin (porcine) injection 5,000 Units  5,000 Units SubCUTAneous Q8H    albuterol (PROVENTIL VENTOLIN) nebulizer solution 2.5 mg  2.5 mg Inhalation Q6H PRN    atorvastatin (LIPITOR) tablet 40 mg  40 mg Oral DAILY    budesonide (PULMICORT) 500 mcg/2 ml nebulizer suspension  500 mcg Inhalation BID RT    gabapentin (NEURONTIN) capsule 100 mg  100 mg Oral BID    levothyroxine (SYNTHROID) tablet 25 mcg  25 mcg Oral 7am    lansoprazole (PREVACID SOLUTAB) disintegrating tablet 30 mg  30 mg Oral ACB    traZODone (DESYREL) tablet 50 mg  50 mg Oral QHS    tuberculin injection 5 Units  5 Units IntraDERMal ONCE       Social History:  Social History   Substance Use Topics    Smoking status: Never Smoker    Smokeless tobacco: Never Used    Alcohol use No     Pt denies any history of IV drug use, blood transfusions, or tattoos. Family History:  Family History   Problem Relation Age of Onset    Hypertension Mother     Arthritis-rheumatoid Mother     High Cholesterol Mother     Broken Bones Mother     Heart Failure Father     Heart Attack Father     Hypertension Brother     High Cholesterol Brother     Arthritis-osteo Brother      Knee    High Cholesterol Brother     Hypertension Brother     Heart Attack Brother     Heart Attack Paternal Grandmother     Heart Attack Paternal Grandfather        Review of Systems:  A detailed 10 system ROS is obtained, with pertinent positives as listed above. All others are negative.     Diet:      Objective:     Physical Exam:  Vitals:  Visit Vitals    /63 (BP 1 Location: Left arm, BP Patient Position: At rest)    Pulse 67    Temp 98.1 °F (36.7 °C)    Resp 18    Ht 5' 4\" (1.626 m)    Wt 92.8 kg (204 lb 9.6 oz)    SpO2 96%    BMI 35.12 kg/m2     Gen:  Pt is alert, cooperative, no acute distress  Skin:  Extremities and face reveal no rashes. No palmar erythema. No telangiectasias on the chest wall. HEENT: Sclerae anicteric. Extra-occular muscles are intact. No oral ulcers. No abnormal pigmentation of the lips. The neck is supple. Cardiovascular: Regular rate and rhythm. No murmurs, gallops, or rubs. Respiratory:  Comfortable breathing with no accessory muscle use. Clear breath sounds anteriorly with no wheezes, rales, or rhonchi. GI:  Abdomen nondistended, soft, and nontender. Normal active bowel sounds. No enlargement of the liver or spleen. No masses palpable. Rectal:  Deferred  Musculoskeletal:  No pitting edema of the lower legs. Extremities have good range of motion. No costovertebral tenderness. Neurological:  Gross memory appears intact. Patient is alert and oriented. Psychiatric:  Mood appears appropriate with judgement intact. Lymphatic:  No cervical or supraclavicular adenopathy.     Laboratory:    Recent Labs      10/12/18   0550  10/11/18   1946  10/11/18   1130   WBC  5.6  7.7   --    HGB  8.9*  10.4*   --    HCT  27.8*  32.5*   --    PLT  136*  162   --    MCV  92.1  91.8   --    NA  152*  147*  146*   K  3.0*  2.8*  3.0*   CL  122*  113*  112*   CO2  15*  17*  15*   BUN  45*  52*  53*   CREA  2.42*  2.94*  2.80*   CA  7.6*  8.2*  7.9*   MG  2.5*  0.8*   --    GLU  115*  73  53*   AP   --   77   --    SGOT   --   16   --    CBIL   --   0.1   --    ALB   --   2.9*   --    TP   --   7.6   --    LPSE   --   442*   --           Assessment:       Principal Problem:    Acute renal failure superimposed on stage 3 chronic kidney disease (HCC) (10/11/2018)    Active Problems:    HTN (hypertension) (8/16/2012)      Hyperlipidemia (8/16/2012)      Overview: Calcium score of 140, Goal LDL under 130      Hypothyroidism (8/16/2012)      S/P total knee arthroplasty (8/16/2012)      Overview: Bilateral--2009      GERD (gastroesophageal reflux disease) (8/16/2012)      Overview: Previous dilatation stricture, EGD Jan 2013 showed some mild esophagitis      Chronic pain syndrome (11/11/2013)      Overview: Chronic feet, hands, hips osteoarthritis, S/P bilateral TKA, gets pain       with walking 100 ft      Primary insomnia (7/17/2017)      Severe obesity (BMI 35.0-39. 9) with comorbidity ([address] (3/28/2018)      Hypokalemia (10/11/2018)      Dehydration (10/11/2018)      Orthostatic hypotension (10/11/2018)      Acute pancreatitis (10/12/2018)    Differential diagnosis includes PUD, gastritis, H. pyloric, biliary lesions, biliary stones, pancreatic lesions (especially in the setting of elevated lipase), inappropriately dosed Synthroid +/- her missed Synthroid doses, adrenal axis disorders, drug reaction.     Plan:       EGD today  Normalize electrolytes  Will need pancreatic imaging in the future    Dianne Alcocer MD  [facility], [place]

## 2018-10-12 NOTE — PROGRESS NOTES
Pt seen for chronic left leg wound. Removed compression dressing on left lower leg and ace bandage on left knee. Pt states she fell at home and hurt her knee. Removed old dressing to knee. One large skin tear over the left lateral aspect of the knee, two small skin tears to left medial aspect of the knee and one open area on the top of the knee. Pt states this open area on the top of the knee has been there for about a month. Wound bed to top of the knee has pink and black tissue and has calloused edges. Cleansed area with normal saline and applied xeroform gauze to open areas. Covered the xeroform with gauze and wrapped with ace bandage. Recommend dressing change daily and as needed. Will continue to follow.

## 2018-10-12 NOTE — PROCEDURES
Esophagogastroduodenoscopy    DATE of PROCEDURE: 10/12/2018    MEDICATIONS ADMINISTERED: MAC    INSTRUMENT: GIF    PROCEDURE:  After obtaining informed consent, the patient was placed in the left lateral position and sedated. The endoscope was advanced under direct vision without difficulty. The esophagus, stomach (including retroflexed views) and duodenum were evaluated. The patient was taken to the recovery area in stable condition. FINDINGS:  ESOPHAGUS: Schatzki's ring at the GE junction. Otherwise normal.  STOMACH: Fundic gland polyps throughout the body and fundus, all are less than 1 cm. 2 cm sliding hiatal hernia. Gastric erosions and small ulcers with oozing in the antrum. A total of four random biopsies were taken from the antrum, incisura and body of the stomach. DUODENUM: normal. Normal transverse views of the papilla. Estimated blood loss: 0-minimal     PLAN:  1. Increase PPI to b.i.d.  2. Add sucralfate  3. Trial of clear liquid diet. Advance as tolerated.     Cindy Osorio MD  [facility], [place]

## 2018-10-12 NOTE — PROGRESS NOTES
Bedside report received from [facility]. Assessment completed. Pt is lying in bed at this time. Pt A&O x 4. Respirations even and unlabored. No s/sx of acute pain or distress. Bed in low, locked position. Call light within reach. Pt instructed to call for assistance. Will monitor.

## 2018-10-12 NOTE — PROGRESS NOTES
End of shift SBAR to be given to oncoming nurse; pt alert and oriented x4. Pt reports nausea has subsided and she is able to eat part of dinner. \"Most she has eaten in weeks\" per daughter. Daughter has been at bedside and updated on patient's condition. VSS. No complaints at this time. Bed in low, locked position.

## 2018-10-12 NOTE — PROGRESS NOTES
Pt arrived back on unit with transport. In bed with bed alarm on in low, locked position. Patient has occasional strong cough, producing thick, clear sputum. Alert and oriented x4, no complaints at this time. D5 2% KCl restarted. Dentures at bedside.

## 2018-10-12 NOTE — PROGRESS NOTES
TRANSFER - IN REPORT: 
 
Verbal report received from Jamar Bruce (name) on [facility]  being received from PACU (unit) for routine progression of care Report consisted of patients Situation, Background, Assessment and  
Recommendations(SBAR). Information from the following report(s) SBAR, Kardex and Recent Results was reviewed with the receiving nurse. Opportunity for questions and clarification was provided. Assessment completed upon patients arrival to unit and care assumed.

## 2018-10-12 NOTE — PROGRESS NOTES
Problem: Falls - Risk of 
Goal: *Absence of Falls Document Ayanna Ortiz Fall Risk and appropriate interventions in the flowsheet. Outcome: Progressing Towards Goal 
Fall Risk Interventions: 
Mobility Interventions: Bed/chair exit alarm Medication Interventions: Bed/chair exit alarm, Patient to call before getting OOB Elimination Interventions: Bed/chair exit alarm, Call light in reach History of Falls Interventions: Bed/chair exit alarm, Investigate reason for fall Problem: Pressure Injury - Risk of 
Goal: *Prevention of pressure injury Document Jeffery Scale and appropriate interventions in the flowsheet. Outcome: Progressing Towards Goal 
Pressure Injury Interventions: Activity Interventions: Increase time out of bed, PT/OT evaluation Mobility Interventions: Turn and reposition approx. every two hours(pillow and wedges) Nutrition Interventions: Document food/fluid/supplement intake Friction and Shear Interventions: Transferring/repositioning devices

## 2018-10-13 ENCOUNTER — APPOINTMENT (OUTPATIENT)
Dept: CT IMAGING | Age: 78
DRG: 683 | End: 2018-10-13
Attending: INTERNAL MEDICINE
Payer: MEDICARE

## 2018-10-13 LAB
ANION GAP SERPL CALC-SCNC: 10 MMOL/L (ref 7–16)
BUN SERPL-MCNC: 32 MG/DL (ref 8–23)
CALCIUM SERPL-MCNC: 7.6 MG/DL (ref 8.3–10.4)
CHLORIDE SERPL-SCNC: 121 MMOL/L (ref 98–107)
CO2 SERPL-SCNC: 18 MMOL/L (ref 21–32)
CREAT SERPL-MCNC: 2.13 MG/DL (ref 0.6–1)
GLUCOSE SERPL-MCNC: 106 MG/DL (ref 65–100)
MM INDURATION POC: 0 MM (ref 0–5)
POTASSIUM SERPL-SCNC: 3 MMOL/L (ref 3.5–5.1)
PPD POC: NORMAL NEGATIVE
SODIUM SERPL-SCNC: 149 MMOL/L (ref 136–145)

## 2018-10-13 PROCEDURE — 3331090001 HH PPS REVENUE CREDIT

## 2018-10-13 PROCEDURE — 74011250636 HC RX REV CODE- 250/636: Performed by: INTERNAL MEDICINE

## 2018-10-13 PROCEDURE — 74011250637 HC RX REV CODE- 250/637: Performed by: INTERNAL MEDICINE

## 2018-10-13 PROCEDURE — 36415 COLL VENOUS BLD VENIPUNCTURE: CPT

## 2018-10-13 PROCEDURE — 74011636320 HC RX REV CODE- 636/320: Performed by: INTERNAL MEDICINE

## 2018-10-13 PROCEDURE — 74011000250 HC RX REV CODE- 250: Performed by: INTERNAL MEDICINE

## 2018-10-13 PROCEDURE — 74176 CT ABD & PELVIS W/O CONTRAST: CPT

## 2018-10-13 PROCEDURE — 65270000029 HC RM PRIVATE

## 2018-10-13 PROCEDURE — 77030020253 HC SOL INJ D545NS .05 DEX .45 SAL

## 2018-10-13 PROCEDURE — 3331090002 HH PPS REVENUE DEBIT

## 2018-10-13 PROCEDURE — 74011250637 HC RX REV CODE- 250/637: Performed by: PHYSICIAN ASSISTANT

## 2018-10-13 PROCEDURE — 94640 AIRWAY INHALATION TREATMENT: CPT

## 2018-10-13 PROCEDURE — 74011000258 HC RX REV CODE- 258: Performed by: INTERNAL MEDICINE

## 2018-10-13 PROCEDURE — 80048 BASIC METABOLIC PNL TOTAL CA: CPT

## 2018-10-13 PROCEDURE — 94760 N-INVAS EAR/PLS OXIMETRY 1: CPT

## 2018-10-13 RX ORDER — POTASSIUM CHLORIDE 14.9 MG/ML
20 INJECTION INTRAVENOUS ONCE
Status: COMPLETED | OUTPATIENT
Start: 2018-10-13 | End: 2018-10-13

## 2018-10-13 RX ADMIN — POTASSIUM CHLORIDE 20 MEQ: 14.9 INJECTION, SOLUTION INTRAVENOUS at 14:54

## 2018-10-13 RX ADMIN — POTASSIUM CHLORIDE 20 MEQ: 14.9 INJECTION, SOLUTION INTRAVENOUS at 10:36

## 2018-10-13 RX ADMIN — DIATRIZOATE MEGLUMINE AND DIATRIZOATE SODIUM 15 ML: 660; 100 LIQUID ORAL; RECTAL at 11:19

## 2018-10-13 RX ADMIN — DEXTROSE MONOHYDRATE AND SODIUM CHLORIDE 125 ML/HR: 5; .45 INJECTION, SOLUTION INTRAVENOUS at 09:11

## 2018-10-13 RX ADMIN — BUDESONIDE 500 MCG: 0.5 INHALANT RESPIRATORY (INHALATION) at 19:26

## 2018-10-13 RX ADMIN — SUCRALFATE 1 G: 1 SUSPENSION ORAL at 05:29

## 2018-10-13 RX ADMIN — POTASSIUM CHLORIDE 20 MEQ: 20 TABLET, EXTENDED RELEASE ORAL at 16:57

## 2018-10-13 RX ADMIN — LANSOPRAZOLE 30 MG: 30 TABLET, ORALLY DISINTEGRATING, DELAYED RELEASE ORAL at 05:29

## 2018-10-13 RX ADMIN — HEPARIN SODIUM 5000 UNITS: 5000 INJECTION INTRAVENOUS; SUBCUTANEOUS at 05:29

## 2018-10-13 RX ADMIN — TRAZODONE HYDROCHLORIDE 50 MG: 50 TABLET ORAL at 21:35

## 2018-10-13 RX ADMIN — Medication 5 ML: at 05:29

## 2018-10-13 RX ADMIN — BUDESONIDE 500 MCG: 0.5 INHALANT RESPIRATORY (INHALATION) at 07:21

## 2018-10-13 RX ADMIN — Medication 10 ML: at 21:36

## 2018-10-13 RX ADMIN — SUCRALFATE 1 G: 1 SUSPENSION ORAL at 21:35

## 2018-10-13 RX ADMIN — SUCRALFATE 1 G: 1 SUSPENSION ORAL at 16:57

## 2018-10-13 RX ADMIN — LANSOPRAZOLE 30 MG: 30 TABLET, ORALLY DISINTEGRATING, DELAYED RELEASE ORAL at 16:56

## 2018-10-13 RX ADMIN — GABAPENTIN 100 MG: 100 CAPSULE ORAL at 16:58

## 2018-10-13 RX ADMIN — Medication 10 ML: at 14:54

## 2018-10-13 RX ADMIN — GABAPENTIN 100 MG: 100 CAPSULE ORAL at 08:04

## 2018-10-13 RX ADMIN — HEPARIN SODIUM 5000 UNITS: 5000 INJECTION INTRAVENOUS; SUBCUTANEOUS at 14:54

## 2018-10-13 RX ADMIN — ATORVASTATIN CALCIUM 40 MG: 40 TABLET, FILM COATED ORAL at 08:05

## 2018-10-13 RX ADMIN — SUCRALFATE 1 G: 1 SUSPENSION ORAL at 10:34

## 2018-10-13 RX ADMIN — HEPARIN SODIUM 5000 UNITS: 5000 INJECTION INTRAVENOUS; SUBCUTANEOUS at 21:36

## 2018-10-13 RX ADMIN — DEXTROSE MONOHYDRATE AND SODIUM CHLORIDE 125 ML/HR: 5; .45 INJECTION, SOLUTION INTRAVENOUS at 18:13

## 2018-10-13 RX ADMIN — POTASSIUM CHLORIDE 20 MEQ: 20 TABLET, EXTENDED RELEASE ORAL at 08:04

## 2018-10-13 RX ADMIN — LEVOTHYROXINE SODIUM 25 MCG: 50 TABLET ORAL at 05:29

## 2018-10-13 NOTE — PROGRESS NOTES
GI DAILY PROGRESS NOTE Admit Date:  10/11/2018 Today's Date:  10/13/2018 CC:  Nausea and vomiting Subjective:  
 
Patient feels better. Denies abd pain, melena, brbpr. No nausea since yesterday pm. 
 
Medications:  
Current Facility-Administered Medications Medication Dose Route Frequency  lansoprazole (PREVACID SOLUTAB) disintegrating tablet 30 mg  30 mg Oral ACB&D  
 sucralfate (CARAFATE) 100 mg/mL oral suspension 1 g  1 g Oral AC&HS  
 dextrose 5 % - 0.45% NaCl infusion  125 mL/hr IntraVENous CONTINUOUS  
 potassium chloride (K-DUR, KLOR-CON) SR tablet 20 mEq  20 mEq Oral BID  sodium chloride (NS) flush 5-10 mL  5-10 mL IntraVENous Q8H  
 sodium chloride (NS) flush 5-10 mL  5-10 mL IntraVENous PRN  
 acetaminophen (TYLENOL) tablet 650 mg  650 mg Oral Q4H PRN  
 HYDROcodone-acetaminophen (NORCO) 5-325 mg per tablet 1 Tab  1 Tab Oral Q4H PRN  
 naloxone (NARCAN) injection 0.4 mg  0.4 mg IntraVENous PRN  
 ondansetron (ZOFRAN) injection 4 mg  4 mg IntraVENous Q4H PRN  
 bisacodyl (DULCOLAX) tablet 5 mg  5 mg Oral DAILY PRN  
 heparin (porcine) injection 5,000 Units  5,000 Units SubCUTAneous Q8H  
 albuterol (PROVENTIL VENTOLIN) nebulizer solution 2.5 mg  2.5 mg Inhalation Q6H PRN  
 atorvastatin (LIPITOR) tablet 40 mg  40 mg Oral DAILY  budesonide (PULMICORT) 500 mcg/2 ml nebulizer suspension  500 mcg Inhalation BID RT  
 gabapentin (NEURONTIN) capsule 100 mg  100 mg Oral BID  levothyroxine (SYNTHROID) tablet 25 mcg  25 mcg Oral 7am  
 traZODone (DESYREL) tablet 50 mg  50 mg Oral QHS Review of Systems: ROS was obtained, with pertinent positives as listed above. No chest pain or SOB. Diet:   
 
Objective:  
Vitals: 
Visit Vitals  /59 (BP 1 Location: Left arm, BP Patient Position: At rest)  Pulse 77  Temp 98.7 °F (37.1 °C)  Resp 17  Ht 5' 4\" (1.626 m)  Wt 92.4 kg (203 lb 11.2 oz)  SpO2 93%  BMI 34.97 kg/m2 Intake/Output: 10/11 1901 - 10/13 0700 In: 3119.5 [P.O.:870; I.V.:2249.5] Out: 650 [Urine:650] Exam: 
General appearance: alert, cooperative, no distress Lungs: clear to auscultation bilaterally anteriorly Heart: regular rate and rhythm Abdomen: soft, non-tender. Bowel sounds normal. No masses, no organomegaly Extremities: BLE edema Neuro:  alert and oriented Data Review (Labs):   
Recent Labs 10/13/18 
 0546  10/12/18 
 0550  10/11/18 
 1946  10/11/18 
 1130 WBC   --   5.6  7.7   --   
HGB   --   8.9*  10.4*   --   
HCT   --   27.8*  32.5*   --   
PLT   --   136*  162   -- MCV   --   92.1  91.8   --   
NA  149*  152*  147*  146*  
K  3.0*  3.0*  2.8*  3.0*  
CL  121*  122*  113*  112* CO2  18*  15*  17*  15* BUN  32*  45*  52*  53* CREA  2.13*  2.42*  2.94*  2.80* CA  7.6*  7.6*  8.2*  7.9*  
MG   --   2.5*  0.8*   --   
GLU  106*  115*  73  53* AP   --    --   77   --   
SGOT   --    --   16   --   
CBIL   --    --   0.1   --   
ALB   --    --   2.9*   --   
TP   --    --   7.6   --   
LPSE   --    --   442*   --   
 
 
Assessment:  
 
Principal Problem: 
  Acute renal failure superimposed on stage 3 chronic kidney disease ([facility] (10/11/2018) Active Problems: 
  HTN (hypertension) (8/16/2012) Hyperlipidemia (8/16/2012) Overview: Calcium score of 140, Goal LDL under 130 Hypothyroidism (8/16/2012) S/P total knee arthroplasty (8/16/2012) Overview: Bilateral--2009 GERD (gastroesophageal reflux disease) (8/16/2012) Overview: Previous dilatation stricture, EGD Jan 2013 showed some mild esophagitis Chronic pain syndrome (11/11/2013) Overview: Chronic feet, hands, hips osteoarthritis, S/P bilateral TKA, gets pain  
    with walking 100 ft 
 
  Primary insomnia (7/17/2017) Severe obesity (BMI 35.0-39. 9) with comorbidity ([address] (3/28/2018) Hypokalemia (10/11/2018) Dehydration (10/11/2018) Orthostatic hypotension (10/11/2018) Acute pancreatitis (10/12/2018) EGD 10/12 with ulcers and erosions with oozing Plan:  
 
Continue BID PPI and carafate Symptomatic nausea treatment OK to advance diet Niyah Barton MD 
[facility], [place]

## 2018-10-13 NOTE — PROGRESS NOTES
Bedside and Verbal shift change report received from [facility]. Report included the following information SBAR, Kardex, MAR and Recent Results. assumed care of patient.

## 2018-10-13 NOTE — PROGRESS NOTES
Subjective:  
Daily Progress Note: 10/13/2018 10:09 AM 
 
No complaints ROS Comfortable No CP No SOB No Abd Pain MS - 
 
Current Facility-Administered Medications Medication Dose Route Frequency  potassium chloride 20 mEq in 100 ml IVPB  20 mEq IntraVENous ONCE  potassium chloride 20 mEq in 100 ml IVPB  20 mEq IntraVENous ONCE  
 lansoprazole (PREVACID SOLUTAB) disintegrating tablet 30 mg  30 mg Oral ACB&D  
 sucralfate (CARAFATE) 100 mg/mL oral suspension 1 g  1 g Oral AC&HS  
 dextrose 5 % - 0.45% NaCl infusion  125 mL/hr IntraVENous CONTINUOUS  
 potassium chloride (K-DUR, KLOR-CON) SR tablet 20 mEq  20 mEq Oral BID  sodium chloride (NS) flush 5-10 mL  5-10 mL IntraVENous Q8H  
 sodium chloride (NS) flush 5-10 mL  5-10 mL IntraVENous PRN  
 acetaminophen (TYLENOL) tablet 650 mg  650 mg Oral Q4H PRN  
 HYDROcodone-acetaminophen (NORCO) 5-325 mg per tablet 1 Tab  1 Tab Oral Q4H PRN  
 naloxone (NARCAN) injection 0.4 mg  0.4 mg IntraVENous PRN  
 ondansetron (ZOFRAN) injection 4 mg  4 mg IntraVENous Q4H PRN  
 bisacodyl (DULCOLAX) tablet 5 mg  5 mg Oral DAILY PRN  
 heparin (porcine) injection 5,000 Units  5,000 Units SubCUTAneous Q8H  
 albuterol (PROVENTIL VENTOLIN) nebulizer solution 2.5 mg  2.5 mg Inhalation Q6H PRN  
 atorvastatin (LIPITOR) tablet 40 mg  40 mg Oral DAILY  budesonide (PULMICORT) 500 mcg/2 ml nebulizer suspension  500 mcg Inhalation BID RT  
 gabapentin (NEURONTIN) capsule 100 mg  100 mg Oral BID  levothyroxine (SYNTHROID) tablet 25 mcg  25 mcg Oral 7am  
 traZODone (DESYREL) tablet 50 mg  50 mg Oral QHS Objective:  
 
Visit Vitals  /59 (BP 1 Location: Left arm, BP Patient Position: At rest)  Pulse 77  Temp 98.7 °F (37.1 °C)  Resp 17  Ht 5' 4\" (1.626 m)  Wt 92.4 kg (203 lb 11.2 oz)  SpO2 93%  BMI 34.97 kg/m2 O2 Device: Room air Temp (24hrs), Av.3 °F (36.8 °C), Min:98 °F (36.7 °C), Max:98.7 °F (37.1 °C) 10/11 1901 - 10/13 0700 In: 3119.5 [P.O.:870; I.V.:2249.5] Out: 650 [Urine:650] Visit Vitals  /59 (BP 1 Location: Left arm, BP Patient Position: At rest)  Pulse 77  Temp 98.7 °F (37.1 °C)  Resp 17  Ht 5' 4\" (1.626 m)  Wt 92.4 kg (203 lb 11.2 oz)  SpO2 93%  BMI 34.97 kg/m2 Head: Normocephalic, without obvious abnormality Neck: no JVD Lungs: clear to auscultation bilaterally Heart: regular rate and rhythm Abdomen: soft, non-tender. Extremities:no edema Data Review Recent Results (from the past 48 hour(s)) METABOLIC PANEL, BASIC Collection Time: 10/11/18 11:30 AM  
Result Value Ref Range Sodium 146 (H) 136 - 145 mmol/L Potassium 3.0 (L) 3.5 - 5.1 mmol/L Chloride 112 (H) 98 - 107 mmol/L  
 CO2 15 (L) 21 - 32 mmol/L Anion gap 19 mmol/L Glucose 53 (L) 65 - 100 mg/dL BUN 53 (H) 8 - 23 MG/DL Creatinine 2.80 (H) 0.6 - 1.0 MG/DL  
 GFR est AA 21 (L) >60 ml/min/1.73m2 GFR est non-AA 17 ml/min/1.73m2 Calcium 7.9 (L) 8.3 - 10.4 MG/DL  
GLUCOSE, POC Collection Time: 10/11/18  6:48 PM  
Result Value Ref Range Glucose (POC) 76 65 - 100 mg/dL METABOLIC PANEL, BASIC Collection Time: 10/11/18  7:46 PM  
Result Value Ref Range Sodium 147 (H) 136 - 145 mmol/L Potassium 2.8 (LL) 3.5 - 5.1 mmol/L Chloride 113 (H) 98 - 107 mmol/L  
 CO2 17 (L) 21 - 32 mmol/L Anion gap 17 (H) 7 - 16 mmol/L Glucose 73 65 - 100 mg/dL BUN 52 (H) 8 - 23 MG/DL Creatinine 2.94 (H) 0.6 - 1.0 MG/DL  
 GFR est AA 20 (L) >60 ml/min/1.73m2 GFR est non-AA 17 (L) >60 ml/min/1.73m2 Calcium 8.2 (L) 8.3 - 10.4 MG/DL  
CBC WITH AUTOMATED DIFF Collection Time: 10/11/18  7:46 PM  
Result Value Ref Range WBC 7.7 4.3 - 11.1 K/uL  
 RBC 3.54 (L) 4.05 - 5.2 M/uL  
 HGB 10.4 (L) 11.7 - 15.4 g/dL HCT 32.5 (L) 35.8 - 46.3 % MCV 91.8 79.6 - 97.8 FL  
 MCH 29.4 26.1 - 32.9 PG  
 MCHC 32.0 31.4 - 35.0 g/dL  
 RDW 14.6 % PLATELET 845 472 - 117 K/uL MPV 12.9 (H) 9.4 - 12.3 FL ABSOLUTE NRBC 0.00 0.0 - 0.2 K/uL  
 DF AUTOMATED NEUTROPHILS 68 43 - 78 % LYMPHOCYTES 18 13 - 44 % MONOCYTES 12 4.0 - 12.0 % EOSINOPHILS 1 0.5 - 7.8 % BASOPHILS 1 0.0 - 2.0 % IMMATURE GRANULOCYTES 0 0.0 - 5.0 %  
 ABS. NEUTROPHILS 5.2 1.7 - 8.2 K/UL  
 ABS. LYMPHOCYTES 1.4 0.5 - 4.6 K/UL  
 ABS. MONOCYTES 0.9 0.1 - 1.3 K/UL  
 ABS. EOSINOPHILS 0.1 0.0 - 0.8 K/UL  
 ABS. BASOPHILS 0.1 0.0 - 0.2 K/UL  
 ABS. IMM. GRANS. 0.0 0.0 - 0.5 K/UL HEPATIC FUNCTION PANEL Collection Time: 10/11/18  7:46 PM  
Result Value Ref Range Protein, total 7.6 6.3 - 8.2 g/dL Albumin 2.9 (L) 3.2 - 4.6 g/dL Globulin 4.7 (H) 2.3 - 3.5 g/dL A-G Ratio 0.6 (L) 1.2 - 3.5 Bilirubin, total 0.4 0.2 - 1.1 MG/DL Bilirubin, direct 0.1 <0.4 MG/DL Alk. phosphatase 77 50 - 136 U/L  
 AST (SGOT) 16 15 - 37 U/L  
 ALT (SGPT) 14 12 - 65 U/L  
MAGNESIUM Collection Time: 10/11/18  7:46 PM  
Result Value Ref Range Magnesium 0.8 (LL) 1.8 - 2.4 mg/dL CK Collection Time: 10/11/18  7:46 PM  
Result Value Ref Range  21 - 215 U/L  
TSH 3RD GENERATION Collection Time: 10/11/18  7:46 PM  
Result Value Ref Range TSH 1.100 0.358 - 3.740 uIU/mL  
LIPASE Collection Time: 10/11/18  7:46 PM  
Result Value Ref Range Lipase 442 (H) 73 - 393 U/L  
URINALYSIS W/ RFLX MICROSCOPIC Collection Time: 10/11/18 10:05 PM  
Result Value Ref Range Color YELLOW Appearance CLOUDY Specific gravity 1.016 1.001 - 1.023    
 pH (UA) 5.5 5.0 - 9.0 Protein 100 (A) NEG mg/dL Glucose NEGATIVE  mg/dL Ketone 40 (A) NEG mg/dL Bilirubin MODERATE (A) NEG Blood MODERATE (A) NEG Urobilinogen 0.2 0.2 - 1.0 EU/dL Nitrites NEGATIVE  NEG Leukocyte Esterase SMALL (A) NEG    
 WBC 5-10 0 /hpf  
 RBC 3-5 0 /hpf Epithelial cells 0-3 0 /hpf Bacteria 0 0 /hpf Casts 5-10 0 /lpf SODIUM, UR, RANDOM Collection Time: 10/11/18 10:05 PM  
Result Value Ref Range Sodium,urine random 59 MMOL/L  
CREATININE, UR, RANDOM Collection Time: 10/11/18 10:05 PM  
Result Value Ref Range Creatinine, urine 108.00 mg/dL PLEASE READ & DOCUMENT PPD TEST IN 24 HRS Collection Time: 10/12/18  5:40 AM  
Result Value Ref Range PPD  Negative  
 mm Induration 0 mm METABOLIC PANEL, BASIC Collection Time: 10/12/18  5:50 AM  
Result Value Ref Range Sodium 152 (H) 136 - 145 mmol/L Potassium 3.0 (L) 3.5 - 5.1 mmol/L Chloride 122 (H) 98 - 107 mmol/L  
 CO2 15 (L) 21 - 32 mmol/L Anion gap 15 7 - 16 mmol/L Glucose 115 (H) 65 - 100 mg/dL BUN 45 (H) 8 - 23 MG/DL Creatinine 2.42 (H) 0.6 - 1.0 MG/DL  
 GFR est AA 25 (L) >60 ml/min/1.73m2 GFR est non-AA 21 (L) >60 ml/min/1.73m2 Calcium 7.6 (L) 8.3 - 10.4 MG/DL  
CBC W/O DIFF Collection Time: 10/12/18  5:50 AM  
Result Value Ref Range WBC 5.6 4.3 - 11.1 K/uL  
 RBC 3.02 (L) 4.05 - 5.2 M/uL HGB 8.9 (L) 11.7 - 15.4 g/dL HCT 27.8 (L) 35.8 - 46.3 % MCV 92.1 79.6 - 97.8 FL  
 MCH 29.5 26.1 - 32.9 PG  
 MCHC 32.0 31.4 - 35.0 g/dL  
 RDW 14.8 % PLATELET 525 (L) 758 - 450 K/uL MPV 13.7 (H) 9.4 - 12.3 FL ABSOLUTE NRBC 0.00 0.0 - 0.2 K/uL MAGNESIUM Collection Time: 10/12/18  5:50 AM  
Result Value Ref Range Magnesium 2.5 (H) 1.8 - 2.4 mg/dL EKG, 12 LEAD, INITIAL Collection Time: 10/12/18  7:59 AM  
Result Value Ref Range Ventricular Rate 65 BPM  
 Atrial Rate 65 BPM  
 P-R Interval 176 ms QRS Duration 84 ms Q-T Interval 424 ms QTC Calculation (Bezet) 440 ms Calculated P Axis 23 degrees Calculated R Axis 17 degrees Calculated T Axis 35 degrees Diagnosis Sinus rhythm with Premature atrial complexes Otherwise normal ECG When compared with ECG of 29-DEC-2008 10:33, 
Premature atrial complexes are now Present Confirmed by Kay Bae MD (), Nikki Rivera ([id number]) on 10/12/2018 9:33:34 AM 
  
 METABOLIC PANEL, BASIC Collection Time: 10/13/18  5:46 AM  
Result Value Ref Range Sodium 149 (H) 136 - 145 mmol/L Potassium 3.0 (L) 3.5 - 5.1 mmol/L Chloride 121 (H) 98 - 107 mmol/L  
 CO2 18 (L) 21 - 32 mmol/L Anion gap 10 7 - 16 mmol/L Glucose 106 (H) 65 - 100 mg/dL BUN 32 (H) 8 - 23 MG/DL Creatinine 2.13 (H) 0.6 - 1.0 MG/DL  
 GFR est AA 29 (L) >60 ml/min/1.73m2 GFR est non-AA 24 (L) >60 ml/min/1.73m2 Calcium 7.6 (L) 8.3 - 10.4 MG/DL Assessment Patient Active Problem List  
 Diagnosis Date Noted  Acute pancreatitis 10/12/2018  Acute renal failure superimposed on stage 3 chronic kidney disease ([facility] [address] 10/11/2018  Hypokalemia 10/11/2018  Dehydration 10/11/2018  Orthostatic hypotension 10/11/2018  Cellulitis and abscess of left lower extremity 09/11/2018  Severe obesity (BMI 35.0-39. 9) with comorbidity ([facility] [address] 03/28/2018  Primary insomnia 07/17/2017  Right upper quadrant abdominal pain 12/14/2016  Raynaud's phenomenon 02/25/2015  Post Menopausal Osteopenia 02/25/2015  Chronic kidney disease, stage III (moderate) ([facility] [address] 11/12/2014  Pedal edema 01/28/2014  Chronic pain syndrome 11/11/2013  Colon polyps 04/30/2013  Asthma 10/29/2012  
 HTN (hypertension) 08/16/2012  Hyperlipidemia 08/16/2012  Hypothyroidism 08/16/2012  Female stress incontinence 08/16/2012  Umbilical hernia 32/34/9906  S/P total knee arthroplasty 08/16/2012  Kidney stones 08/16/2012  Ophthalmic migraine 08/16/2012  GERD (gastroesophageal reflux disease) 08/16/2012 Problems Addressed by Nephrology TIARRA - prerenal 
Low K Low Mg Hypernatremia Plan Sodium better. Continue hypotonic fluid. Aggressive K repletion. Check Mg tomorrow. Renal function continues to improve.

## 2018-10-13 NOTE — PROGRESS NOTES
Hospitalist Progress Note 10/13/2018 Admit Date: 10/11/2018  6:45 PM  
NAME: Pili Collier :  1940 MRN:  [id number] Attending: Kam Harrington MD 
PCP:  Jose Carlos Gunter MD 
 
SUBJECTIVE:  
Suleman Puente is a 71 yo F admitted 10/11 with TIARRA after 2 weeks of nausea and vomiting (at least twice a day for the last 1.5-2 weeks) while receiving 3 week course of cefepime for recent L leg/knee cellulitis/bursitis. She also had syncopal event at home, felt to be due to dehydration/orthostatic hypotension. She reports she had episode of vomiting this AM after oral contrast for CT abdomen. Her Cr has slightly improved from yesterday; sodium level is trending up. 
 
10/13- reports she is feeling better overall. Has not vomited or been nauseous since 4 PM yesterday. Tolerated clear liquids and would like to try to advance diet. She feels exhausted, but overall, significantly better than on admission. EGD showed antral erosions and small ulcers with oozing that may be culprit for nausea. Review of Systems negative with exception of pertinent positives noted above PHYSICAL EXAM  
 
Visit Vitals  /59 (BP 1 Location: Left arm, BP Patient Position: At rest)  Pulse 77  Temp 98.7 °F (37.1 °C)  Resp 17  Ht 5' 4\" (1.626 m)  Wt 92.4 kg (203 lb 11.2 oz)  SpO2 93%  BMI 34.97 kg/m2 Temp (24hrs), Av.3 °F (36.8 °C), Min:98 °F (36.7 °C), Max:98.7 °F (37.1 °C) Patient Vitals for the past 24 hrs: 
 Temp Pulse Resp BP SpO2  
10/13/18 0721 - - - - 93 % 10/13/18 0718 98.7 °F (37.1 °C) 77 17 133/59 95 % 10/13/18 0332 98 °F (36.7 °C) 75 17 127/71 92 % 10/12/18 2231 98.2 °F (36.8 °C) 79 18 145/64 92 % 10/12/18 1914 98.6 °F (37 °C) 73 20 133/71 95 % 10/12/18 1521 98.2 °F (36.8 °C) 70 16 152/78 95 % 10/12/18 1330 - 69 16 147/66 93 % 10/12/18 1241 - 67 16 159/74 94 % 10/12/18 1234 - 65 14 168/76 93 % 10/12/18 1224 - 64 14 150/76 96 % 10/12/18 1215 98 °F (36.7 °C) 62 16 153/72 100 % 10/12/18 1158 - 90 12 168/79 95 % 10/12/18 1138 - 68 16 159/76 95 % Oxygen Therapy O2 Sat (%): 93 % (10/13/18 0721) Pulse via Oximetry: 67 beats per minute (10/12/18 1241) O2 Device: Room air (10/13/18 0721) Intake/Output Summary (Last 24 hours) at 10/13/18 1021 Last data filed at 10/13/18 2932 Gross per 24 hour Intake             1957 ml Output              500 ml Net             1457 ml General: No acute distress, appears to be feeling some better   
Lungs:  CTA Bilaterally. Heart:  Regular rate and rhythm,  No murmur, rub, or gallop Abdomen: Soft, Non distended, Non tender, Positive bowel sounds, no mass appreciated on exam 
Extremities: No cyanosis, clubbing or edema Neurologic:  No focal deficits Recent Results (from the past 24 hour(s)) METABOLIC PANEL, BASIC Collection Time: 10/13/18  5:46 AM  
Result Value Ref Range Sodium 149 (H) 136 - 145 mmol/L Potassium 3.0 (L) 3.5 - 5.1 mmol/L Chloride 121 (H) 98 - 107 mmol/L  
 CO2 18 (L) 21 - 32 mmol/L Anion gap 10 7 - 16 mmol/L Glucose 106 (H) 65 - 100 mg/dL BUN 32 (H) 8 - 23 MG/DL Creatinine 2.13 (H) 0.6 - 1.0 MG/DL  
 GFR est AA 29 (L) >60 ml/min/1.73m2 GFR est non-AA 24 (L) >60 ml/min/1.73m2 Calcium 7.6 (L) 8.3 - 10.4 MG/DL Imaging:   
CT ABD PELV WO CONT    (Results Pending) ASSESSMENT Hospital Problems as of 10/13/2018  Date Reviewed: 8/1/2018 Codes Class Noted - Resolved POA Acute pancreatitis ICD-10-CM: K85.90 ICD-9-CM: 041.9  10/12/2018 - Present Yes * (Principal)Acute renal failure superimposed on stage 3 chronic kidney disease ([facility] [address] ICD-10-CM: N17.9, N18.3 ICD-9-CM: 584.9, 585.3  10/11/2018 - Present Yes Hypokalemia ICD-10-CM: E87.6 ICD-9-CM: 276.8  10/11/2018 - Present Yes Dehydration ICD-10-CM: E86.0 ICD-9-CM: 276.51  10/11/2018 - Present Yes Orthostatic hypotension ICD-10-CM: I95.1 ICD-9-CM: 458.0  10/11/2018 - Present Yes Severe obesity (BMI 35.0-39. 9) with comorbidity ([facility] [address] ICD-10-CM: E66.01 
ICD-9-CM: 278.01  3/28/2018 - Present Yes Primary insomnia ICD-10-CM: F51.01 
ICD-9-CM: 307.42  7/17/2017 - Present Yes Chronic pain syndrome ICD-10-CM: G89.4 ICD-9-CM: 338.4  11/11/2013 - Present Yes Overview Addendum 1/14/2016  3:45 PM by Mikey Coyle MD  
  Chronic feet, hands, hips osteoarthritis, S/P bilateral TKA, gets pain with walking 100 ft 
  
  
   
 HTN (hypertension) ICD-10-CM: I10 
ICD-9-CM: 401.9  8/16/2012 - Present Yes Hyperlipidemia ICD-10-CM: E78.5 ICD-9-CM: 272.4  8/16/2012 - Present Yes Overview Addendum 10/28/2012  6:29 PM by Mikey Coyel MD  
  Calcium score of 140, Goal LDL under 130 Hypothyroidism ICD-10-CM: E03.9 ICD-9-CM: 244.9  8/16/2012 - Present Yes S/P total knee arthroplasty ICD-10-CM: V17.971 ICD-9-CM: V43.65  8/16/2012 - Present Yes Overview Addendum 8/16/2012  8:38 AM by Katy Alvarez Bilateral--2009 GERD (gastroesophageal reflux disease) ICD-10-CM: K21.9 ICD-9-CM: 530.81  8/16/2012 - Present Yes Overview Addendum 7/29/2014  2:30 PM by Mikey Coyle MD  
  Previous dilatation stricture, EGD Jan 2013 showed some mild esophagitis Plan: TIARRA on stage 3 CKD · Improving slowly. · IVF per nephro. · CT pending as she could not tolerate oral contrast yesterday. · Strict I/O. Hypokalemia · Replete. · Per nephro, likely intracellularly depleted due to vomiting/dehydration. Nausea/vomiting · GI following- EGD yesterday with antral erosions/ulcers. · BID PPI and carafate. · Nausea improved- advance diet. · PRN zofran. Possible abdominal mass · CT pending. HTN 
· BP meds on hold. Monitor. Hypothyroidism · Synthroid DVT Prophylaxis: Heparin Signed By: Jak Harris MD   
 October 13, 2018

## 2018-10-13 NOTE — PROGRESS NOTES
Patient in bed resting. She was able to tolerate solid food for dinner, only having minor discomfort afterwards. Bed alarm is active. Dressing to knee changed. Will continue to monitor.

## 2018-10-13 NOTE — PROGRESS NOTES
Bedside and Verbal shift change report given to Trinh Soliman (oncoming nurse) by self, Gary Boo (offgoing nurse). Report included the following information SBAR, Kardex, MAR and Recent Results. Oncoming nurse assumed care.

## 2018-10-14 PROBLEM — K25.3 ACUTE GASTRIC ULCER WITHOUT HEMORRHAGE OR PERFORATION: Status: ACTIVE | Noted: 2018-10-14

## 2018-10-14 LAB
ANION GAP SERPL CALC-SCNC: 12 MMOL/L (ref 7–16)
BUN SERPL-MCNC: 22 MG/DL (ref 8–23)
CALCIUM SERPL-MCNC: 7.6 MG/DL (ref 8.3–10.4)
CHLORIDE SERPL-SCNC: 115 MMOL/L (ref 98–107)
CO2 SERPL-SCNC: 17 MMOL/L (ref 21–32)
CREAT SERPL-MCNC: 1.95 MG/DL (ref 0.6–1)
GLUCOSE SERPL-MCNC: 98 MG/DL (ref 65–100)
MAGNESIUM SERPL-MCNC: 1.2 MG/DL (ref 1.8–2.4)
MM INDURATION POC: 0 MM (ref 0–5)
POTASSIUM SERPL-SCNC: 3.4 MMOL/L (ref 3.5–5.1)
PPD POC: NEGATIVE NEGATIVE
SODIUM SERPL-SCNC: 144 MMOL/L (ref 136–145)

## 2018-10-14 PROCEDURE — 74011250637 HC RX REV CODE- 250/637: Performed by: PHYSICIAN ASSISTANT

## 2018-10-14 PROCEDURE — 94640 AIRWAY INHALATION TREATMENT: CPT

## 2018-10-14 PROCEDURE — 3331090001 HH PPS REVENUE CREDIT

## 2018-10-14 PROCEDURE — 36415 COLL VENOUS BLD VENIPUNCTURE: CPT

## 2018-10-14 PROCEDURE — 65270000029 HC RM PRIVATE

## 2018-10-14 PROCEDURE — 3331090002 HH PPS REVENUE DEBIT

## 2018-10-14 PROCEDURE — 74011250637 HC RX REV CODE- 250/637: Performed by: INTERNAL MEDICINE

## 2018-10-14 PROCEDURE — 94760 N-INVAS EAR/PLS OXIMETRY 1: CPT

## 2018-10-14 PROCEDURE — 74011000258 HC RX REV CODE- 258: Performed by: INTERNAL MEDICINE

## 2018-10-14 PROCEDURE — 83735 ASSAY OF MAGNESIUM: CPT

## 2018-10-14 PROCEDURE — 74011250636 HC RX REV CODE- 250/636: Performed by: INTERNAL MEDICINE

## 2018-10-14 PROCEDURE — 77030020253 HC SOL INJ D545NS .05 DEX .45 SAL

## 2018-10-14 PROCEDURE — 74011000250 HC RX REV CODE- 250: Performed by: INTERNAL MEDICINE

## 2018-10-14 PROCEDURE — 80048 BASIC METABOLIC PNL TOTAL CA: CPT

## 2018-10-14 RX ORDER — LANOLIN ALCOHOL/MO/W.PET/CERES
400 CREAM (GRAM) TOPICAL
Status: DISCONTINUED | OUTPATIENT
Start: 2018-10-14 | End: 2018-10-15 | Stop reason: HOSPADM

## 2018-10-14 RX ADMIN — Medication 5 ML: at 21:15

## 2018-10-14 RX ADMIN — LEVOTHYROXINE SODIUM 25 MCG: 50 TABLET ORAL at 05:28

## 2018-10-14 RX ADMIN — HEPARIN SODIUM 5000 UNITS: 5000 INJECTION INTRAVENOUS; SUBCUTANEOUS at 13:50

## 2018-10-14 RX ADMIN — BUDESONIDE 500 MCG: 0.5 INHALANT RESPIRATORY (INHALATION) at 19:56

## 2018-10-14 RX ADMIN — TRAZODONE HYDROCHLORIDE 50 MG: 50 TABLET ORAL at 21:14

## 2018-10-14 RX ADMIN — SUCRALFATE 1 G: 1 SUSPENSION ORAL at 17:13

## 2018-10-14 RX ADMIN — Medication 5 ML: at 05:26

## 2018-10-14 RX ADMIN — POTASSIUM CHLORIDE 20 MEQ: 20 TABLET, EXTENDED RELEASE ORAL at 17:13

## 2018-10-14 RX ADMIN — LANSOPRAZOLE 30 MG: 30 TABLET, ORALLY DISINTEGRATING, DELAYED RELEASE ORAL at 05:29

## 2018-10-14 RX ADMIN — SUCRALFATE 1 G: 1 SUSPENSION ORAL at 21:14

## 2018-10-14 RX ADMIN — Medication 400 MG: at 11:36

## 2018-10-14 RX ADMIN — Medication 400 MG: at 17:13

## 2018-10-14 RX ADMIN — LANSOPRAZOLE 30 MG: 30 TABLET, ORALLY DISINTEGRATING, DELAYED RELEASE ORAL at 17:13

## 2018-10-14 RX ADMIN — BUDESONIDE 500 MCG: 0.5 INHALANT RESPIRATORY (INHALATION) at 07:36

## 2018-10-14 RX ADMIN — HEPARIN SODIUM 5000 UNITS: 5000 INJECTION INTRAVENOUS; SUBCUTANEOUS at 21:14

## 2018-10-14 RX ADMIN — Medication 5 ML: at 13:50

## 2018-10-14 RX ADMIN — SUCRALFATE 1 G: 1 SUSPENSION ORAL at 05:29

## 2018-10-14 RX ADMIN — ATORVASTATIN CALCIUM 40 MG: 40 TABLET, FILM COATED ORAL at 08:23

## 2018-10-14 RX ADMIN — SUCRALFATE 1 G: 1 SUSPENSION ORAL at 11:36

## 2018-10-14 RX ADMIN — POTASSIUM CHLORIDE 20 MEQ: 20 TABLET, EXTENDED RELEASE ORAL at 08:23

## 2018-10-14 RX ADMIN — GABAPENTIN 100 MG: 100 CAPSULE ORAL at 08:23

## 2018-10-14 RX ADMIN — Medication 400 MG: at 08:23

## 2018-10-14 RX ADMIN — GABAPENTIN 100 MG: 100 CAPSULE ORAL at 17:13

## 2018-10-14 RX ADMIN — DEXTROSE MONOHYDRATE AND SODIUM CHLORIDE 125 ML/HR: 5; .45 INJECTION, SOLUTION INTRAVENOUS at 02:55

## 2018-10-14 RX ADMIN — HEPARIN SODIUM 5000 UNITS: 5000 INJECTION INTRAVENOUS; SUBCUTANEOUS at 05:26

## 2018-10-14 NOTE — PROGRESS NOTES
SBAR shift report received from [facility]. Pt stable. Assessment complete. Pt is lying in bed. Resp even, unlabored. Pt is alert, orient X 4. Pt appears in no acute distress at this time. Pt is on RA. Pt denies pain, SOB. Pt has dressing to L knee, C/D/I. Pt encouraged to call for assistance, call light in reach. Safety measures in place. Will continue to monitor

## 2018-10-14 NOTE — PROGRESS NOTES
Subjective:  
Daily Progress Note: 10/14/2018 10:09 AM 
 
No complaints ROS Comfortable No CP No SOB No Abd Pain MS - 
 
Current Facility-Administered Medications Medication Dose Route Frequency  magnesium oxide (MAG-OX) tablet 400 mg  400 mg Oral TID WITH MEALS  lansoprazole (PREVACID SOLUTAB) disintegrating tablet 30 mg  30 mg Oral ACB&D  
 sucralfate (CARAFATE) 100 mg/mL oral suspension 1 g  1 g Oral AC&HS  
 dextrose 5 % - 0.45% NaCl infusion  125 mL/hr IntraVENous CONTINUOUS  
 potassium chloride (K-DUR, KLOR-CON) SR tablet 20 mEq  20 mEq Oral BID  sodium chloride (NS) flush 5-10 mL  5-10 mL IntraVENous Q8H  
 sodium chloride (NS) flush 5-10 mL  5-10 mL IntraVENous PRN  
 acetaminophen (TYLENOL) tablet 650 mg  650 mg Oral Q4H PRN  
 HYDROcodone-acetaminophen (NORCO) 5-325 mg per tablet 1 Tab  1 Tab Oral Q4H PRN  
 naloxone (NARCAN) injection 0.4 mg  0.4 mg IntraVENous PRN  
 ondansetron (ZOFRAN) injection 4 mg  4 mg IntraVENous Q4H PRN  
 bisacodyl (DULCOLAX) tablet 5 mg  5 mg Oral DAILY PRN  
 heparin (porcine) injection 5,000 Units  5,000 Units SubCUTAneous Q8H  
 albuterol (PROVENTIL VENTOLIN) nebulizer solution 2.5 mg  2.5 mg Inhalation Q6H PRN  
 atorvastatin (LIPITOR) tablet 40 mg  40 mg Oral DAILY  budesonide (PULMICORT) 500 mcg/2 ml nebulizer suspension  500 mcg Inhalation BID RT  
 gabapentin (NEURONTIN) capsule 100 mg  100 mg Oral BID  levothyroxine (SYNTHROID) tablet 25 mcg  25 mcg Oral 7am  
 traZODone (DESYREL) tablet 50 mg  50 mg Oral QHS Objective:  
 
Visit Vitals  /63  Pulse 69  Temp 98.2 °F (36.8 °C)  Resp 16  
 Ht 5' 4\" (1.626 m)  Wt 94.7 kg (208 lb 11.2 oz)  SpO2 96%  BMI 35.82 kg/m2 O2 Device: Room air Temp (24hrs), Av.2 °F (36.8 °C), Min:98 °F (36.7 °C), Max:98.9 °F (37.2 °C) 
 
 
10/14 0701 - 10/14 1900 In: 340 [P.O.:340] Out: -  
10/12 1901 - 10/14 0700 In: 1290 [P.O.:590; I.V.:2788] Out: 350 [Urine:350] Visit Vitals  /63  Pulse 69  Temp 98.2 °F (36.8 °C)  Resp 16  
 Ht 5' 4\" (1.626 m)  Wt 94.7 kg (208 lb 11.2 oz)  SpO2 96%  BMI 35.82 kg/m2 Head: Normocephalic, without obvious abnormality Neck: no JVD Lungs: clear to auscultation bilaterally Heart: regular rate and rhythm Abdomen: soft, non-tender. Extremities:no edema Data Review Recent Results (from the past 48 hour(s)) METABOLIC PANEL, BASIC Collection Time: 10/13/18  5:46 AM  
Result Value Ref Range Sodium 149 (H) 136 - 145 mmol/L Potassium 3.0 (L) 3.5 - 5.1 mmol/L Chloride 121 (H) 98 - 107 mmol/L  
 CO2 18 (L) 21 - 32 mmol/L Anion gap 10 7 - 16 mmol/L Glucose 106 (H) 65 - 100 mg/dL BUN 32 (H) 8 - 23 MG/DL Creatinine 2.13 (H) 0.6 - 1.0 MG/DL  
 GFR est AA 29 (L) >60 ml/min/1.73m2 GFR est non-AA 24 (L) >60 ml/min/1.73m2 Calcium 7.6 (L) 8.3 - 10.4 MG/DL  
PLEASE READ & DOCUMENT PPD TEST IN 48 HRS Collection Time: 10/14/18  5:41 AM  
Result Value Ref Range PPD Negative Negative  
 mm Induration 0 mm MAGNESIUM Collection Time: 10/14/18  6:22 AM  
Result Value Ref Range Magnesium 1.2 (LL) 1.8 - 2.4 mg/dL METABOLIC PANEL, BASIC Collection Time: 10/14/18  6:22 AM  
Result Value Ref Range Sodium 144 136 - 145 mmol/L Potassium 3.4 (L) 3.5 - 5.1 mmol/L Chloride 115 (H) 98 - 107 mmol/L  
 CO2 17 (L) 21 - 32 mmol/L Anion gap 12 7 - 16 mmol/L Glucose 98 65 - 100 mg/dL BUN 22 8 - 23 MG/DL Creatinine 1.95 (H) 0.6 - 1.0 MG/DL  
 GFR est AA 32 (L) >60 ml/min/1.73m2 GFR est non-AA 27 (L) >60 ml/min/1.73m2 Calcium 7.6 (L) 8.3 - 10.4 MG/DL Assessment Patient Active Problem List  
 Diagnosis Date Noted  Acute gastric ulcer without hemorrhage or perforation 10/14/2018  Acute pancreatitis 10/12/2018  Acute renal failure superimposed on stage 3 chronic kidney disease ([facility] [address] 10/11/2018  Hypokalemia 10/11/2018  Dehydration 10/11/2018  Orthostatic hypotension 10/11/2018  Cellulitis and abscess of left lower extremity 09/11/2018  Severe obesity (BMI 35.0-39. 9) with comorbidity ([facility] [address] 03/28/2018  Primary insomnia 07/17/2017  Right upper quadrant abdominal pain 12/14/2016  Raynaud's phenomenon 02/25/2015  Post Menopausal Osteopenia 02/25/2015  Chronic kidney disease, stage III (moderate) ([facility] [address] 11/12/2014  Pedal edema 01/28/2014  Chronic pain syndrome 11/11/2013  Colon polyps 04/30/2013  Asthma 10/29/2012  
 HTN (hypertension) 08/16/2012  Hyperlipidemia 08/16/2012  Hypothyroidism 08/16/2012  Female stress incontinence 08/16/2012  Umbilical hernia 64/57/1487  S/P total knee arthroplasty 08/16/2012  Kidney stones 08/16/2012  Ophthalmic migraine 08/16/2012  GERD (gastroesophageal reflux disease) 08/16/2012 Problems Addressed by Nephrology TIARRA - prerenal 
Low K Low Mg Hypernatremia Plan Sodium better. Can stop IV fluids - PO only. Renal function continues to improve.

## 2018-10-14 NOTE — PROGRESS NOTES
Critical magnesium level of 1.2 called from lab. Face to face conversation with Dr. Marla Almanza. Pt to begin receiving oral Mag-Ox.

## 2018-10-14 NOTE — PROGRESS NOTES
Pt is resting quietly in bed. Resp even, unlabored. No complaints or issues throughout the shift. SBAR end of shift report given to oncoming RN.

## 2018-10-14 NOTE — PROGRESS NOTES
GI DAILY PROGRESS NOTE Admit Date:  10/11/2018 Today's Date:  10/14/2018 CC:  Nausea and vomiting Subjective:  
 
Patient feels better. Denies abd pain, melena, brbpr. No nausea since yesterday pm. 
 
Medications:  
Current Facility-Administered Medications Medication Dose Route Frequency  lansoprazole (PREVACID SOLUTAB) disintegrating tablet 30 mg  30 mg Oral ACB&D  
 sucralfate (CARAFATE) 100 mg/mL oral suspension 1 g  1 g Oral AC&HS  
 dextrose 5 % - 0.45% NaCl infusion  125 mL/hr IntraVENous CONTINUOUS  
 potassium chloride (K-DUR, KLOR-CON) SR tablet 20 mEq  20 mEq Oral BID  sodium chloride (NS) flush 5-10 mL  5-10 mL IntraVENous Q8H  
 sodium chloride (NS) flush 5-10 mL  5-10 mL IntraVENous PRN  
 acetaminophen (TYLENOL) tablet 650 mg  650 mg Oral Q4H PRN  
 HYDROcodone-acetaminophen (NORCO) 5-325 mg per tablet 1 Tab  1 Tab Oral Q4H PRN  
 naloxone (NARCAN) injection 0.4 mg  0.4 mg IntraVENous PRN  
 ondansetron (ZOFRAN) injection 4 mg  4 mg IntraVENous Q4H PRN  
 bisacodyl (DULCOLAX) tablet 5 mg  5 mg Oral DAILY PRN  
 heparin (porcine) injection 5,000 Units  5,000 Units SubCUTAneous Q8H  
 albuterol (PROVENTIL VENTOLIN) nebulizer solution 2.5 mg  2.5 mg Inhalation Q6H PRN  
 atorvastatin (LIPITOR) tablet 40 mg  40 mg Oral DAILY  budesonide (PULMICORT) 500 mcg/2 ml nebulizer suspension  500 mcg Inhalation BID RT  
 gabapentin (NEURONTIN) capsule 100 mg  100 mg Oral BID  levothyroxine (SYNTHROID) tablet 25 mcg  25 mcg Oral 7am  
 traZODone (DESYREL) tablet 50 mg  50 mg Oral QHS Review of Systems: ROS was obtained, with pertinent positives as listed above. No chest pain or SOB. Diet:   
 
Objective:  
Vitals: 
Visit Vitals  /63  Pulse 69  Temp 98.2 °F (36.8 °C)  Resp 16  
 Ht 5' 4\" (1.626 m)  Wt 94.7 kg (208 lb 11.2 oz)  SpO2 96%  BMI 35.82 kg/m2 Intake/Output: 
  
10/12 1901 - 10/14 0700 In: 2974 [P.O.:590; I.V.:7447] Out: 350 [Urine:350] Exam: 
General appearance: alert, cooperative, no distress Lungs: clear to auscultation bilaterally anteriorly Heart: regular rate and rhythm Abdomen: soft, non-tender. Bowel sounds normal. No masses, no organomegaly Extremities: BLE edema Neuro:  alert and oriented Data Review (Labs):   
Recent Labs 10/14/18 
 0622  10/13/18 
 0546  10/12/18 
 0550  10/11/18 
 1946  10/11/18 
 1130 WBC   --    --   5.6  7.7   --   
HGB   --    --   8.9*  10.4*   --   
HCT   --    --   27.8*  32.5*   --   
PLT   --    --   136*  162   -- MCV   --    --   92.1  91.8   --   
NA  144  149*  152*  147*  146*  
K  3.4*  3.0*  3.0*  2.8*  3.0*  
CL  115*  121*  122*  113*  112* CO2  17*  18*  15*  17*  15*  
BUN  22  32*  45*  52*  53* CREA  1.95*  2.13*  2.42*  2.94*  2.80* CA  7.6*  7.6*  7.6*  8.2*  7.9*  
MG  1.2*   --   2.5*  0.8*   --   
GLU  98  106*  115*  73  53* AP   --    --    --   77   --   
SGOT   --    --    --   16   --   
CBIL   --    --    --   0.1   --   
ALB   --    --    --   2.9*   --   
TP   --    --    --   7.6   --   
LPSE   --    --    --   442*   --   
 
 
Assessment:  
 
Principal Problem: 
  Acute renal failure superimposed on stage 3 chronic kidney disease ([facility] [address] (10/11/2018) Active Problems: 
  HTN (hypertension) (8/16/2012) Hyperlipidemia (8/16/2012) Overview: Calcium score of 140, Goal LDL under 130 Hypothyroidism (8/16/2012) S/P total knee arthroplasty (8/16/2012) Overview: Bilateral--2009 GERD (gastroesophageal reflux disease) (8/16/2012) Overview: Previous dilatation stricture, EGD Jan 2013 showed some mild esophagitis Chronic pain syndrome (11/11/2013) Overview: Chronic feet, hands, hips osteoarthritis, S/P bilateral TKA, gets pain  
    with walking 100 ft 
 
  Primary insomnia (7/17/2017) Severe obesity (BMI 35.0-39. 9) with comorbidity ([address] (3/28/2018) Hypokalemia (10/11/2018) Dehydration (10/11/2018) Orthostatic hypotension (10/11/2018) Acute pancreatitis (10/12/2018) EGD 10/12 with ulcers and erosions with oozing Inability to take synthroid may also have contributed to the nausea Plan:  
 
Continue BID PPI and carafate Symptomatic nausea treatment OK to advance diet Will follow up pathology from EGD Will sign off and arrange follow-up with us at [facility]. Thank you for allowing us to participate in the care of this patient. If we can be of any further assistance, please do not hesitate to contact us.  
 
Cindy Osorio MD 
[facility] [place], [place]

## 2018-10-14 NOTE — PROGRESS NOTES
Report received from [facility] [address] [facility]. Patient sitting up on the side of the bed on her phone. Morning labs discussed with Dr. Jef Durham. Breath sounds are clear, no change to skin. Wound still present on L knee, dressing in place; clean, dry and intact. Patient alert and oriented, no needs expressed at this time. Pt states she is feeling much better today. Bed in low, locked position with alarm on. Will continue to monitor. VSS. Afebrile.

## 2018-10-14 NOTE — PROGRESS NOTES
Hospitalist Progress Note 10/14/2018 Admit Date: 10/11/2018  6:45 PM  
NAME: Mercedez Benitez :  1940 MRN:  [id number] Attending: Joaquina Schneider MD 
PCP:  Misty Willard MD 
 
SUBJECTIVE:  
Sruthi Young is a 69 yo F admitted 10/11 with TIARRA after 2 weeks of nausea and vomiting (at least twice a day for the last 1.5-2 weeks) while receiving 3 week course of cefepime for recent L leg/knee cellulitis/bursitis. She also had syncopal event at home, felt to be due to dehydration/orthostatic hypotension. She has symptomatically improved and had EGD on 10/12 showing antral gastric erosions/ulcers with oozing (likely cause of nausea/vomiting). Cr from 2.9 to 1.9. Electrolytes improving. CT abdomen without significant abnormality and had no hydronephrosis. 10/14- reports she is feeling better overall. Appetite still somewhat down, but she denies any further nausea or vomiting and is tolerating low fat diet. Her strength is slowly improving. Hypomagnesemia today. Review of Systems negative with exception of pertinent positives noted above PHYSICAL EXAM  
 
Visit Vitals  /63  Pulse 69  Temp 98.2 °F (36.8 °C)  Resp 16  
 Ht 5' 4\" (1.626 m)  Wt 94.7 kg (208 lb 11.2 oz)  SpO2 96%  BMI 35.82 kg/m2 Temp (24hrs), Av.2 °F (36.8 °C), Min:98 °F (36.7 °C), Max:98.9 °F (37.2 °C) Patient Vitals for the past 24 hrs: 
 Temp Pulse Resp BP SpO2  
10/14/18 0737 - - - - 96 % 10/14/18 0723 98.2 °F (36.8 °C) 69 16 107/63 94 % 10/14/18 0304 98 °F (36.7 °C) 73 16 112/68 93 % 10/13/18 2325 98.9 °F (37.2 °C) 77 18 145/79 95 % 10/13/18 1926 - - - - 95 % 10/13/18 1908 98.1 °F (36.7 °C) 73 18 122/71 95 % 10/13/18 1500 98 °F (36.7 °C) 68 18 151/82 96 % 10/13/18 1110 98.2 °F (36.8 °C) 76 18 150/75 97 % Oxygen Therapy O2 Sat (%): 96 % (10/14/18 9110) Pulse via Oximetry: 75 beats per minute (10/14/18 5632) O2 Device: Room air (10/14/18 4086) Intake/Output Summary (Last 24 hours) at 10/14/18 0437 Last data filed at 10/14/18 1972 Gross per 24 hour Intake             2661 ml Output              150 ml Net             2511 ml General: No acute distress, appears to be feeling some better   
Lungs:  CTA Bilaterally. Heart:  Regular rate and rhythm,  No murmur, rub, or gallop Abdomen: Soft, Non distended, Non tender, Positive bowel sounds, no mass appreciated on exam 
Extremities: No cyanosis, clubbing or edema, L knee with healing abrasion and no signs of cellulitis Neurologic:  No focal deficits Recent Results (from the past 24 hour(s)) PLEASE READ & DOCUMENT PPD TEST IN 48 HRS Collection Time: 10/14/18  5:41 AM  
Result Value Ref Range PPD Negative Negative  
 mm Induration 0 mm MAGNESIUM Collection Time: 10/14/18  6:22 AM  
Result Value Ref Range Magnesium 1.2 (LL) 1.8 - 2.4 mg/dL METABOLIC PANEL, BASIC Collection Time: 10/14/18  6:22 AM  
Result Value Ref Range Sodium 144 136 - 145 mmol/L Potassium 3.4 (L) 3.5 - 5.1 mmol/L Chloride 115 (H) 98 - 107 mmol/L  
 CO2 17 (L) 21 - 32 mmol/L Anion gap 12 7 - 16 mmol/L Glucose 98 65 - 100 mg/dL BUN 22 8 - 23 MG/DL Creatinine 1.95 (H) 0.6 - 1.0 MG/DL  
 GFR est AA 32 (L) >60 ml/min/1.73m2 GFR est non-AA 27 (L) >60 ml/min/1.73m2 Calcium 7.6 (L) 8.3 - 10.4 MG/DL Imaging:   
CT ABD PELV WO CONT Final Result IMPRESSION:  Minor thickening and stranding densities in the lateral conal  
fascia on the left, likely inflammatory although nonspecific. Additional  
findings include dense mitral annular calcifications, anemia. Small hiatal  
hernia, aortic atherosclerosis and prior hysterectomy. ASSESSMENT Hospital Problems as of 10/14/2018  Date Reviewed: 8/1/2018 Codes Class Noted - Resolved POA Acute pancreatitis ICD-10-CM: K85.90 ICD-9-CM: 104.3  10/12/2018 - Present Yes * (Principal)Acute renal failure superimposed on stage 3 chronic kidney disease ([facility] ICD-10-CM: N17.9, N18.3 ICD-9-CM: 584.9, 585.3  10/11/2018 - Present Yes Hypokalemia ICD-10-CM: E87.6 ICD-9-CM: 276.8  10/11/2018 - Present Yes Dehydration ICD-10-CM: E86.0 ICD-9-CM: 276.51  10/11/2018 - Present Yes Orthostatic hypotension ICD-10-CM: I95.1 ICD-9-CM: 458.0  10/11/2018 - Present Yes Severe obesity (BMI 35.0-39. 9) with comorbidity ([facility] ICD-10-CM: E66.01 
ICD-9-CM: 278.01  3/28/2018 - Present Yes Primary insomnia ICD-10-CM: F51.01 
ICD-9-CM: 307.42  7/17/2017 - Present Yes Chronic pain syndrome ICD-10-CM: G89.4 ICD-9-CM: 338.4  11/11/2013 - Present Yes Overview Addendum 1/14/2016  3:45 PM by Marvin Birmingham MD  
  Chronic feet, hands, hips osteoarthritis, S/P bilateral TKA, gets pain with walking 100 ft 
  
  
   
 HTN (hypertension) ICD-10-CM: I10 
ICD-9-CM: 401.9  8/16/2012 - Present Yes Hyperlipidemia ICD-10-CM: E78.5 ICD-9-CM: 272.4  8/16/2012 - Present Yes Overview Addendum 10/28/2012  6:29 PM by Marvin Birmingham MD  
  Calcium score of 140, Goal LDL under 130 Hypothyroidism ICD-10-CM: E03.9 ICD-9-CM: 244.9  8/16/2012 - Present Yes S/P total knee arthroplasty ICD-10-CM: J32.630 ICD-9-CM: V43.65  8/16/2012 - Present Yes Overview Addendum 8/16/2012  8:38 AM by Ashleigh King--2009 GERD (gastroesophageal reflux disease) ICD-10-CM: K21.9 ICD-9-CM: 530.81  8/16/2012 - Present Yes Overview Addendum 7/29/2014  2:30 PM by Marvin Birmingham MD  
  Previous dilatation stricture, EGD Jan 2013 showed some mild esophagitis Plan: TIARRA on stage 3 CKD · Improving slowly. · IVF per nephro. · CT negative for hydronephrosis. · Strict I/O. · Losartan/hctz on hold. Hypokalemia · Replete. · Per nephro, likely intracellularly depleted due to vomiting/dehydration. Hypomagnesemia · Start PO magox. Nausea/vomiting · GI following- EGD yesterday with antral erosions/ulcers. · BID PPI and carafate. · Nausea improved- advance diet. · PRN zofran. Gastric ulcer/erosion · BID PPI and carafate. Likely cause of nausea/vomiting. HTN 
· BP meds on hold. BP has been controlled here- may not need to restart on discharge. Hypothyroidism · Synthroid Dispo- home next 24-48 hours if continued improvement in electrolytes and renal function. DVT Prophylaxis: Heparin Signed By: Erika Cerna MD   
 October 14, 2018

## 2018-10-14 NOTE — PROGRESS NOTES
Problem: Pressure Injury - Risk of 
Goal: *Prevention of pressure injury Document Jeffery Scale and appropriate interventions in the flowsheet. Outcome: Progressing Towards Goal 
Pressure Injury Interventions: 
  
 
Moisture Interventions: Minimize layers Activity Interventions: Increase time out of bed Mobility Interventions: HOB 30 degrees or less Nutrition Interventions: Document food/fluid/supplement intake Friction and Shear Interventions: Minimize layers

## 2018-10-15 ENCOUNTER — HOME CARE VISIT (OUTPATIENT)
Dept: HOME HEALTH SERVICES | Facility: HOME HEALTH | Age: 78
End: 2018-10-15
Payer: MEDICARE

## 2018-10-15 VITALS
RESPIRATION RATE: 18 BRPM | WEIGHT: 208.7 LBS | DIASTOLIC BLOOD PRESSURE: 73 MMHG | OXYGEN SATURATION: 95 % | HEIGHT: 64 IN | SYSTOLIC BLOOD PRESSURE: 116 MMHG | HEART RATE: 75 BPM | BODY MASS INDEX: 35.63 KG/M2 | TEMPERATURE: 98.1 F

## 2018-10-15 LAB
ANION GAP SERPL CALC-SCNC: 10 MMOL/L (ref 7–16)
BUN SERPL-MCNC: 18 MG/DL (ref 8–23)
CALCIUM SERPL-MCNC: 7.7 MG/DL (ref 8.3–10.4)
CHLORIDE SERPL-SCNC: 116 MMOL/L (ref 98–107)
CO2 SERPL-SCNC: 19 MMOL/L (ref 21–32)
CREAT SERPL-MCNC: 1.73 MG/DL (ref 0.6–1)
GLUCOSE SERPL-MCNC: 85 MG/DL (ref 65–100)
MAGNESIUM SERPL-MCNC: 1.5 MG/DL (ref 1.8–2.4)
POTASSIUM SERPL-SCNC: 3.7 MMOL/L (ref 3.5–5.1)
SODIUM SERPL-SCNC: 145 MMOL/L (ref 136–145)

## 2018-10-15 PROCEDURE — 74011250637 HC RX REV CODE- 250/637: Performed by: INTERNAL MEDICINE

## 2018-10-15 PROCEDURE — 3331090002 HH PPS REVENUE DEBIT

## 2018-10-15 PROCEDURE — 3331090001 HH PPS REVENUE CREDIT

## 2018-10-15 PROCEDURE — 74011250637 HC RX REV CODE- 250/637: Performed by: PHYSICIAN ASSISTANT

## 2018-10-15 PROCEDURE — 74011250636 HC RX REV CODE- 250/636: Performed by: INTERNAL MEDICINE

## 2018-10-15 PROCEDURE — 83735 ASSAY OF MAGNESIUM: CPT

## 2018-10-15 PROCEDURE — 94760 N-INVAS EAR/PLS OXIMETRY 1: CPT

## 2018-10-15 PROCEDURE — 36415 COLL VENOUS BLD VENIPUNCTURE: CPT

## 2018-10-15 PROCEDURE — 80048 BASIC METABOLIC PNL TOTAL CA: CPT

## 2018-10-15 PROCEDURE — 94640 AIRWAY INHALATION TREATMENT: CPT

## 2018-10-15 PROCEDURE — 74011000250 HC RX REV CODE- 250: Performed by: INTERNAL MEDICINE

## 2018-10-15 RX ORDER — SUCRALFATE 1 G/1
1 TABLET ORAL 4 TIMES DAILY
Qty: 120 TAB | Refills: 0 | Status: SHIPPED | OUTPATIENT
Start: 2018-10-15 | End: 2018-11-14

## 2018-10-15 RX ORDER — LANOLIN ALCOHOL/MO/W.PET/CERES
400 CREAM (GRAM) TOPICAL 2 TIMES DAILY
Qty: 60 TAB | Refills: 0 | Status: SHIPPED | OUTPATIENT
Start: 2018-10-15 | End: 2019-01-22

## 2018-10-15 RX ORDER — PANTOPRAZOLE SODIUM 40 MG/1
40 TABLET, DELAYED RELEASE ORAL 2 TIMES DAILY
Qty: 60 TAB | Refills: 0 | Status: SHIPPED | OUTPATIENT
Start: 2018-10-15 | End: 2018-12-19

## 2018-10-15 RX ADMIN — ATORVASTATIN CALCIUM 40 MG: 40 TABLET, FILM COATED ORAL at 08:17

## 2018-10-15 RX ADMIN — POTASSIUM CHLORIDE 20 MEQ: 20 TABLET, EXTENDED RELEASE ORAL at 08:17

## 2018-10-15 RX ADMIN — BUDESONIDE 500 MCG: 0.5 INHALANT RESPIRATORY (INHALATION) at 07:28

## 2018-10-15 RX ADMIN — GABAPENTIN 100 MG: 100 CAPSULE ORAL at 08:17

## 2018-10-15 RX ADMIN — SUCRALFATE 1 G: 1 SUSPENSION ORAL at 11:32

## 2018-10-15 RX ADMIN — Medication 10 ML: at 05:26

## 2018-10-15 RX ADMIN — Medication 400 MG: at 11:32

## 2018-10-15 RX ADMIN — LEVOTHYROXINE SODIUM 25 MCG: 50 TABLET ORAL at 05:28

## 2018-10-15 RX ADMIN — SUCRALFATE 1 G: 1 SUSPENSION ORAL at 05:28

## 2018-10-15 RX ADMIN — HEPARIN SODIUM 5000 UNITS: 5000 INJECTION INTRAVENOUS; SUBCUTANEOUS at 05:26

## 2018-10-15 RX ADMIN — LANSOPRAZOLE 30 MG: 30 TABLET, ORALLY DISINTEGRATING, DELAYED RELEASE ORAL at 05:28

## 2018-10-15 RX ADMIN — Medication 400 MG: at 08:17

## 2018-10-15 NOTE — PROGRESS NOTES
Discharge instructions, follow up information, medication list, and prescription information provided and explained to the pt. Per Dr Geovany Perez instructions, patient informed that if insurance does not cover Protonix,  Patient can take Prilosec 40mg twice daily, patient verbalized understanding. IV removed from right hand, no remote telemetry on. Opportunity for questions provided. Instructed to call once ready to leave.

## 2018-10-15 NOTE — PROGRESS NOTES
Patient discharged home with son. All belongings were packed and sent home with patient. All due meds were given and patient was stable and in no apparent distress. Pain 0/10. IV removed and discharge instructions given by discharge nurse.

## 2018-10-15 NOTE — PROGRESS NOTES
Pt resting quietly in bed with no complaints or issues. Resp even, unlabored. Pt appears in no acute distress at this time. SBAR end of shift report given to oncoming RN.

## 2018-10-15 NOTE — PROGRESS NOTES
LATE NOTE: 
MCR patient status was changed from OBS to IP. Important Letter to Medicare was signed by patient and put in patient's chart; patient was provided with copy of Important Letter to Medicare. Patient's Care Managers notified.

## 2018-10-15 NOTE — PROGRESS NOTES
Problem: Breathing Pattern - Ineffective Goal: *Absence of hypoxia Outcome: Progressing Towards Goal 
On RA

## 2018-10-15 NOTE — PROGRESS NOTES
Pt sitting up on side of bed on the phone and eating breakfast. Significant old bruising to left hand, wound on left knee that is dressed. Dressing clean, dry, and intact. Bilateral breath sounds are clear. Pain 0/10. Pt reports \"feeling much better\". Pt in no apparent distress at this time. Bed in low, locked position with call light in reach. Will continue to monitor.

## 2018-10-15 NOTE — PROGRESS NOTES
Subjective:  
Daily Progress Note: 10/15/2018 10:09 AM 
 
No complaints. No sob, cp, n/v. Renal function improving. ROS Comfortable No CP No SOB No Abd Pain Current Facility-Administered Medications Medication Dose Route Frequency  magnesium oxide (MAG-OX) tablet 400 mg  400 mg Oral TID WITH MEALS  lansoprazole (PREVACID SOLUTAB) disintegrating tablet 30 mg  30 mg Oral ACB&D  
 sucralfate (CARAFATE) 100 mg/mL oral suspension 1 g  1 g Oral AC&HS  potassium chloride (K-DUR, KLOR-CON) SR tablet 20 mEq  20 mEq Oral BID  sodium chloride (NS) flush 5-10 mL  5-10 mL IntraVENous Q8H  
 sodium chloride (NS) flush 5-10 mL  5-10 mL IntraVENous PRN  
 acetaminophen (TYLENOL) tablet 650 mg  650 mg Oral Q4H PRN  
 HYDROcodone-acetaminophen (NORCO) 5-325 mg per tablet 1 Tab  1 Tab Oral Q4H PRN  
 naloxone (NARCAN) injection 0.4 mg  0.4 mg IntraVENous PRN  
 ondansetron (ZOFRAN) injection 4 mg  4 mg IntraVENous Q4H PRN  
 bisacodyl (DULCOLAX) tablet 5 mg  5 mg Oral DAILY PRN  
 heparin (porcine) injection 5,000 Units  5,000 Units SubCUTAneous Q8H  
 albuterol (PROVENTIL VENTOLIN) nebulizer solution 2.5 mg  2.5 mg Inhalation Q6H PRN  
 atorvastatin (LIPITOR) tablet 40 mg  40 mg Oral DAILY  budesonide (PULMICORT) 500 mcg/2 ml nebulizer suspension  500 mcg Inhalation BID RT  
 gabapentin (NEURONTIN) capsule 100 mg  100 mg Oral BID  levothyroxine (SYNTHROID) tablet 25 mcg  25 mcg Oral 7am  
 traZODone (DESYREL) tablet 50 mg  50 mg Oral QHS Objective:  
 
Visit Vitals  /69  Pulse 82  Temp 98.2 °F (36.8 °C)  Resp 18  Ht 5' 4\" (1.626 m)  Wt 94.7 kg (208 lb 11.2 oz)  SpO2 93%  BMI 35.82 kg/m2 O2 Device: Room air Temp (24hrs), Av.3 °F (36.8 °C), Min:97.8 °F (36.6 °C), Max:99.2 °F (37.3 °C) 
 
 
10/15 0701 - 10/15 1900 In: 240 [P.O.:240] Out: -  
10/13 1901 - 10/15 0700 In: 9747 [P.O.:340; I.V.:2201] Out: 150 [Urine:150] Visit Vitals  /69  Pulse 82  Temp 98.2 °F (36.8 °C)  Resp 18  Ht 5' 4\" (1.626 m)  Wt 94.7 kg (208 lb 11.2 oz)  SpO2 93%  BMI 35.82 kg/m2 Head: Normocephalic, without obvious abnormality Neck: no JVD Lungs: clear to auscultation bilaterally Heart: regular rate and rhythm Abdomen: soft, non-tender. Extremities:no edema Data Review Recent Results (from the past 48 hour(s)) PLEASE READ & DOCUMENT PPD TEST IN 48 HRS Collection Time: 10/14/18  5:41 AM  
Result Value Ref Range PPD Negative Negative  
 mm Induration 0 mm MAGNESIUM Collection Time: 10/14/18  6:22 AM  
Result Value Ref Range Magnesium 1.2 (LL) 1.8 - 2.4 mg/dL METABOLIC PANEL, BASIC Collection Time: 10/14/18  6:22 AM  
Result Value Ref Range Sodium 144 136 - 145 mmol/L Potassium 3.4 (L) 3.5 - 5.1 mmol/L Chloride 115 (H) 98 - 107 mmol/L  
 CO2 17 (L) 21 - 32 mmol/L Anion gap 12 7 - 16 mmol/L Glucose 98 65 - 100 mg/dL BUN 22 8 - 23 MG/DL Creatinine 1.95 (H) 0.6 - 1.0 MG/DL  
 GFR est AA 32 (L) >60 ml/min/1.73m2 GFR est non-AA 27 (L) >60 ml/min/1.73m2 Calcium 7.6 (L) 8.3 - 71.2 MG/DL  
METABOLIC PANEL, BASIC Collection Time: 10/15/18  6:11 AM  
Result Value Ref Range Sodium 145 136 - 145 mmol/L Potassium 3.7 3.5 - 5.1 mmol/L Chloride 116 (H) 98 - 107 mmol/L  
 CO2 19 (L) 21 - 32 mmol/L Anion gap 10 7 - 16 mmol/L Glucose 85 65 - 100 mg/dL BUN 18 8 - 23 MG/DL Creatinine 1.73 (H) 0.6 - 1.0 MG/DL  
 GFR est AA 37 (L) >60 ml/min/1.73m2 GFR est non-AA 30 (L) >60 ml/min/1.73m2 Calcium 7.7 (L) 8.3 - 10.4 MG/DL MAGNESIUM Collection Time: 10/15/18  6:11 AM  
Result Value Ref Range Magnesium 1.5 (L) 1.8 - 2.4 mg/dL Assessment Patient Active Problem List  
 Diagnosis Date Noted  Acute gastric ulcer without hemorrhage or perforation 10/14/2018  Acute pancreatitis 10/12/2018  Acute renal failure superimposed on stage 3 chronic kidney disease ([facility] [address] 10/11/2018  Hypokalemia 10/11/2018  Dehydration 10/11/2018  Orthostatic hypotension 10/11/2018  Cellulitis and abscess of left lower extremity 09/11/2018  Severe obesity (BMI 35.0-39. 9) with comorbidity ([facility] [address] 03/28/2018  Primary insomnia 07/17/2017  Right upper quadrant abdominal pain 12/14/2016  Raynaud's phenomenon 02/25/2015  Post Menopausal Osteopenia 02/25/2015  Chronic kidney disease, stage III (moderate) ([facility] [address] 11/12/2014  Pedal edema 01/28/2014  Chronic pain syndrome 11/11/2013  Colon polyps 04/30/2013  Asthma 10/29/2012  
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 
- prerenal with n/v and poor po intake 
- baseline creatinine in low 1's 
- renal function continues to improve - peak creatinine 2.9--->1.73 today 
- continue to follow Hypernatremia 
- improved. ..off IV fluids now 
- encourage po intake Hypokalemia 
- better s/p replacement Hypomagnesemia 
- improved s/p replacement Dispo - home today

## 2018-10-15 NOTE — DISCHARGE SUMMARY
Hospitalist Discharge Summary     Patient ID:  Veronica Harris  [id number]  70 y.o.  1940  Admit date: 10/11/2018  6:45 PM  Discharge date and time: 10/15/2018  Attending: Nayan Padilla MD  PCP:  Riccardo Casper MD  Treatment Team: Attending Provider: Nayan Padilla MD; Consulting Provider: Jeremie Zamarripa MD; Utilization Review: Jluis Prescott RN; Care Manager: Jatinder Powers RN; Physician: Leena Barakat. Anne Juarez MD    Principal Diagnosis Acute renal failure superimposed on stage 3 chronic kidney disease [facility]   Hospital Problems as of 10/15/2018  Date Reviewed: 8/1/2018          Codes Class Noted - Resolved POA    Acute gastric ulcer without hemorrhage or perforation ICD-10-CM: K25.3  ICD-9-CM: 531.30  10/14/2018 - Present Yes        Acute pancreatitis ICD-10-CM: K85.90  ICD-9-CM: 947.0  10/12/2018 - Present Yes        * (Principal)Acute renal failure superimposed on stage 3 chronic kidney disease ([facility].) ICD-10-CM: N17.9, N18.3  ICD-9-CM: 584.9, 585.3  10/11/2018 - Present Yes        Hypokalemia ICD-10-CM: E87.6  ICD-9-CM: 276.8  10/11/2018 - Present Yes        Dehydration ICD-10-CM: E86.0  ICD-9-CM: 276.51  10/11/2018 - Present Yes        Orthostatic hypotension ICD-10-CM: I95.1  ICD-9-CM: 458.0  10/11/2018 - Present Yes        Severe obesity (BMI 35.0-39. 9) with comorbidity ([facility] ICD-10-CM: E66.01  ICD-9-CM: 278.01  3/28/2018 - Present Yes        Primary insomnia ICD-10-CM: F51.01  ICD-9-CM: 307.42  7/17/2017 - Present Yes        Chronic pain syndrome ICD-10-CM: G89.4  ICD-9-CM: 338.4  11/11/2013 - Present Yes    Overview Addendum 1/14/2016  3:45 PM by Zhane Parra MD     Chronic feet, hands, hips osteoarthritis, S/P bilateral TKA, gets pain with walking 100 ft             HTN (hypertension) ICD-10-CM: I10  ICD-9-CM: 401.9  8/16/2012 - Present Yes        Hyperlipidemia ICD-10-CM: E78.5  ICD-9-CM: 272.4  8/16/2012 - Present Yes    Overview Addendum 10/28/2012  6:29 PM by Zhane Parra MD     Calcium score of 140, Goal LDL under 130             Hypothyroidism ICD-10-CM: E03.9  ICD-9-CM: 244.9  8/16/2012 - Present Yes        S/P total knee arthroplasty ICD-10-CM: Z96.659  ICD-9-CM: V43.65  8/16/2012 - Present Yes    Overview Addendum 8/16/2012  8:38 AM by Sapphire Mora     Bilateral--2009             GERD (gastroesophageal reflux disease) ICD-10-CM: K21.9  ICD-9-CM: 530.81  8/16/2012 - Present Yes    Overview Addendum 7/29/2014  2:30 PM by Charissa Shabazz MD     Previous dilatation stricture, EGD Jan 2013 showed some mild esophagitis                      HPI:  '78 F with PMH of HTN, GERD, HLD, Hypothyroidism, CKD stage III, recent hospitalization for L knee cellulitis/burisitis due to fall complicated by ?hematoma to partial TKA s/p Cefepime for 3 weeks per ID/Plasctic & Ortho surgery. Today presented to ER with cc of progressively worsening generalized weakness and fall at home. Reports she woke up this am, felt nauseous and got out of bed quickly to vomit but she felt lightheaded and passed out. She hit her left led with a small skin tear. No seizure like activity noted. Daughter also provides history and says Pt was also having nausea, vomiting episodes twice daily since last week and was seen at ER for Mild Acute pancreatitis. Since then Her PO intake has been poor and her Cr has been slowly going up from 1.22 last month to 2.9 today. Labs also showed k 2.8, Na 147, CO2 was 17. Orthostatics were +ve in ER with drop in SBP from 150 to 120. She was given IVF in ER and hospitalist asked to admit.   Pt still feels weak but less dizzy. Reports last vomiting was earlier today, nb/nb. Also reports a tender Rt Lateral region abd mass. Her UOP has decreased as well. Denies Dysuria, fever, CP, SOB, Diarrhea, blood in stool.'    Hospital Course:  1. Nausea vomiting/gastric ulcer- she was treated with anti-emetic therapy and PPI.   She had EGD on 10/12 showing Schatzki's ring and gastric erosions/small ulcers with oozing in the antrum. PPI increased to BID and carafate started. Her n/v resolved and she tolerated a regular diet. She will be continued on BID protonix (replace her home dose prilosec) on discharge until outpatient follow up with GI, along with continuing carafate for a month. 2. TIARRA on stage 3 CKD- was treated with IVF. Renal function improved significantly from 2.9 to 1.7 on day of discharge. Nephrology consulted and stopped her IVF on 10/14 (renal function continued to improve). She had electrolyte disturbance of low potassium and low magnesium and these were repleted. 3. Hypokalemia/hypomagnesemia- this improved with replacement. She will resume her home dose potassium and will be placed on magox 400 mg BID for the next 30 days. 4. HTN- her bp med (losartan/hctz) held due to dehydration and low bp on admission. BP remained well-controlled. Will discontinue this on discharge due to TIARRA. She is encouraged to keep BP log and take to PCP. May need to be restarted on anti-hypertensive therapy, but will want to avoid nephro-toxic meds. Significant Diagnostic Studies:       Labs: Results:       Chemistry Recent Labs      10/15/18   0611  10/14/18   0622  10/13/18   0546   GLU  85  98  106*   NA  145  144  149*   K  3.7  3.4*  3.0*   CL  116*  115*  121*   CO2  19*  17*  18*   BUN  18  22  32*   CREA  1.73*  1.95*  2.13*   CA  7.7*  7.6*  7.6*   AGAP  10  12  10      CBC w/Diff No results for input(s): WBC, RBC, HGB, HCT, PLT, GRANS, LYMPH, EOS, HGBEXT, HCTEXT, PLTEXT in the last 72 hours. Cardiac Enzymes No results for input(s): CPK, CKND1, RONALD in the last 72 hours. No lab exists for component: CKRMB, TROIP   Coagulation No results for input(s): PTP, INR, APTT in the last 72 hours.     No lab exists for component: INREXT    Lipid Panel Lab Results   Component Value Date/Time    Cholesterol, total 155 07/24/2018 10:30 AM    HDL Cholesterol 49 07/24/2018 10:30 AM    LDL, calculated 81 07/24/2018 10:30 AM VLDL, calculated 25 07/24/2018 10:30 AM    Triglyceride 124 07/24/2018 10:30 AM    CHOL/HDL Ratio 2.9 12/07/2016 09:49 AM      BNP No results for input(s): BNPP in the last 72 hours. Liver Enzymes No results for input(s): TP, ALB, TBIL, AP, SGOT, GPT in the last 72 hours. No lab exists for component: DBIL   Thyroid Studies Lab Results   Component Value Date/Time    TSH 1.100 10/11/2018 07:46 PM          Imaging:   CT ABD PELV WO CONT   Final Result   IMPRESSION:  Minor thickening and stranding densities in the lateral conal   fascia on the left, likely inflammatory although nonspecific. Additional   findings include dense mitral annular calcifications, anemia. Small hiatal   hernia, aortic atherosclerosis and prior hysterectomy. Discharge Exam:  Visit Vitals    /69    Pulse 82    Temp 98.2 °F (36.8 °C)    Resp 18    Ht 5' 4\" (1.626 m)    Wt 94.7 kg (208 lb 11.2 oz)    SpO2 93%    BMI 35.82 kg/m2     General appearance: alert, cooperative, no distress, appears stated age, obese  Lungs: clear to auscultation bilaterally  Heart: regular rate and rhythm, S1, S2 normal, no murmur, click, rub or gallop  Abdomen: soft, non-tender. Bowel sounds normal. No masses,  no organomegaly  Extremities: no cyanosis or edema, healing L knee abrasion  Neurologic: Grossly normal    Disposition: home  Discharge Condition: stable  Patient Instructions:   Current Discharge Medication List      START taking these medications    Details   magnesium oxide (MAG-OX) 400 mg tablet Take 1 Tab by mouth two (2) times a day. Indications: hypomagnesemia  Qty: 60 Tab, Refills: 0      sucralfate (CARAFATE) 1 gram tablet Take 1 Tab by mouth four (4) times daily for 30 days. Indications: Gastric Ulcer  Qty: 120 Tab, Refills: 0      pantoprazole (PROTONIX) 40 mg tablet Take 1 Tab by mouth two (2) times a day.  Indications: Gastric Ulcer  Qty: 60 Tab, Refills: 0         CONTINUE these medications which have NOT CHANGED Details   ondansetron (ZOFRAN ODT) 4 mg disintegrating tablet Take 1 Tab by mouth every eight (8) hours as needed for Nausea. Qty: 20 Tab, Refills: 0      atorvastatin (LIPITOR) 40 mg tablet Take 1 Tab by mouth daily. Qty: 90 Tab, Refills: 3      levothyroxine (SYNTHROID) 25 mcg tablet Take 1 Tab by mouth every morning. Qty: 90 Tab, Refills: 3      gabapentin (NEURONTIN) 100 mg capsule Take 1 Cap by mouth two (2) times a day. Qty: 60 Cap, Refills: 3      traZODone (DESYREL) 50 mg tablet Take 1 Tab by mouth nightly. Qty: 30 Tab, Refills: 3      potassium chloride (K-DUR, KLOR-CON) 20 mEq tablet TAKE 1 TABLET BY MOUTH 2 TIMES A DAY  Qty: 180 Tab, Refills: 3      FLOVENT  mcg/actuation inhaler Take 2 Puffs by inhalation two (2) times a day. Refills: 3      acetaminophen (TYLENOL ARTHRITIS PAIN) 650 mg CR tablet Take 650 mg by mouth every eight (8) hours as needed (pain). Associated Diagnoses: Elevated serum creatinine      albuterol (VENTOLIN HFA) 90 mcg/actuation inhaler Take 2 Puffs by inhalation every six (6) hours as needed for Wheezing or Shortness of Breath. CALCIUM CITRATE + PO take 1 Tab by mouth two (2) times a day.          STOP taking these medications       heparin sodium,porcine (HEPARIN LOCK FLUSH, PORCINE, IV) Comments:   Reason for Stoppin.9 % sodium chloride (NORMAL SALINE FLUSH INJECTION) Comments:   Reason for Stopping:         losartan-hydroCHLOROthiazide (HYZAAR) 100-25 mg per tablet Comments:   Reason for Stopping:         PRILOSEC OTC PO Comments:   Reason for Stopping:         potassium chloride in 0.9%NaCl (0.9% SODIUM CHLORIDE WITH KCL 20 MEQ/L) 20 mEq/L infusion Comments:   Reason for Stopping:         cefepime 2 gram 2 g IV syringe Comments:   Reason for Stopping:         acetaminophen (TYLENOL) 500 mg capsule Comments:   Reason for Stopping:               Activity: Activity as tolerated  Diet: Regular Diet    Follow-up      Follow-up Appointments   Procedures  FOLLOW UP VISIT Appointment in: Other (Specify) 1. Dr. Sandrita Lockhart (PCP) within 1 week for hospital follow up and recheck BMP. 2. [facility] 4 weeks for hospital follow up for gastric ulcer- call for appointment. 1. Dr. Sandrita Lockhart (PCP) within 1 week for hospital follow up and recheck BMP. 2. [facility] 4 weeks for hospital follow up for gastric ulcer- call for appointment. Standing Status:   Standing     Number of Occurrences:   1     Order Specific Question:   Appointment in     Answer: Other (Specify)   ·   ·   Time spent to discharge patient 33 minutes  Signed:  Pierce Whitten.  Vicki Horton MD  10/15/2018  10:20 AM

## 2018-10-15 NOTE — DISCHARGE INSTRUCTIONS
Monitor blood pressure twice a day and record to take to your follow up with Dr. Xiomy Sauceda. Call [facility] to schedule follow up. Peptic Ulcer Disease: Care Instructions  Your Care Instructions    Peptic ulcers are sores on the inside of the stomach or the small intestine (such as a duodenal ulcer). They are most often caused by an infection with bacteria or from use of nonsteroidal anti-inflammatory drugs (NSAIDs). NSAIDs include aspirin, ibuprofen (Advil), and naproxen (Aleve). Your doctor may have prescribed medicine to reduce stomach acid. You also may need to take antibiotics if your peptic ulcers are caused by an infection. You can help yourself heal and keep ulcers from coming back by making some changes in your lifestyle. Quit smoking. Limit alcohol. Follow-up care is a key part of your treatment and safety. Be sure to make and go to all appointments, and call your doctor if you are having problems. It's also a good idea to know your test results and keep a list of the medicines you take. How can you care for yourself at home? · Be safe with medicines. Take your medicines exactly as prescribed. Call your doctor if you think you are having a problem with your medicine. · Do not take aspirin or other NSAIDs such as ibuprofen (Advil or Motrin) or naproxen (Aleve). Ask your doctor what you can take for pain. · Do not smoke. Smoking can make ulcers worse. If you need help quitting, talk to your doctor about stop-smoking programs and medicines. These can increase your chances of quitting for good. · Drink in moderation or avoid drinking alcohol. · Eat a balanced diet of small, frequent meals. See a dietitian if you need help planning your meals. When should you call for help? [id number] anytime you think you may need emergency care.  For example, call if:    · You vomit blood or what looks like coffee grounds.     · You pass maroon or very bloody stools.    Call your doctor now or seek immediate medical care if:    · You have new or worse belly pain.     · Your stools are black and look like tar, or they have streaks of blood.     · You vomit.    Watch closely for changes in your health, and be sure to contact your doctor if:    · You do not get better as expected. Where can you learn more? Go to http://jenna-maya.info/. Enter M657 in the search box to learn more about \"Peptic Ulcer Disease: Care Instructions. \"  Current as of: July 24, 2017  Content Version: 11.8  © 1310-7871 [employer]. Care instructions adapted under license by [employer] (which disclaims liability or warranty for this information). If you have questions about a medical condition or this instruction, always ask your healthcare professional. [place] [address] any warranty or liability for your use of this information. Acute Kidney Injury: Care Instructions  Your Care Instructions    Acute kidney injury (TIARRA) is a sudden decrease in kidney function. This can happen over a period of hours, days or, in some cases, weeks. TIARRA used to be called acute renal failure, but kidney failure doesn't always happen with TIARRA. Common causes of TIARRA are dehydration, blood loss, and medicines. When TIARRA happens, the kidneys have trouble removing waste and excess fluids from the body. The waste and fluids build up and become harmful. Kidney function may return to normal if the cause of TIARRA is treated quickly. Your chance of a full recovery depends on what caused the problem, how quickly the cause was treated, and what other medical problems you have. A machine may be used to help your kidneys remove waste and fluids for a short period of time. This is called dialysis. Follow-up care is a key part of your treatment and safety. Be sure to make and go to all appointments, and call your doctor if you are having problems.  It's also a good idea to know your test results and keep a list of the medicines you take. How can you care for yourself at home? · Talk to your doctor about how much fluid you should drink. · Eat a balanced diet. Talk to your doctor or a dietitian about what type of diet may be best for you. You may need to limit sodium, potassium, and phosphorus. · If you need dialysis, follow the instructions and schedule for dialysis that your doctor gives you. · Do not smoke. Smoking can make your condition worse. If you need help quitting, talk to your doctor about stop-smoking programs and medicines. These can increase your chances of quitting for good. · Do not drink alcohol. · Review all of your medicines with your doctor. Do not take any medicines, including nonsteroidal anti-inflammatory drugs (NSAIDs) such as ibuprofen (Advil, Motrin) or naproxen (Aleve), unless your doctor says it is safe for you to do so. · Make sure that anyone treating you for any health problem knows that you have had TIARRA. When should you call for help? Call 911 anytime you think you may need emergency care. For example, call if:    · You passed out (lost consciousness).    Call your doctor now or seek immediate medical care if:    · You have new or worse nausea and vomiting.     · You have much less urine than normal, or you have no urine.     · You are feeling confused or cannot think clearly.     · You have new or more blood in your urine.     · You have new swelling.     · You are dizzy or lightheaded, or feel like you may faint.    Watch closely for changes in your health, and be sure to contact your doctor if:    · You do not get better as expected. Where can you learn more? Go to http://jenna-maya.info/. Enter M620 in the search box to learn more about \"Acute Kidney Injury: Care Instructions. \"  Current as of: March 15, 2018  Content Version: 11.8  © 3249-9884 [employer], [employer].  Care instructions adapted under license by [employer] (which disclaims liability or warranty for this information). If you have questions about a medical condition or this instruction, always ask your healthcare professional. [address] any warranty or liability for your use of this information. DISCHARGE SUMMARY from Nurse    PATIENT INSTRUCTIONS:    After general anesthesia or intravenous sedation, for 24 hours or while taking prescription Narcotics:  · Limit your activities  · Do not drive and operate hazardous machinery  · Do not make important personal or business decisions  · Do  not drink alcoholic beverages  · If you have not urinated within 8 hours after discharge, please contact your surgeon on call. Report the following to your surgeon:  · Excessive pain, swelling, redness or odor of or around the surgical area  · Temperature over 100.5  · Nausea and vomiting lasting longer than 4 hours or if unable to take medications  · Any signs of decreased circulation or nerve impairment to extremity: change in color, persistent  numbness, tingling, coldness or increase pain  · Any questions    What to do at Home:  Recommended activity: Activity as tolerated. If you experience any of the following symptoms temp > 101.5, unrelieved pain, nausea or vomiting, shortness of breath or fatigue not relieved with rest, please follow up with MD.    *  Please give a list of your current medications to your Primary Care Provider. *  Please update this list whenever your medications are discontinued, doses are      changed, or new medications (including over-the-counter products) are added. *  Please carry medication information at all times in case of emergency situations. These are general instructions for a healthy lifestyle:    No smoking/ No tobacco products/ Avoid exposure to second hand smoke  Surgeon General's Warning:  Quitting smoking now greatly reduces serious risk to your health.     Obesity, smoking, and sedentary lifestyle greatly increases your risk for illness    A healthy diet, regular physical exercise & weight monitoring are important for maintaining a healthy lifestyle    You may be retaining fluid if you have a history of heart failure or if you experience any of the following symptoms:  Weight gain of 3 pounds or more overnight or 5 pounds in a week, increased swelling in our hands or feet or shortness of breath while lying flat in bed. Please call your doctor as soon as you notice any of these symptoms; do not wait until your next office visit. Recognize signs and symptoms of STROKE:    F-face looks uneven    A-arms unable to move or move unevenly    S-speech slurred or non-existent    T-time-call 911 as soon as signs and symptoms begin-DO NOT go       Back to bed or wait to see if you get better-TIME IS BRAIN. Warning Signs of HEART ATTACK     Call 911 if you have these symptoms:   Chest discomfort. Most heart attacks involve discomfort in the center of the chest that lasts more than a few minutes, or that goes away and comes back. It can feel like uncomfortable pressure, squeezing, fullness, or pain.  Discomfort in other areas of the upper body. Symptoms can include pain or discomfort in one or both arms, the back, neck, jaw, or stomach.  Shortness of breath with or without chest discomfort.  Other signs may include breaking out in a cold sweat, nausea, or lightheadedness. Don't wait more than five minutes to call 911 - MINUTES MATTER! Fast action can save your life. Calling 911 is almost always the fastest way to get lifesaving treatment. Emergency Medical Services staff can begin treatment when they arrive -- up to an hour sooner than if someone gets to the hospital by car. The discharge information has been reviewed with the patient. The patient verbalized understanding.   Discharge medications reviewed with the patient and appropriate educational materials and side effects teaching were provided.   ___________________________________________________________________________________________________________________________________

## 2018-10-15 NOTE — PROGRESS NOTES
SBAR shift report received from Shantell Reyes and BARRON Sandoval. Pt stable. Assessment complete. Pt is lying in bed. Resp even, unlabored. Pt is alert, orient X 4. Pt appears in no acute distress at this time. Pt is on RA. Pt has dressing and ACE bandage to L knee in place. Pt denies N/V, pain, or SOB at this time. Pt encouraged to call for assistance, call light in reach. Safety measures in place. Will continue to monitor

## 2018-10-16 ENCOUNTER — PATIENT OUTREACH (OUTPATIENT)
Dept: CASE MANAGEMENT | Age: 78
End: 2018-10-16

## 2018-10-16 PROCEDURE — 3331090002 HH PPS REVENUE DEBIT

## 2018-10-16 PROCEDURE — 3331090001 HH PPS REVENUE CREDIT

## 2018-10-16 NOTE — PROGRESS NOTES
This note will not be viewable in [address] Transition of Care Discharge Follow-up Questionnaire Date/Time of Call: 
 10/16/18 11:29am  
What was the patient hospitalized for? Acute Renal Failure Superimposed On Stage 3 Chronic Kidney Disease Does the patient understand his/her diagnosis and/or treatment and what happened during the hospitalization? Yes, spoke with patient, she states her understanding of diagnosis and treatment; and is agreeable to call. Patient states she is doing well Did the patient receive discharge instructions? Yes   
CM Assessed Risk for Readmission:  
 
 
Patient stated Risk for Readmission:  
 
 low to r/t diagnosis and comorbidities 
 
 
dehydration Review any discharge instructions (see discharge instructions/AVS in [facility]). Ask patient if they understand these. Do they have any questions? Reviewed, understanding is stated, no questions at this time Were home services ordered (nursing, PT, OT, ST, etc.)? No  
  
If so, has the first visit occurred? If not, why? (Assist with coordination of services if necessary.) 
 N/A Was any DME ordered? No  
  
If so, has it been received? If not, why?  (Assist patient in obtaining DME orders &/or equipment if necessary.) N/A Complete a review of all medications (new, continued and discontinued meds per the D/C instructions and medication tab in [facility]). Completed Start: magnesium oxide 400 mg tablet; pantoprazole 40 mg tablet; sucralfate 1 gram tablet Change: potassium chloride 20 mEq tablet bid Stop: losartan-hydroCHLOROthiazide 100-25 mg per tablet; Prilosec otc Patient states that she was told if her insurance wont cover protonix it is ok to continue Prilosec otc. Were all new prescriptions filled? If not, why?  (Assist patient in obtaining medications if necessary  escalate for CCM &/or SW if ongoing issues are verbalized by pt or anticipated) Patient states her daughter is picking up today Does the patient understand the purpose and dosing instructions for all medications? (If patient has questions, provide explanation and education.) Yes, understanding of medications Does the patient have any problems in performing ADLs? (If patient is unable to perform ADLs  what is the limiting factor(s)? Do they have a support system that can assist? If no support system is present, discuss possible assistance that they may be able to obtain. Escalate for CCM/SW if ongoing issues are verbalized by pt or anticipated) Independent with ADLs Does the patient have all follow-up appointments scheduled? 7 day f/up with PCP?  
(f/up with PCP may be w/in 14 days if patient has a f/up with their specialist w/in 7 days) 7-14 day f/up with specialist?  
(or per discharge instructions) If f/up has not been made  what actions has the care coordinator made to accomplish this? Has transportation been arranged? Yes Friday October 19, 2018  9:00 AM EDT Office Visit with Cassandra Gardner MD 
[facility] In 4 weeks Office will call you with appt date and time. MT patient indicates she is waiting on this call Yes, no transportation issues Any other questions or concerns expressed by the patient? No further questions or concerns at this time. Patient states her gratitude for follow up Contact information for [facility] was given, instructed to call with new questions or concerns. Schedule next appointment with LARISSA MENEZES Coordinator or refer to RN Case Manager/ per the workflow guidelines. When is care coordinators next follow-up call scheduled? If referred for CCM  what RN care manager was the referral assigned?  
 Discussed referral to RN CCM due to 2 admissions and 2 ER since September, patient declines stating she will call her wound care doctor as well and that she has so many involved in her care at present she cant add more. Community Care Coordinator will follow per workflow guidelines, f/u scheduled 2 to 3 weeks PAIGE Call Completed By: Dagmar David LPN Community Care Coordinator

## 2018-10-17 ENCOUNTER — HOME CARE VISIT (OUTPATIENT)
Dept: SCHEDULING | Facility: HOME HEALTH | Age: 78
End: 2018-10-17
Payer: MEDICARE

## 2018-10-17 PROCEDURE — 3331090002 HH PPS REVENUE DEBIT

## 2018-10-17 PROCEDURE — 3331090001 HH PPS REVENUE CREDIT

## 2018-10-17 PROCEDURE — G0299 HHS/HOSPICE OF RN EA 15 MIN: HCPCS

## 2018-10-18 PROCEDURE — 3331090002 HH PPS REVENUE DEBIT

## 2018-10-18 PROCEDURE — 3331090001 HH PPS REVENUE CREDIT

## 2018-10-19 VITALS
DIASTOLIC BLOOD PRESSURE: 63 MMHG | HEART RATE: 61 BPM | RESPIRATION RATE: 18 BRPM | TEMPERATURE: 97.9 F | SYSTOLIC BLOOD PRESSURE: 110 MMHG | OXYGEN SATURATION: 96 %

## 2018-10-19 PROCEDURE — 3331090001 HH PPS REVENUE CREDIT

## 2018-10-19 PROCEDURE — 3331090002 HH PPS REVENUE DEBIT

## 2018-10-20 PROCEDURE — 3331090002 HH PPS REVENUE DEBIT

## 2018-10-20 PROCEDURE — 3331090001 HH PPS REVENUE CREDIT

## 2018-10-21 PROCEDURE — 3331090001 HH PPS REVENUE CREDIT

## 2018-10-21 PROCEDURE — 3331090002 HH PPS REVENUE DEBIT

## 2018-10-22 PROCEDURE — 3331090001 HH PPS REVENUE CREDIT

## 2018-10-22 PROCEDURE — 3331090002 HH PPS REVENUE DEBIT

## 2018-10-23 PROCEDURE — 3331090002 HH PPS REVENUE DEBIT

## 2018-10-23 PROCEDURE — 3331090001 HH PPS REVENUE CREDIT

## 2018-10-30 ENCOUNTER — PATIENT OUTREACH (OUTPATIENT)
Dept: CASE MANAGEMENT | Age: 78
End: 2018-10-30

## 2018-10-30 NOTE — PROGRESS NOTES
This note will not be viewable in [address] Attempt to reach patient for outreach follow up. Left message at home and cell number. Patient noted per [facility] care to have had follow up with Dr. Cassandra Harrison on 10/19/18 with next visit 12/19/18. Also had appointment with Dr. Benoit Harris 10/23/18, next visit date not indicated. Patient will be graduated from [facility] program.  Will reopen if call is returned.

## 2019-01-08 ENCOUNTER — HOSPITAL ENCOUNTER (OUTPATIENT)
Dept: MAMMOGRAPHY | Age: 79
Discharge: HOME | End: 2019-01-08
Attending: FAMILY MEDICINE
Payer: MEDICARE

## 2019-01-08 DIAGNOSIS — Z12.31 VISIT FOR SCREENING MAMMOGRAM: ICD-10-CM

## 2019-01-08 DIAGNOSIS — M85.89 OSTEOPENIA OF MULTIPLE SITES: ICD-10-CM

## 2019-01-08 PROCEDURE — 77080 DXA BONE DENSITY AXIAL: CPT

## 2019-01-08 PROCEDURE — 77067 SCR MAMMO BI INCL CAD: CPT

## 2019-02-08 ENCOUNTER — HOSPITAL ENCOUNTER (OUTPATIENT)
Dept: INFUSION THERAPY | Age: 79
Discharge: HOME | End: 2019-02-08
Payer: MEDICARE

## 2019-02-08 VITALS
SYSTOLIC BLOOD PRESSURE: 182 MMHG | TEMPERATURE: 97.5 F | HEART RATE: 83 BPM | OXYGEN SATURATION: 95 % | RESPIRATION RATE: 16 BRPM | DIASTOLIC BLOOD PRESSURE: 78 MMHG

## 2019-02-08 LAB
ANION GAP SERPL CALC-SCNC: 5 MMOL/L (ref 7–16)
BASOPHILS # BLD: 0 K/UL (ref 0–0.2)
BASOPHILS NFR BLD: 1 % (ref 0–2)
BUN SERPL-MCNC: 17 MG/DL (ref 8–23)
CALCIUM SERPL-MCNC: 9.3 MG/DL (ref 8.3–10.4)
CHLORIDE SERPL-SCNC: 106 MMOL/L (ref 98–107)
CO2 SERPL-SCNC: 29 MMOL/L (ref 21–32)
CREAT SERPL-MCNC: 1.4 MG/DL (ref 0.6–1)
DIFFERENTIAL METHOD BLD: ABNORMAL
EOSINOPHIL # BLD: 0.1 K/UL (ref 0–0.8)
EOSINOPHIL NFR BLD: 1 % (ref 0.5–7.8)
ERYTHROCYTE [DISTWIDTH] IN BLOOD BY AUTOMATED COUNT: 13.3 % (ref 11.9–14.6)
GLUCOSE SERPL-MCNC: 99 MG/DL (ref 65–100)
HCT VFR BLD AUTO: 36.2 % (ref 35.8–46.3)
HGB BLD-MCNC: 11.7 G/DL (ref 11.7–15.4)
IMM GRANULOCYTES # BLD AUTO: 0 K/UL (ref 0–0.5)
IMM GRANULOCYTES NFR BLD AUTO: 1 % (ref 0–5)
LYMPHOCYTES # BLD: 2 K/UL (ref 0.5–4.6)
LYMPHOCYTES NFR BLD: 31 % (ref 13–44)
MCH RBC QN AUTO: 30.5 PG (ref 26.1–32.9)
MCHC RBC AUTO-ENTMCNC: 32.3 G/DL (ref 31.4–35)
MCV RBC AUTO: 94.3 FL (ref 79.6–97.8)
MONOCYTES # BLD: 0.8 K/UL (ref 0.1–1.3)
MONOCYTES NFR BLD: 12 % (ref 4–12)
NEUTS SEG # BLD: 3.4 K/UL (ref 1.7–8.2)
NEUTS SEG NFR BLD: 54 % (ref 43–78)
NRBC # BLD: 0 K/UL (ref 0–0.2)
PLATELET # BLD AUTO: 236 K/UL (ref 150–450)
PMV BLD AUTO: 11.1 FL (ref 9.4–12.3)
POTASSIUM SERPL-SCNC: 3.8 MMOL/L (ref 3.5–5.1)
RBC # BLD AUTO: 3.84 M/UL (ref 4.05–5.25)
SODIUM SERPL-SCNC: 140 MMOL/L (ref 136–145)
WBC # BLD AUTO: 6.3 K/UL (ref 4.3–11.1)

## 2019-02-08 PROCEDURE — 80048 BASIC METABOLIC PNL TOTAL CA: CPT

## 2019-02-08 PROCEDURE — 74011250636 HC RX REV CODE- 250/636: Performed by: FAMILY MEDICINE

## 2019-02-08 PROCEDURE — 96372 THER/PROPH/DIAG INJ SC/IM: CPT

## 2019-02-08 PROCEDURE — 85025 COMPLETE CBC W/AUTO DIFF WBC: CPT

## 2019-02-08 RX ADMIN — DENOSUMAB 60 MG: 60 INJECTION SUBCUTANEOUS at 16:08

## 2019-02-08 NOTE — PROGRESS NOTES
Arrived to the [facility]. Prolia sq completed. Patient tolerated well. Any issues or concerns during appointment: denies Patient went to scheduling to make next appointment. Instructed to notify Dr Melissa Rooney office for any issues or concerns. Verbalized understanding. Discharged ambulatory.

## 2019-08-09 ENCOUNTER — HOSPITAL ENCOUNTER (OUTPATIENT)
Dept: INFUSION THERAPY | Age: 79
Discharge: HOME | End: 2019-08-09
Payer: MEDICARE

## 2019-08-09 VITALS
HEART RATE: 73 BPM | SYSTOLIC BLOOD PRESSURE: 131 MMHG | OXYGEN SATURATION: 94 % | TEMPERATURE: 98.6 F | DIASTOLIC BLOOD PRESSURE: 67 MMHG | RESPIRATION RATE: 18 BRPM

## 2019-08-09 LAB
ANION GAP SERPL CALC-SCNC: 7 MMOL/L (ref 7–16)
BASOPHILS # BLD: 0 K/UL (ref 0–0.2)
BASOPHILS NFR BLD: 0 % (ref 0–2)
BUN SERPL-MCNC: 21 MG/DL (ref 8–23)
CALCIUM SERPL-MCNC: 9.4 MG/DL (ref 8.3–10.4)
CHLORIDE SERPL-SCNC: 107 MMOL/L (ref 98–107)
CO2 SERPL-SCNC: 27 MMOL/L (ref 21–32)
CREAT SERPL-MCNC: 1.52 MG/DL (ref 0.6–1)
DIFFERENTIAL METHOD BLD: ABNORMAL
EOSINOPHIL # BLD: 0 K/UL (ref 0–0.8)
EOSINOPHIL NFR BLD: 1 % (ref 0.5–7.8)
ERYTHROCYTE [DISTWIDTH] IN BLOOD BY AUTOMATED COUNT: 14 % (ref 11.9–14.6)
GLUCOSE SERPL-MCNC: 99 MG/DL (ref 65–100)
HCT VFR BLD AUTO: 35.6 % (ref 35.8–46.3)
HGB BLD-MCNC: 11.8 G/DL (ref 11.7–15.4)
IMM GRANULOCYTES # BLD AUTO: 0 K/UL (ref 0–0.5)
IMM GRANULOCYTES NFR BLD AUTO: 0 % (ref 0–5)
LYMPHOCYTES # BLD: 1.9 K/UL (ref 0.5–4.6)
LYMPHOCYTES NFR BLD: 30 % (ref 13–44)
MCH RBC QN AUTO: 31.3 PG (ref 26.1–32.9)
MCHC RBC AUTO-ENTMCNC: 33.1 G/DL (ref 31.4–35)
MCV RBC AUTO: 94.4 FL (ref 79.6–97.8)
MONOCYTES # BLD: 0.7 K/UL (ref 0.1–1.3)
MONOCYTES NFR BLD: 11 % (ref 4–12)
NEUTS SEG # BLD: 3.7 K/UL (ref 1.7–8.2)
NEUTS SEG NFR BLD: 58 % (ref 43–78)
NRBC # BLD: 0 K/UL (ref 0–0.2)
PLATELET # BLD AUTO: 179 K/UL (ref 150–450)
PMV BLD AUTO: 11.6 FL (ref 9.4–12.3)
POTASSIUM SERPL-SCNC: 3.9 MMOL/L (ref 3.5–5.1)
RBC # BLD AUTO: 3.77 M/UL (ref 4.05–5.25)
SODIUM SERPL-SCNC: 141 MMOL/L (ref 136–145)
WBC # BLD AUTO: 6.4 K/UL (ref 4.3–11.1)

## 2019-08-09 PROCEDURE — 96372 THER/PROPH/DIAG INJ SC/IM: CPT

## 2019-08-09 PROCEDURE — 74011250636 HC RX REV CODE- 250/636

## 2019-08-09 PROCEDURE — 80048 BASIC METABOLIC PNL TOTAL CA: CPT

## 2019-08-09 PROCEDURE — 85025 COMPLETE CBC W/AUTO DIFF WBC: CPT

## 2019-08-09 PROCEDURE — 36415 COLL VENOUS BLD VENIPUNCTURE: CPT

## 2019-08-09 RX ADMIN — DENOSUMAB 60 MG: 60 INJECTION SUBCUTANEOUS at 16:18

## 2019-08-09 NOTE — PROGRESS NOTES
Patient: Praveen Montano MRN: [id number]  SSN: [id number]    YOB: 1940  Age: 70 y.o. Sex: female       Subjective:      Chief Complaint:  Left leg cellulitis with underlying hardware     History of Present Illness:     Wound Caused By: Trauma, fall ~ 8/30/2018  Associated Signs and Symptoms: Knee wound, pain , swelling  Timing: constant  Quality: wound and cellulitis  Severity: Full thickness wound     9/13/2018 Has been seen at urgent care twice, started on PO abx and had IM injection x 1. Cellulitis has persisted. Evaluated today at wound center and discussed with ortho who concur that aggressive course warranted in order to avoid secondary infection of her knee hardware. 9/21/2018 Admittted following previous evaluation in wound clinic. Cultures grew GNR and has been on IV abx. Re-evaluated by Dr Papi Borges earlier this week. Treating for bursitis. Sees ID next week. Getting twice daily infusions.      9/28/2018  On abx. Much improved with abx. Primarily bursitis. 10/5/2019  Discussed with Dr Papi Borges. Donna Shipman does not communicate with underlying joint space. Tolerating dressings. Still getting infusions. Biggest complaint today is nausea and vomitting. No lower GI sx.  Has f/u with ID Monday          Past Medical History:   Diagnosis Date    Calculus of kidney      GERD (gastroesophageal reflux disease)      Headache(784.0)      Hypercholesterolemia      Hypertension      Primary insomnia 7/17/2017    Thyroid disease              Past Surgical History:   Procedure Laterality Date    HX COLONOSCOPY   Jan 2013     4 polyps, repeat 5 years    HX COLONOSCOPY   7/5/2016     repeat 5 yrs tubular adenoma    HX SALPINGO-OOPHORECTOMY        REMOVAL OF KIDNEY STONE                 Family History   Problem Relation Age of Onset    Hypertension Mother      Arthritis-rheumatoid Mother      High Cholesterol Mother      Broken Bones Mother      Heart Failure Father      Heart Attack Father      Hypertension Brother      High Cholesterol Brother      Arthritis-osteo Brother         Knee    High Cholesterol Brother      Hypertension Brother      Heart Attack Brother      Heart Attack Paternal Grandmother      Heart Attack Paternal Grandfather             Social History   Substance Use Topics    Smoking status: Never Smoker    Smokeless tobacco: Never Used    Alcohol use No               Prior to Admission medications    Medication Sig Start Date End Date Taking? Authorizing Provider   atorvastatin (LIPITOR) 40 mg tablet Take 1 Tab by mouth daily. 8/1/18   Yes Joss Doherty MD   levothyroxine (SYNTHROID) 25 mcg tablet Take 1 Tab by mouth every morning. 8/1/18   Yes Joss Doherty MD   gabapentin (NEURONTIN) 100 mg capsule Take 1 Cap by mouth two (2) times a day. 8/1/18   Yes Joss Doherty MD   traZODone (DESYREL) 50 mg tablet Take 1 Tab by mouth nightly. 8/1/18   Yes Joss Doherty MD   losartan-hydroCHLOROthiazide (HYZAAR) 100-25 mg per tablet Take 1 Tab by mouth daily.  7/26/18   Yes Joss Doherty MD   potassium chloride (K-DUR, KLOR-CON) 20 mEq tablet TAKE 1 TABLET BY MOUTH 2 TIMES A DAY  Patient taking differently: TAKE 1 TABLET BY MOUTH ONCE DAILY 7/10/18   Yes Sonal Perez MD   FLOVENT  mcg/actuation inhaler   10/28/15   Yes Historical Provider   PRILOSEC OTC PO take 1 Tab by mouth every morning.     Yes Historical Provider   CALCIUM CITRATE + PO take 1 Tab by mouth two (2) times a day.     Yes Historical Provider   acetaminophen (TYLENOL ARTHRITIS PAIN) 650 mg CR tablet Take 650 mg by mouth every eight (8) hours.       Historical Provider   albuterol (VENTOLIN HFA) 90 mcg/actuation inhaler Take  by inhalation.       Historical Provider            Allergies   Allergen Reactions    Pneumovax 23 [Pneumococcal 23-Janell Ps Vaccine] Other (comments)       fever         Review of Systems:     CONSTITUTIONAL: No fever, chills  HEAD: No headache  EYES: No visual loss  ENT: No hearing loss  SKIN: No rash  CARDIOVASCULAR: No chest pain  RESPIRATORY: No shortness of breath  GASTROINTESTINAL: No nausea, vomiting  GENITOURINARY: No excessive urination  NEUROLOGICAL: No weakness  MUSCULOSKELETAL: Left leg swelling, pain as above  HEMATOLOGIC: No easy bleeding  LYMPHATICS: No lymphedema. PSYCHIATRIC: No current depression  ENDOCRINOLOGIC: No high sugars  ALLERGIES: No history of asthma, hives, eczema or rhinitis.             Immunization History   Administered Date(s) Administered    Influenza High Dose Vaccine PF 09/08/2016, 11/28/2017    Influenza Vaccine 10/10/2013, 10/09/2014    Influenza Vaccine PF 10/26/2015    Influenza Vaccine Split 10/29/2012    Pneumococcal Polysaccharide (PPSV-23) 06/28/2007, 08/27/2007    TDAP Vaccine 04/12/2012, 10/29/2012    Tdap 04/12/2012, 10/29/2012    Zoster 10/04/2011    Zoster Vaccine, Live 10/04/2011         There is no height or weight on file to calculate BMI.     Counseling regarding nutrition done:  No      Current medications:            Current Facility-Administered Medications   Medication Dose Route Frequency Provider Last Rate Last Dose    influenza vaccine 2018-19 (6 mos+)(PF) (FLUARIX QUAD/FLULAVAL QUAD) injection 0.5 mL  0.5 mL IntraMUSCular PRIOR TO DISCHARGE Ayaz Sprague MD          cefTRIAXone (ROCEPHIN) 2 g in 0.9% sodium chloride (MBP/ADV) 50 mL  2 g IntraVENous Q24H Denzel Cardenas  mL/hr at 09/13/18 1331 2 g at 09/13/18 1331    vancomycin (VANCOCIN) 1500 mg in  ml infusion  1,500 mg IntraVENous Q24H Denzel Cardenas .3 mL/hr at 09/13/18 1424 1,500 mg at 09/13/18 1424    potassium chloride (K-DUR, KLOR-CON) SR tablet 20 mEq  20 mEq Oral DAILY Ayaz Sprague MD    20 mEq at 09/13/18 1331    sodium chloride (NS) flush 5-10 mL  5-10 mL IntraVENous Q8H Ayaz Sprague MD    10 mL at 09/13/18 1331    sodium chloride (NS) flush 5-10 mL  5-10 mL IntraVENous PRN Ayaz Sprague MD          enoxaparin (LOVENOX) injection 40 mg  40 mg SubCUTAneous Q24H Shantell Suarez MD    40 mg at 09/13/18 0007    alcohol 62% (NOZIN) nasal  1 Ampule  1 Ampule Topical Q12H Shantell Suarez MD    1 Ampule at 09/13/18 0855    atorvastatin (LIPITOR) tablet 40 mg  40 mg Oral DAILY Shantell Suarez MD    40 mg at 09/13/18 0856    levothyroxine (SYNTHROID) tablet 25 mcg  25 mcg Oral DAILY Shantell Suarez MD    25 mcg at 09/13/18 0755    gabapentin (NEURONTIN) capsule 100 mg  100 mg Oral BID Shantell Suarez MD    100 mg at 09/13/18 1755    traZODone (DESYREL) tablet 50 mg  50 mg Oral QHS Shantell Suarez MD    50 mg at 09/12/18 2209    losartan/hydroCHLOROthiazide (HYZAAR) 100/25 mg    Oral DAILY Shantell Suarez MD          pantoprazole (PROTONIX) tablet 40 mg  40 mg Oral ACB Shantell Suarez MD    40 mg at 09/13/18 3698    acetaminophen (TYLENOL) tablet 650 mg  650 mg Oral Q6H PRN Shantell Suarez MD          oxyCODONE-acetaminophen (PERCOCET) 5-325 mg per tablet 1 Tab  1 Tab Oral Q4H PRN Shantell Suarez MD    1 Tab at 09/12/18 0116            Objective:      Physical Exam:   Visit Vitals  /73 (BP 1 Location: Left arm)   Pulse 81   Temp 97.8 °F (36.6 °C)   Resp 18   Ht 5' 4\" (1.626 m)   Wt 91.4 kg (201 lb 6.4 oz)   SpO2 96%   BMI 34.57 kg/m²           General: well developed, well nourished  Psych: cooperative. Pleasant. Neuro: alert and oriented to person/place/situation. Derm: Normal turgor for age. Left leg with swelling, redness. Marked. 2+ pitting edema. HEENT: Normocephalic, atraumatic. EOMI. Neck: Normal range of motion. Chest: Good air entry bilaterally. Cardio: RRR  Abdomen: Soft, nontender     Left knee with FROM active and passive. No new areas of breakdown. Wound Knee Left; Anterior (Active)   DRESSING STATUS Clean, dry, and intact 9/11/2018  2:23 PM   Non-Pressure Injury Full thickness (subcut/muscle) 9/11/2018  2:23 PM   Wound Length (cm) 6.5 cm 9/11/2018  2:23 PM   Wound Width (cm) 2.2 cm 9/11/2018  2:23 PM   Wound Depth (cm) 0.2 9/11/2018  2:23 PM   Wound Surface area (cm^2) 14.3 cm^2 9/11/2018  2:23 PM   Condition of [place] 9/11/2018  2:23 PM   Condition of Edges Rolled/curled 9/11/2018  2:23 PM   Epithelialization (%) 0 9/11/2018  2:23 PM   Tissue Type Black 9/11/2018  2:23 PM   Tissue Type Percent Black 50 % 9/11/2018  2:23 PM   Tissue Type Percent Pink 25 9/11/2018  2:23 PM   Tissue Type Percent Red 25 9/11/2018  2:23 PM   Drainage Amount  Small  9/11/2018  2:23 PM   Drainage Color Serous;Clear 9/11/2018  2:23 PM   Wound Odor None 9/11/2018  2:23 PM   Periwound Skin Condition Ecchymosis; Erythema, blanchable 9/11/2018  2:23 PM   Cleansing and Cleansing Agents  Normal saline 9/11/2018  2:23 PM   Number of days:10             Assessment:      Left knee wound, cellulitis, improving.     Plan:         Continue compression. Follow up with ID for d/c of abx. Recheck one week.

## 2019-08-13 NOTE — PROGRESS NOTES
Problem: Mobility Impaired (Adult and Pediatric) Goal: *Acute Goals and Plan of Care (Insert Text) STG: 
(1.)Ms. Alona Downing will move from supine to sit and sit to supine , scoot up and down and roll side to side with SUPERVISION within 3 treatment day(s). (2.)Ms. Alona Downing will transfer from bed to chair and chair to bed with STAND BY ASSIST using the least restrictive device within 3 treatment day(s). (3.)Ms. Alona Downing will ambulate with STAND BY ASSIST for 100 feet with the least restrictive device within 3 treatment day(s). LTG: 
(1.)Ms. Alona Downing will move from supine to sit and sit to supine , scoot up and down and roll side to side in bed with INDEPENDENT within 7 treatment day(s). (2.)Ms. Alona Downing will transfer from bed to chair and chair to bed with MODIFIED INDEPENDENCE using the least restrictive device within 7 treatment day(s). (3.)Ms. Alona Downing will ambulate with MODIFIED INDEPENDENCE for 500+ feet with the least restrictive device within 7 treatment day(s). (4.)Ms. Nayak will ascend/descend 4 steps with one hand rail with STAND BY ASSIST within 7 treatment days. ________________________________________________________________________________________________ PHYSICAL THERAPY: Initial Assessment, Treatment Day: Day of Assessment, AM 10/12/2018 INPATIENT: Hospital Day: 2 [place] / Product Type: Medicare /  
  
NAME/AGE/GENDER: Clarence Johnson is a 70 y.o. female PRIMARY DIAGNOSIS: Acute renal failure (ARF) (HCC) Acute renal failure superimposed on stage 3 chronic kidney disease ([facility] [address] [facility] Acute renal failure superimposed on stage 3 chronic kidney disease ([facility] [address] ICD-10: Treatment Diagnosis:  
 · Generalized Muscle Weakness (M62.81) · Difficulty in walking, Not elsewhere classified (R26.2) · History of falling (Z91.81) Precaution/Allergies: 
Pneumovax 23 [pneumococcal 23-layton ps vaccine] ASSESSMENT:  
 
 Ms. Jessy Shaw is a pleasant 70 y.o. Female with primary diagnosis of Acute renal failure. Pt is known to this PT from prior hospitalization ~1 month ago for L knee wound from fall that had gotten infected. Pt states she lives with her  whom she is normally the caregiver of but hasn't really been able to take care of him since last admission, ambulates independently though she has a RW for when she feels unsteady, and has a daughter who is a nurse who lives nearby that can assist. Pt states she had one fall last night due to getting out of bed too fast and getting dizzy (pt has had ongoing nausea/vomiting/dehydration), skinning her L knee. Pt's L knee is still swollen due to prior wound, which pt states is slowly improving, and covered in bandage (pt was just seen by wound nurse), but denies L knee pain currently. Pt transferred from supine to sitting EOB with SBA-CGA and additional time to complete task, demonstrating good sitting balance. Pt performed STS with CGA and RW, and transferred to [facility] with CGA and cuing for sequencing. Pt successfully voided and performed toileting hygiene independently. Pt ambulated 5ft to sink with CGA and RW, demonstrating slow, shuffling gait with narrow JAVED and one moment of unsteadiness posteriorly which pt was able to self-correct independently. Pt washed hands at sink, demonstrating improved standing balance, with chair behind just in case. Pt ambulated additional 5ft to bedside chair with CGA and RW, with cuing for safety awareness and pacing, and CGa for controlled descent to chair. B LE AROM and strength generally decreased but WFL, with sensation decreased in L toes and minor L knee discomfort with resistance. Pt left upright in chair with all needs met and within reach with nursing at bedside.  Deirdre Valdes will benefit from skilled PT (medically necessary) to address decreased strength, decreased balance, decreased functional tolerance, decreased cardiopulmonary endurance affecting participation in basic ADLs and functional tasks. This section established at most recent assessment PROBLEM LIST (Impairments causing functional limitations): 1. Decreased Strength 2. Decreased Transfer Abilities 3. Decreased Ambulation Ability/Technique 4. Decreased Balance 5. Increased Pain 6. Decreased Activity Tolerance 7. Decreased Pacing Skills 8. Increased Fatigue 9. Decreased Hardy with Home Exercise Program 
 INTERVENTIONS PLANNED: (Benefits and precautions of physical therapy have been discussed with the patient.) 1. Balance Exercise 2. Bed Mobility 3. Family Education 4. Gait Training 5. Home Exercise Program (HEP) 6. Manual Therapy 7. Neuromuscular Re-education/Strengthening 8. Range of Motion (ROM) 9. Therapeutic Activites 10. Therapeutic Exercise/Strengthening 11. Transfer Training 12. Group Therapy TREATMENT PLAN: Frequency/Duration: 3 times a week for duration of hospital stay Rehabilitation Potential For Stated Goals: Good RECOMMENDED REHABILITATION/EQUIPMENT: (at time of discharge pending progress): Due to the probability of continued deficits (see above) this patient will likely need continued skilled physical therapy after discharge. Equipment:  
? None at this time HISTORY:  
History of Present Injury/Illness (Reason for Referral): 
70 F with PMH of HTN, GERD, HLD, Hypothyroidism, CKD stage III, recent hospitalization for L knee cellulitis/burisitis due to fall complicated by ?hematoma to partial TKA s/p Cefepime for 3 weeks per ID/Plasctic & Ortho surgery. Today presented to ER with cc of progressively worsening generalized weakness and fall at home. Reports she woke up this am, felt nauseous and got out of bed quickly to vomit but she felt lightheaded and passed out. She hit her left led with a small skin tear.  No seizure like activity noted. Daughter also provides history and says Pt was also having nausea, vomiting episodes twice daily since last week and was seen at ER for Mild Acute pancreatitis. Since then Her PO intake has been poor and her Cr has been slowly going up from 1.22 last month to 2.9 today. Labs also showed k 2.8, Na 147, CO2 was 17. Orthostatics were +ve in ER with drop in SBP from 150 to 120. She was given IVF in ER and hospitalist asked to admit. 
  
Pt still feels weak but less dizzy. Reports last vomiting was earlier today, nb/nb. Also reports a tender Rt Lateral region abd mass. Her UOP has decreased as well. Denies Dysuria, fever, CP, SOB, Diarrhea, blood in stool.   
  
Past Medical History/Comorbidities: Ms. Alona Downing  has a past medical history of Calculus of kidney; GERD (gastroesophageal reflux disease); Headache(784.0); Hypercholesterolemia; Hypertension; Primary insomnia (7/17/2017); and Thyroid disease. Ms. Alona Downing  has a past surgical history that includes hx salpingo-oophorectomy; pr removal of kidney stone; hx colonoscopy (Jan 2013); and hx colonoscopy (7/5/2016). Social History/Living Environment:  
Home Environment: Private residence # Steps to Enter: 4 Rails to Enter: Yes Hand Rails : Right One/Two Story Residence: Two story, live on 1st floor # of Interior Steps: 12 Height of Each Step (in): 8 inches Interior Rails: Both Lift Chair Available: No 
Living Alone: No 
Support Systems: Spouse/Significant Other/Partner, Child(amrik) Patient Expects to be Discharged to[de-identified] Private residence Current DME Used/Available at Home: Walker, rolling, Wheelchair, [employer], Shower chair Tub or Shower Type: Shower Prior Level of Function/Work/Activity: 
Independent with ambulation but has RW, lives with  who requires assistance, unable to assist  lately, has daughter who is RN who assists, one fall, L knee wound, was driving prior to last admission Number of Personal Factors/Comorbidities that affect the Plan of Care: 3+: HIGH COMPLEXITY EXAMINATION:  
Most Recent Physical Functioning:  
Gross Assessment: 
AROM: Within functional limits Strength: Generally decreased, functional 
Coordination: Within functional limits Tone: Normal 
Sensation: Impaired (diminshed in L toes, otherwise normal) Posture: 
  
Balance: 
Sitting: Intact Standing: Impaired Standing - Static: Fair Standing - Dynamic : Fair Bed Mobility: 
Rolling: Additional time;Stand-by assistance Supine to Sit: Additional time;Contact guard assistance Scooting: Additional time;Stand-by assistance Wheelchair Mobility: 
  
Transfers: 
Sit to Stand: Contact guard assistance Stand to Sit: Contact guard assistance Gait: 
  
Base of Support: Narrowed Speed/Nathalie: Shuffled; Slow Step Length: Left shortened;Right shortened Gait Abnormalities: Decreased step clearance Distance (ft): 5 Feet (ft) (x2) Assistive Device: Walker, rolling Ambulation - Level of Assistance: Contact guard assistance Body Structures Involved: 1. Nerves 2. Heart 3. Lungs 4. Bones 5. Joints 6. Muscles 7. Ligaments Body Functions Affected: 1. Sensory/Pain 2. Cardio 3. Respiratory 4. Neuromusculoskeletal 
5. Movement Related 6. Skin Related Activities and Participation Affected: 1. Mobility 2. Self Care 3. Domestic Life 4. Interpersonal Interactions and Relationships 5. Community, Social and [place] Number of elements that affect the Plan of Care: 4+: HIGH COMPLEXITY CLINICAL PRESENTATION:  
Presentation: Evolving clinical presentation with changing clinical characteristics: MODERATE COMPLEXITY CLINICAL DECISION MAKING:  
[employer] AM-[employer] 6 Clicks Basic Mobility Inpatient Short Form How much difficulty does the patient currently have. .. Unable A Lot A Little None 1.   Turning over in bed (including adjusting bedclothes, sheets and blankets)? [] 1   [] 2   [x] 3   [] 4  
2. Sitting down on and standing up from a chair with arms ( e.g., wheelchair, bedside commode, etc.)   [] 1   [] 2   [x] 3   [] 4  
3. Moving from lying on back to sitting on the side of the bed? [] 1   [] 2   [x] 3   [] 4 How much help from another person does the patient currently need. .. Total A Lot A Little None 4. Moving to and from a bed to a chair (including a wheelchair)? [] 1   [] 2   [x] 3   [] 4  
5. Need to walk in hospital room? [] 1   [] 2   [x] 3   [] 4  
6. Climbing 3-5 steps with a railing? [x] 1   [] 2   [] 3   [] 4  
© 2007, Trustees of [address] [place] [id number], under license to [employer]. All rights reserved Score:  Initial: 16 Most Recent: X (Date: -- ) Interpretation of Tool:  Represents activities that are increasingly more difficult (i.e. Bed mobility, Transfers, Gait). Score 24 23 22-20 19-15 14-10 9-7 6 Modifier CH CI CJ CK CL CM CN   
 
? Mobility - Walking and Moving Around:  
  - CURRENT STATUS: CK - 40%-59% impaired, limited or restricted  - GOAL STATUS: CJ - 20%-39% impaired, limited or restricted  - D/C STATUS:  ---------------To be determined--------------- [place] / [place] B / Product Type: Medicare /   
 
Medical Necessity:    
· Patient demonstrates good rehab potential due to higher previous functional level. Reason for Services/Other Comments: 
· Patient continues to require skilled intervention due to impaired activity tolerance and functional mobility. Use of outcome tool(s) and clinical judgement create a POC that gives a: Questionable prediction of patient's progress: MODERATE COMPLEXITY  
  
 
 
 
TREATMENT:  
(In addition to Assessment/Re-Assessment sessions the following treatments were rendered) Pre-treatment Symptoms/Complaints:  \"I'm so weak, I can't keep anything down\" Pain: Initial: Pain Intensity 1: 0  Post Session:  L knee pain increased with resistance, 0/10 after In addition to PT Evaluation: 
Therapeutic Activity: (    15 minutes): Therapeutic activities including Bed transfers, Chair transfers, Toilet transfers, Ambulation on level ground, functional standing balance, and education/cuing for safety awareness to improve mobility, strength, balance and coordination. Required minimal   verbal/visual cuing to promote static and dynamic balance in standing. Braces/Orthotics/Lines/Etc:  
· IV 
· O2 Device: Room air Treatment/Session Assessment:   
· Response to Treatment:  See above · Interdisciplinary Collaboration:  
o Physical Therapist 
o Registered Nurse · After treatment position/precautions:  
o Up in chair 
o Bed/Chair-wheels locked 
o Bed in low position 
o Call light within reach 
o Nurse at bedside · Compliance with Program/Exercises: Will assess as treatment progresses. · Recommendations/Intent for next treatment session: \"Next visit will focus on advancements to more challenging activities and reduction in assistance provided\". Total Treatment Duration: PT Patient Time In/Time Out Time In: 2198 Time Out: 1003 Mariana Calles, PT  
    
 
 
 
 Ambulatory

## 2019-08-22 ENCOUNTER — HOSPITAL ENCOUNTER (INPATIENT)
Age: 79
LOS: 2 days | Discharge: HOME HEALTH | DRG: 603 | End: 2019-08-24
Attending: EMERGENCY MEDICINE | Admitting: HOSPITALIST
Payer: MEDICARE

## 2019-08-22 ENCOUNTER — APPOINTMENT (OUTPATIENT)
Dept: GENERAL RADIOLOGY | Age: 79
DRG: 603 | End: 2019-08-22
Attending: EMERGENCY MEDICINE
Payer: MEDICARE

## 2019-08-22 DIAGNOSIS — I95.89 HYPOTENSION DUE TO HYPOVOLEMIA: ICD-10-CM

## 2019-08-22 DIAGNOSIS — E86.1 HYPOTENSION DUE TO HYPOVOLEMIA: ICD-10-CM

## 2019-08-22 DIAGNOSIS — R50.9 FEBRILE ILLNESS, ACUTE: ICD-10-CM

## 2019-08-22 DIAGNOSIS — L03.115 CELLULITIS OF RIGHT LEG: Primary | ICD-10-CM

## 2019-08-22 PROBLEM — N17.9 ACUTE KIDNEY INJURY SUPERIMPOSED ON CKD (HCC): Status: ACTIVE | Noted: 2019-08-22

## 2019-08-22 PROBLEM — N18.9 ACUTE KIDNEY INJURY SUPERIMPOSED ON CKD (HCC): Status: ACTIVE | Noted: 2019-08-22

## 2019-08-22 LAB
ALBUMIN SERPL-MCNC: 3.6 G/DL (ref 3.2–4.6)
ALBUMIN/GLOB SERPL: 1.1 {RATIO} (ref 1.2–3.5)
ALP SERPL-CCNC: 36 U/L (ref 50–136)
ALT SERPL-CCNC: 21 U/L (ref 12–65)
ANION GAP SERPL CALC-SCNC: 8 MMOL/L (ref 7–16)
AST SERPL-CCNC: 21 U/L (ref 15–37)
BASOPHILS # BLD: 0 K/UL (ref 0–0.2)
BASOPHILS NFR BLD: 0 % (ref 0–2)
BILIRUB SERPL-MCNC: 1.1 MG/DL (ref 0.2–1.1)
BUN SERPL-MCNC: 24 MG/DL (ref 8–23)
CALCIUM SERPL-MCNC: 9.1 MG/DL (ref 8.3–10.4)
CHLORIDE SERPL-SCNC: 108 MMOL/L (ref 98–107)
CO2 SERPL-SCNC: 25 MMOL/L (ref 21–32)
CREAT SERPL-MCNC: 1.85 MG/DL (ref 0.6–1)
DIFFERENTIAL METHOD BLD: ABNORMAL
EOSINOPHIL # BLD: 0 K/UL (ref 0–0.8)
EOSINOPHIL NFR BLD: 0 % (ref 0.5–7.8)
ERYTHROCYTE [DISTWIDTH] IN BLOOD BY AUTOMATED COUNT: 14.9 % (ref 11.9–14.6)
GLOBULIN SER CALC-MCNC: 3.4 G/DL (ref 2.3–3.5)
GLUCOSE SERPL-MCNC: 96 MG/DL (ref 65–100)
HCT VFR BLD AUTO: 37 % (ref 35.8–46.3)
HGB BLD-MCNC: 12 G/DL (ref 11.7–15.4)
IMM GRANULOCYTES # BLD AUTO: 0.1 K/UL (ref 0–0.5)
IMM GRANULOCYTES NFR BLD AUTO: 1 % (ref 0–5)
LACTATE BLD-SCNC: 1.5 MMOL/L (ref 0.5–1.9)
LYMPHOCYTES # BLD: 1.7 K/UL (ref 0.5–4.6)
LYMPHOCYTES NFR BLD: 11 % (ref 13–44)
MCH RBC QN AUTO: 31.7 PG (ref 26.1–32.9)
MCHC RBC AUTO-ENTMCNC: 32.4 G/DL (ref 31.4–35)
MCV RBC AUTO: 97.6 FL (ref 79.6–97.8)
MONOCYTES # BLD: 1.5 K/UL (ref 0.1–1.3)
MONOCYTES NFR BLD: 10 % (ref 4–12)
NEUTS SEG # BLD: 12.8 K/UL (ref 1.7–8.2)
NEUTS SEG NFR BLD: 79 % (ref 43–78)
NRBC # BLD: 0 K/UL (ref 0–0.2)
PLATELET # BLD AUTO: 185 K/UL (ref 150–450)
PMV BLD AUTO: 12 FL (ref 9.4–12.3)
POTASSIUM SERPL-SCNC: 3.8 MMOL/L (ref 3.5–5.1)
PROCALCITONIN SERPL-MCNC: 0.3 NG/ML
PROT SERPL-MCNC: 7 G/DL (ref 6.3–8.2)
RBC # BLD AUTO: 3.79 M/UL (ref 4.05–5.2)
SODIUM SERPL-SCNC: 141 MMOL/L (ref 136–145)
WBC # BLD AUTO: 16.2 K/UL (ref 4.3–11.1)

## 2019-08-22 PROCEDURE — 85025 COMPLETE CBC W/AUTO DIFF WBC: CPT

## 2019-08-22 PROCEDURE — 83605 ASSAY OF LACTIC ACID: CPT

## 2019-08-22 PROCEDURE — 87086 URINE CULTURE/COLONY COUNT: CPT

## 2019-08-22 PROCEDURE — 77030011943

## 2019-08-22 PROCEDURE — 71045 X-RAY EXAM CHEST 1 VIEW: CPT

## 2019-08-22 PROCEDURE — 74011250636 HC RX REV CODE- 250/636: Performed by: EMERGENCY MEDICINE

## 2019-08-22 PROCEDURE — 84145 PROCALCITONIN (PCT): CPT

## 2019-08-22 PROCEDURE — 99285 EMERGENCY DEPT VISIT HI MDM: CPT | Performed by: EMERGENCY MEDICINE

## 2019-08-22 PROCEDURE — 96365 THER/PROPH/DIAG IV INF INIT: CPT | Performed by: EMERGENCY MEDICINE

## 2019-08-22 PROCEDURE — 65270000029 HC RM PRIVATE

## 2019-08-22 PROCEDURE — 80053 COMPREHEN METABOLIC PANEL: CPT

## 2019-08-22 PROCEDURE — 87040 BLOOD CULTURE FOR BACTERIA: CPT

## 2019-08-22 PROCEDURE — 81003 URINALYSIS AUTO W/O SCOPE: CPT | Performed by: EMERGENCY MEDICINE

## 2019-08-22 PROCEDURE — 36415 COLL VENOUS BLD VENIPUNCTURE: CPT

## 2019-08-22 PROCEDURE — 77030038269 HC DRN EXT URIN PURWCK BARD -A

## 2019-08-22 PROCEDURE — 74011250637 HC RX REV CODE- 250/637: Performed by: EMERGENCY MEDICINE

## 2019-08-22 PROCEDURE — 51701 INSERT BLADDER CATHETER: CPT | Performed by: EMERGENCY MEDICINE

## 2019-08-22 RX ORDER — HYDROCODONE BITARTRATE AND ACETAMINOPHEN 5; 325 MG/1; MG/1
1 TABLET ORAL
Status: DISCONTINUED | OUTPATIENT
Start: 2019-08-22 | End: 2019-08-24 | Stop reason: HOSPADM

## 2019-08-22 RX ORDER — CLINDAMYCIN PHOSPHATE 900 MG/50ML
900 INJECTION INTRAVENOUS
Status: COMPLETED | OUTPATIENT
Start: 2019-08-22 | End: 2019-08-22

## 2019-08-22 RX ORDER — ACETAMINOPHEN 325 MG/1
650 TABLET ORAL
Status: COMPLETED | OUTPATIENT
Start: 2019-08-22 | End: 2019-08-22

## 2019-08-22 RX ORDER — MORPHINE SULFATE 2 MG/ML
1 INJECTION, SOLUTION INTRAMUSCULAR; INTRAVENOUS
Status: DISCONTINUED | OUTPATIENT
Start: 2019-08-22 | End: 2019-08-24 | Stop reason: HOSPADM

## 2019-08-22 RX ORDER — SODIUM CHLORIDE 0.9 % (FLUSH) 0.9 %
5-40 SYRINGE (ML) INJECTION EVERY 8 HOURS
Status: DISCONTINUED | OUTPATIENT
Start: 2019-08-22 | End: 2019-08-24 | Stop reason: HOSPADM

## 2019-08-22 RX ORDER — ACETAMINOPHEN 325 MG/1
650 TABLET ORAL
Status: DISCONTINUED | OUTPATIENT
Start: 2019-08-22 | End: 2019-08-24 | Stop reason: HOSPADM

## 2019-08-22 RX ORDER — HEPARIN SODIUM 5000 [USP'U]/ML
5000 INJECTION, SOLUTION INTRAVENOUS; SUBCUTANEOUS EVERY 8 HOURS
Status: DISCONTINUED | OUTPATIENT
Start: 2019-08-22 | End: 2019-08-24 | Stop reason: HOSPADM

## 2019-08-22 RX ORDER — SODIUM CHLORIDE 0.9 % (FLUSH) 0.9 %
5-40 SYRINGE (ML) INJECTION AS NEEDED
Status: DISCONTINUED | OUTPATIENT
Start: 2019-08-22 | End: 2019-08-24 | Stop reason: HOSPADM

## 2019-08-22 RX ORDER — SODIUM CHLORIDE, SODIUM LACTATE, POTASSIUM CHLORIDE, CALCIUM CHLORIDE 600; 310; 30; 20 MG/100ML; MG/100ML; MG/100ML; MG/100ML
75 INJECTION, SOLUTION INTRAVENOUS CONTINUOUS
Status: DISCONTINUED | OUTPATIENT
Start: 2019-08-22 | End: 2019-08-24 | Stop reason: HOSPADM

## 2019-08-22 RX ORDER — CLINDAMYCIN PHOSPHATE 600 MG/50ML
600 INJECTION INTRAVENOUS EVERY 8 HOURS
Status: DISCONTINUED | OUTPATIENT
Start: 2019-08-22 | End: 2019-08-24 | Stop reason: HOSPADM

## 2019-08-22 RX ADMIN — ACETAMINOPHEN 650 MG: 325 TABLET, FILM COATED ORAL at 19:21

## 2019-08-22 RX ADMIN — SODIUM CHLORIDE 1000 ML: 900 INJECTION, SOLUTION INTRAVENOUS at 17:25

## 2019-08-22 RX ADMIN — CLINDAMYCIN PHOSPHATE 900 MG: 900 INJECTION, SOLUTION INTRAVENOUS at 17:50

## 2019-08-22 NOTE — ED TRIAGE NOTES
Per ems patient was seen at her family doctor today for a check up where they found she had a low blood pressure in their office and has a wound to her right lower leg that appears to be infected. Patient states she has been having chills and generally not feeling well since last night and feels weak \"all over\" this morning. Patient states wound on right leg has been draining purulent drainage as well and there is redness to lower leg.

## 2019-08-22 NOTE — ED PROVIDER NOTES
79-year-old female suffered skin tear to her right leg 3 days ago when she struck it on a piece of furniture in the house. Over the last 24 hours, redness swelling and pain to the right. Feeling weak and fatigued all over. Denies any diarrhea bleeding. One episode of vomiting. No cough or congestion. No chest abdomen or back pain. History of hypertension asthma hypothyroidism. History of kidney stones with surgery and hysterectomy. Saw her doctor earlier today were blood pressure was 70/40 in the office and sent here. Brought in by EMS. Was given some fluids en route    The history is provided by the patient. Fatigue   This is a new problem. The current episode started yesterday. The problem has been gradually worsening. There was no focality noted. Pertinent negatives include no focal weakness and no slurred speech. Patient reports a subjective fever - was not measured. Associated symptoms include vomiting, headaches and nausea. Pertinent negatives include no shortness of breath, no chest pain, no altered mental status, no confusion and no bladder incontinence. Associated medical issues do not include CVA.         Past Medical History:   Diagnosis Date    Calculus of kidney     Gastric ulcer     2018    GERD (gastroesophageal reflux disease)     Headache(784.0)     Hypercholesterolemia     Hypertension     Primary insomnia 7/17/2017    Thyroid disease        Past Surgical History:   Procedure Laterality Date    HX COLONOSCOPY  Jan 2013    4 polyps, repeat 5 years    HX COLONOSCOPY  7/5/2016    repeat 5 yrs tubular adenoma    HX SALPINGO-OOPHORECTOMY      REMOVAL OF KIDNEY STONE           Family History:   Problem Relation Age of Onset    Hypertension Mother     Arthritis-rheumatoid Mother     High Cholesterol Mother     Broken Bones Mother     Heart Failure Father     Heart Attack Father     Hypertension Brother     High Cholesterol Brother     Arthritis-osteo Brother         Knee  High Cholesterol Brother     Hypertension Brother     Heart Attack Brother     Heart Attack Paternal Grandmother     Heart Attack Paternal Grandfather     Breast Cancer Neg Hx        Social History     Socioeconomic History    Marital status:      Spouse name: Not on file    Number of children: Not on file    Years of education: Not on file    Highest education level: Not on file   Occupational History    Not on file   Social Needs    Financial resource strain: Not on file    Food insecurity:     Worry: Not on file     Inability: Not on file    Transportation needs:     Medical: Not on file     Non-medical: Not on file   Tobacco Use    Smoking status: Never Smoker    Smokeless tobacco: Never Used   Substance and Sexual Activity    Alcohol use: No    Drug use: No    Sexual activity: Not on file   Lifestyle    Physical activity:     Days per week: Not on file     Minutes per session: Not on file    Stress: Not on file   Relationships    Social connections:     Talks on phone: Not on file     Gets together: Not on file     Attends Jew service: Not on file     Active member of club or organization: Not on file     Attends meetings of clubs or organizations: Not on file     Relationship status: Not on file    Intimate partner violence:     Fear of current or ex partner: Not on file     Emotionally abused: Not on file     Physically abused: Not on file     Forced sexual activity: Not on file   Other Topics Concern    Not on file   Social History Narrative    Lives with  who is chronically ill with end stage lung disease and early dementia and depression. 5 children, daughter Nicolasa Rosales and son Sheilah Osler (PharmD) have her [de-identified], sons Isael Pierre and Jim Singh have durable POA         ALLERGIES: Pneumococcal 23-layton ps vaccine    Review of Systems   Constitutional: Positive for chills and fatigue. Negative for fever. Respiratory: Negative for cough and shortness of breath.     Cardiovascular: Negative for chest pain and palpitations. Gastrointestinal: Positive for nausea and vomiting. Negative for abdominal pain and diarrhea. Genitourinary: Negative for bladder incontinence, difficulty urinating, dysuria and flank pain. Musculoskeletal: Positive for myalgias. Negative for back pain and neck pain. Skin: Positive for wound. Negative for color change and rash. Neurological: Positive for headaches. Negative for focal weakness and syncope. Psychiatric/Behavioral: Negative for confusion. All other systems reviewed and are negative. Vitals:    08/22/19 1525   BP: 114/63   Pulse: 88   Resp: 18   Temp: 98.3 °F (36.8 °C)   SpO2: 96%   Weight: 91.2 kg (201 lb)   Height: 5' 4\" (1.626 m)            Physical Exam   Constitutional: She is oriented to person, place, and time. She appears well-developed and well-nourished. No distress. HENT:   Head: Normocephalic and atraumatic. Right Ear: External ear normal.   Left Ear: External ear normal.   Mouth/Throat: Oropharynx is clear and moist. No oropharyngeal exudate. Eyes: Pupils are equal, round, and reactive to light. Conjunctivae and EOM are normal.   Neck: Normal range of motion. Neck supple. Cardiovascular: Normal rate, regular rhythm and intact distal pulses. No murmur heard. Pulmonary/Chest: Breath sounds normal. No respiratory distress. Abdominal: Soft. Bowel sounds are normal. She exhibits no mass. There is no tenderness. There is no rebound and no guarding. No hernia. Neurological: She is alert and oriented to person, place, and time. Gait normal.   Nl speech   Skin: Skin is warm and dry. 3 cm skin tear anterior lateral right lower leg. Minimal drainage. Surrounding erythema warmth and tenderness. No particular edema. Distal neurovascular intact. Psychiatric: She has a normal mood and affect. Her speech is normal.   Nursing note and vitals reviewed.        MDM  Number of Diagnoses or Management Options  Diagnosis management comments: Cellulitis. Check for UTI or sepsis. Check for dehydration or electrolyte abnormalities. Blood pressure much better here with small fluid bolus given by       Amount and/or Complexity of Data Reviewed  Clinical lab tests: ordered and reviewed    Risk of Complications, Morbidity, and/or Mortality  Presenting problems: moderate  Diagnostic procedures: minimal  Management options: low    Patient Progress  Patient progress: stable         Procedures    Results Include:    Recent Results (from the past 24 hour(s))   METABOLIC PANEL, COMPREHENSIVE    Collection Time: 08/22/19  3:27 PM   Result Value Ref Range    Sodium 141 136 - 145 mmol/L    Potassium 3.8 3.5 - 5.1 mmol/L    Chloride 108 (H) 98 - 107 mmol/L    CO2 25 21 - 32 mmol/L    Anion gap 8 7 - 16 mmol/L    Glucose 96 65 - 100 mg/dL    BUN 24 (H) 8 - 23 MG/DL    Creatinine 1.85 (H) 0.6 - 1.0 MG/DL    GFR est AA 34 (L) >60 ml/min/1.73m2    GFR est non-AA 28 (L) >60 ml/min/1.73m2    Calcium 9.1 8.3 - 10.4 MG/DL    Bilirubin, total 1.1 0.2 - 1.1 MG/DL    ALT (SGPT) 21 12 - 65 U/L    AST (SGOT) 21 15 - 37 U/L    Alk.  phosphatase 36 (L) 50 - 136 U/L    Protein, total 7.0 6.3 - 8.2 g/dL    Albumin 3.6 3.2 - 4.6 g/dL    Globulin 3.4 2.3 - 3.5 g/dL    A-G Ratio 1.1 (L) 1.2 - 3.5     PROCALCITONIN    Collection Time: 08/22/19  4:46 PM   Result Value Ref Range    Procalcitonin 0.3 ng/mL   CBC WITH AUTOMATED DIFF    Collection Time: 08/22/19  4:46 PM   Result Value Ref Range    WBC 16.2 (H) 4.3 - 11.1 K/uL    RBC 3.79 (L) 4.05 - 5.2 M/uL    HGB 12.0 11.7 - 15.4 g/dL    HCT 37.0 35.8 - 46.3 %    MCV 97.6 79.6 - 97.8 FL    MCH 31.7 26.1 - 32.9 PG    MCHC 32.4 31.4 - 35.0 g/dL    RDW 14.9 (H) 11.9 - 14.6 %    PLATELET 548 591 - 316 K/uL    MPV 12.0 9.4 - 12.3 FL    ABSOLUTE NRBC 0.00 0.0 - 0.2 K/uL    DF AUTOMATED      NEUTROPHILS 79 (H) 43 - 78 %    LYMPHOCYTES 11 (L) 13 - 44 %    MONOCYTES 10 4.0 - 12.0 %    EOSINOPHILS 0 (L) 0.5 - 7.8 %    BASOPHILS 0 0.0 - 2.0 % IMMATURE GRANULOCYTES 1 0.0 - 5.0 %    ABS. NEUTROPHILS 12.8 (H) 1.7 - 8.2 K/UL    ABS. LYMPHOCYTES 1.7 0.5 - 4.6 K/UL    ABS. MONOCYTES 1.5 (H) 0.1 - 1.3 K/UL    ABS. EOSINOPHILS 0.0 0.0 - 0.8 K/UL    ABS. BASOPHILS 0.0 0.0 - 0.2 K/UL    ABS. IMM. GRANS. 0.1 0.0 - 0.5 K/UL   POC LACTIC ACID    Collection Time: 08/22/19  5:34 PM   Result Value Ref Range    Lactic Acid (POC) 1.50 0.5 - 1.9 mmol/L     Xr Chest Port    Result Date: 8/22/2019  CHEST X-RAY, single portable view  8/22/2019 History: Fever and hypotension. Technique: Single frontal view of the chest. Comparison: Chest x-ray 10/5/2018 Findings: The cardiac silhouette is normal in respect to size. The lungs are expanded without evidence for pneumothorax. No consolidative airspace process or pleural effusion is seen. IMPRESSION: 1. No acute cardiopulmonary process evident on single frontal view of the chest.     Discussed with hospitalist regarding admission. Patient spiked fever to 102.   Blood pressure improved with fluids

## 2019-08-23 ENCOUNTER — APPOINTMENT (OUTPATIENT)
Dept: ULTRASOUND IMAGING | Age: 79
DRG: 603 | End: 2019-08-23
Attending: HOSPITALIST
Payer: MEDICARE

## 2019-08-23 ENCOUNTER — HOME HEALTH ADMISSION (OUTPATIENT)
Dept: HOME HEALTH SERVICES | Facility: HOME HEALTH | Age: 79
End: 2019-08-23

## 2019-08-23 LAB
ANION GAP SERPL CALC-SCNC: 8 MMOL/L (ref 7–16)
BASOPHILS # BLD: 0 K/UL (ref 0–0.2)
BASOPHILS NFR BLD: 0 % (ref 0–2)
BUN SERPL-MCNC: 21 MG/DL (ref 8–23)
CALCIUM SERPL-MCNC: 8.3 MG/DL (ref 8.3–10.4)
CHLORIDE SERPL-SCNC: 109 MMOL/L (ref 98–107)
CO2 SERPL-SCNC: 23 MMOL/L (ref 21–32)
CREAT SERPL-MCNC: 1.43 MG/DL (ref 0.6–1)
DIFFERENTIAL METHOD BLD: ABNORMAL
EOSINOPHIL # BLD: 0 K/UL (ref 0–0.8)
EOSINOPHIL NFR BLD: 0 % (ref 0.5–7.8)
ERYTHROCYTE [DISTWIDTH] IN BLOOD BY AUTOMATED COUNT: 14.9 % (ref 11.9–14.6)
GLUCOSE SERPL-MCNC: 90 MG/DL (ref 65–100)
HCT VFR BLD AUTO: 31.2 % (ref 35.8–46.3)
HGB BLD-MCNC: 10.1 G/DL (ref 11.7–15.4)
IMM GRANULOCYTES # BLD AUTO: 0 K/UL (ref 0–0.5)
IMM GRANULOCYTES NFR BLD AUTO: 0 % (ref 0–5)
LYMPHOCYTES # BLD: 1.4 K/UL (ref 0.5–4.6)
LYMPHOCYTES NFR BLD: 14 % (ref 13–44)
MCH RBC QN AUTO: 31.5 PG (ref 26.1–32.9)
MCHC RBC AUTO-ENTMCNC: 32.4 G/DL (ref 31.4–35)
MCV RBC AUTO: 97.2 FL (ref 79.6–97.8)
MONOCYTES # BLD: 0.9 K/UL (ref 0.1–1.3)
MONOCYTES NFR BLD: 9 % (ref 4–12)
NEUTS SEG # BLD: 7.7 K/UL (ref 1.7–8.2)
NEUTS SEG NFR BLD: 77 % (ref 43–78)
NRBC # BLD: 0 K/UL (ref 0–0.2)
PLATELET # BLD AUTO: 166 K/UL (ref 150–450)
PMV BLD AUTO: 12 FL (ref 9.4–12.3)
POTASSIUM SERPL-SCNC: 3.5 MMOL/L (ref 3.5–5.1)
RBC # BLD AUTO: 3.21 M/UL (ref 4.05–5.2)
SODIUM SERPL-SCNC: 140 MMOL/L (ref 136–145)
WBC # BLD AUTO: 10.1 K/UL (ref 4.3–11.1)

## 2019-08-23 PROCEDURE — 85025 COMPLETE CBC W/AUTO DIFF WBC: CPT

## 2019-08-23 PROCEDURE — 74011000250 HC RX REV CODE- 250: Performed by: HOSPITALIST

## 2019-08-23 PROCEDURE — 97530 THERAPEUTIC ACTIVITIES: CPT

## 2019-08-23 PROCEDURE — 74011250636 HC RX REV CODE- 250/636: Performed by: HOSPITALIST

## 2019-08-23 PROCEDURE — 93971 EXTREMITY STUDY: CPT

## 2019-08-23 PROCEDURE — 77030020255 HC SOL INJ LR 1000ML BG

## 2019-08-23 PROCEDURE — 80048 BASIC METABOLIC PNL TOTAL CA: CPT

## 2019-08-23 PROCEDURE — 97535 SELF CARE MNGMENT TRAINING: CPT

## 2019-08-23 PROCEDURE — 65270000029 HC RM PRIVATE

## 2019-08-23 PROCEDURE — 94640 AIRWAY INHALATION TREATMENT: CPT

## 2019-08-23 PROCEDURE — 94760 N-INVAS EAR/PLS OXIMETRY 1: CPT

## 2019-08-23 PROCEDURE — 36415 COLL VENOUS BLD VENIPUNCTURE: CPT

## 2019-08-23 PROCEDURE — 74011250637 HC RX REV CODE- 250/637: Performed by: HOSPITALIST

## 2019-08-23 PROCEDURE — 97161 PT EVAL LOW COMPLEX 20 MIN: CPT

## 2019-08-23 PROCEDURE — 97165 OT EVAL LOW COMPLEX 30 MIN: CPT

## 2019-08-23 PROCEDURE — 74011250637 HC RX REV CODE- 250/637: Performed by: FAMILY MEDICINE

## 2019-08-23 RX ORDER — GABAPENTIN 100 MG/1
100 CAPSULE ORAL 2 TIMES DAILY
Status: DISCONTINUED | OUTPATIENT
Start: 2019-08-23 | End: 2019-08-24 | Stop reason: HOSPADM

## 2019-08-23 RX ORDER — UREA 10 %
2 LOTION (ML) TOPICAL 2 TIMES DAILY
Status: DISCONTINUED | OUTPATIENT
Start: 2019-08-23 | End: 2019-08-24 | Stop reason: HOSPADM

## 2019-08-23 RX ORDER — BUDESONIDE 0.5 MG/2ML
500 INHALANT ORAL
Status: DISCONTINUED | OUTPATIENT
Start: 2019-08-23 | End: 2019-08-24 | Stop reason: HOSPADM

## 2019-08-23 RX ORDER — TRAZODONE HYDROCHLORIDE 50 MG/1
50 TABLET ORAL
Status: DISCONTINUED | OUTPATIENT
Start: 2019-08-23 | End: 2019-08-23

## 2019-08-23 RX ORDER — TRAZODONE HYDROCHLORIDE 50 MG/1
50 TABLET ORAL
Status: DISCONTINUED | OUTPATIENT
Start: 2019-08-23 | End: 2019-08-24 | Stop reason: HOSPADM

## 2019-08-23 RX ORDER — ALBUTEROL SULFATE 0.83 MG/ML
2.5 SOLUTION RESPIRATORY (INHALATION)
Status: DISCONTINUED | OUTPATIENT
Start: 2019-08-23 | End: 2019-08-24 | Stop reason: HOSPADM

## 2019-08-23 RX ORDER — LEVOTHYROXINE SODIUM 50 UG/1
25 TABLET ORAL
Status: DISCONTINUED | OUTPATIENT
Start: 2019-08-23 | End: 2019-08-24 | Stop reason: HOSPADM

## 2019-08-23 RX ORDER — ATORVASTATIN CALCIUM 40 MG/1
40 TABLET, FILM COATED ORAL DAILY
Status: DISCONTINUED | OUTPATIENT
Start: 2019-08-23 | End: 2019-08-24 | Stop reason: HOSPADM

## 2019-08-23 RX ORDER — LABETALOL HYDROCHLORIDE 5 MG/ML
20 INJECTION, SOLUTION INTRAVENOUS
Status: DISCONTINUED | OUTPATIENT
Start: 2019-08-23 | End: 2019-08-24 | Stop reason: HOSPADM

## 2019-08-23 RX ORDER — POTASSIUM CHLORIDE 20 MEQ/1
20 TABLET, EXTENDED RELEASE ORAL 2 TIMES DAILY
Status: DISCONTINUED | OUTPATIENT
Start: 2019-08-23 | End: 2019-08-24 | Stop reason: HOSPADM

## 2019-08-23 RX ADMIN — GABAPENTIN 100 MG: 100 CAPSULE ORAL at 09:39

## 2019-08-23 RX ADMIN — CLINDAMYCIN PHOSPHATE 600 MG: 600 INJECTION, SOLUTION INTRAVENOUS at 17:07

## 2019-08-23 RX ADMIN — LACTOBACILLUS TAB 2 TABLET: TAB at 11:40

## 2019-08-23 RX ADMIN — CLINDAMYCIN PHOSPHATE 600 MG: 600 INJECTION, SOLUTION INTRAVENOUS at 22:54

## 2019-08-23 RX ADMIN — Medication 10 ML: at 17:11

## 2019-08-23 RX ADMIN — TRAZODONE HYDROCHLORIDE 50 MG: 50 TABLET ORAL at 22:54

## 2019-08-23 RX ADMIN — BUDESONIDE 500 MCG: 0.5 INHALANT RESPIRATORY (INHALATION) at 19:49

## 2019-08-23 RX ADMIN — CLINDAMYCIN PHOSPHATE 600 MG: 600 INJECTION, SOLUTION INTRAVENOUS at 09:38

## 2019-08-23 RX ADMIN — LACTOBACILLUS TAB 2 TABLET: TAB at 17:07

## 2019-08-23 RX ADMIN — Medication 10 ML: at 06:16

## 2019-08-23 RX ADMIN — Medication 10 ML: at 00:32

## 2019-08-23 RX ADMIN — SODIUM CHLORIDE, SODIUM LACTATE, POTASSIUM CHLORIDE, AND CALCIUM CHLORIDE 125 ML/HR: 600; 310; 30; 20 INJECTION, SOLUTION INTRAVENOUS at 00:31

## 2019-08-23 RX ADMIN — POTASSIUM CHLORIDE 20 MEQ: 20 TABLET, EXTENDED RELEASE ORAL at 09:39

## 2019-08-23 RX ADMIN — LEVOTHYROXINE SODIUM 25 MCG: 50 TABLET ORAL at 06:16

## 2019-08-23 RX ADMIN — Medication 10 ML: at 22:55

## 2019-08-23 RX ADMIN — HEPARIN SODIUM 5000 UNITS: 5000 INJECTION INTRAVENOUS; SUBCUTANEOUS at 22:54

## 2019-08-23 RX ADMIN — BUDESONIDE 500 MCG: 0.5 INHALANT RESPIRATORY (INHALATION) at 07:41

## 2019-08-23 RX ADMIN — ACETAMINOPHEN 650 MG: 325 TABLET, FILM COATED ORAL at 22:54

## 2019-08-23 RX ADMIN — HEPARIN SODIUM 5000 UNITS: 5000 INJECTION INTRAVENOUS; SUBCUTANEOUS at 17:07

## 2019-08-23 RX ADMIN — ATORVASTATIN CALCIUM 40 MG: 40 TABLET, FILM COATED ORAL at 09:39

## 2019-08-23 RX ADMIN — HEPARIN SODIUM 5000 UNITS: 5000 INJECTION INTRAVENOUS; SUBCUTANEOUS at 06:15

## 2019-08-23 RX ADMIN — POTASSIUM CHLORIDE 20 MEQ: 20 TABLET, EXTENDED RELEASE ORAL at 17:07

## 2019-08-23 RX ADMIN — HEPARIN SODIUM 5000 UNITS: 5000 INJECTION INTRAVENOUS; SUBCUTANEOUS at 00:31

## 2019-08-23 RX ADMIN — GABAPENTIN 100 MG: 100 CAPSULE ORAL at 17:07

## 2019-08-23 RX ADMIN — CLINDAMYCIN PHOSPHATE 600 MG: 600 INJECTION, SOLUTION INTRAVENOUS at 00:31

## 2019-08-23 NOTE — PROGRESS NOTES
Problem: Falls - Risk of  Goal: *Absence of Falls  Description  Document Cori Guevara Fall Risk and appropriate interventions in the flowsheet.   Outcome: Progressing Towards Goal  Note:   Fall Risk Interventions:

## 2019-08-23 NOTE — WOUND CARE
Pt seen for RLE skin tear. Pt states she was getting out of the shower and rubbed it against a chair. Pt states that she normally wears compression stockings, and has some swelling at all times. Noted blanchable erythema to RLE and warmth and some edema as well. Wound on anterior aspect of RLE with skin flap present, not able to fold skin flap over entire wound bed. Cleansed wound with dermal wound cleanser, covered wound with xeroform gauze covered with 4x4 gauze and wrapped with chintan guaze wrap. Recommend dressing change daily. Pt can continue compression stocking from home if desires. Wound team will continue to follow while in acute care setting.

## 2019-08-23 NOTE — PROGRESS NOTES
Chart screened by  for discharge planning. No needs identified at this time. Please consult  if any new issues arise. Care Management Interventions  PCP Verified by CM: Yes  Mode of Transport at Discharge:  Other (see comment)  Transition of Care Consult (CM Consult): Discharge Planning  Discharge Durable Medical Equipment: No  Physical Therapy Consult: Yes  Occupational Therapy Consult: Yes  Current Support Network: Lives with Spouse  Confirm Follow Up Transport: Family  Plan discussed with Pt/Family/Caregiver: Yes  Freedom of Choice Offered: Yes  Discharge Location  Discharge Placement: Home

## 2019-08-23 NOTE — PROGRESS NOTES
08/22/19 8473   Dual Skin Pressure Injury Assessment   Dual Skin Pressure Injury Assessment WDL   Second Care Provider (Based on [address] [place] [address]) Martha Franklin RN   Skin Integumentary   Skin Integumentary (WDL) X   Skin Color Red; Other (comment)  (scattered bruises)   Skin Condition/Temp Dry;Fragile;Hot; Warm   Skin Integrity Tear   Turgor Non-tenting    Pressure  Injury Documentation No Pressure Injury Noted-Pressure Ulcer Prevention Initiated   Wound Prevention and Protection Methods   Orientation of Wound Prevention Posterior   Location of Wound Prevention Sacrum/Coccyx   Dressing Present  No   Wound Offloading (Prevention Methods) Bed, pressure reduction mattress   Cellulitis to RLE, skin tear noted.

## 2019-08-23 NOTE — PROGRESS NOTES
Progress Note    Patient: Pascale Lloyd MRN: [id number]  SSN: [id number]    YOB: 1940  Age: 70 y.o. Sex: female      Admit Date: 8/22/2019    LOS: 1 day     Subjective:     Patient states pain, swelling, redness has improved. No acute events since admission. Objective:     Vitals:    08/23/19 0742 08/23/19 0930 08/23/19 1100 08/23/19 1247   BP:  125/64 103/53 128/74   Pulse:    89   Resp:    18   Temp:    99.5 °F (37.5 °C)   SpO2: 94% 93%  91%   Weight:       Height:            Intake and Output:  Current Shift: No intake/output data recorded. Last three shifts: No intake/output data recorded. Physical Exam:   General:          Well nourished. Alert. Awake oriented x3,    Eyes:               Normal sclerae. Extraocular movements intact. HENT:             Normocephalic, atraumatic.  dry mucous membranes  CV:                  RRR. No m/r/g. Lungs:             CTAB. No wheezing, rhonchi, or rales. Abdomen:        Soft, nontender, nondistended. Extremities:     Right leg, red, swollen, warm, tender, skin tear 2x3 cm in right lower leg,   Neurologic:      AAO X 3, CN II-XII grossly intact. No focal neurological deficits, Sensation intact. Skin:                No rashes or jaundice. Normal coloration  Psych:             Normal mood and affect. Lab/Data Review: All lab results for the last 24 hours reviewed. Assessment: This is a 69y Female with PMHx significant for HTN, Dyslipidemia, Hypothyroidism presented to the ER from PCP office today via EMS because of fever, chills and hypotension with BP 70/40. Pt received IV fluids en route. Pt states that she had abrasion of her right leg 3 days ago from minor trauma at home. Since last night, pt started having fever, chills and pain, redness in her right leg. Pain 6-8/10 in severity, sharp, pain in the right leg. She had previous history of cellulitis to her left leg. Tmax 102 F this evening.  Pt states she has nausea and one episode of clear emesis, non bloody. She feels weak and tired. In ED, febrile with Tmax of 100.8F, CBC with leukocytosis 16, BMP with Cr 1.85, (worse from baseline CKD 3) lactic acid within normal limits. 1 liter IV fluids. Blood cultures x2 NGTD. Continued on clindamycin IV. Improving on hospital day 1. Plan:     Cellulitis of Right leg:  Clindamycin, IV fluids. Blood cultures NGTD     SIRS criteria: from cellulitis  Fever, leukocytosis, Procalcitonin is 0.3 Lactic acid within normal limits. Blood cultures x2,    CXR negative, U/A leukocyte esterase -small, urine culture pending  Blood cultures NGRD     TIARRA on CKD 3: Prerenal   Improving with IV fluids. Repeat BMP in am.      HTN:  Hold anti hypertensives for now as initial BP was very low.     Hypothyroidism:  Continue Levothyroxine.      Discharge planning:  Med surg inpt, anticipate inpt hospital stay for atleast 48 hours.      DVT ppx: Heparin sc  Code status:  Full     DPOA: Daughter Darnell Mazariegos [phone]  Pts sonKrupa Settlejudy [phone]  Estimated LOS:  Greater than 2 midnights    Signed By: Can Ramirez MD     August 23, 2019

## 2019-08-23 NOTE — PROGRESS NOTES
Problem: Self Care Deficits Care Plan (Adult)  Goal: *Acute Goals and Plan of Care (Insert Text)  Description  1. Patient will complete lower body bathing and dressing with supervision and adaptive equipment as needed. 2. Patient will complete toileting with supervision. 3. Patient will tolerate 30 minutes of OT treatment with 2-3 rest breaks to increase activity tolerance for ADLs. 4. Patient will complete functional transfers with supervision and adaptive equipment as needed. 5. Patient will complete functional mobility for household distances with supervision and good safety awareness. Timeframe: 7 visits      Outcome: Progressing Towards Goal     OCCUPATIONAL THERAPY: Initial Assessment, Daily Note and AM 8/23/2019  INPATIENT: OT Visit Days: 1  [place] / [place] / Product Type: Medicare /      NAME/AGE/GENDER: Cathleen Navarrete is a 70 y.o. female   PRIMARY DIAGNOSIS:  Cellulitis of right leg [M45.113]  Acute kidney injury superimposed on CKD ([facility] [address] [N17.9, N18.9] Cellulitis of right leg   Cellulitis of right leg          ICD-10: Treatment Diagnosis:    · Generalized Muscle Weakness (M62.81)  · Other lack of cordination (R27.8)  · Dizziness and Giddiness (R42)   Precautions/Allergies:    Pneumococcal 23-layton ps vaccine      ASSESSMENT:     Ms. Harjinder Weiss was admitted with cellulitis of her R LE after brushing up against an upholstered chair. Pt lives with her spouse in a two story home with a walk-in shower and pt is independent with ADL/functional mobility at baseline. Pt reports her  is debilitated and that she has to assist with his care. They have a caregiver that comes into the home and assists with household chores and cooking breakfast. Pt denies any falls over the past 6 months. This session, pt presented sitting up in the chair. Pt is alert and oriented x 4. Pt demonstrated deficits in dizziness, fatigue, swelling, and impaired balance impacting ADLs.  Pt has been experiencing some hypotension. Pt's BP was 103/53 in sitting and dropped to 85/54 in standing. Pt was able to take a few steps forwards and backwards with minimal assistance and use of a walker. Pt did report feeling dizzy and was advised to return to sitting. Pt's BP was 101/43 post-activity. Pt agreeable to participating in ADL this am to including total body bathing and dressing with minimal assistance. Pt tolerated it well from sitting and standing positions. Pt does report that during activity that she feels much weaker. At the end of the session, pt was left up in the chair with needs in reach. Pt presented below functional baseline and would benefit from skilled OT services to address deficits. This section established at most recent assessment   PROBLEM LIST (Impairments causing functional limitations):  1. Decreased Strength  2. Decreased ADL/Functional Activities  3. Decreased Transfer Abilities  4. Decreased Ambulation Ability/Technique  5. Decreased Balance  6. Increased Pain  7. Decreased Activity Tolerance  8. Decreased Flexibility/Joint Mobility  9. Edema/Girth  10. Decreased Skin Integrity/Hygeine  11. Decreased [place] with Home Exercise Program  12. Decreased Cognition   INTERVENTIONS PLANNED: (Benefits and precautions of occupational therapy have been discussed with the patient.)  1. Activities of daily living training  2. Adaptive equipment training  3. Balance training  4. Clothing management  5. Donning&doffing training  6. Neuromuscular re-eduation  7. Therapeutic activity  8. Therapeutic exercise     TREATMENT PLAN: Frequency/Duration: Follow patient 3 times per week to address above goals. Rehabilitation Potential For Stated Goals: Good     REHAB RECOMMENDATIONS (at time of discharge pending progress):    Placement: It is my opinion, based on this patient's performance to date, that Ms. Aisha Spence may benefit from [address] after discharge due to the functional deficits listed above that are likely to improve with skilled rehabilitation because he/she has multiple medical issues that affect his/her functional mobility in the community. Equipment:    TBD               OCCUPATIONAL PROFILE AND HISTORY:   History of Present Injury/Illness (Reason for Referral):  See H&P  Past Medical History/Comorbidities:   Ms. Domenick Leventhal  has a past medical history of [place] of kidney, Gastric ulcer, GERD (gastroesophageal reflux disease), Headache(784.0), Hypercholesterolemia, Hypertension, Primary insomnia (7/17/2017), and Thyroid disease. Ms. Domenick Leventhal  has a past surgical history that includes hx salpingo-oophorectomy; pr removal of kidney stone; hx colonoscopy (Jan 2013); and hx colonoscopy (7/5/2016). Social History/Living Environment:   Home Environment: Private residence  # Steps to Enter: 4  Rails to Enter: Yes  Hand Rails : Bilateral  Wheelchair Ramp: No  One/Two Story Residence: Two story, live on 1st floor  Lift Chair Available: No  Living Alone: No  Support Systems: Child(amrik), Spouse/Significant Other/Partner  Patient Expects to be Discharged to[de-identified] Private residence  Current DME Used/Available at Home: Shower chair, Walker, rolling  Tub or Shower Type: Tub/Shower combination  Prior Level of Function/Work/Activity:  Pt lives with her spouse in a two story home with a walk-in shower and pt is independent with ADL/functional mobility at baseline. Pt reports her  is debilitated and that she has to assist with his care. They have a caregiver that comes into the home and assists with household chores and cooking breakfast. Pt denies any falls over the past 6 months. Personal Factors:           Other factors that influence how disability is experienced by the patient:  multiple co-morbidities     Number of Personal Factors/Comorbidities that affect the Plan of Care: Expanded review of therapy/medical records (1-2):  MODERATE COMPLEXITY   ASSESSMENT OF OCCUPATIONAL PERFORMANCE[de-identified]   Activities of Daily Living:   Basic ADLs (From Assessment) Complex ADLs (From Assessment)   Feeding: Setup  Oral Facial Hygiene/Grooming: Contact guard assistance  Bathing: Minimum assistance  Upper Body Dressing: Stand-by assistance  Lower Body Dressing: Minimum assistance  Toileting: Minimum assistance Instrumental ADL  Meal Preparation: Moderate assistance  Homemaking: Maximum assistance   Grooming/Bathing/Dressing Activities of Daily Living   Grooming  Washing Face: Set-up     Upper Body Bathing  Bathing Assistance: Stand-by assistance  Position Performed: Seated in chair  Cues: Tactile cues provided;Verbal cues provided;Visual cues provided     Lower Body Bathing  Bathing Assistance: Minimum assistance  Perineal  : Minimum assistance  Position Performed: Standing  Cues: Tactile cues provided;Verbal cues provided;Visual cues provided  Adaptive Equipment: Walker  Lower Body : Minimum assistance  Position Performed: Seated in chair  Cues: Tactile cues provided;Verbal cues provided;Visual cues provided     Upper Body [address]: Supervision       Bed/Mat Mobility  Supine to Sit: Minimum assistance  Sit to Stand: Contact guard assistance  Stand to Sit: Contact guard assistance  Bed to Chair: Minimum assistance  Scooting: Stand-by assistance     Most Recent Physical Functioning:   Gross Assessment:  AROM: Within functional limits  Strength: Generally decreased, functional  Coordination: Generally decreased, functional               Posture:  Posture (WDL): Exceptions to WDL  Posture Assessment:  Forward head, Rounded shoulders  Balance:  Sitting: Intact  Standing: Impaired  Standing - Static: Fair  Standing - Dynamic : Fair Bed Mobility:  Supine to Sit: Minimum assistance  Scooting: Stand-by assistance  Wheelchair Mobility:     Transfers:  Sit to Stand: Contact guard assistance  Stand to Sit: Contact guard assistance  Bed to Chair: Minimum assistance  Interventions: Verbal cues;Safety awareness training; Tactile cues  Duration: 10 Minutes            Patient Vitals for the past 6 hrs:   BP BP Patient Position SpO2   19 0742   94 %   19 0930 125/64 Sitting 93 %   19 1100 103/53 Sitting        Mental Status  Neurologic State: Alert  Orientation Level: Oriented X4                          Physical Skills Involved:  1. Range of Motion  2. Balance  3. Strength  4. Activity Tolerance  5. Pain (acute)  6. Edema  7. Skin Integrity Cognitive Skills Affected (resulting in the inability to perform in a timely and safe manner):  1. None  Psychosocial Skills Affected:  1. None    Number of elements that affect the Plan of Care: 5+:  HIGH COMPLEXITY   CLINICAL DECISION MAKIN[address] [place] [id number] AM-PAC 6 Clicks   Daily Activity Inpatient Short Form  How much help from another person does the patient currently need. .. Total A Lot A Little None   1. Putting on and taking off regular lower body clothing? ? 1   ? 2   ? 3   ? 4   2. Bathing (including washing, rinsing, drying)? ? 1   ? 2   ? 3   ? 4   3. Toileting, which includes using toilet, bedpan or urinal?   ? 1   ? 2   ? 3   ? 4   4. Putting on and taking off regular upper body clothing? ? 1   ? 2   ? 3   ? 4   5. Taking care of personal grooming such as brushing teeth? ? 1   ? 2   ? 3   ? 4   6. Eating meals? ? 1   ? 2   ? 3   ? 4   © , Trustees of [address] [place], [place] [id number], under license to [employer]. All rights reserved      Score:  Initial: 18 Most Recent: X (Date: -- )    Interpretation of Tool:  Represents activities that are increasingly more difficult (i.e. Bed mobility, Transfers, Gait). Medical Necessity:     · Patient demonstrates good  ·  rehab potential due to higher previous functional level. Reason for Services/Other Comments:  · Patient continues to require skilled intervention due to decreased independence with ADL/functional transfers   · .    Use of outcome tool(s) and clinical judgement create a POC that gives a: LOW COMPLEXITY         TREATMENT:   (In addition to Assessment/Re-Assessment sessions the following treatments were rendered)     Pre-treatment Symptoms/Complaints:    Pain: Initial:   Pain Intensity 1: 0  2/10 in the R LE  Post Session:  2/10     Self Care: (25): Procedure(s) (per grid) utilized to improve and/or restore self-care/home management as related to dressing, bathing and grooming. Required minimal visual, verbal and tactile cueing to facilitate activities of daily living skills and compensatory activities. Braces/Orthotics/Lines/Etc:   · IV  · O2 Device: Room air  Treatment/Session Assessment:    · Response to Treatment:  Pt tolerated with reported fatigue. · Interdisciplinary Collaboration:   o Occupational Therapist  o Registered Nurse  · After treatment position/precautions:   o Up in chair  o Bed/Chair-wheels locked  o Call light within reach  o RN notified   · Compliance with Program/Exercises: Will assess as treatment progresses. · Recommendations/Intent for next treatment session: \"Next visit will focus on advancements to more challenging activities and reduction in assistance provided\".   Total Treatment Duration:  OT Patient Time In/Time Out  Time In: 1055  Time Out: [address], OT

## 2019-08-23 NOTE — H&P
Hospitalist Note     Admit Date:  2019  4:41 PM   Name:  Bard Fox   Age:  70 y.o.  :  1940   MRN:  [id number]   PCP:  Samanta Williamson MD  Treatment Team: Attending Provider: Karolina Woodson MD; Primary Nurse: Delbert Linder    HPI/Subjective:   Chief complaint fever, chills, right leg pain,     This is a 69y Female with PMHx significant for HTN, Dyslipidemia, Hypothyroidism presented to the ER from PCP office today via EMS because of fever, chills and hypotension with BP 70/40. Pt received IV fluids en route. Pt states that she had abrasion of her right leg 3 days ago from minor trauma at home. Since last night, pt started having fever, chills and pain, redness in her right leg. Pain 6-8/10 in severity, sharp, pain in the right leg. She had previous history of cellulitis to her left leg. Tmax 102 F this evening. Pt states she has nausea and one episode of clear emesis, non bloody, this morning. She feels weak and tired. No cough, no shortness of breath. No chest pain. No palpitations. No urinary complaints including dysuria, urgency, frequency of urination. No constipation. No diarrhea. No abdominal pain. ER course:   Febrile with Tmax of 100.8F, CBC with leukocytosis 16, BMP with Cr 1.85, (worse from baseline CKD 3) lactic acid within normal limits. 1 liter IV fluids. Blood cultures x2, IV clindamycin. U/A and urine culture sent,  Admitted for further evaluation and management of Cellulitis of right leg. 10 systems reviewed and negative except as noted in HPI.   Past Medical History:   Diagnosis Date    Calculus of kidney     Gastric ulcer         GERD (gastroesophageal reflux disease)     Headache(784.0)     Hypercholesterolemia     Hypertension     Primary insomnia 2017    Thyroid disease       Past Surgical History:   Procedure Laterality Date    HX COLONOSCOPY  2013    4 polyps, repeat 5 years    HX COLONOSCOPY  2016    repeat 5 yrs tubular adenoma    HX SALPINGO-OOPHORECTOMY      REMOVAL OF KIDNEY STONE        Allergies   Allergen Reactions    Pneumococcal 23-Janell Ps Vaccine Other (comments)     fever  Other reaction(s): Other (comments)  fever      Social History     Tobacco Use    Smoking status: Never Smoker    Smokeless tobacco: Never Used   Substance Use Topics    Alcohol use: No      Family History   Problem Relation Age of Onset    Hypertension Mother     Arthritis-rheumatoid Mother     High Cholesterol Mother     Broken Bones Mother     Heart Failure Father     Heart Attack Father     Hypertension Brother     High Cholesterol Brother     Arthritis-osteo Brother         Knee    High Cholesterol Brother     Hypertension Brother     Heart Attack Brother     Heart Attack Paternal Grandmother     Heart Attack Paternal Grandfather     Breast Cancer Neg Hx       Immunization History   Administered Date(s) Administered    Influenza High Dose Vaccine PF 09/08/2016, 11/28/2017    Influenza Vaccine 10/10/2013, 10/09/2014    Influenza Vaccine (Quad) PF 09/14/2018    Influenza Vaccine PF 10/26/2015    Influenza Vaccine Split 10/29/2012    Pneumococcal Polysaccharide (PPSV-23) 06/28/2007, 08/27/2007    TB Skin Test (PPD) Intradermal 10/12/2018    TDAP Vaccine 04/12/2012, 10/29/2012    Tdap 04/12/2012, 10/29/2012    Zoster 10/04/2011    Zoster Vaccine, Live 10/04/2011     PTA Medications:  Prior to Admission Medications   Prescriptions Last Dose Informant Patient Reported? Taking? CALCIUM CITRATE + PO   Yes Yes   Sig: take 1 Tab by mouth two (2) times a day. FLOVENT  mcg/actuation inhaler   Yes Yes   Sig: Take 2 Puffs by inhalation two (2) times a day. acetaminophen (TYLENOL ARTHRITIS PAIN) 650 mg CR tablet   Yes Yes   Sig: Take 650 mg by mouth every eight (8) hours as needed (pain).    albuterol (VENTOLIN HFA) 90 mcg/actuation inhaler   Yes Yes   Sig: Take 2 Puffs by inhalation every six (6) hours as needed for Wheezing or Shortness of Breath. atorvastatin (LIPITOR) 40 mg tablet   No Yes   Sig: Take 1 Tab by mouth daily. gabapentin (NEURONTIN) 100 mg capsule   No Yes   Sig: Take 1 Cap by mouth two (2) times a day. levothyroxine (SYNTHROID) 25 mcg tablet   No Yes   Sig: Take 1 Tab by mouth every morning. ondansetron (ZOFRAN ODT) 4 mg disintegrating tablet   No Yes   Sig: Take 1 Tab by mouth every eight (8) hours as needed for Nausea. potassium chloride (K-DUR, KLOR-CON) 20 mEq tablet   No Yes   Sig: TAKE 1 TABLET BY MOUTH 2 TIMES A DAY   traZODone (DESYREL) 50 mg tablet   No Yes   Sig: Take 1 Tab by mouth nightly. Facility-Administered Medications: None       Objective:     Patient Vitals for the past 24 hrs:   Temp Pulse Resp BP SpO2   08/22/19 2318 99.1 °F (37.3 °C) 80 18 116/60 91 %   08/22/19 2200 99.5 °F (37.5 °C) 81 16 134/73 95 %   08/22/19 2131 (!) 100.6 °F (38.1 °C) 83 18 148/64 96 %   08/22/19 1924     94 %   08/22/19 1921 (!) 100.8 °F (38.2 °C) 83 18 123/59 94 %   08/22/19 1859  81  135/64 93 %   08/22/19 1829  78 18 142/65 95 %   08/22/19 1813  78 18 144/65 94 %   08/22/19 1759  82 16 142/65 94 %   08/22/19 1744  79  147/65 94 %   08/22/19 1729  77  142/66 95 %   08/22/19 1525 98.3 °F (36.8 °C) 88 18 114/63 96 %     Oxygen Therapy  O2 Sat (%): 91 % (08/22/19 2318)  Pulse via Oximetry: 83 beats per minute (08/22/19 1921)  O2 Device: Room air (08/22/19 2215)    No intake or output data in the 24 hours ending 08/23/19 0028    *Note that automatically entered I/Os may not be accurate; dependent on patient compliance with collection and accurate  by assistants. Physical Exam:  General:    Well nourished. Alert. Awake oriented x3,    Eyes:   Normal sclerae. Extraocular movements intact. HENT:  Normocephalic, atraumatic.  dry mucous membranes  CV:   RRR. No m/r/g. Lungs:  CTAB. No wheezing, rhonchi, or rales. Abdomen: Soft, nontender, nondistended.    Extremities: Right leg, red, swollen, warm, tender, skin abrasion 2x3 cm in right lower leg,   Neurologic: AAO X 3, CN II-XII grossly intact. No focal neurological deficits, Sensation intact. Skin:     No rashes or jaundice. Normal coloration  Psych:  Normal mood and affect. I reviewed the labs, imaging, EKGs, telemetry, and other studies done this admission. Data Review:   Recent Results (from the past 24 hour(s))   METABOLIC PANEL, COMPREHENSIVE    Collection Time: 08/22/19  3:27 PM   Result Value Ref Range    Sodium 141 136 - 145 mmol/L    Potassium 3.8 3.5 - 5.1 mmol/L    Chloride 108 (H) 98 - 107 mmol/L    CO2 25 21 - 32 mmol/L    Anion gap 8 7 - 16 mmol/L    Glucose 96 65 - 100 mg/dL    BUN 24 (H) 8 - 23 MG/DL    Creatinine 1.85 (H) 0.6 - 1.0 MG/DL    GFR est AA 34 (L) >60 ml/min/1.73m2    GFR est non-AA 28 (L) >60 ml/min/1.73m2    Calcium 9.1 8.3 - 10.4 MG/DL    Bilirubin, total 1.1 0.2 - 1.1 MG/DL    ALT (SGPT) 21 12 - 65 U/L    AST (SGOT) 21 15 - 37 U/L    Alk. phosphatase 36 (L) 50 - 136 U/L    Protein, total 7.0 6.3 - 8.2 g/dL    Albumin 3.6 3.2 - 4.6 g/dL    Globulin 3.4 2.3 - 3.5 g/dL    A-G Ratio 1.1 (L) 1.2 - 3.5     PROCALCITONIN    Collection Time: 08/22/19  4:46 PM   Result Value Ref Range    Procalcitonin 0.3 ng/mL   CBC WITH AUTOMATED DIFF    Collection Time: 08/22/19  4:46 PM   Result Value Ref Range    WBC 16.2 (H) 4.3 - 11.1 K/uL    RBC 3.79 (L) 4.05 - 5.2 M/uL    HGB 12.0 11.7 - 15.4 g/dL    HCT 37.0 35.8 - 46.3 %    MCV 97.6 79.6 - 97.8 FL    MCH 31.7 26.1 - 32.9 PG    MCHC 32.4 31.4 - 35.0 g/dL    RDW 14.9 (H) 11.9 - 14.6 %    PLATELET 699 591 - 168 K/uL    MPV 12.0 9.4 - 12.3 FL    ABSOLUTE NRBC 0.00 0.0 - 0.2 K/uL    DF AUTOMATED      NEUTROPHILS 79 (H) 43 - 78 %    LYMPHOCYTES 11 (L) 13 - 44 %    MONOCYTES 10 4.0 - 12.0 %    EOSINOPHILS 0 (L) 0.5 - 7.8 %    BASOPHILS 0 0.0 - 2.0 %    IMMATURE GRANULOCYTES 1 0.0 - 5.0 %    ABS. NEUTROPHILS 12.8 (H) 1.7 - 8.2 K/UL    ABS.  LYMPHOCYTES 1.7 0.5 - 4.6 K/UL ABS. MONOCYTES 1.5 (H) 0.1 - 1.3 K/UL    ABS. EOSINOPHILS 0.0 0.0 - 0.8 K/UL    ABS. BASOPHILS 0.0 0.0 - 0.2 K/UL    ABS. IMM. GRANS. 0.1 0.0 - 0.5 K/UL   POC LACTIC ACID    Collection Time: 08/22/19  5:34 PM   Result Value Ref Range    Lactic Acid (POC) 1.50 0.5 - 1.9 mmol/L       All Micro Results     Procedure Component Value Units Date/Time    CULTURE, URINE [[id number]] Collected:  08/22/19 2030    Order Status:  Completed Specimen:  Urine from Clean catch Updated:  08/22/19 2158    CULTURE, BLOOD [[id number]] Collected:  08/22/19 1728    Order Status:  Completed Specimen:  Blood Updated:  08/22/19 1814    CULTURE, BLOOD [[id number]] Collected:  08/22/19 1728    Order Status:  Completed Specimen:  Blood Updated:  08/22/19 1814          Current Facility-Administered Medications   Medication Dose Route Frequency    sodium chloride (NS) flush 5-40 mL  5-40 mL IntraVENous Q8H    sodium chloride (NS) flush 5-40 mL  5-40 mL IntraVENous PRN    lactated Ringers infusion  125 mL/hr IntraVENous CONTINUOUS    acetaminophen (TYLENOL) tablet 650 mg  650 mg Oral Q4H PRN    HYDROcodone-acetaminophen (NORCO) 5-325 mg per tablet 1 Tab  1 Tab Oral Q4H PRN    morphine injection 1 mg  1 mg IntraVENous Q4H PRN    heparin (porcine) injection 5,000 Units  5,000 Units SubCUTAneous Q8H    clindamycin (CLEOCIN) 600mg D5W 50mL IVPB (premix)  600 mg IntraVENous Q8H       Other Studies:  Xr Chest Port    Result Date: 8/22/2019  CHEST X-RAY, single portable view  8/22/2019 History: Fever and hypotension. Technique: Single frontal view of the chest. Comparison: Chest x-ray 10/5/2018 Findings: The cardiac silhouette is normal in respect to size. The lungs are expanded without evidence for pneumothorax. No consolidative airspace process or pleural effusion is seen. IMPRESSION: 1.   No acute cardiopulmonary process evident on single frontal view of the chest.       Assessment and Plan:     Hospital Problems as of 8/23/2019 Date Reviewed: 8/1/2018          Codes Class Noted - Resolved POA    * (Principal) Cellulitis of right leg ICD-10-CM: L03.115  ICD-9-CM: 682.6  8/22/2019 - Present Unknown        Acute kidney injury superimposed on CKD ([facility] [address] ICD-10-CM: N17.9, N18.9  ICD-9-CM: 866.00, 585.9  8/22/2019 - Present Unknown        HTN (hypertension) ICD-10-CM: I10  ICD-9-CM: 401.9  8/16/2012 - Present Yes        Hyperlipidemia ICD-10-CM: E78.5  ICD-9-CM: 272.4  8/16/2012 - Present Yes    Overview Addendum 10/28/2012  6:29 PM by Zaira Gilbert MD     Calcium score of 140, Goal LDL under 130             Hypothyroidism ICD-10-CM: E03.9  ICD-9-CM: 244.9  8/16/2012 - Present Yes        GERD (gastroesophageal reflux disease) ICD-10-CM: K21.9  ICD-9-CM: 530.81  8/16/2012 - Present     Overview Addendum 7/29/2014  2:30 PM by Zaira Gilbert MD     Previous dilatation stricture, EGD Jan 2013 showed some mild esophagitis                   Plan: This is  a 69y female with:    Cellulitis of Right leg:  Clindamycin, IV fluids. Blood cultures pending,    SIRS criteria: from cellulitis  Fever, leukocytosis, Procalcitonin is 0.3 Lactic acid within normal limits. Blood cultures x2,    CXR negative, U/A leukocyte esterase -small, urine culture pending,     TIARRA on CKD 3: Prerenal   Cr is 1.85 continue hydration with IV fluids. Repeat BMP in am.     HTN:  Hold anti hypertensives for now as initial BP was very low. Hypothyroidism:  Continue Levothyroxine. Discharge planning:  Med surg inpt, anticipate inpt hospital stay for atleast 48 hours.      DVT ppx: Heparin sc  Code status:  Full    DPOA: Daughter Mia Drummond [phone]  Pts sonAnyi [phone]  Estimated LOS:  Greater than 2 midnights  Risk:  high    Signed:  Bashir Lisa MD

## 2019-08-23 NOTE — PROGRESS NOTES
[facility]  Face to Face Encounter    Patients Name: John Castanon    YOB: 1940    Ordering Physician: Dr. Hawa Lucas    Primary Diagnosis: Cellulitis of right leg [V27.038]  Acute kidney injury superimposed on CKD [facility]) [N17.9, N18.9]    Date of Face to Face:   8/23/2019                                  Face to Face Encounter findings are related to primary reason for home care:   yes. 1. I certify that the patient needs intermittent care as follows: skilled nursing care:  skilled observation/assessment, patient education  physical therapy: strengthening, stretching/ROM, transfer training, gait/stair training, balance training and pt/caregiver education  occupational therapy:  ADL safety (ie. cooking, bathing, dressing), ROM and pt/caregiver education    2. I certify that this patient is homebound, that is: 1) patient requires the use of a  device, special transportation, or assistance of another to leave the home; or 2) patient's condition makes leaving the home medically contraindicated; and 3) patient has a normal inability to leave the home and leaving the home requires considerable and taxing effort. Patient may leave the home for infrequent and short duration for medical reasons, and occasional absences for non-medical reasons. Homebound status is due to the following functional limitations: Patient with strength deficits limiting the performance of all ADL's without caregiver assistance or the use of an assistive device. 3. I certify that this patient is under my care and that I, or a nurse practitioner or  [id number], or clinical nurse specialist, or certified nurse midwife, working with me, had a Face-to-Face Encounter that meets the physician Face-to-Face Encounter requirements.   The following are the clinical findings from the [address] [place] [address] encounter that support the need for skilled services and is a summary of the encounter: see hospital chart    See summary of the patient's illness      Jessica Duran, ALFREDO  8/23/2019      THE FOLLOWING TO BE COMPLETED BY THE COMMUNITY PHYSICIAN:    I concur with the findings described above from the F2F encounter that this patient is homebound and in need of a skilled service.     Certifying Physician: _____________________________________      Printed Certifying Physician Name: _____________________________________    Date: _________________

## 2019-08-23 NOTE — PROGRESS NOTES
Visit by spiritual volunteer.     Francisco Moses, staff Maycol luna 08, [address]  /   [email or web address]

## 2019-08-23 NOTE — PROGRESS NOTES
Problem: Mobility Impaired (Adult and Pediatric)  Goal: *Acute Goals and Plan of Care (Insert Text)  Description  LTG:  (1.)Ms. Nadine Horner will move from supine to sit and sit to supine, scoot up and down and roll side to side INDEPENDENTLY with bed flat within 7 treatment day(s). (2.)Ms. Nadine Horner will transfer from bed to chair and chair to bed INDEPENDENTLY within 7 treatment day(s). (3.)Ms. Nadine Horner will ambulate with MODIFIED INDEPENDENCE for 250+ feet with the least restrictive device within 7 treatment day(s). (4.)Ms. Nadine Horner will perform exercises per HEP independently for 10+ minutes to improve strength and mobility within 7 days. (5.)Ms. Nadine Horner will ascend and descend 4 steps with SUPERVISION using handrail(s) within 7 days. ________________________________________________________________________________________________   Outcome: Progressing Towards Goal     PHYSICAL THERAPY: Initial Assessment and AM 8/23/2019  INPATIENT: PT Visit Days : 1  [place] / [place] PART [place] B / Product Type: Medicare /       NAME/AGE/GENDER: Aliya Carpio is a 70 y.o. female   PRIMARY DIAGNOSIS: Cellulitis of right leg [Q69.456]  Acute kidney injury superimposed on CKD ([facility] [N17.9, N18.9] Cellulitis of right leg   Cellulitis of right leg          ICD-10: Treatment Diagnosis:    Generalized Muscle Weakness (M62.81)  Difficulty in walking, Not elsewhere classified (R26.2)  Other abnormalities of gait and mobility (R26.89)   Precaution/Allergies:  Pneumococcal 23-layton ps vaccine      ASSESSMENT:     Ms. Nadine Horner is a 70year old female admitted from home for sepsis r/t cellulitis of R LE. She lives with  and is independent at baseline with mobility and ambulation without use of any DME, however used walker starting yesterday due to right LE pain, generalized weakness/unsteadiness, and dizziness.  She presents coming back from ultrasound; assisted from stretcher to bed in room with min handheld assist. C/o dizziness during mobility. Seated /64 with O2 sats 92-93% on room air. Took short rest break for interview and LE assessment. Stood from edge of bed for BP check- 89/58 in standing with pt only mildly symptomatic and reports biggest c/o is weakness. Ambulates 12 ft in room and to bathroom with min handheld assist, one episode of loss of balance in bathroom requiring mod assist. Performs transfers and seated/standing activities with close CGA and cueing for safety. Up to chair after activity for another rest break, then performs below LE exercises with good participation. Encouraged to be OOB to chair with legs elevated and continue exercises during the day. Abbey Lynne is functioning below baseline at this time with increased weakness, balance deficits, dizziness, orthostatic hypotension, and right LE pain d/t cellulitis. She will benefit from continued PT during hospital stay to address deficits/maximize independence with mobility. Dc needs TBD pending progress, hopefully home at [place] with  PT. This section established at most recent assessment   PROBLEM LIST (Impairments causing functional limitations):  Decreased Strength  Decreased Transfer Abilities  Decreased Ambulation Ability/Technique  Decreased Balance  Increased Pain  Decreased Activity Tolerance  Decreased Skin Integrity/Hygeine   INTERVENTIONS PLANNED: (Benefits and precautions of physical therapy have been discussed with the patient.)  Balance Exercise  Bed Mobility  Gait Training  Home Exercise Program (HEP)  Therapeutic Activites  Therapeutic Exercise/Strengthening  Transfer Training     TREATMENT PLAN: Frequency/Duration: 3 times a week for duration of hospital stay  Rehabilitation Potential For Stated Goals: Good     REHAB RECOMMENDATIONS (at time of discharge pending progress):    Placement: It is my opinion, based on this patient's performance to date, that Ms. Santo Torres may benefit from [address] after discharge due to the functional deficits listed above that are likely to improve with skilled rehabilitation because he/she has multiple medical issues that affect his/her functional mobility in the community. Equipment:   tbd               HISTORY:   History of Present Injury/Illness (Reason for Referral):  Per H&P, \"This is a 69y Female with PMHx significant for HTN, Dyslipidemia, Hypothyroidism presented to the ER from PCP office today via EMS because of fever, chills and hypotension with BP 70/40. Pt received IV fluids en route. Pt states that she had abrasion of her right leg 3 days ago from minor trauma at home. Since last night, pt started having fever, chills and pain, redness in her right leg. Pain 6-8/10 in severity, sharp, pain in the right leg. She had previous history of cellulitis to her left leg. Tmax 102 F this evening. Pt states she has nausea and one episode of clear emesis, non bloody, this morning. She feels weak and tired. No cough, no shortness of breath. No chest pain. No palpitations. No urinary complaints including dysuria, urgency, frequency of urination. No constipation. No diarrhea. No abdominal pain. ER course:   Febrile with Tmax of 100.8F, CBC with leukocytosis 16, BMP with Cr 1.85, (worse from baseline CKD 3) lactic acid within normal limits. 1 liter IV fluids. Blood cultures x2, IV clindamycin. U/A and urine culture sent,  Admitted for further evaluation and management of Cellulitis of right leg\"    Past Medical History/Comorbidities:   Ms. Marko Garcia  has a past medical history of Calculus of kidney, Gastric ulcer, GERD (gastroesophageal reflux disease), Headache(784.0), Hypercholesterolemia, Hypertension, Primary insomnia (7/17/2017), and Thyroid disease. Ms. Marko Garcia  has a past surgical history that includes hx salpingo-oophorectomy; pr removal of kidney stone; hx colonoscopy (Jan 2013); and hx colonoscopy (7/5/2016).   Social History/Living Environment:   Home Environment: Private residence  # Steps to Enter: 4  Rails to Enter: Yes  Hand Rails : Bilateral  Wheelchair Ramp: No  One/Two Story Residence: Two story, live on 1st floor  Lift Chair Available: No  Living Alone: No  Support Systems: Spouse/Significant Other/Partner, Child(amrik), Family member(s)  Patient Expects to be Discharged to[de-identified] Private residence  Current DME Used/Available at Home: Walker, rolling  Prior Level of Function/Work/Activity:  Lives with  in 2 story home (Stay on 1st) and is typically independent with all mobility/ADLs and driving. No DME use at baseline; did use walker yesterday. Number of Personal Factors/Comorbidities that affect the Plan of Care: 1-2: MODERATE COMPLEXITY   EXAMINATION:   Most Recent Physical Functioning:   Gross Assessment:  AROM: Within functional limits  Strength: Generally decreased, functional  Coordination: Within functional limits               Posture:  Posture (WDL): Exceptions to WDL  Posture Assessment: Forward head, Rounded shoulders  Balance:  Sitting: Intact  Standing: Impaired  Standing - Static: Fair  Standing - Dynamic : Fair Bed Mobility:  Supine to Sit: Minimum assistance  Scooting: Stand-by assistance  Wheelchair Mobility:     Transfers:  Sit to Stand: Contact guard assistance;Minimum assistance  Stand to Sit: Contact guard assistance  Bed to Chair: Minimum assistance  Interventions: Verbal cues; Safety awareness training; Tactile cues  Duration: 10 Minutes  Gait:     Base of Support: Center of gravity altered  Speed/Nathalie: Pace decreased (<100 feet/min); Slow  Step Length: Left shortened;Right shortened  Gait Abnormalities: Trunk sway increased;Decreased step clearance  Distance (ft): 14 Feet (ft)  Assistive Device: Other (comment)(handheld assist)  Ambulation - Level of Assistance: Minimal assistance  Interventions: Verbal cues; Safety awareness training; Tactile cues      Body Structures Involved:  Muscles Body Functions Affected:  Sensory/Pain  Movement Related  Skin Related Activities and Participation Affected:  General Tasks and Demands  Mobility  Domestic Life  Community, Social and [place]   Number of elements that affect the Plan of Care: 4+: HIGH COMPLEXITY   CLINICAL PRESENTATION:   Presentation: Stable and uncomplicated: LOW COMPLEXITY   CLINICAL DECISION MAKIN [facility] Mobility Inpatient Short Form  How much difficulty does the patient currently have. .. Unable A Lot A Little None   1. Turning over in bed (including adjusting bedclothes, sheets and blankets)? ? 1   ? 2   ? 3   ? 4   2. Sitting down on and standing up from a chair with arms ( e.g., wheelchair, bedside commode, etc.)   ? 1   ? 2   ? 3   ? 4   3. Moving from lying on back to sitting on the side of the bed?   ? 1   ? 2   ? 3   ? 4   How much help from another person does the patient currently need. .. Total A Lot A Little None   4. Moving to and from a bed to a chair (including a wheelchair)? ? 1   ? 2   ? 3   ? 4   5. Need to walk in hospital room? ? 1   ? 2   ? 3   ? 4   6. Climbing 3-5 steps with a railing? ? 1   ? 2   ? 3   ? 4   © , Trustees of [address] [place] [address], under license to [employer]. All rights reserved      Score:  Initial: 18 Most Recent: X (Date: -- )    Interpretation of Tool:  Represents activities that are increasingly more difficult (i.e. Bed mobility, Transfers, Gait). Medical Necessity:     Patient demonstrates good   rehab potential due to higher previous functional level. Reason for Services/Other Comments:  Patient continues to demonstrate capacity to improve strength, balance, activity tolerance which will increase independence and increase safety  .    Use of outcome tool(s) and clinical judgement create a POC that gives a: Clear prediction of patient's progress: LOW COMPLEXITY            TREATMENT:   (In addition to Assessment/Re-Assessment sessions the following treatments were rendered)   Pre-treatment Symptoms/Complaints:  \"thank you\"  Pain: Initial:   Pain Intensity 1: 0  Post Session:  0/10     Therapeutic Activity: (  10 Minutes ):  Therapeutic activities including Bed transfers, Chair transfers, Ambulation on level ground and exercises to improve mobility, strength, balance and activity tolerance . Required minimal Verbal cues; Safety awareness training; Tactile cues to promote static and dynamic balance in standing and promote motor control of bilateral, lower extremity(s). Date:  8/23/19 Date:   Date:     Activity/Exercise Parameters Parameters Parameters   LAQ 8x AB     Seated marching 8x AB     Ankle pumps 8x AB     Seated hip aBd 8x AB                           Braces/Orthotics/Lines/Etc:   O2 Device: Room air  Treatment/Session Assessment:    Response to Treatment:  pt performs mobility with close CGA/min A due to weakness, dizziness, orthostatic hypotension  Interdisciplinary Collaboration:   Physical Therapist  Registered Nurse  After treatment position/precautions:   Up in chair  Bed/Chair-wheels locked  Bed in low position  Call light within reach  RN notified   Compliance with Program/Exercises: Will assess as treatment progresses  Recommendations/Intent for next treatment session: \"Next visit will focus on advancements to more challenging activities and reduction in assistance provided\".   Total Treatment Duration:  PT Patient Time In/Time Out  Time In: 0907  Time Out: [address], GUI

## 2019-08-23 NOTE — PROGRESS NOTES
Interdisciplinary Rounds completed 08/23/19. Nursing, Case Management, Physician and PT present. Plan of care reviewed and updated.     D/c in am.

## 2019-08-23 NOTE — ED NOTES
TRANSFER - OUT REPORT:    Verbal report given to Clara(name) on Shannan Nayak  being transferred to [facility](unit) for routine progression of care       Report consisted of patients Situation, Background, Assessment and   Recommendations(SBAR). Information from the following report(s) SBAR was reviewed with the receiving nurse. Lines:   Peripheral IV 08/22/19 Left Antecubital (Active)   Site Assessment Clean, dry, & intact 8/22/2019  7:27 PM   Phlebitis Assessment 0 8/22/2019  7:27 PM   Infiltration Assessment 0 8/22/2019  7:27 PM   Dressing Status Clean, dry, & intact 8/22/2019  7:27 PM        Opportunity for questions and clarification was provided.       Patient transported with:   [facility]

## 2019-08-24 VITALS
BODY MASS INDEX: 34.31 KG/M2 | TEMPERATURE: 98 F | HEIGHT: 64 IN | HEART RATE: 91 BPM | OXYGEN SATURATION: 97 % | DIASTOLIC BLOOD PRESSURE: 78 MMHG | RESPIRATION RATE: 18 BRPM | WEIGHT: 201 LBS | SYSTOLIC BLOOD PRESSURE: 124 MMHG

## 2019-08-24 PROCEDURE — 74011000250 HC RX REV CODE- 250: Performed by: HOSPITALIST

## 2019-08-24 PROCEDURE — 94760 N-INVAS EAR/PLS OXIMETRY 1: CPT

## 2019-08-24 PROCEDURE — 74011250637 HC RX REV CODE- 250/637: Performed by: FAMILY MEDICINE

## 2019-08-24 PROCEDURE — 74011250636 HC RX REV CODE- 250/636: Performed by: HOSPITALIST

## 2019-08-24 PROCEDURE — 94640 AIRWAY INHALATION TREATMENT: CPT

## 2019-08-24 PROCEDURE — 74011250637 HC RX REV CODE- 250/637: Performed by: HOSPITALIST

## 2019-08-24 RX ORDER — CLINDAMYCIN HYDROCHLORIDE 150 MG/1
450 CAPSULE ORAL EVERY 8 HOURS
Qty: 78 CAP | Refills: 0 | Status: SHIPPED | OUTPATIENT
Start: 2019-08-24 | End: 2019-09-10

## 2019-08-24 RX ORDER — UREA 10 %
2 LOTION (ML) TOPICAL 2 TIMES DAILY
Qty: 76 TAB | Refills: 0 | Status: SHIPPED | OUTPATIENT
Start: 2019-08-24 | End: 2019-09-12

## 2019-08-24 RX ADMIN — Medication 10 ML: at 05:48

## 2019-08-24 RX ADMIN — POTASSIUM CHLORIDE 20 MEQ: 20 TABLET, EXTENDED RELEASE ORAL at 08:53

## 2019-08-24 RX ADMIN — LEVOTHYROXINE SODIUM 25 MCG: 50 TABLET ORAL at 05:49

## 2019-08-24 RX ADMIN — ATORVASTATIN CALCIUM 40 MG: 40 TABLET, FILM COATED ORAL at 08:53

## 2019-08-24 RX ADMIN — GABAPENTIN 100 MG: 100 CAPSULE ORAL at 08:53

## 2019-08-24 RX ADMIN — CLINDAMYCIN PHOSPHATE 600 MG: 600 INJECTION, SOLUTION INTRAVENOUS at 08:55

## 2019-08-24 RX ADMIN — LACTOBACILLUS TAB 2 TABLET: TAB at 08:52

## 2019-08-24 RX ADMIN — BUDESONIDE 500 MCG: 0.5 INHALANT RESPIRATORY (INHALATION) at 07:16

## 2019-08-24 RX ADMIN — HEPARIN SODIUM 5000 UNITS: 5000 INJECTION INTRAVENOUS; SUBCUTANEOUS at 05:48

## 2019-08-24 NOTE — PROGRESS NOTES
Hourly rounds performed. All needs met. Pt complained of pain once during shift in right leg, which pt received PRN pain med. Pt has no other complaints. Bed is in low position and call light is within reach. Will continue to monitor and report to oncoming nurse.

## 2019-08-24 NOTE — DISCHARGE INSTRUCTIONS
DISCHARGE SUMMARY from Nurse    PATIENT INSTRUCTIONS:    *  Please give a list of your current medications to your Primary Care Provider. *  Please update this list whenever your medications are discontinued, doses are      changed, or new medications (including over-the-counter products) are added. *  Please carry medication information at all times in case of emergency situations. These are general instructions for a healthy lifestyle:    No smoking/ No tobacco products/ Avoid exposure to second hand smoke  Surgeon General's Warning:  Quitting smoking now greatly reduces serious risk to your health. Obesity, smoking, and sedentary lifestyle greatly increases your risk for illness    A healthy diet, regular physical exercise & weight monitoring are important for maintaining a healthy lifestyle    You may be retaining fluid if you have a history of heart failure or if you experience any of the following symptoms:  Weight gain of 3 pounds or more overnight or 5 pounds in a week, increased swelling in our hands or feet or shortness of breath while lying flat in bed. Please call your doctor as soon as you notice any of these symptoms; do not wait until your next office visit. The discharge information has been reviewed with the patient. The patient verbalized understanding. Discharge medications reviewed with the patient and appropriate educational materials and side effects teaching were provided. ___________________________________________________________________________________________________________________________________    Patient Education        Cellulitis: Care Instructions  Your Care Instructions    Cellulitis is a skin infection caused by bacteria, most often strep or staph. It often occurs after a break in the skin from a scrape, cut, bite, or puncture, or after a rash. Cellulitis may be treated without doing tests to find out what caused it.  But your doctor may do tests, if needed, to look for a specific bacteria, like methicillin-resistant Staphylococcus aureus (MRSA). The doctor has checked you carefully, but problems can develop later. If you notice any problems or new symptoms, get medical treatment right away. Follow-up care is a key part of your treatment and safety. Be sure to make and go to all appointments, and call your doctor if you are having problems. It's also a good idea to know your test results and keep a list of the medicines you take. How can you care for yourself at home? · Take your antibiotics as directed. Do not stop taking them just because you feel better. You need to take the full course of antibiotics. · Prop up the infected area on pillows to reduce pain and swelling. Try to keep the area above the level of your heart as often as you can. · If your doctor told you how to care for your wound, follow your doctor's instructions. If you did not get instructions, follow this general advice:  ? Wash the wound with clean water 2 times a day. Don't use hydrogen peroxide or alcohol, which can slow healing. ? You may cover the wound with a thin layer of petroleum jelly, such as Vaseline, and a nonstick bandage. ? Apply more petroleum jelly and replace the bandage as needed. · Be safe with medicines. Take pain medicines exactly as directed. ? If the doctor gave you a prescription medicine for pain, take it as prescribed. ? If you are not taking a prescription pain medicine, ask your doctor if you can take an over-the-counter medicine. To prevent cellulitis in the future  · Try to prevent cuts, scrapes, or other injuries to your skin. Cellulitis most often occurs where there is a break in the skin. · If you get a scrape, cut, mild burn, or bite, wash the wound with clean water as soon as you can to help avoid infection. Don't use hydrogen peroxide or alcohol, which can slow healing.   · If you have swelling in your legs (edema), support stockings and good skin care may help prevent leg sores and cellulitis. · Take care of your feet, especially if you have diabetes or other conditions that increase the risk of infection. Wear shoes and socks. Do not go barefoot. If you have athlete's foot or other skin problems on your feet, talk to your doctor about how to treat them. When should you call for help? Call your doctor now or seek immediate medical care if:    · You have signs that your infection is getting worse, such as:  ? Increased pain, swelling, warmth, or redness. ? Red streaks leading from the area. ? Pus draining from the area. ? A fever.     · You get a rash.    Watch closely for changes in your health, and be sure to contact your doctor if:    · You do not get better as expected. Where can you learn more? Go to http://jenna-maya.info/. Tracee Lundy in the search box to learn more about \"Cellulitis: Care Instructions. \"  Current as of: April 1, 2019  Content Version: 12.1  © 4462-6142 [employer], [employer]. Care instructions adapted under license by [employer] (which disclaims liability or warranty for this information). If you have questions about a medical condition or this instruction, always ask your healthcare professional. [address] any warranty or liability for your use of this information.

## 2019-08-24 NOTE — DISCHARGE SUMMARY
Discharge Summary     Patient: Willis Cheadle MRN: [id number]  SSN: [id number]    YOB: 1940  Age: 70 y.o. Sex: female       Admit Date: 8/22/2019    Discharge Date: 8/24/2019      Admission Diagnoses: Cellulitis of right leg [L03.115]  Acute kidney injury superimposed on CKD ([facility] [N17.9, N18.9]    Discharge Diagnoses:   Problem List as of 8/24/2019 Date Reviewed: 8/1/2018          Codes Class Noted - Resolved    * (Principal) Cellulitis of right leg ICD-10-CM: L03.115  ICD-9-CM: 682.6  8/22/2019 - Present        Chronic kidney disease, stage III (moderate) ([facility] ICD-10-CM: N18.3  ICD-9-CM: 585.3  11/12/2014 - Present        Asthma ICD-10-CM: J45.909  ICD-9-CM: 493.90  10/29/2012 - Present        Acute kidney injury superimposed on CKD ([facility] ICD-10-CM: N17.9, N18.9  ICD-9-CM: 866.00, 585.9  8/22/2019 - Present        Gastric ulcer ICD-10-CM: K25.9  ICD-9-CM: 531.90  Unknown - Present        Acute gastric ulcer without hemorrhage or perforation ICD-10-CM: K25.3  ICD-9-CM: 531.30  10/14/2018 - Present        Acute pancreatitis ICD-10-CM: K85.90  ICD-9-CM: 577.0  10/12/2018 - Present        Acute renal failure superimposed on stage 3 chronic kidney disease ([facility] ICD-10-CM: N17.9, N18.3  ICD-9-CM: 584.9, 585.3  10/11/2018 - Present        Hypokalemia ICD-10-CM: E87.6  ICD-9-CM: 276.8  10/11/2018 - Present        Dehydration ICD-10-CM: E86.0  ICD-9-CM: 276.51  10/11/2018 - Present        Orthostatic hypotension ICD-10-CM: I95.1  ICD-9-CM: 458.0  10/11/2018 - Present        Cellulitis and abscess of left lower extremity ICD-10-CM: L03.116, L02.416  ICD-9-CM: 682.6  9/11/2018 - Present        Severe obesity (BMI 35.0-39. 9) with comorbidity ([facility] ICD-10-CM: E66.01  ICD-9-CM: 278.01  3/28/2018 - Present        Primary insomnia ICD-10-CM: F51.01  ICD-9-CM: 307.42  7/17/2017 - Present        Right upper quadrant abdominal pain ICD-10-CM: R10.11  ICD-9-CM: 789.01  12/14/2016 - Present    Overview Signed 12/14/2016 10:18 AM by Laura Urrutia MD     S/P thorough eval Dr Chapito Prajapati, gastroenterologist including EGD, colonoscopy, abd U/S.   Lasts only minutes about once a week, worse when constipated             Raynaud's phenomenon ICD-10-CM: I73.00  ICD-9-CM: 443.0  2/25/2015 - Present        Post Menopausal Osteopenia ICD-10-CM: M89.9  ICD-9-CM: 733.90  2/25/2015 - Present        Pedal edema ICD-10-CM: R60.0  ICD-9-CM: 782.3  1/28/2014 - Present    Overview Signed 1/28/2014  4:03 PM by Laura Urrutia MD     Worse on left             Chronic pain syndrome ICD-10-CM: G89.4  ICD-9-CM: 338.4  11/11/2013 - Present    Overview Addendum 1/14/2016  3:45 PM by Laura Urrutia MD     Chronic feet, hands, hips osteoarthritis, S/P bilateral TKA, gets pain with walking 100 ft             Colon polyps ICD-10-CM: K63.5  ICD-9-CM: 211.3  4/30/2013 - Present    Overview Signed 4/30/2013 10:01 AM by Laura Urrutia MD     Next colo due Jan 2018             HTN (hypertension) ICD-10-CM: I10  ICD-9-CM: 401.9  8/16/2012 - Present        Hyperlipidemia ICD-10-CM: E78.5  ICD-9-CM: 272.4  8/16/2012 - Present    Overview Addendum 10/28/2012  6:29 PM by Laura Urrutia MD     Calcium score of 140, Goal LDL under 130             Hypothyroidism ICD-10-CM: E03.9  ICD-9-CM: 244.9  8/16/2012 - Present        Female stress incontinence ICD-10-CM: N39.3  ICD-9-CM: 625.6  8/16/2012 - Present        Umbilical hernia XLB-86-PM: K42.9  ICD-9-CM: 553.1  8/16/2012 - Present        S/P total knee arthroplasty ICD-10-CM: Z96.659  ICD-9-CM: V43.65  8/16/2012 - Present    Overview Addendum 8/16/2012  8:38 AM by Tanner Abel     [facility]--2009             Kidney stones ICD-10-CM: N20.0  ICD-9-CM: 592.0  8/16/2012 - Present        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  ICD-9-CM: 346.80  8/16/2012 - Present        GERD (gastroesophageal reflux disease) ICD-10-CM: K21.9  ICD-9-CM: 530.81  8/16/2012 - Present    Overview Addendum 7/29/2014  2:30 PM by Laura Urrutia MD Previous dilatation stricture, EGD Jan 2013 showed some mild esophagitis             RESOLVED: Urinary urgency ICD-10-CM: R39.15  ICD-9-CM: 788.63  4/30/2013 - 11/11/2013        RESOLVED: Pyuria ICD-10-CM: N39.0  ICD-9-CM: 791.9  4/30/2013 - 11/11/2013        RESOLVED: Metabolic syndrome SJQ-55-BV: C22.35  ICD-9-CM: 277.7  8/16/2012 - 12/14/2016               Discharge Condition: Stable    Hospital Course: This is a 69y Female with PMHx significant for HTN, Dyslipidemia, Hypothyroidism presented to the ER from PCP office today via EMS because of fever, chills and hypotension with BP 70/40. Pt received IV fluids en route. Pt states that she had abrasion of her right leg 3 days ago from minor trauma at home. Since last night, pt started having fever, chills and pain, redness in her right leg. Pain 6-8/10 in severity, sharp, pain in the right leg. She had previous history of cellulitis to her left leg. Tmax 102 F this evening. Pt states she has nausea and one episode of clear emesis, non bloody. She feels weak and tired. In ED, febrile with Tmax of 100.8F, CBC with leukocytosis 16, BMP with Cr 1.85, (worse from baseline CKD 3) lactic acid within normal limits. 1 liter IV fluids. Patient started on clindamycin IV. Blood cultures x2 NGTD. On day 2 of admission patient had near resolution of her cellulitis and was discharged home to complete a course or oral clindamycin. Consults: None    Significant Diagnostic Studies:     Right lower extremity duplex venous study: No evidence for right lower extremity DVT. Disposition: home    Discharge Medications:   Cannot display discharge medications since this patient is not currently admitted.       Activity: Activity as tolerated  Diet: Cardiac Diet  Wound Care: Keep wound clean and dry    Follow-up Appointments   Procedures    FOLLOW UP VISIT Appointment in: One Week PCP     PCP     Standing Status:   Standing     Number of Occurrences:   1     Order Specific Question: Appointment in     Answer:    One Week       Signed By: Benito Cabrera MD     August 24, 2019

## 2019-08-25 LAB
BACTERIA SPEC CULT: NORMAL
SERVICE CMNT-IMP: NORMAL

## 2019-08-26 ENCOUNTER — PATIENT OUTREACH (OUTPATIENT)
Dept: CASE MANAGEMENT | Age: 79
End: 2019-08-26

## 2019-08-26 NOTE — PROGRESS NOTES
This note will not be viewable in [address] Transition of Care Discharge Follow-up Questionnaire   Date/Time of Call:   8/26/2019 11:20 am    What was the patient hospitalized for? Cellulitis right leg   Does the patient understand his/her diagnosis and/or treatment and what happened during the hospitalization? Yes,  Spoke with patient and she understands Diagnosis and treatment and recent hospitalization. She is agreeable to call. She states she is feeling well this morning. She continues to have some tenderness in leg ,no fever noted. Did the patient receive discharge instructions? Yes    CM Assessed Risk for Readmission:       Patient stated Risk for Readmission:      Low  risk  for readmission       Patient did not voice any concerns regarding readmission. Review any discharge instructions (see discharge instructions/AVS in [facility]). Ask patient if they understand these. Do they have any questions? Discharge instructions reviewed with patient and she verbalized understanding of instructions given. No other questions voiced at this time. Were home services ordered (nursing, PT, OT, ST, etc.)? [facility],PT,OT RN ordered but patient declined and states she does not feel like she needs at present. If so, has the first visit occurred? If not, why? (Assist with coordination of services if necessary.)   N/A    Was any DME ordered? No      If so, has it been received? If not, why?  (Assist patient in obtaining DME orders &/or equipment if necessary.) Patient has a walker at home if needed. Complete a review of all medications (new, continued and discontinued meds per the D/C instructions and medication tab in [facility]). START taking:  clindamycin 150 mg capsule (CLEOCIN)  Lactobacillus Acidoph & Bulgar 1 million cell Tab  tablet [facility])   Were all new prescriptions filled?   If not, why?  (Assist patient in obtaining medications if necessary  escalate for CCM &/or SW if ongoing issues are verbalized by pt or anticipated)   Yes        Does the patient understand the purpose and dosing instructions for all medications? (If patient has questions, provide explanation and education.)   Spoke with patient and she states she has understanding of purpose and dosing of medications. Does the patient have any problems in performing ADLs? (If patient is unable to perform ADLs  what is the limiting factor(s)? Do they have a support system that can assist? If no support system is present, discuss possible assistance that they may be able to obtain. Escalate for CCM/SW if ongoing issues are verbalized by pt or anticipated)   Patient lives with  and independent with ADLs. Assistance available from  as needed. Does the patient have all follow-up appointments scheduled? 7 day f/up with PCP?   (f/up with PCP may be w/in 14 days if patient has a f/up with their specialist w/in 7 days)    7-14 day f/up with specialist?   (or per discharge instructions)    If f/up has not been made  what actions has the care coordinator made to accomplish this? Has transportation been arranged? Follow-up appt. with Dr. Leonard Dozier 8/29/2019 at 11:00 am          Discussed with patient the importance of F/U appointment. There are no transportation needs at this time. Any other questions or concerns expressed by the patient? No other needs or concerns identified at this time. Schedule next appointment with LARISSA Quintero or refer to RN Case Manager/ per the workflow guidelines. When is care coordinators next follow-up call scheduled? If referred for CCM  what RN care manager was the referral assigned? 7-14 days     Contact information for Care Coordinator was given and instructed to call with questions or concerns.         7- 14  days    PAIGE Call Completed By: Fernanda Richard LPN    Care Coordinator

## 2019-08-27 LAB
BACTERIA SPEC CULT: NORMAL
BACTERIA SPEC CULT: NORMAL
SERVICE CMNT-IMP: NORMAL
SERVICE CMNT-IMP: NORMAL

## 2019-09-04 ENCOUNTER — HOSPITAL ENCOUNTER (EMERGENCY)
Age: 79
Discharge: HOME | End: 2019-09-04
Attending: EMERGENCY MEDICINE
Payer: MEDICARE

## 2019-09-04 VITALS
BODY MASS INDEX: 33.8 KG/M2 | HEIGHT: 64 IN | RESPIRATION RATE: 18 BRPM | TEMPERATURE: 98.6 F | DIASTOLIC BLOOD PRESSURE: 65 MMHG | WEIGHT: 198 LBS | OXYGEN SATURATION: 96 % | SYSTOLIC BLOOD PRESSURE: 146 MMHG | HEART RATE: 96 BPM

## 2019-09-04 DIAGNOSIS — L03.119 CELLULITIS AND ABSCESS OF LEG, EXCEPT FOOT: Primary | ICD-10-CM

## 2019-09-04 DIAGNOSIS — L02.419 CELLULITIS AND ABSCESS OF LEG, EXCEPT FOOT: Primary | ICD-10-CM

## 2019-09-04 LAB
ALBUMIN SERPL-MCNC: 3 G/DL (ref 3.2–4.6)
ALBUMIN/GLOB SERPL: 0.6 {RATIO} (ref 1.2–3.5)
ALP SERPL-CCNC: 60 U/L (ref 50–130)
ALT SERPL-CCNC: 13 U/L (ref 12–65)
ANION GAP SERPL CALC-SCNC: 9 MMOL/L (ref 7–16)
AST SERPL-CCNC: 14 U/L (ref 15–37)
BASOPHILS # BLD: 0 K/UL (ref 0–0.2)
BASOPHILS NFR BLD: 0 % (ref 0–2)
BILIRUB SERPL-MCNC: 0.4 MG/DL (ref 0.2–1.1)
BUN SERPL-MCNC: 19 MG/DL (ref 8–23)
CALCIUM SERPL-MCNC: 8.4 MG/DL (ref 8.3–10.4)
CHLORIDE SERPL-SCNC: 103 MMOL/L (ref 98–107)
CO2 SERPL-SCNC: 29 MMOL/L (ref 21–32)
CREAT SERPL-MCNC: 1.49 MG/DL (ref 0.6–1)
DIFFERENTIAL METHOD BLD: ABNORMAL
EOSINOPHIL # BLD: 0.1 K/UL (ref 0–0.8)
EOSINOPHIL NFR BLD: 1 % (ref 0.5–7.8)
ERYTHROCYTE [DISTWIDTH] IN BLOOD BY AUTOMATED COUNT: 14 % (ref 11.9–14.6)
GLOBULIN SER CALC-MCNC: 4.7 G/DL (ref 2.3–3.5)
GLUCOSE SERPL-MCNC: 105 MG/DL (ref 65–100)
HCT VFR BLD AUTO: 33.3 % (ref 35.8–46.3)
HGB BLD-MCNC: 10.6 G/DL (ref 11.7–15.4)
IMM GRANULOCYTES # BLD AUTO: 0.1 K/UL (ref 0–0.5)
IMM GRANULOCYTES NFR BLD AUTO: 1 % (ref 0–5)
LYMPHOCYTES # BLD: 1.6 K/UL (ref 0.5–4.6)
LYMPHOCYTES NFR BLD: 14 % (ref 13–44)
MCH RBC QN AUTO: 30.7 PG (ref 26.1–32.9)
MCHC RBC AUTO-ENTMCNC: 31.8 G/DL (ref 31.4–35)
MCV RBC AUTO: 96.5 FL (ref 79.6–97.8)
MONOCYTES # BLD: 1.2 K/UL (ref 0.1–1.3)
MONOCYTES NFR BLD: 10 % (ref 4–12)
NEUTS SEG # BLD: 8.4 K/UL (ref 1.7–8.2)
NEUTS SEG NFR BLD: 74 % (ref 43–78)
NRBC # BLD: 0 K/UL (ref 0–0.2)
PLATELET # BLD AUTO: 401 K/UL (ref 150–450)
PMV BLD AUTO: 10.6 FL (ref 9.4–12.3)
POTASSIUM SERPL-SCNC: 3.7 MMOL/L (ref 3.5–5.1)
PROT SERPL-MCNC: 7.7 G/DL (ref 6.3–8.2)
RBC # BLD AUTO: 3.45 M/UL (ref 4.05–5.2)
SODIUM SERPL-SCNC: 141 MMOL/L (ref 136–145)
WBC # BLD AUTO: 11.4 K/UL (ref 4.3–11.1)

## 2019-09-04 PROCEDURE — 85025 COMPLETE CBC W/AUTO DIFF WBC: CPT

## 2019-09-04 PROCEDURE — 87205 SMEAR GRAM STAIN: CPT

## 2019-09-04 PROCEDURE — 80053 COMPREHEN METABOLIC PANEL: CPT

## 2019-09-04 PROCEDURE — 99282 EMERGENCY DEPT VISIT SF MDM: CPT | Performed by: EMERGENCY MEDICINE

## 2019-09-04 PROCEDURE — 87186 SC STD MICRODIL/AGAR DIL: CPT

## 2019-09-04 PROCEDURE — 87077 CULTURE AEROBIC IDENTIFY: CPT

## 2019-09-04 RX ORDER — SULFAMETHOXAZOLE AND TRIMETHOPRIM 800; 160 MG/1; MG/1
1 TABLET ORAL 2 TIMES DAILY
Qty: 20 TAB | Refills: 0 | Status: SHIPPED | OUTPATIENT
Start: 2019-09-04 | End: 2019-09-04 | Stop reason: SDUPTHER

## 2019-09-04 RX ORDER — CEPHALEXIN 500 MG/1
500 CAPSULE ORAL 3 TIMES DAILY
Qty: 30 CAP | Refills: 0 | Status: SHIPPED | OUTPATIENT
Start: 2019-09-04 | End: 2019-09-10

## 2019-09-04 RX ORDER — SULFAMETHOXAZOLE AND TRIMETHOPRIM 800; 160 MG/1; MG/1
1 TABLET ORAL 2 TIMES DAILY
Qty: 20 TAB | Refills: 0 | Status: SHIPPED | OUTPATIENT
Start: 2019-09-04 | End: 2019-09-10

## 2019-09-04 NOTE — ED PROVIDER NOTES
22-year-old female presenting for concerns about a draining wound on the medial aspect of the right knee. She has had abscesses before in the setting of minor puncture wounds and has recently been treated for cellulitis of the lower extremity. She was even admitted to the hospital for IV antibiotics for 2 days. She was then sent home with 2 weeks of clindamycin which she has been taking as directed and is about to run out of in the next day or so. The area on the right knee came up several days ago as a boil became increasingly swollen and looked more like a blister. Over the past 24 hours though it started draining some purulent material.  She denies any other systemic symptoms this time around. She had something similar on the left leg last year that required wound care. The history is provided by the patient. Leg Pain    This is a recurrent problem. The current episode started more than 2 days ago. The problem has been gradually improving. The pain is present in the right knee. The quality of the pain is described as aching. The pain is at a severity of 3/10. The pain is mild. Pertinent negatives include no numbness, full range of motion, no stiffness, no tingling, no itching, no back pain and no neck pain. The symptoms are aggravated by palpation.         Past Medical History:   Diagnosis Date    Calculus of kidney     Gastric ulcer     2018    GERD (gastroesophageal reflux disease)     Headache(784.0)     Hypercholesterolemia     Hypertension     Primary insomnia 7/17/2017    Thyroid disease        Past Surgical History:   Procedure Laterality Date    HX COLONOSCOPY  Jan 2013    4 polyps, repeat 5 years    HX COLONOSCOPY  7/5/2016    repeat 5 yrs tubular adenoma    HX SALPINGO-OOPHORECTOMY      REMOVAL OF KIDNEY STONE           Family History:   Problem Relation Age of Onset    Hypertension Mother     Arthritis-rheumatoid Mother     High Cholesterol Mother     Broken Bones Mother     Heart Failure Father     Heart Attack Father     Hypertension Brother     High Cholesterol Brother     Arthritis-osteo Brother         Knee    High Cholesterol Brother     Hypertension Brother     Heart Attack Brother     Heart Attack Paternal Grandmother     Heart Attack Paternal Grandfather     Breast Cancer Neg Hx        Social History     Socioeconomic History    Marital status:      Spouse name: Not on file    Number of children: Not on file    Years of education: Not on file    Highest education level: Not on file   Occupational History    Not on file   Social Needs    Financial resource strain: Not on file    Food insecurity:     Worry: Not on file     Inability: Not on file    Transportation needs:     Medical: Not on file     Non-medical: Not on file   Tobacco Use    Smoking status: Never Smoker    Smokeless tobacco: Never Used   Substance and Sexual Activity    Alcohol use: No    Drug use: No    Sexual activity: Not on file   Lifestyle    Physical activity:     Days per week: Not on file     Minutes per session: Not on file    Stress: Not on file   Relationships    Social connections:     Talks on phone: Not on file     Gets together: Not on file     Attends Holiness service: Not on file     Active member of club or organization: Not on file     Attends meetings of clubs or organizations: Not on file     Relationship status: Not on file    Intimate partner violence:     Fear of current or ex partner: Not on file     Emotionally abused: Not on file     Physically abused: Not on file     Forced sexual activity: Not on file   Other Topics Concern    Not on file   Social History Narrative    Lives with  who is chronically ill with end stage lung disease and early dementia and depression.   5 children, daughter Stacie Castleman and son Belén Delatorre (PharmD) have her [de-identified], sons Alicia Angel and Hattie Zee have durable POA         ALLERGIES: Pneumococcal 23-layton ps vaccine    Review of Systems Musculoskeletal: Negative for back pain, neck pain and stiffness. Skin: Positive for color change and wound. Negative for itching. Neurological: Negative for tingling and numbness. All other systems reviewed and are negative. Vitals:    09/04/19 1617   BP: 121/67   Pulse: 96   Resp: 18   Temp: 98.6 °F (37 °C)   SpO2: 96%   Weight: 89.8 kg (198 lb)   Height: 5' 4\" (1.626 m)            Physical Exam   Constitutional: She is oriented to person, place, and time. She appears well-developed and well-nourished. HENT:   Head: Normocephalic and atraumatic. Eyes: Pupils are equal, round, and reactive to light. Conjunctivae are normal.   Neck: Neck supple. Cardiovascular: Normal rate and regular rhythm. Pulmonary/Chest: Effort normal and breath sounds normal.   Abdominal: Soft. Bowel sounds are normal.   Musculoskeletal: Normal range of motion. She exhibits edema. Area of mild erythema with a improving skin tear on the right lower shin, draining abscess near the patella on the left medial knee   Neurological: She is alert and oriented to person, place, and time. Skin: Skin is warm and dry. Nursing note and vitals reviewed. MDM  Number of Diagnoses or Management Options  Cellulitis and abscess of leg, except foot:   Diagnosis management comments: 70-year-old female presenting for a draining abscess near the left medial knee. No systemic symptoms. Patient is currently on clindamycin and she actually thinks the area is getting better now that it started draining. We will check basic labs, get a wound culture and I will likely start the patient on a second round of antibiotics in this case Bactrim and Keflex.        Amount and/or Complexity of Data Reviewed  Clinical lab tests: ordered and reviewed (Results for orders placed or performed during the hospital encounter of 09/04/19  -CBC WITH AUTOMATED DIFF       Result                      Value             Ref Range           WBC 11. 4 (H)          4.3 - 11.1 K*       RBC                         3.45 (L)          4.05 - 5.2 M*       HGB                         10.6 (L)          11.7 - 15.4 *       HCT                         33.3 (L)          35.8 - 46.3 %       MCV                         96.5              79.6 - 97.8 *       MCH                         30.7              26.1 - 32.9 *       MCHC                        31.8              31.4 - 35.0 *       RDW                         14.0              11.9 - 14.6 %       PLATELET                    401               150 - 450 K/*       MPV                         10.6              9.4 - 12.3 FL       ABSOLUTE NRBC               0.00              0.0 - 0.2 K/*       DF                          AUTOMATED                             NEUTROPHILS                 74                43 - 78 %           LYMPHOCYTES                 14                13 - 44 %           MONOCYTES                   10                4.0 - 12.0 %        EOSINOPHILS                 1                 0.5 - 7.8 %         BASOPHILS                   0                 0.0 - 2.0 %         IMMATURE GRANULOCYTES       1                 0.0 - 5.0 %         ABS. NEUTROPHILS            8.4 (H)           1.7 - 8.2 K/*       ABS. LYMPHOCYTES            1.6               0.5 - 4.6 K/*       ABS. MONOCYTES              1.2               0.1 - 1.3 K/*       ABS. EOSINOPHILS            0.1               0.0 - 0.8 K/*       ABS. BASOPHILS              0.0               0.0 - 0.2 K/*       ABS. IMM.  GRANS.            0.1               0.0 - 0.5 K/*  -METABOLIC PANEL, COMPREHENSIVE       Result                      Value             Ref Range           Sodium                      141               136 - 145 mm*       Potassium                   3.7               3.5 - 5.1 mm*       Chloride                    103               98 - 107 mmo*       CO2                         29                21 - 32 mmol*       Anion gap 9                 7 - 16 mmol/L       Glucose                     105 (H)           65 - 100 mg/*       BUN                         19                8 - 23 MG/DL        Creatinine                  1.49 (H)          0.6 - 1.0 MG*       GFR est AA                  44 (L)            >60 ml/min/1*       GFR est non-AA              36 (L)            >60 ml/min/1*       Calcium                     8.4               8.3 - 10.4 M*       Bilirubin, total            0.4               0.2 - 1.1 MG*       ALT (SGPT)                  13                12 - 65 U/L         AST (SGOT)                  14 (L)            15 - 37 U/L         Alk. phosphatase            60                50 - 130 U/L        Protein, total              7.7               6.3 - 8.2 g/*       Albumin                     3.0 (L)           3.2 - 4.6 g/*       Globulin                    4.7 (H)           2.3 - 3.5 g/*       A-G Ratio                   0.6 (L)           1.2 - 3.5      )  Decide to obtain previous medical records or to obtain history from someone other than the patient: yes    Risk of Complications, Morbidity, and/or Mortality  Presenting problems: moderate  Diagnostic procedures: moderate  Management options: moderate  General comments: I personally reviewed the patient's vital signs, laboratory tests, and/or radiological findings. I discussed these findings with the patient and their significance. I answered all questions and gave the patient clear return precautions. The patient was discharged from the emergency department in stable condition        Patient Progress  Patient progress: improved    ED Course as of Sep 04 1833   Wed Sep 04, 2019   1831 Viewing the patient's labs they appear to be her baseline. She has no fever, only a mildly elevated white count, CMP is at her baseline. Glucose is well controlled. I am going to send the patient home on 10 days of Bactrim and Keflex while the wound culture is evaluated in the lab. [JS]      ED Course User Index  [JS] Rebecca Lr MD       Procedures

## 2019-09-04 NOTE — ED TRIAGE NOTES
Pt in [facility] she hit right lower leg on chair 3 weeks ago. [facility] was admitted for cellulitis. [facility] she was sent home with clindamycin [facility] continues to have redness and pain in the right leg. [facility] Sunday she had open wound in right knee.

## 2019-09-04 NOTE — DISCHARGE INSTRUCTIONS
Start the second round of antibiotics and follow-up with wound care.   Return to the emergency department if your symptoms worsen

## 2019-09-04 NOTE — ED NOTES
I have reviewed discharge instructions with the patient. The patient verbalized understanding. Patient left ED via Discharge Method: ambulatory to Home with her nurse. Opportunity for questions and clarification provided. Patient given 2 scripts. To continue your aftercare when you leave the hospital, you may receive an automated call from our care team to check in on how you are doing. This is a free service and part of our promise to provide the best care and service to meet your aftercare needs.  If you have questions, or wish to unsubscribe from this service please call [phone]. Thank you for Choosing our [facility] Emergency Department.

## 2019-09-05 ENCOUNTER — PATIENT OUTREACH (OUTPATIENT)
Dept: CASE MANAGEMENT | Age: 79
End: 2019-09-05

## 2019-09-05 NOTE — PROGRESS NOTES
This note will not be viewable in [address] Transitions of Care  Follow up Outreach Note   Outreach type Phone call: Spoke with patient   Home visit:   Date/Time of Outreach: 9/5/2019  4:50 pm      Has patient attended PCP or specialist follow-up appointments since last contact? What was outcome of appointment? When is next follow-up scheduled? Spoke with patient and she had to go to the ED 9/4/2019 due to abscessed area right knee. She states it is red and tender. She has noticed some swelling in the lower leg also. I encouraged her to elevate her leg as much as possible. She had FU with Dr. Scarlet Lilly on 8/29/2019. She has scheduled an appointment with wound care for 9/10/2019. Review medications. Any medication changes since last outreach? Does patient have any questions or issues related to their medications? Medications reviewed and changes were made to medications she completed the Cleocin. Keflex 500 mg 1 TID and Bactrim 160/800 mg 1 bid started after ED visit. Patient has obtained medication. No additional questioned voiced regarding medications. Care Coordinator contact information provided if need arises. Home health active? If yes  any issue? Progress? No     Referrals needed?  (CM, SW, HH, etc. )   No, Patient declines CM at this time. Patient state that her daughter is a nurse and her son is a pharmacist and they will assist her with any needs. Other issues/Miscellaneous? (Transportation, access to meals, ability to perform ADLs, adequate caregiver support, etc.)      No transportation needs at present. Patient independent with ADLs. Next Outreach Scheduled? Graduation from program?   15-30 days            Next Steps/Goals (if applicable):         Outreach completed by:   Linda Mcintyre LPN Care Coordinator

## 2019-09-07 LAB
BACTERIA SPEC CULT: ABNORMAL
GRAM STN SPEC: ABNORMAL
GRAM STN SPEC: ABNORMAL
SERVICE CMNT-IMP: ABNORMAL

## 2019-09-10 ENCOUNTER — HOSPITAL ENCOUNTER (OUTPATIENT)
Dept: GENERAL RADIOLOGY | Age: 79
Discharge: HOME | End: 2019-09-10
Attending: SURGERY
Payer: MEDICARE

## 2019-09-10 ENCOUNTER — HOSPITAL ENCOUNTER (OUTPATIENT)
Dept: WOUND CARE | Age: 79
Discharge: HOME | End: 2019-09-10
Attending: SURGERY
Payer: MEDICARE

## 2019-09-10 VITALS
HEIGHT: 64 IN | RESPIRATION RATE: 18 BRPM | OXYGEN SATURATION: 95 % | TEMPERATURE: 98.6 F | SYSTOLIC BLOOD PRESSURE: 104 MMHG | BODY MASS INDEX: 34.31 KG/M2 | DIASTOLIC BLOOD PRESSURE: 62 MMHG | WEIGHT: 201 LBS | HEART RATE: 87 BPM

## 2019-09-10 PROBLEM — M00.9 PYOGENIC ARTHRITIS OF RIGHT KNEE JOINT (HCC): Status: ACTIVE | Noted: 2019-09-10

## 2019-09-10 PROCEDURE — 99213 OFFICE O/P EST LOW 20 MIN: CPT

## 2019-09-10 PROCEDURE — 73564 X-RAY EXAM KNEE 4 OR MORE: CPT

## 2019-09-10 RX ORDER — LEVOFLOXACIN 750 MG/1
750 TABLET ORAL DAILY
Qty: 14 TAB | Refills: 0 | Status: SHIPPED | OUTPATIENT
Start: 2019-09-10 | End: 2019-09-24

## 2019-09-10 NOTE — PROGRESS NOTES
Patient was contacted to reschedule the ER follow-up that was cancelled. She declines at this time stating that she is going to the wound center and wants to discuss what their next step will be prior to scheduling an appointment with Dr. Elizabeth Ortiz.

## 2019-09-10 NOTE — PROGRESS NOTES
Wound Center Progress Note    Patient: Zacarias Raya MRN: [id number]  SSN: [id number]    YOB: 1940  Age: 70 y.o. Sex: female      Subjective:     Chief Complaint:  Zacarias Raya is a 70 y.o. WHITE OR  female who presents with R lower leg medial, R lower leg anterior wound of 3 weeks duration. History of Present Illness:     Traumatic wound began after scratching her leg in a closet. It developed into a cellulitis and was treated in the hospital.  Subsequently, she developed a draining sinus over her medial portion of the right knee where she has had a partial knee replacement. This culturedPseudomonas and is presently being treated with Levaquin. We will also get an x-ray R knee  To see if there is a fluid collection and we may need to advance to a CT scan. Wound Caused By: acute injury due to scratching her knee in a closet  Associated Signs and Symptoms:pain and swelling along with drainage. Timing: Continuous  Quality: Aching, Grinding  Severity: 4/10  Modifying Factors: obesity  Current Wound Care: see nurse's notes    Past Medical History:   Diagnosis Date    Calculus of kidney     Gastric ulcer     2018    GERD (gastroesophageal reflux disease)     Headache(784.0)     Hypercholesterolemia     Hypertension     Primary insomnia 7/17/2017    Thyroid disease       Past Surgical History:   Procedure Laterality Date    HX COLONOSCOPY  Jan 2013    4 polyps, repeat 5 years    HX COLONOSCOPY  7/5/2016    repeat 5 yrs tubular adenoma    HX SALPINGO-OOPHORECTOMY      REMOVAL OF KIDNEY STONE       Family History   Problem Relation Age of Onset    Hypertension Mother     Arthritis-rheumatoid Mother     High Cholesterol Mother     Broken Bones Mother     Heart Failure Father     Heart Attack Father     Hypertension Brother     High Cholesterol Brother     Arthritis-osteo Brother         Knee    High Cholesterol Brother     Hypertension Brother     Heart Attack Brother     Heart Attack Paternal Grandmother     Heart Attack Paternal Grandfather     Breast Cancer Neg Hx       Social History     Tobacco Use    Smoking status: Never Smoker    Smokeless tobacco: Never Used   Substance Use Topics    Alcohol use: No       Prior to Admission medications    Medication Sig Start Date End Date Taking? Authorizing Provider   levoFLOXacin (LEVAQUIN) 750 mg tablet Take 1 Tab by mouth daily for 14 days. 9/10/19 9/24/19 Yes Curtis Merrill MD   losartan-hydroCHLOROthiazide [facility]) 100-25 mg per tablet TAKE ONE TABLET DAILY. 8/27/19   Mohamud Doherty MD   Lactobacillus Acidoph & Bulgar [facility]) 1 million cell tab tablet Take 2 Tabs by mouth two (2) times a day for 19 days. 8/24/19 9/12/19  Lexi Soler MD   levothyroxine (SYNTHROID) 25 mcg tablet Take 1 Tab by mouth every morning. 8/22/19   Joss Doherty MD   potassium chloride (K-DUR, KLOR-CON) 20 mEq tablet TAKE 1 TABLET BY MOUTH 2 TIMES A DAY 8/22/19   Joss Doherty MD   traZODone (DESYREL) 50 mg tablet Take 1 Tab by mouth nightly. 8/22/19   Mohamud Doherty MD   gabapentin (NEURONTIN) 100 mg capsule Take 1 Cap by mouth two (2) times a day. 4/23/19   Mohamud Doherty MD   atorvastatin (LIPITOR) 40 mg tablet Take 1 Tab by mouth daily. 4/23/19   Mohamud Doherty MD   ondansetron (ZOFRAN ODT) 4 mg disintegrating tablet Take 1 Tab by mouth every eight (8) hours as needed for Nausea. 10/5/18   Lucia Young MD   FLOVENT  mcg/actuation inhaler Take 2 Puffs by inhalation two (2) times a day. 10/28/15   Provider, Historical   acetaminophen (TYLENOL ARTHRITIS PAIN) 650 mg CR tablet Take 650 mg by mouth every eight (8) hours as needed (pain). Provider, Historical   albuterol (VENTOLIN HFA) 90 mcg/actuation inhaler Take 2 Puffs by inhalation every six (6) hours as needed for Wheezing or Shortness of Breath. Provider, Historical   CALCIUM CITRATE + PO take 1 Tab by mouth two (2) times a day. Provider, Historical     Allergies   Allergen Reactions    Pneumococcal 23-Janell Ps Vaccine Other (comments)     fever  Other reaction(s): Other (comments)  fever        Review of Systems:  A comprehensive review of systems was negative except for that written in the History of Present Illness. No results found for: HBA1C, VIE9JBEK, HGBE8, IOQ9EQJK, FNF2ZVAO     Immunization History   Administered Date(s) Administered    Influenza High Dose Vaccine PF 09/08/2016, 11/28/2017    Influenza Vaccine 10/10/2013, 10/09/2014    Influenza Vaccine (Quad) PF 09/14/2018    Influenza Vaccine PF 10/26/2015    Influenza Vaccine Split 10/29/2012    Pneumococcal Polysaccharide (PPSV-23) 06/28/2007, 08/27/2007    TB Skin Test (PPD) Intradermal 10/12/2018    TDAP Vaccine 04/12/2012, 10/29/2012    Tdap 04/12/2012, 10/29/2012    Zoster 10/04/2011    Zoster Vaccine, Live 10/04/2011       Body mass index is 34.5 kg/m². Counseling regarding nutrition done: No     Current medications:  Current Outpatient Medications   Medication Sig Dispense Refill    levoFLOXacin (LEVAQUIN) 750 mg tablet Take 1 Tab by mouth daily for 14 days. 14 Tab 0    losartan-hydroCHLOROthiazide (HYZAAR) 100-25 mg per tablet TAKE ONE TABLET DAILY. 90 Tab 3    Lactobacillus Acidoph & Bulgar (FLORANEX) 1 million cell tab tablet Take 2 Tabs by mouth two (2) times a day for 19 days. 76 Tab 0    levothyroxine (SYNTHROID) 25 mcg tablet Take 1 Tab by mouth every morning. 90 Tab 3    potassium chloride (K-DUR, KLOR-CON) 20 mEq tablet TAKE 1 TABLET BY MOUTH 2 TIMES A  Tab 3    traZODone (DESYREL) 50 mg tablet Take 1 Tab by mouth nightly. 30 Tab 3    gabapentin (NEURONTIN) 100 mg capsule Take 1 Cap by mouth two (2) times a day. 180 Cap 3    atorvastatin (LIPITOR) 40 mg tablet Take 1 Tab by mouth daily. 90 Tab 3    ondansetron (ZOFRAN ODT) 4 mg disintegrating tablet Take 1 Tab by mouth every eight (8) hours as needed for Nausea.  20 Tab 0    FLOVENT  mcg/actuation inhaler Take 2 Puffs by inhalation two (2) times a day. 3    acetaminophen (TYLENOL ARTHRITIS PAIN) 650 mg CR tablet Take 650 mg by mouth every eight (8) hours as needed (pain).  albuterol (VENTOLIN HFA) 90 mcg/actuation inhaler Take 2 Puffs by inhalation every six (6) hours as needed for Wheezing or Shortness of Breath.  CALCIUM CITRATE + PO take 1 Tab by mouth two (2) times a day. Objective:     Physical Exam:     Visit Vitals  /62 (BP 1 Location: Left arm)   Pulse 87   Temp 98.6 °F (37 °C)   Resp 18   Ht 5' 4\" (1.626 m)   Wt 91.2 kg (201 lb)   SpO2 95%   BMI 34.50 kg/m²       General: well developed, well nourished, pleasant , NAD. Hygiene good  Psych: cooperative. Pleasant. No anxiety or depression. Normal mood and affect. Neuro: alert and oriented to person/place/situation. Otherwise nonfocal.  Derm: Normal turgor for age, dry skin  HEENT: Normocephalic, atraumatic. EOMI. Conjunctiva clear. No scleral icterus. Neck: Normal range of motion. No masses. Chest: Good air entry bilaterally. Respirations nonlabored  Cardio: Normal heart sounds,no rubs, murmurs or gallops  Abdomen: Soft, nontender, nondistended, normoactive bowel sounds  Lower extremities: color normal; temperature normal. Hair growth is not present. Calves are supple, nontender, approximately equally sized in comparison. Capillary refill <3 sec            Ulcer Description:   Wound Knee Left; Anterior (Active)   Number of days: 364       Wound Knee Left;Medial (Active)   Number of days: 333       Wound Knee Left;Lateral (Active)   Number of days: 333       Wound Pretibial Right (Active)   Number of days: 18       Wound Knee Right;Medial large blister on medial aspect of right knee 09/04/19 (Active)   Number of days: 6       Wound (Active)   Dressing Status Old drainage 9/10/2019  3:04 PM   Non-staged Wound Description Full thickness 9/10/2019  3:04 PM   Wound Length (cm) 1 cm 9/10/2019  3:04 PM   Wound Width (cm) 2.5 cm 9/10/2019  3:04 PM   Wound Depth (cm) 0.1 cm 9/10/2019  3:04 PM   Wound Volume (cm^3) 0.25 cm^3 9/10/2019  3:04 PM   Condition of [address] 9/10/2019  3:04 PM   Tissue Type Percent Yellow 100 9/10/2019  3:04 PM   Drainage Amount Moderate 9/10/2019  3:04 PM   Drainage Color Serosanguinous 9/10/2019  3:04 PM   Wound Odor None 9/10/2019  3:04 PM   Rosario-wound Assessment Edema; Red 9/10/2019  3:04 PM   Cleansing and Cleansing Agents  Normal saline 9/10/2019  3:04 PM   Dressing Changed Changed/New 9/10/2019  3:04 PM   Number of days: 0         Data Review:   No results found for this or any previous visit (from the past 24 hour(s)). Assessment:     70 y.o. female with R lower leg medial, R lower leg anterior combined ulcer. Problem List  Date Reviewed: 8/1/2018          Codes Class Noted    Chronic kidney disease, stage III (moderate) ([facility] ICD-10-CM: N18.3  ICD-9-CM: 585.3  11/12/2014        Asthma ICD-10-CM: J45.909  ICD-9-CM: 493.90  10/29/2012        Cellulitis of right leg ICD-10-CM: L03.115  ICD-9-CM: 682.6  8/22/2019        Acute kidney injury superimposed on CKD ([facility] ICD-10-CM: N17.9, N18.9  ICD-9-CM: 866.00, 585.9  8/22/2019        Gastric ulcer ICD-10-CM: K25.9  ICD-9-CM: 531.90  Unknown        Acute gastric ulcer without hemorrhage or perforation ICD-10-CM: K25.3  ICD-9-CM: 531.30  10/14/2018        Acute pancreatitis ICD-10-CM: K85.90  ICD-9-CM: 577.0  10/12/2018        Acute renal failure superimposed on stage 3 chronic kidney disease ([facility] ICD-10-CM: N17.9, N18.3  ICD-9-CM: 584.9, 585.3  10/11/2018        Hypokalemia ICD-10-CM: E87.6  ICD-9-CM: 276.8  10/11/2018        Dehydration ICD-10-CM: E86.0  ICD-9-CM: 276.51  10/11/2018        Orthostatic hypotension ICD-10-CM: I95.1  ICD-9-CM: 458.0  10/11/2018        Cellulitis and abscess of left lower extremity ICD-10-CM: L03.116, L02.416  ICD-9-CM: 682.6  9/11/2018        Severe obesity (BMI 35.0-39. 9) with comorbidity ([facility] ICD-10-CM: E66.01  ICD-9-CM: 278.01  3/28/2018        Primary insomnia ICD-10-CM: F51.01  ICD-9-CM: 307.42  7/17/2017        Right upper quadrant abdominal pain ICD-10-CM: R10.11  ICD-9-CM: 789.01  12/14/2016    Overview Signed 12/14/2016 10:18 AM by Cat Hernandez MD     S/P thorough eval Dr Angelica Rios, gastroenterologist including EGD, colonoscopy, abd U/S.   Lasts only minutes about once a week, worse when constipated             Raynaud's phenomenon ICD-10-CM: I73.00  ICD-9-CM: 443.0  2/25/2015        Post Menopausal Osteopenia ICD-10-CM: M89.9  ICD-9-CM: 733.90  2/25/2015        Pedal edema ICD-10-CM: R60.0  ICD-9-CM: 782.3  1/28/2014    Overview Signed 1/28/2014  4:03 PM by Cat Hernandez MD     Worse on left             Chronic pain syndrome ICD-10-CM: G89.4  ICD-9-CM: 338.4  11/11/2013    Overview Addendum 1/14/2016  3:45 PM by Cat Hernandez MD     Chronic feet, hands, hips osteoarthritis, S/P bilateral TKA, gets pain with walking 100 ft             Colon polyps ICD-10-CM: K63.5  ICD-9-CM: 211.3  4/30/2013    Overview Signed 4/30/2013 10:01 AM by Cat Hernandez MD     Next colo due Jan 2018             HTN (hypertension) ICD-10-CM: I10  ICD-9-CM: 401.9  8/16/2012        Hyperlipidemia ICD-10-CM: E78.5  ICD-9-CM: 272.4  8/16/2012    Overview Addendum 10/28/2012  6:29 PM by Cat Hernandez MD     Calcium score of 140, Goal LDL under 130             Hypothyroidism ICD-10-CM: E03.9  ICD-9-CM: 244.9  8/16/2012        Female stress incontinence ICD-10-CM: N39.3  ICD-9-CM: 625.6  2/27/1666        Umbilical hernia [facility]-38-NL: K42.9  ICD-9-CM: 553.1  8/16/2012        S/P total knee arthroplasty ICD-10-CM: N64.800  ICD-9-CM: V43.65  8/16/2012    Overview Addendum 8/16/2012  8:38 AM by Nusrat King--2009             Kidney stones ICD-10-CM: N20.0  ICD-9-CM: 592.0  8/16/2012        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 DO NOT GET WET IN SHOWER, WEAR CAST COVER IN SHOWER! Apply Alginate with silver(sheets); cut to size, apply to wound bed. Cover with 2x2 gauze and Covrsite. Change 3 times a week. Stop Bactrim and Keflex, Begin new antibiotic Levaquin. Obtain X-Ray of right knee. Standing Status:   Standing     Number of Occurrences:   1    levoFLOXacin (LEVAQUIN) 750 mg tablet     Sig: Take 1 Tab by mouth daily for 14 days. Dispense:  14 Tab     Refill:  0        Patient understood and agrees with plan. Questions answered. Follow-up Information     Follow up With Specialties Details Why Contact Info    [address] In 1 week  [facility] [address] Blas Sainz [address] [place] [address]  [phone]             Any procedures done today in the [address] [facility] are documented in a separate note in [facility] and made part of this record by reference.      Dictated using voice recognition software; proofread, but unrecognized errors may exist.    Signed By: Marly Cardona MD     September 10, 2019

## 2019-09-10 NOTE — DISCHARGE INSTRUCTIONS
Right Medial Upper Leg (Lower Knee)  Cleanse wound with normal saline. DO NOT GET WET IN SHOWER, WEAR CAST COVER IN SHOWER! Apply Alginate with silver(sheets); cut to size, apply to wound bed. Cover with 2x2 gauze and Covrsite. Change 3 times a week. Stop Bactrim and Keflex, Begin new antibiotic Levaquin. Obtain X-Ray of right knee.

## 2019-09-10 NOTE — WOUND CARE
09/10/19 1504   Wound   Date First Assessed/Time First Assessed: 09/10/19 1504   Present on Hospital Admission: Yes  Primary Wound Type: Abscess   Dressing Status Old drainage   Dressing Type   (telfa, tape)   Non-staged Wound Description Full thickness   Wound Length (cm) 1 cm   Wound Width (cm) 2.5 cm   Wound Depth (cm) 0.1 cm   Wound Volume (cm^3) 0.25 cm^3   Condition of [place]   Tissue Type Percent Yellow 100   Drainage Amount Moderate   Drainage Color Serosanguinous   Wound Odor None   Rosario-wound Assessment Edema; Red   Cleansing and Cleansing Agents  Normal saline   Dressing Changed Changed/New       Patient is not taking an anticoagulant.

## 2019-09-17 ENCOUNTER — HOSPITAL ENCOUNTER (OUTPATIENT)
Dept: WOUND CARE | Age: 79
Discharge: HOME | End: 2019-09-17
Attending: SURGERY
Payer: MEDICARE

## 2019-09-17 VITALS
HEART RATE: 100 BPM | RESPIRATION RATE: 18 BRPM | DIASTOLIC BLOOD PRESSURE: 62 MMHG | SYSTOLIC BLOOD PRESSURE: 138 MMHG | OXYGEN SATURATION: 98 % | TEMPERATURE: 98.1 F | BODY MASS INDEX: 34.08 KG/M2 | HEIGHT: 64 IN | WEIGHT: 199.6 LBS

## 2019-09-17 PROCEDURE — 99212 OFFICE O/P EST SF 10 MIN: CPT

## 2019-09-17 NOTE — PROGRESS NOTES
Wound Center Progress Note    Patient: Thai Torres MRN: [id number]  SSN: [id number]    YOB: 1940  Age: 70 y.o. Sex: female      Subjective:     Chief Complaint:  Thai Torres is a 70 y.o. WHITE OR  female who presents with R lower leg anterior wound of 3 weeks duration. History of Present Illness:     Drainage has decreased and the wound looks better. It is almost completely healed, we will readdress and see again in 2 weeks. Wound Caused By: acute injury due to fall  Associated Signs and Symptoms: mild discomfort  Timing: Intermittent  Quality: Cramping  Severity: 2/10  Modifying Factors: previous surgery with partial kneereplacement  Current Wound Care see nurse's notes    Past Medical History:   Diagnosis Date    Calculus of kidney     Gastric ulcer     2018    GERD (gastroesophageal reflux disease)     Headache(784.0)     Hypercholesterolemia     Hypertension     Primary insomnia 7/17/2017    Thyroid disease       Past Surgical History:   Procedure Laterality Date    HX COLONOSCOPY  Jan 2013    4 polyps, repeat 5 years    HX COLONOSCOPY  7/5/2016    repeat 5 yrs tubular adenoma    HX SALPINGO-OOPHORECTOMY      REMOVAL OF KIDNEY STONE       Family History   Problem Relation Age of Onset    Hypertension Mother     Arthritis-rheumatoid Mother     High Cholesterol Mother     Broken Bones Mother     Heart Failure Father     Heart Attack Father     Hypertension Brother     High Cholesterol Brother     Arthritis-osteo Brother         Knee    High Cholesterol Brother     Hypertension Brother     Heart Attack Brother     Heart Attack Paternal Grandmother     Heart Attack Paternal Grandfather     Breast Cancer Neg Hx       Social History     Tobacco Use    Smoking status: Never Smoker    Smokeless tobacco: Never Used   Substance Use Topics    Alcohol use: No       Prior to Admission medications    Medication Sig Start Date End Date Taking? Authorizing Provider   levoFLOXacin (LEVAQUIN) 750 mg tablet Take 1 Tab by mouth daily for 14 days. 9/10/19 9/24/19  Dario Lara MD   losartan-hydroCHLOROthiazide (HYZAAR) 100-25 mg per tablet TAKE ONE TABLET DAILY. 8/27/19   Poncho Clay MD   levothyroxine (SYNTHROID) 25 mcg tablet Take 1 Tab by mouth every morning. 8/22/19   Joss Doherty MD   potassium chloride (K-DUR, KLOR-CON) 20 mEq tablet TAKE 1 TABLET BY MOUTH 2 TIMES A DAY 8/22/19   Joss Doherty MD   traZODone (DESYREL) 50 mg tablet Take 1 Tab by mouth nightly. 8/22/19   [place] Doherty MD   gabapentin (NEURONTIN) 100 mg capsule Take 1 Cap by mouth two (2) times a day. 4/23/19   [place] Doherty MD   atorvastatin (LIPITOR) 40 mg tablet Take 1 Tab by mouth daily. 4/23/19   [place] Doherty MD   ondansetron (ZOFRAN ODT) 4 mg disintegrating tablet Take 1 Tab by mouth every eight (8) hours as needed for Nausea. 10/5/18   Calvin Young MD   FLOVENT  mcg/actuation inhaler Take 2 Puffs by inhalation two (2) times a day. 10/28/15   Provider, Historical   acetaminophen (TYLENOL ARTHRITIS PAIN) 650 mg CR tablet Take 650 mg by mouth every eight (8) hours as needed (pain). Provider, Historical   albuterol (VENTOLIN HFA) 90 mcg/actuation inhaler Take 2 Puffs by inhalation every six (6) hours as needed for Wheezing or Shortness of Breath. Provider, Historical   CALCIUM CITRATE + PO take 1 Tab by mouth two (2) times a day. Provider, Historical     Allergies   Allergen Reactions    Pneumococcal 23-Janell Ps Vaccine Other (comments)     fever  Other reaction(s): Other (comments)  fever        Review of Systems:  A comprehensive review of systems was negative except for that written in the History of Present Illness.      No results found for: HBA1C, KDB1HBDO, HGBE8, STX3HPQY, BLT3GNBH     Immunization History   Administered Date(s) Administered    Influenza High Dose Vaccine PF 09/08/2016, 11/28/2017    Influenza Vaccine 10/10/2013, 10/09/2014    Influenza Vaccine (Quad) PF 09/14/2018    Influenza Vaccine PF 10/26/2015    Influenza Vaccine Split 10/29/2012    Pneumococcal Polysaccharide (PPSV-23) 06/28/2007, 08/27/2007    TB Skin Test (PPD) Intradermal 10/12/2018    TDAP Vaccine 04/12/2012, 10/29/2012    Tdap 04/12/2012, 10/29/2012    Zoster 10/04/2011    Zoster Vaccine, Live 10/04/2011       Body mass index is 34.26 kg/m². Counseling regarding nutrition done: No     Current medications:  Current Outpatient Medications   Medication Sig Dispense Refill    levoFLOXacin (LEVAQUIN) 750 mg tablet Take 1 Tab by mouth daily for 14 days. 14 Tab 0    losartan-hydroCHLOROthiazide (HYZAAR) 100-25 mg per tablet TAKE ONE TABLET DAILY. 90 Tab 3    levothyroxine (SYNTHROID) 25 mcg tablet Take 1 Tab by mouth every morning. 90 Tab 3    potassium chloride (K-DUR, KLOR-CON) 20 mEq tablet TAKE 1 TABLET BY MOUTH 2 TIMES A  Tab 3    traZODone (DESYREL) 50 mg tablet Take 1 Tab by mouth nightly. 30 Tab 3    gabapentin (NEURONTIN) 100 mg capsule Take 1 Cap by mouth two (2) times a day. 180 Cap 3    atorvastatin (LIPITOR) 40 mg tablet Take 1 Tab by mouth daily. 90 Tab 3    ondansetron (ZOFRAN ODT) 4 mg disintegrating tablet Take 1 Tab by mouth every eight (8) hours as needed for Nausea. 20 Tab 0    FLOVENT  mcg/actuation inhaler Take 2 Puffs by inhalation two (2) times a day. 3    acetaminophen (TYLENOL ARTHRITIS PAIN) 650 mg CR tablet Take 650 mg by mouth every eight (8) hours as needed (pain).  albuterol (VENTOLIN HFA) 90 mcg/actuation inhaler Take 2 Puffs by inhalation every six (6) hours as needed for Wheezing or Shortness of Breath.  CALCIUM CITRATE + PO take 1 Tab by mouth two (2) times a day.            Objective:     Physical Exam:     Visit Vitals  /62 (BP 1 Location: Left arm)   Pulse 100   Temp 98.1 °F (36.7 °C)   Resp 18   Ht 5' 4\" (1.626 m)   Wt 90.5 kg (199 lb 9.6 oz)   SpO2 98%   BMI 34.26 kg/m²       General: well developed, well nourished, pleasant , NAD. Hygiene good  Psych: cooperative. Pleasant. No anxiety or depression. Normal mood and affect. Neuro: alert and oriented to person/place/situation. Otherwise nonfocal.  Derm: Normal turgor for age, dry skin  HEENT: Normocephalic, atraumatic. EOMI. Conjunctiva clear. No scleral icterus. Neck: Normal range of motion. No masses. Chest: Good air entry bilaterally. Respirations nonlabored  Cardio: Normal heart sounds,no rubs, murmurs or gallops  Abdomen: Soft, nontender, nondistended, normoactive bowel sounds  Lower extremities: color normal; temperature normal. Hair growth is not present. Calves are supple, nontender, approximately equally sized in comparison. Capillary refill <3 sec            Ulcer Description:   Wound Knee Left; Anterior (Active)   Number of days: 371       Wound Knee Left;Medial (Active)   Number of days: 340       Wound Knee Left;Lateral (Active)   Number of days: 340       Wound Pretibial Right (Active)   Number of days: 25       Wound Knee Right;Medial large blister on medial aspect of right knee 09/04/19 (Active)   Number of days: 13       Wound (Active)   Dressing Status Clean, dry, and intact 9/17/2019 11:18 AM   Non-staged Wound Description Full thickness 9/17/2019 11:18 AM   Wound Length (cm) 0.3 cm 9/17/2019 11:18 AM   Wound Width (cm) 0.7 cm 9/17/2019 11:18 AM   Wound Depth (cm) 0.1 cm 9/17/2019 11:18 AM   Wound Volume (cm^3) 0.02 cm^3 9/17/2019 11:18 AM   Condition of Base [address] 9/17/2019 11:18 AM   Tissue Type Percent Yellow 100 9/17/2019 11:18 AM   Drainage Amount Scant 9/17/2019 11:18 AM   Drainage Color Serous 9/17/2019 11:18 AM   Wound Odor None 9/17/2019 11:18 AM   Rosario-wound Assessment Edema 9/17/2019 11:18 AM   Cleansing and Cleansing Agents  Normal saline 9/17/2019 11:18 AM   Dressing Changed Changed/New 9/17/2019 11:18 AM   Number of days: 7         Data Review:   No results found for this or any previous visit (from the past 24 hour(s)). Assessment:     70 y.o. female with R lower leg anterior combined ulcer. Problem List  Date Reviewed: 8/1/2018          Codes Class Noted    Chronic kidney disease, stage III (moderate) ([facility] ICD-10-CM: N18.3  ICD-9-CM: 585.3  11/12/2014        Asthma ICD-10-CM: J45.909  ICD-9-CM: 493.90  10/29/2012        Pyogenic arthritis of right knee joint ([facility] ICD-10-CM: M00.9  ICD-9-CM: 711.06  9/10/2019        Cellulitis of right leg ICD-10-CM: L03.115  ICD-9-CM: 682.6  8/22/2019        Acute kidney injury superimposed on CKD ([facility] ICD-10-CM: N17.9, N18.9  ICD-9-CM: 866.00, 585.9  8/22/2019        Gastric ulcer ICD-10-CM: K25.9  ICD-9-CM: 531.90  Unknown        Acute gastric ulcer without hemorrhage or perforation ICD-10-CM: K25.3  ICD-9-CM: 531.30  10/14/2018        Acute pancreatitis ICD-10-CM: K85.90  ICD-9-CM: 577.0  10/12/2018        Acute renal failure superimposed on stage 3 chronic kidney disease ([facility] ICD-10-CM: N17.9, N18.3  ICD-9-CM: 584.9, 585.3  10/11/2018        Hypokalemia ICD-10-CM: E87.6  ICD-9-CM: 276.8  10/11/2018        Dehydration ICD-10-CM: E86.0  ICD-9-CM: 276.51  10/11/2018        Orthostatic hypotension ICD-10-CM: I95.1  ICD-9-CM: 458.0  10/11/2018        Cellulitis and abscess of left lower extremity ICD-10-CM: L03.116, L02.416  ICD-9-CM: 682.6  9/11/2018        Severe obesity (BMI 35.0-39. 9) with comorbidity ([facility] ICD-10-CM: E66.01  ICD-9-CM: 278.01  3/28/2018        Primary insomnia ICD-10-CM: F51.01  ICD-9-CM: 307.42  7/17/2017        Right upper quadrant abdominal pain ICD-10-CM: R10.11  ICD-9-CM: 789.01  12/14/2016    Overview Signed 12/14/2016 10:18 AM by Shraddha Reynoso MD     S/P thorough eval Dr Dilcia Mujica, gastroenterologist including EGD, colonoscopy, abd U/S.   Lasts only minutes about once a week, worse when constipated             Raynaud's phenomenon ICD-10-CM: I73.00  ICD-9-CM: 443.0  2/25/2015        Post Menopausal Osteopenia ICD-10-CM: M89.9  ICD-9-CM: 733.90  2/25/2015        Pedal edema ICD-10-CM: R60.0  ICD-9-CM: 782.3  1/28/2014    Overview Signed 1/28/2014  4:03 PM by Irlanda Miguel MD     Worse on left             Chronic pain syndrome ICD-10-CM: G89.4  ICD-9-CM: 338.4  11/11/2013    Overview Addendum 1/14/2016  3:45 PM by Irlanda Miguel MD     Chronic feet, hands, hips osteoarthritis, S/P bilateral TKA, gets pain with walking 100 ft             Colon polyps ICD-10-CM: K63.5  ICD-9-CM: 211.3  4/30/2013    Overview Signed 4/30/2013 10:01 AM by Irlanda Miguel MD     Next colo due Jan 2018             HTN (hypertension) ICD-10-CM: I10  ICD-9-CM: 401.9  8/16/2012        Hyperlipidemia ICD-10-CM: E78.5  ICD-9-CM: 272.4  8/16/2012    Overview Addendum 10/28/2012  6:29 PM by Irlanda Miguel MD     Calcium score of 140, Goal LDL under 130             Hypothyroidism ICD-10-CM: E03.9  ICD-9-CM: 244.9  8/16/2012        Female stress incontinence ICD-10-CM: N39.3  ICD-9-CM: 625.6  0/30/3270        Umbilical hernia EEY-37-WK: K42.9  ICD-9-CM: 553.1  8/16/2012        S/P total knee arthroplasty ICD-10-CM: Y00.965  ICD-9-CM: V43.65  8/16/2012    Overview Addendum 8/16/2012  8:38 AM by Genita Ken     Bilateral--2009             Kidney stones ICD-10-CM: N20.0  ICD-9-CM: 592.0  8/16/2012        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 DO NOT GET WET IN SHOWER, WEAR CAST COVER IN SHOWER!   Apply Alginate with silver(sheets); cut to size, apply to wound bed. Cover with 2x2 gauze and Covrsite. Change 3 times a week.         Standing Status:   Standing     Number of Occurrences:   1        Patient understood and agrees with plan. Questions answered. Follow-up Information     Follow up With Specialties Details Why Contact Info    [address] In 2 weeks  [facility] [address] Zoila Salomon [address] [facility] [address] [phone]             Any procedures done today in the [address] [facility] are documented in a separate note in [facility] and made part of this record by reference.      Dictated using voice recognition software; proofread, but unrecognized errors may exist.    Signed By: Ashli Quezada MD     September 17, 2019

## 2019-09-17 NOTE — WOUND CARE
[address]  [address], [address] [facility]  Phone: [phone]  Fax: [phone]    Patient: Priscilla Caro MRN: [id number]  SSN: [id number]    YOB: 1940  Age: 70 y.o. Sex: female       Return Appointment: 2 weeks with Ngoc Ivory MD    Instructions: Right Medial Upper Leg (Lower Knee)  Cleanse wound with normal saline. DO NOT GET WET IN SHOWER, WEAR CAST COVER IN SHOWER! Apply Alginate with silver(sheets); cut to size, apply to wound bed. Cover with  Covrsite. Change 3 times a week.        Should you experience increased redness, swelling, pain, foul odor, size of wound(s), or have a temperature over 101 degrees please contact the [address] [place] [address] Road at [phone] or if after hours contact your primary care physician or go to the hospital emergency department.     Signed By: Eunice Pedroza RN     September 17, 2019

## 2019-09-17 NOTE — WOUND CARE
Clinic Level of Care Assessment    NAME:  Neha Guzman OF BIRTH:  1940 GENDER: female  MEDICAL RECORD NUMBER:  [id number]   DATE:  9/17/2019      Wound Count Document in [address] [place] [address]  Number of Wounds Assessed Points   No Wounds/Ulcers []   0   Less than Three Wounds/Ulcers [x]   1   3-6 Wounds/Ulcers []   2   Greater than 6 Wounds/Ulcers []   3     Ambulation Status Document in Coord/ADAMA/Mobility tab  Status Definition Points   Independent Independently able to ambulate. Fully able (without any assistance) to get on/off exam table/chair. [x]   0   Minimal Physical Assistance Requires physical assistance of one person to ambulate and/or position patient to be examined. Includes necessary physical assistance to position lower extremities on/off stool. []   1   Moderate Physical Assistance Requires at least one staff member to physically assist patient in ambulating into treatment room, and on/off exam table. []   2   Full Assistance Requires assistance of at least two staff members to transfer patient into treatment room and/or on/off exam table/chair. \"Total Transfer\". []   3     Dressing Complexity Document in LDA and Write Appropriate Order  Complexity Definition Points   No Dressing  []   0   Simple Minimal, simple dressing. i.e. Band-aid, gauze, simple wrap. [x]   1   Intermediate Moderately complicated requiring licensed personnel to apply i.e. collagen matrix, ointments, gels, alginates. []   2   Complex Complicated requiring licensed personnel to apply dressings 6 or more wounds. []   3     Teaching Effort Document in Education Tab   Effort Definition Points   No Teaching  []   0   Simple Reinforce two or less topics. Document in Education navigator. [x]   1   Intermediate Reinforce three to five topics and/or one additional   new topic. Document in Education navigator. []   2   Complex Teach more than one new topic.  New patient information   packet reviewed and/or reinforce more than three topics. Document in Education navigator. HBO initial instruction. []   3       Patient Assessment and Planning  Planning Definition Points   Simple Multiple System Simple: Simple follow-up with routine assessment and planning. If Discharged, instructions and long term/follow-up care given to patient/caregiver. Discharged, instructions and/or After Visit Summary given to patient/caregiver and instructions completed. [x]   1   Intermediate Multiple System Intermediate: Contact with outside resources; i.e. Telephone calls to home health, [facility]. May include filling out forms and writing letters, arranging transportation, communication with insurance , vendors, etc.  Discharged, instructions and/or After Visit Summary given to patient/caregiver and instructions completed. []   2   Complex Multiple System Complex: Full, comprehensive assessment and planning. Follow the entire navigator under Wound Visit charting filling out each tab which includes OP Adm Database Screening, Education and CarePlan  HBO risk assessment completed. Discharged, instructions and/or After Visit Summary given to patient/caregiver and instructions completed.    []   3           Is this the Patient's First Visit to the [address] [place] [address] Road  No      Is this Patient Established @ [facility]  Yes             Clinical Level of Care      Points  0-2  Level 1 []     Points  3-5  Level 2 [x]     Points  6-9  Level 3 []     Points  10-12  Level 4 []     Points  13-15  Level 5 []       Electronically signed by Evens Paniagua RN on 9/17/2019 at 11:31 AM

## 2019-09-17 NOTE — WOUND CARE
09/17/19 1118   Wound   Date First Assessed/Time First Assessed: 09/10/19 1504   Present on Hospital Admission: Yes  Primary Wound Type: Abscess   Dressing Status Clean, dry, and intact   Dressing Type   (alginate with silver, covrsite)   Non-staged Wound Description Full thickness   Wound Length (cm) 0.3 cm   Wound Width (cm) 0.7 cm   Wound Depth (cm) 0.1 cm   Wound Volume (cm^3) 0.02 cm^3   Condition of Base Slough   Tissue Type Percent Yellow 100   Drainage Amount Scant   Drainage Color Serous   Wound Odor None   Rosario-wound Assessment Edema   Cleansing and Cleansing Agents  Normal saline   Dressing Changed Changed/New       Patient is not currently taking any anticoagulants

## 2019-09-27 ENCOUNTER — PATIENT OUTREACH (OUTPATIENT)
Dept: CASE MANAGEMENT | Age: 79
End: 2019-09-27

## 2019-09-27 NOTE — PROGRESS NOTES
This note will not be viewable in [address] Transitions of Care  Follow up Outreach Note   Outreach type Phone call: Spoke with patient    Home visit:   Date/Time of Outreach: 9/27/2019  3:00 pm      Has patient attended PCP or specialist follow-up appointments since last contact? What was outcome of appointment? When is next follow-up scheduled? Patient had FU with Dr. Oneida Banerjee as scheduled and continues with the wound center every other week. Patient states she has finished all of her A/Bs and that she is very pleased with her progress. Review medications. Any medication changes since last outreach? Does patient have any questions or issues related to their medications? Medications reviewed with patient with no changes in medications. .      Home health active? If yes  any issue? Progress? No      Referrals needed?  (CM, SW, HH, etc. )   No    Other issues/Miscellaneous? (Transportation, access to meals, ability to perform ADLs, adequate caregiver support, etc.)      Patient independent with ADLs. Next Outreach Scheduled? Graduation from program?       Yes        Next Steps/Goals (if applicable):         Outreach completed by:   Rehan Betancourt LPN Care Coordinator

## 2019-09-30 NOTE — WOUND CARE
Clinic Level of Care Assessment NAME:  Abbey Lynne YOB: 1940 GENDER: female MEDICAL RECORD NUMBER:  [id number] DATE:  9/10/2019 Wound Count Document in [facility] Number of Wounds Assessed Points No Wounds/Ulcers []   0 Less than Three Wounds/Ulcers [x]   1  
3-6 Wounds/Ulcers []   2 Greater than 6 Wounds/Ulcers []   3 Ambulation Status Document in Coord/ADAMA/Mobility tab Status Definition Points Independent Independently able to ambulate. Fully able (without any assistance) to get on/off exam table/chair. [x]   0 Minimal Physical Assistance Requires physical assistance of one person to ambulate and/or position patient to be examined. Includes necessary physical assistance to position lower extremities on/off stool. []   1 Moderate Physical Assistance Requires at least one staff member to physically assist patient in ambulating into treatment room, and on/off exam table. []   2 Full Assistance Requires assistance of at least two staff members to transfer patient into treatment room and/or on/off exam table/chair. \"Total Transfer\". []   3 Dressing Complexity Document in [facility] and Write Appropriate Order Complexity Definition Points No Dressing  []   0 Simple Minimal, simple dressing. i.e. Band-aid, gauze, simple wrap. []   1 Intermediate Moderately complicated requiring licensed personnel to apply i.e. collagen matrix, ointments, gels, alginates. [x]   2 Complex Complicated requiring licensed personnel to apply dressings 6 or more wounds. []   3 Teaching Effort Document in Education Tab Effort Definition Points No Teaching  []   0 Simple Reinforce two or less topics. Document in Education navigator. [x]   1 Intermediate Reinforce three to five topics and/or one additional  
new topic. Document in Education navigator. []   2 Complex Teach more than one new topic. New patient information packet reviewed and/or reinforce more than three topics. Document in Education navigator. HBO initial instruction. []   3 Patient Assessment and Planning Planning Definition Points Simple Multiple System Simple: Simple follow-up with routine assessment and planning. If Discharged, instructions and long term/follow-up care given to patient/caregiver. Discharged, instructions and/or After Visit Summary given to patient/caregiver and instructions completed. []   1 Intermediate Multiple System Intermediate: Contact with outside resources; i.e. Telephone calls to home health, [facility]. May include filling out forms and writing letters, arranging transportation, communication with insurance , vendors, etc.  Discharged, instructions and/or After Visit Summary given to patient/caregiver and instructions completed. [x]   2 Complex Multiple System Complex: Full, comprehensive assessment and planning. Follow the entire navigator under Wound Visit charting filling out each tab which includes OP Adm Database Screening, Education and CarePlan  HBO risk assessment completed. Discharged, instructions and/or After Visit Summary given to patient/caregiver and instructions completed. []   3 Is this the Patient's First Visit to the [address] [place] [address] Road No 
 
 
Is this Patient Established @ [facility] 
Yes Clinical Level of Care Points  0-2  Level 1 [] Points  3-5  Level 2 [] Points  6-9  Level 3 [x] Points  10-12  Level 4 [] Points  13-15  Level 5 [] Electronically signed by Aspen Enriquez RN on 9/30/2019 at 11:09 AM

## 2019-10-01 ENCOUNTER — HOSPITAL ENCOUNTER (OUTPATIENT)
Dept: WOUND CARE | Age: 79
Discharge: HOME | End: 2019-10-01
Attending: SURGERY
Payer: MEDICARE

## 2019-10-01 VITALS
BODY MASS INDEX: 35.2 KG/M2 | SYSTOLIC BLOOD PRESSURE: 131 MMHG | WEIGHT: 206.2 LBS | TEMPERATURE: 97.3 F | HEIGHT: 64 IN | DIASTOLIC BLOOD PRESSURE: 70 MMHG | RESPIRATION RATE: 18 BRPM | HEART RATE: 93 BPM | OXYGEN SATURATION: 96 %

## 2019-10-01 PROCEDURE — 99212 OFFICE O/P EST SF 10 MIN: CPT

## 2019-10-01 NOTE — WOUND CARE
[address] [place] [address], [address] [address] Phone: [phone] Fax: [phone] Patient: Charles Peacock MRN: [id number]  SSN: [id number] YOB: 1940  Age: 70 y.o. Sex: female Return Appointment: 2 week with Cathie Ortiz MD 
 
Instructions: Right Medial Upper Leg (Lower Knee) Cleanse wound with normal saline. DO NOT GET WET IN SHOWER, WEAR CAST COVER IN SHOWER! Apply Alginate with silver(sheets); cut to size, apply to wound bed. Cover with  Covrsite. Change 3 times a week. Should you experience increased redness, swelling, pain, foul odor, size of wound(s), or have a temperature over 101 degrees please contact the [address] [place] [address] Road at [phone] or if after hours contact your primary care physician or go to the hospital emergency department. Signed By: Faiza Burdick RN October 1, 2019

## 2019-10-01 NOTE — PROGRESS NOTES
Wound Center Progress Note    Patient: Jorge Dozier MRN: [id number]  SSN: [id number]    YOB: 1940  Age: 70 y.o. Sex: female      Subjective:     Chief Complaint:  Jorge Dozier is a 70 y.o. WHITE OR  female who presents with R lower leg medial wound of 3 weeks duration. History of Present Illness:     Patient fell again bruising her knee on the right side and also on the left side as well as a periorbital hematoma. She says that she tripped over her 's oxygen linet[place] multiple bruises are okay. There's no all the way and other than this, which is draining a bit. I tried to probe it and could get no further than just the shallow subcutaneous area. We will continue the same dressings and we'll see her again in 2 weeks. .  Wound Caused By: previous infection  Associated Signs and Symptoms: a little pain  Timing: Intermittent and drainage  Quality: Aching  Severity: 2/10  Modifying Factors: age and thin skin  Current Wound Care:see nurse's notes    Past Medical History:   Diagnosis Date    Calculus of kidney     Gastric ulcer     2018    GERD (gastroesophageal reflux disease)     Headache(784.0)     Hypercholesterolemia     Hypertension     Primary insomnia 7/17/2017    Thyroid disease       Past Surgical History:   Procedure Laterality Date    HX COLONOSCOPY  Jan 2013    4 polyps, repeat 5 years    HX COLONOSCOPY  7/5/2016    repeat 5 yrs tubular adenoma    HX SALPINGO-OOPHORECTOMY      REMOVAL OF KIDNEY STONE       Family History   Problem Relation Age of Onset    Hypertension Mother     Arthritis-rheumatoid Mother     High Cholesterol Mother     Broken Bones Mother     Heart Failure Father     Heart Attack Father     Hypertension Brother     High Cholesterol Brother     Arthritis-osteo Brother         Knee    High Cholesterol Brother     Hypertension Brother     Heart Attack Brother     Heart Attack Paternal Grandmother     Heart Attack Paternal Grandfather     Breast Cancer Neg Hx       Social History     Tobacco Use    Smoking status: Never Smoker    Smokeless tobacco: Never Used   Substance Use Topics    Alcohol use: No       Prior to Admission medications    Medication Sig Start Date End Date Taking? Authorizing Provider   losartan-hydroCHLOROthiazide (HYZAAR) 100-25 mg per tablet TAKE ONE TABLET DAILY. 8/27/19   Jose Francisco Redding MD   levothyroxine (SYNTHROID) 25 mcg tablet Take 1 Tab by mouth every morning. 8/22/19   Joss Doherty MD   potassium chloride (K-DUR, KLOR-CON) 20 mEq tablet TAKE 1 TABLET BY MOUTH 2 TIMES A DAY 8/22/19   Joss Doherty MD   traZODone (DESYREL) 50 mg tablet Take 1 Tab by mouth nightly. 8/22/19   Dipti Doherty MD   gabapentin (NEURONTIN) 100 mg capsule Take 1 Cap by mouth two (2) times a day. 4/23/19   Dipti Doherty MD   atorvastatin (LIPITOR) 40 mg tablet Take 1 Tab by mouth daily. 4/23/19   Dipti Doherty MD   ondansetron (ZOFRAN ODT) 4 mg disintegrating tablet Take 1 Tab by mouth every eight (8) hours as needed for Nausea. 10/5/18   Vi Young MD   FLOVENT  mcg/actuation inhaler Take 2 Puffs by inhalation two (2) times a day. 10/28/15   Provider, Historical   acetaminophen (TYLENOL ARTHRITIS PAIN) 650 mg CR tablet Take 650 mg by mouth every eight (8) hours as needed (pain). Provider, Historical   albuterol (VENTOLIN HFA) 90 mcg/actuation inhaler Take 2 Puffs by inhalation every six (6) hours as needed for Wheezing or Shortness of Breath. Provider, Historical   CALCIUM CITRATE + PO take 1 Tab by mouth two (2) times a day. Provider, Historical     Allergies   Allergen Reactions    Pneumococcal 23-Janell Ps Vaccine Other (comments)     fever  Other reaction(s): Other (comments)  fever        Review of Systems:  A comprehensive review of systems was negative except for that written in the History of Present Illness.      No results found for: HBA1C, JQM4PMFK, HGBE8, HYX9CHKB, PDF8TEII Immunization History   Administered Date(s) Administered    Influenza High Dose Vaccine PF 09/08/2016, 11/28/2017    Influenza Vaccine 10/10/2013, 10/09/2014    Influenza Vaccine (Quad) PF 09/14/2018    Influenza Vaccine PF 10/26/2015    Influenza Vaccine Split 10/29/2012    Pneumococcal Polysaccharide (PPSV-23) 06/28/2007, 08/27/2007    TB Skin Test (PPD) Intradermal 10/12/2018    TDAP Vaccine 04/12/2012, 10/29/2012    Tdap 04/12/2012, 10/29/2012    Zoster 10/04/2011    Zoster Vaccine, Live 10/04/2011       Body mass index is 35.39 kg/m². Counseling regarding nutrition done: No     Current medications:  Current Outpatient Medications   Medication Sig Dispense Refill    losartan-hydroCHLOROthiazide (HYZAAR) 100-25 mg per tablet TAKE ONE TABLET DAILY. 90 Tab 3    levothyroxine (SYNTHROID) 25 mcg tablet Take 1 Tab by mouth every morning. 90 Tab 3    potassium chloride (K-DUR, KLOR-CON) 20 mEq tablet TAKE 1 TABLET BY MOUTH 2 TIMES A  Tab 3    traZODone (DESYREL) 50 mg tablet Take 1 Tab by mouth nightly. 30 Tab 3    gabapentin (NEURONTIN) 100 mg capsule Take 1 Cap by mouth two (2) times a day. 180 Cap 3    atorvastatin (LIPITOR) 40 mg tablet Take 1 Tab by mouth daily. 90 Tab 3    ondansetron (ZOFRAN ODT) 4 mg disintegrating tablet Take 1 Tab by mouth every eight (8) hours as needed for Nausea. 20 Tab 0    FLOVENT  mcg/actuation inhaler Take 2 Puffs by inhalation two (2) times a day. 3    acetaminophen (TYLENOL ARTHRITIS PAIN) 650 mg CR tablet Take 650 mg by mouth every eight (8) hours as needed (pain).  albuterol (VENTOLIN HFA) 90 mcg/actuation inhaler Take 2 Puffs by inhalation every six (6) hours as needed for Wheezing or Shortness of Breath.  CALCIUM CITRATE + PO take 1 Tab by mouth two (2) times a day.            Objective:     Physical Exam:     Visit Vitals  /70 (BP 1 Location: Left arm)   Pulse 93   Temp 97.3 °F (36.3 °C)   Resp 18   Ht 5' 4\" (1.626 m)   Wt 93.5 kg (206 lb 3.2 oz)   SpO2 96%   BMI 35.39 kg/m²       General: well developed, well nourished, pleasant , NAD. Hygiene good  Psych: cooperative. Pleasant. No anxiety or depression. Normal mood and affect. Neuro: alert and oriented to person/place/situation. Otherwise nonfocal.  Derm: Normal turgor for age, dry skin  HEENT: Normocephalic, atraumatic. EOMI. Conjunctiva clear. No scleral icterus. Neck: Normal range of motion. No masses. Chest: Good air entry bilaterally. Respirations nonlabored  Cardio: Normal heart sounds,no rubs, murmurs or gallops  Abdomen: Soft, nontender, nondistended, normoactive bowel sounds  Lower extremities: color normal; temperature normal. Hair growth is not present. Calves are supple, nontender, approximately equally sized in comparison. Capillary refill <3 sec            Ulcer Description:   Wound Knee Left; Anterior (Active)   Number of days: 385       Wound Knee Left;Medial (Active)   Number of days: 354       Wound Knee Left;Lateral (Active)   Number of days: 354       Wound Pretibial Right (Active)   Number of days: 39       Wound Knee Right;Medial large blister on medial aspect of right knee 09/04/19 (Active)   Number of days: 27       Wound (Active)   Dressing Status Clean, dry, and intact 10/1/2019 10:32 AM   Non-staged Wound Description Full thickness 9/17/2019 11:18 AM   Wound Length (cm) 0.1 cm 10/1/2019 10:32 AM   Wound Width (cm) 0.3 cm 10/1/2019 10:32 AM   Wound Depth (cm) 0.1 cm 10/1/2019 10:32 AM   Wound Volume (cm^3) 0 cm^3 10/1/2019 10:32 AM   Condition of [employer] 10/1/2019 10:32 AM   Tissue Type Percent Pink 10 10/1/2019 10:32 AM   Tissue Type Percent Yellow 100 9/17/2019 11:18 AM   Drainage Amount Small 10/1/2019 10:32 AM   Drainage Color Yellow 10/1/2019 10:32 AM   Wound Odor None 10/1/2019 10:32 AM   Rosario-wound Assessment Edema 9/17/2019 11:18 AM   Cleansing and Cleansing Agents  Normal saline 10/1/2019 10:32 AM   Dressing Changed Changed/New 9/17/2019 11:18 AM   Number of days: 21         Data Review:   No results found for this or any previous visit (from the past 24 hour(s)). Assessment:     70 y.o. female with R lower leg medial combined ulcer. Problem List  Date Reviewed: 8/1/2018          Codes Class Noted    Chronic kidney disease, stage III (moderate) ([facility] ICD-10-CM: N18.3  ICD-9-CM: 585.3  11/12/2014        Asthma ICD-10-CM: J45.909  ICD-9-CM: 493.90  10/29/2012        Pyogenic arthritis of right knee joint ([facility] ICD-10-CM: M00.9  ICD-9-CM: 711.06  9/10/2019        Cellulitis of right leg ICD-10-CM: L03.115  ICD-9-CM: 682.6  8/22/2019        Acute kidney injury superimposed on CKD ([facility] ICD-10-CM: N17.9, N18.9  ICD-9-CM: 866.00, 585.9  8/22/2019        Gastric ulcer ICD-10-CM: K25.9  ICD-9-CM: 531.90  Unknown        Acute gastric ulcer without hemorrhage or perforation ICD-10-CM: K25.3  ICD-9-CM: 531.30  10/14/2018        Acute pancreatitis ICD-10-CM: K85.90  ICD-9-CM: 577.0  10/12/2018        Acute renal failure superimposed on stage 3 chronic kidney disease ([facility] ICD-10-CM: N17.9, N18.3  ICD-9-CM: 584.9, 585.3  10/11/2018        Hypokalemia ICD-10-CM: E87.6  ICD-9-CM: 276.8  10/11/2018        Dehydration ICD-10-CM: E86.0  ICD-9-CM: 276.51  10/11/2018        Orthostatic hypotension ICD-10-CM: I95.1  ICD-9-CM: 458.0  10/11/2018        Cellulitis and abscess of left lower extremity ICD-10-CM: L03.116, L02.416  ICD-9-CM: 682.6  9/11/2018        Severe obesity (BMI 35.0-39. 9) with comorbidity ([facility] ICD-10-CM: E66.01  ICD-9-CM: 278.01  3/28/2018        Primary insomnia ICD-10-CM: F51.01  ICD-9-CM: 307.42  7/17/2017        Right upper quadrant abdominal pain ICD-10-CM: R10.11  ICD-9-CM: 789.01  12/14/2016    Overview Signed 12/14/2016 10:18 AM by Bianca Kahn MD     S/P thorough eval Dr Rickey Ortiz, gastroenterologist including EGD, colonoscopy, abd U/S.   Lasts only minutes about once a week, worse when constipated             Raynaud's phenomenon ICD-10-CM: I73.00  ICD-9-CM: 443.0  2/25/2015        Post Menopausal Osteopenia ICD-10-CM: M89.9  ICD-9-CM: 733.90  2/25/2015        Pedal edema ICD-10-CM: R60.0  ICD-9-CM: 782.3  1/28/2014    Overview Signed 1/28/2014  4:03 PM by Mariama Smith MD     Worse on left             Chronic pain syndrome ICD-10-CM: G89.4  ICD-9-CM: 338.4  11/11/2013    Overview Addendum 1/14/2016  3:45 PM by Mariama Smith MD     Chronic feet, hands, hips osteoarthritis, S/P bilateral TKA, gets pain with walking 100 ft             Colon polyps ICD-10-CM: K63.5  ICD-9-CM: 211.3  4/30/2013    Overview Signed 4/30/2013 10:01 AM by Mariama Smith MD     Next colo due Jan 2018             HTN (hypertension) ICD-10-CM: I10  ICD-9-CM: 401.9  8/16/2012        Hyperlipidemia ICD-10-CM: E78.5  ICD-9-CM: 272.4  8/16/2012    Overview Addendum 10/28/2012  6:29 PM by Mariama Smith MD     Calcium score of 140, Goal LDL under 130             Hypothyroidism ICD-10-CM: E03.9  ICD-9-CM: 244.9  8/16/2012        Female stress incontinence ICD-10-CM: N39.3  ICD-9-CM: 625.6  0/07/0892        Umbilical hernia DCQ-31-VL: K42.9  ICD-9-CM: 553.1  8/16/2012        S/P total knee arthroplasty ICD-10-CM: G47.821  ICD-9-CM: V43.65  8/16/2012    Overview Addendum 8/16/2012  8:38 AM by Savannah Rose     Bilateral--2009             Kidney stones ICD-10-CM: N20.0  ICD-9-CM: 592.0  8/16/2012        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  DO NOT GET WET IN SHOWER, WEAR CAST COVER IN [place]! Apply Alginate with silver(sheets); cut to size, apply to wound bed. Cover with  Covrsite. Change 3 times a week. Standing Status:   Standing     Number of Occurrences:   1        Patient understood and agrees with plan. Questions answered. Follow-up Information     Follow up With Specialties Details Why Contact Info    [address] In 2 weeks  [place] [address] [address]  [phone]             Any procedures done today in the [address] [facility],[address] are documented in a separate note in [facility] and made part of this record by reference.      Dictated using voice recognition software; proofread, but unrecognized errors may exist.    Signed By: Denis Mcmullen MD     October 1, 2019

## 2019-10-01 NOTE — WOUND CARE
Clinic Level of Care Assessment NAME:  Maximo Ferguson YOB: 1940 GENDER: female MEDICAL RECORD NUMBER:  [id number] DATE:  10/1/2019 Wound Count Document in [facility] Number of Wounds Assessed Points No Wounds/Ulcers []   0 Less than Three Wounds/Ulcers [x]   1  
3-6 Wounds/Ulcers []   2 Greater than 6 Wounds/Ulcers []   3 Ambulation Status Document in Coord/ADAMA/Mobility tab Status Definition Points Independent Independently able to ambulate. Fully able (without any assistance) to get on/off exam table/chair. [x]   0 Minimal Physical Assistance Requires physical assistance of one person to ambulate and/or position patient to be examined. Includes necessary physical assistance to position lower extremities on/off stool. []   1 Moderate Physical Assistance Requires at least one staff member to physically assist patient in ambulating into treatment room, and on/off exam table. []   2 Full Assistance Requires assistance of at least two staff members to transfer patient into treatment room and/or on/off exam table/chair. \"Total Transfer\". []   3 Dressing Complexity Document in [facility] and Write Appropriate Order Complexity Definition Points No Dressing  []   0 Simple Minimal, simple dressing. i.e. Band-aid, gauze, simple wrap. [x]   1 Intermediate Moderately complicated requiring licensed personnel to apply i.e. collagen matrix, ointments, gels, alginates. []   2 Complex Complicated requiring licensed personnel to apply dressings 6 or more wounds. []   3 Teaching Effort Document in Education Tab Effort Definition Points No Teaching  []   0 Simple Reinforce two or less topics. Document in Education navigator. [x]   1 Intermediate Reinforce three to five topics and/or one additional  
new topic. Document in Education navigator. []   2 Complex Teach more than one new topic. New patient information packet reviewed and/or reinforce more than three topics. Document in Education navigator. HBO initial instruction. []   3 Patient Assessment and Planning Planning Definition Points Simple Multiple System Simple: Simple follow-up with routine assessment and planning. If Discharged, instructions and long term/follow-up care given to patient/caregiver. Discharged, instructions and/or After Visit Summary given to patient/caregiver and instructions completed. [x]   1 Intermediate Multiple System Intermediate: Contact with outside resources; i.e. Telephone calls to home health, [facility]. May include filling out forms and writing letters, arranging transportation, communication with insurance , vendors, etc.  Discharged, instructions and/or After Visit Summary given to patient/caregiver and instructions completed. []   2 Complex Multiple System Complex: Full, comprehensive assessment and planning. Follow the entire navigator under Wound Visit charting filling out each tab which includes OP Adm Database Screening, Education and CarePlan  HBO risk assessment completed. Discharged, instructions and/or After Visit Summary given to patient/caregiver and instructions completed. []   3 Is this the Patient's First Visit to the [facility] No 
 
 
Is this Patient Established @ [facility] 
Yes Clinical Level of Care Points  0-2  Level 1 [] Points  3-5  Level 2 [x] Points  6-9  Level 3 [] Points  10-12  Level 4 [] Points  13-15  Level 5 [] Electronically signed by Kati Aiken RN on 10/1/2019 at 10:40 AM

## 2019-10-01 NOTE — WOUND CARE
10/01/19 1032 Wound Date First Assessed/Time First Assessed: 09/10/19 1504   Present on Hospital Admission: Yes  Primary Wound Type: Abscess Dressing Status Clean, dry, and intact Dressing Type  
(silver algniate, bandaid) Wound Length (cm) 0.1 cm Wound Width (cm) 0.3 cm Wound Depth (cm) 0.1 cm Wound Volume (cm^3) 0 cm^3 Condition of Base Pink Tissue Type Percent Pink 10 Drainage Amount Small Drainage Color Yellow Wound Odor None Cleansing and Cleansing Agents  Normal saline Patient is not currently on any anticoagulants

## 2019-10-15 ENCOUNTER — HOSPITAL ENCOUNTER (OUTPATIENT)
Dept: WOUND CARE | Age: 79
Discharge: HOME | End: 2019-10-15
Attending: SURGERY
Payer: MEDICARE

## 2019-10-15 VITALS
HEART RATE: 73 BPM | BODY MASS INDEX: 35.58 KG/M2 | RESPIRATION RATE: 18 BRPM | DIASTOLIC BLOOD PRESSURE: 71 MMHG | OXYGEN SATURATION: 96 % | SYSTOLIC BLOOD PRESSURE: 149 MMHG | TEMPERATURE: 97.8 F | HEIGHT: 64 IN | WEIGHT: 208.4 LBS

## 2019-10-15 PROCEDURE — 87186 SC STD MICRODIL/AGAR DIL: CPT

## 2019-10-15 PROCEDURE — 87205 SMEAR GRAM STAIN: CPT

## 2019-10-15 PROCEDURE — 87077 CULTURE AEROBIC IDENTIFY: CPT

## 2019-10-15 PROCEDURE — 99212 OFFICE O/P EST SF 10 MIN: CPT

## 2019-10-15 NOTE — WOUND CARE
10/15/19 1123 Wound Knee Left Date First Assessed/Time First Assessed: 10/15/19 1122   Present on Hospital Admission: Yes  Primary Wound Type: Traumatic  Location: Knee  Wound Location Orientation: Left Dressing Status Dry Dressing Type  
(no dressing) Non-staged Wound Description Full thickness Wound Length (cm) 3 cm Wound Width (cm) 3 cm Wound Depth (cm) 0.1 cm Wound Volume (cm^3) 0.9 cm^3 Condition of Base Granulation Tissue Type Percent Red 100 Drainage Amount Small Drainage Color Sanguinous Wound Odor None Rosario-wound Assessment Edema Cleansing and Cleansing Agents  Normal saline Dressing Changed Changed/New Dressing Type Applied (aquacel ag, 2x2 gauze, covrsite) Wound Date First Assessed/Time First Assessed: 09/10/19 1504   Present on Hospital Admission: Yes  Primary Wound Type: Abscess Dressing Status Clean, dry, and intact Dressing Type  
(band-aid) Non-staged Wound Description Full thickness Wound Length (cm) 0.2 cm Wound Width (cm) 0.2 cm Wound Depth (cm) 1 cm Wound Volume (cm^3) 0.04 cm^3 Condition of Base Pink Drainage Amount Small Drainage Color Yellow Wound Odor None Cleansing and Cleansing Agents  Normal saline Dressing Changed Changed/New Dressing Type Applied  
(iodosorb, 2x2 gauze, covrsite) Patient is not taking an anticoagulant.

## 2019-10-15 NOTE — WOUND CARE
Clinic Level of Care Assessment NAME:  Jason Chávezr YOB: 1940 GENDER: female MEDICAL RECORD NUMBER:  [id number] DATE:  10/15/2019 Wound Count Document in [facility] Number of Wounds Assessed Points No Wounds/Ulcers []   0 Less than Three Wounds/Ulcers [x]   1  
3-6 Wounds/Ulcers []   2 Greater than 6 Wounds/Ulcers []   3 Ambulation Status Document in Coord/ADAMA/Mobility tab Status Definition Points Independent Independently able to ambulate. Fully able (without any assistance) to get on/off exam table/chair. [x]   0 Minimal Physical Assistance Requires physical assistance of one person to ambulate and/or position patient to be examined. Includes necessary physical assistance to position lower extremities on/off stool. []   1 Moderate Physical Assistance Requires at least one staff member to physically assist patient in ambulating into treatment room, and on/off exam table. []   2 Full Assistance Requires assistance of at least two staff members to transfer patient into treatment room and/or on/off exam table/chair. \"Total Transfer\". []   3 Dressing Complexity Document in [facility] and Write Appropriate Order Complexity Definition Points No Dressing  []   0 Simple Minimal, simple dressing. i.e. Band-aid, gauze, simple wrap. []   1 Intermediate Moderately complicated requiring licensed personnel to apply i.e. collagen matrix, ointments, gels, alginates. [x]   2 Complex Complicated requiring licensed personnel to apply dressings 6 or more wounds. []   3 Teaching Effort Document in Education Tab Effort Definition Points No Teaching  []   0 Simple Reinforce two or less topics. Document in Education navigator. [x]   1 Intermediate Reinforce three to five topics and/or one additional  
new topic. Document in Education navigator. []   2 Complex Teach more than one new topic. New patient information packet reviewed and/or reinforce more than three topics. Document in Education navigator. HBO initial instruction. []   3 Patient Assessment and Planning Planning Definition Points Simple Multiple System Simple: Simple follow-up with routine assessment and planning. If Discharged, instructions and long term/follow-up care given to patient/caregiver. Discharged, instructions and/or After Visit Summary given to patient/caregiver and instructions completed. [x]   1 Intermediate Multiple System Intermediate: Contact with outside resources; i.e. [place]phone calls to [place] health, [facility] May include filling out forms and writing letters, arranging transportation, communication with insurance , vendors, etc.  Discharged, instructions and/or After Visit Summary given to patient/caregiver and instructions completed. []   2 Complex Multiple System Complex: Full, comprehensive assessment and planning. Follow the entire navigator under Wound Visit charting filling out each tab which includes OP Adm Database Screening, Education and CarePlan  HBO risk assessment completed. Discharged, instructions and/or After Visit Summary given to patient/caregiver and instructions completed. []   3 Is this the Patient's First Visit to the [address] [place] [address] Road No 
 
 
Is this Patient Established @ [facility] 
Yes Clinical Level of Care Points  0-2  Level 1 [] Points  3-5  Level 2 [x] Points  6-9  Level 3 [] Points  10-12  Level 4 [] Points  13-15  Level 5 [] Electronically signed by Yoanna Puri RN on 10/15/2019 at 11:31 AM

## 2019-10-15 NOTE — DISCHARGE INSTRUCTIONS
Right Medial Upper Leg (Lower Knee)  Cleanse wound with normal saline. DO NOT GET WET IN SHOWER, WEAR CAST COVER IN SHOWER! Apply Iodosorb to wound bed. Cover with 2x2 gauze and Covrsite. Change 3 times a week. Left Knee:  Cleanse wound with normal saline. DO NOT GET WET IN SHOWER! Apply Aquacel Ag sheet to wound bed, Cover with 2x2 gauze and Covrsite. Change 3 times a week. Culture obtained from right knee.

## 2019-10-15 NOTE — WOUND CARE
[address] [place] [address], [facility] [address] Phone: [phone] Fax: [phone] Patient: Yasmany Sauceda MRN: [id number]  SSN: [id number] YOB: 1940  Age: 70 y.o. Sex: female Return Appointment: 1 week with Angelica Chan MD 
 
Instructions:  
Right Medial Upper Leg (Lower Knee) Cleanse wound with normal saline. DO NOT GET WET IN SHOWER, WEAR CAST COVER IN SHOWER! Apply Iodosorb to wound bed. Cover with 2x2 gauze and Covrsite. Change 3 times a week. Left Knee: 
Cleanse wound with normal saline. DO NOT GET WET IN SHOWER! Apply Aquacel Ag sheet to wound bed, Cover with 2x2 gauze and Covrsite. Change 3 times a week. Culture obtained from right knee. Should you experience increased redness, swelling, pain, foul odor, size of wound(s), or have a temperature over 101 degrees please contact the [address] [place] [address] at [phone] or if after hours contact your primary care physician or go to the hospital emergency department. Signed By: Yoanna Puri RN October 15, 2019

## 2019-10-15 NOTE — PROGRESS NOTES
Since being seen last week. The lesion on the left knee has open and is draining. Now there is some cottage cheese-appearing material coming out of the right knee. We will continue Dressing witIodosorb Aquacel Ag on the left. We will see her again in one week. Wound Center Progress Note    Patient: Navin Aguila MRN: [id number]  SSN: [id number]    YOB: 1940  Age: 70 y.o. Sex: female      Subjective:     Chief Complaint:  Navin Aguila is a 70 y.o. WHITE OR  female who presents with Bilateral kneewound of 3 weeks duration.     History of Present Illness:     C dictated note  Wound Caused By: non-healed surgical wound for knee surgery  Associated Signs and Symptoms:Some drainage and painTiming: Intermittent  Quality: Aching  Severity: 3/10  Modifying Factors: Age and blood thinners  Current Wound Care: See nurse's notes    Past Medical History:   Diagnosis Date    Calculus of kidney     Gastric ulcer     2018    GERD (gastroesophageal reflux disease)     Headache(784.0)     Hypercholesterolemia     Hypertension     Primary insomnia 7/17/2017    Thyroid disease       Past Surgical History:   Procedure Laterality Date    HX COLONOSCOPY  Jan 2013    4 polyps, repeat 5 years    HX COLONOSCOPY  7/5/2016    repeat 5 yrs tubular adenoma    HX SALPINGO-OOPHORECTOMY      REMOVAL OF KIDNEY STONE       Family History   Problem Relation Age of Onset    Hypertension Mother     Arthritis-rheumatoid Mother     High Cholesterol Mother     Broken Bones Mother     Heart Failure Father     Heart Attack Father     Hypertension Brother     High Cholesterol Brother     Arthritis-osteo Brother         Knee    High Cholesterol Brother     Hypertension Brother     Heart Attack Brother     Heart Attack Paternal Grandmother     Heart Attack Paternal Grandfather     Breast Cancer Neg Hx       Social History     Tobacco Use    Smoking status: Never Smoker    Smokeless tobacco: Never Used   Substance Use Topics    Alcohol use: No       Prior to Admission medications    Medication Sig Start Date End Date Taking? Authorizing Provider   losartan-hydroCHLOROthiazide (HYZAAR) 100-25 mg per tablet TAKE ONE TABLET DAILY. 8/27/19   Ashlee Cazares MD   levothyroxine (SYNTHROID) 25 mcg tablet Take 1 Tab by mouth every morning. 8/22/19   Joss Doherty MD   potassium chloride (K-DUR, KLOR-CON) 20 mEq tablet TAKE 1 TABLET BY MOUTH 2 TIMES A DAY 8/22/19   Joss Doherty MD   traZODone (DESYREL) 50 mg tablet Take 1 Tab by mouth nightly. 8/22/19   Peace Doherty MD   gabapentin (NEURONTIN) 100 mg capsule Take 1 Cap by mouth two (2) times a day. 4/23/19   Peace Doherty MD   atorvastatin (LIPITOR) 40 mg tablet Take 1 Tab by mouth daily. 4/23/19   Peace Doherty MD   ondansetron (ZOFRAN ODT) 4 mg disintegrating tablet Take 1 Tab by mouth every eight (8) hours as needed for Nausea. 10/5/18   Adeline Young MD   FLOVENT  mcg/actuation inhaler Take 2 Puffs by inhalation two (2) times a day. 10/28/15   Provider, Historical   acetaminophen (TYLENOL ARTHRITIS PAIN) 650 mg CR tablet Take 650 mg by mouth every eight (8) hours as needed (pain). Provider, Historical   albuterol (VENTOLIN HFA) 90 mcg/actuation inhaler Take 2 Puffs by inhalation every six (6) hours as needed for Wheezing or Shortness of Breath. Provider, Historical   CALCIUM CITRATE + PO take 1 Tab by mouth two (2) times a day. Provider, Historical     Allergies   Allergen Reactions    Pneumococcal 23-Janell Ps Vaccine Other (comments)     fever  Other reaction(s): Other (comments)  fever        Review of Systems:  A comprehensive review of systems was negative except for that written in the History of Present Illness.      No results found for: HBA1C, IVA4PVJJ, HGBE8, AAH0SYHL, ZAO2WTEP     Immunization History   Administered Date(s) Administered    Influenza High Dose Vaccine PF 09/08/2016, 11/28/2017    Influenza Vaccine 10/10/2013, 10/09/2014    Influenza Vaccine (Quad) PF 09/14/2018    Influenza Vaccine PF 10/26/2015    Influenza Vaccine Split 10/29/2012    Pneumococcal Polysaccharide (PPSV-23) 06/28/2007, 08/27/2007    TB Skin Test (PPD) Intradermal 10/12/2018    TDAP Vaccine 04/12/2012, 10/29/2012    Tdap 04/12/2012, 10/29/2012    Zoster 10/04/2011    Zoster Vaccine, Live 10/04/2011       Body mass index is 35.77 kg/m². Counseling regarding nutrition done: No     Current medications:  Current Outpatient Medications   Medication Sig Dispense Refill    losartan-hydroCHLOROthiazide (HYZAAR) 100-25 mg per tablet TAKE ONE TABLET DAILY. 90 Tab 3    levothyroxine (SYNTHROID) 25 mcg tablet Take 1 Tab by mouth every morning. 90 Tab 3    potassium chloride (K-DUR, KLOR-CON) 20 mEq tablet TAKE 1 TABLET BY MOUTH 2 TIMES A  Tab 3    traZODone (DESYREL) 50 mg tablet Take 1 Tab by mouth nightly. 30 Tab 3    gabapentin (NEURONTIN) 100 mg capsule Take 1 Cap by mouth two (2) times a day. 180 Cap 3    atorvastatin (LIPITOR) 40 mg tablet Take 1 Tab by mouth daily. 90 Tab 3    ondansetron (ZOFRAN ODT) 4 mg disintegrating tablet Take 1 Tab by mouth every eight (8) hours as needed for Nausea. 20 Tab 0    FLOVENT  mcg/actuation inhaler Take 2 Puffs by inhalation two (2) times a day. 3    acetaminophen (TYLENOL ARTHRITIS PAIN) 650 mg CR tablet Take 650 mg by mouth every eight (8) hours as needed (pain).  albuterol (VENTOLIN HFA) 90 mcg/actuation inhaler Take 2 Puffs by inhalation every six (6) hours as needed for Wheezing or Shortness of Breath.  CALCIUM CITRATE + PO take 1 Tab by mouth two (2) times a day.            Objective:     Physical Exam:     Visit Vitals  /71 (BP 1 Location: Left arm, BP Patient Position: At rest;Sitting)   Pulse 73   Temp 97.8 °F (36.6 °C)   Resp 18   Ht 5' 4\" (1.626 m)   Wt 94.5 kg (208 lb 6.4 oz)   SpO2 96%   BMI 35.77 kg/m²       General: well developed, well nourished, pleasant , NAD. Hygiene good  Psych: cooperative. Pleasant. No anxiety or depression. Normal mood and affect. Neuro: alert and oriented to person/place/situation. Otherwise nonfocal.  Derm: Normal turgor for age, dry skin  HEENT: Normocephalic, atraumatic. EOMI. Conjunctiva clear. No scleral icterus. Neck: Normal range of motion. No masses. Chest: Good air entry bilaterally. Respirations nonlabored  Cardio: Normal heart sounds,no rubs, murmurs or gallops  Abdomen: Soft, nontender, nondistended, normoactive bowel sounds  Lower extremities: color normal; temperature normal. Hair growth is not present. Calves are supple, nontender, approximately equally sized in comparison. Capillary refill <3 sec            Ulcer Description:   Wound Knee Left; Anterior (Active)   Number of days: 399       Wound Knee Left;Medial (Active)   Number of days: 368       Wound Knee Left;Lateral (Active)   Number of days: 368       Wound Pretibial Right (Active)   Number of days: 53       Wound Knee Right;Medial large blister on medial aspect of right knee 09/04/19 (Active)   Number of days: 41       Wound (Active)   Dressing Status Clean, dry, and intact 10/15/2019 11:23 AM   Non-staged Wound Description Full thickness 10/15/2019 11:23 AM   Wound Length (cm) 0.2 cm 10/15/2019 11:23 AM   Wound Width (cm) 0.2 cm 10/15/2019 11:23 AM   Wound Depth (cm) 1 cm 10/15/2019 11:23 AM   Wound Volume (cm^3) 0.04 cm^3 10/15/2019 11:23 AM   Condition of Base [place] 10/15/2019 11:23 AM   Tissue Type Percent Pink 10 10/1/2019 10:32 AM   Tissue Type Percent Yellow 100 9/17/2019 11:18 AM   Drainage Amount Small 10/15/2019 11:23 AM   Drainage Color Yellow 10/15/2019 11:23 AM   Wound Odor None 10/15/2019 11:23 AM   Rosario-wound Assessment Edema 9/17/2019 11:18 AM   Cleansing and Cleansing Agents  Normal saline 10/15/2019 11:23 AM   Dressing Changed Changed/New 10/15/2019 11:23 AM   Number of days: 35       Wound Knee Left (Active)   Dressing Status Dry 10/15/2019 11:23 AM   Non-staged Wound Description Full thickness 10/15/2019 11:23 AM   Wound Length (cm) 3 cm 10/15/2019 11:23 AM   Wound Width (cm) 3 cm 10/15/2019 11:23 AM   Wound Depth (cm) 0.1 cm 10/15/2019 11:23 AM   Wound Volume (cm^3) 0.9 cm^3 10/15/2019 11:23 AM   Condition of Base [place] 10/15/2019 11:23 AM   Tissue Type Percent Red 100 10/15/2019 11:23 AM   Drainage Amount Small 10/15/2019 11:23 AM   Drainage Color Sanguinous 10/15/2019 11:23 AM   Wound Odor None 10/15/2019 11:23 AM   Rosario-wound Assessment Edema 10/15/2019 11:23 AM   Cleansing and Cleansing Agents  Normal saline 10/15/2019 11:23 AM   Dressing Changed Changed/New 10/15/2019 11:23 AM   Number of days: 0         Data Review:   No results found for this or any previous visit (from the past 24 hour(s)). Assessment:     70 y.o. female with Bilateral knee combined ulcer.     Problem List  Date Reviewed: 8/1/2018          Codes Class Noted    Chronic kidney disease, stage III (moderate) ([facility] ICD-10-CM: N18.3  ICD-9-CM: 585.3  11/12/2014        Asthma ICD-10-CM: J45.909  ICD-9-CM: 493.90  10/29/2012        Pyogenic arthritis of right knee joint ([facility] ICD-10-CM: M00.9  ICD-9-CM: 711.06  9/10/2019        Cellulitis of right leg ICD-10-CM: L03.115  ICD-9-CM: 682.6  8/22/2019        Acute kidney injury superimposed on CKD ([facility] [address] ICD-10-CM: N17.9, N18.9  ICD-9-CM: 866.00, 585.9  8/22/2019        Gastric ulcer ICD-10-CM: K25.9  ICD-9-CM: 531.90  Unknown        Acute gastric ulcer without hemorrhage or perforation ICD-10-CM: K25.3  ICD-9-CM: 531.30  10/14/2018        Acute pancreatitis ICD-10-CM: K85.90  ICD-9-CM: 577.0  10/12/2018        Acute renal failure superimposed on stage 3 chronic kidney disease ([facility] ICD-10-CM: N17.9, N18.3  ICD-9-CM: 584.9, 585.3  10/11/2018        Hypokalemia ICD-10-CM: E87.6  ICD-9-CM: 276.8  10/11/2018        Dehydration ICD-10-CM: E86.0  ICD-9-CM: 276.51  10/11/2018        Orthostatic hypotension ICD-10-CM: I95.1  ICD-9-CM: 458.0  10/11/2018        Cellulitis and abscess of left lower extremity ICD-10-CM: L03.116, L02.416  ICD-9-CM: 682.6  9/11/2018        Severe obesity (BMI 35.0-39. 9) with comorbidity ([address] ICD-10-CM: E66.01  ICD-9-CM: 278.01  3/28/2018        Primary insomnia ICD-10-CM: F51.01  ICD-9-CM: 307.42  7/17/2017        Right upper quadrant abdominal pain ICD-10-CM: R10.11  ICD-9-CM: 789.01  12/14/2016    Overview Signed 12/14/2016 10:18 AM by Aletha Bingham MD     S/P thorough eval Dr Lauren Middleton, gastroenterologist including EGD, colonoscopy, abd U/S.   Lasts only minutes about once a week, worse when constipated             Raynaud's phenomenon ICD-10-CM: I73.00  ICD-9-CM: 443.0  2/25/2015        Post Menopausal Osteopenia ICD-10-CM: M89.9  ICD-9-CM: 733.90  2/25/2015        Pedal edema ICD-10-CM: R60.0  ICD-9-CM: 782.3  1/28/2014    Overview Signed 1/28/2014  4:03 PM by Aletha Bingham MD     Worse on left             Chronic pain syndrome ICD-10-CM: G89.4  ICD-9-CM: 338.4  11/11/2013    Overview Addendum 1/14/2016  3:45 PM by Aletha Bingham MD     Chronic feet, hands, hips osteoarthritis, S/P bilateral TKA, gets pain with walking 100 ft             Colon polyps ICD-10-CM: K63.5  ICD-9-CM: 211.3  4/30/2013    Overview Signed 4/30/2013 10:01 AM by Aletha Bingham MD     Next colo due Jan 2018             HTN (hypertension) ICD-10-CM: I10  ICD-9-CM: 401.9  8/16/2012        Hyperlipidemia ICD-10-CM: E78.5  ICD-9-CM: 272.4  8/16/2012    Overview Addendum 10/28/2012  6:29 PM by Aletha Bingham MD     Calcium score of 140, Goal LDL under 130             Hypothyroidism ICD-10-CM: E03.9  ICD-9-CM: 244.9  8/16/2012        Female stress incontinence ICD-10-CM: N39.3  ICD-9-CM: 625.6  1/18/7669        Umbilical hernia UYT-58-YK: K42.9  ICD-9-CM: 553.1  8/16/2012        S/P total knee arthroplasty ICD-10-CM: K24.730  ICD-9-CM: V43.65  8/16/2012    Overview Addendum 8/16/2012  8:38 AM by Jud Feliciano Bilateral--2009             Kidney stones ICD-10-CM: N20.0  ICD-9-CM: 592.0  8/16/2012        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  ICD-9-CM: 346.80  8/16/2012        GERD (gastroesophageal reflux disease) ICD-10-CM: K21.9  ICD-9-CM: 530.81  8/16/2012    Overview Addendum 7/29/2014  2:30 PM by Todd De Anda MD     Previous dilatation stricture, EGD Jan 2013 showed some mild esophagitis                    Needs:  Good local wound care  Edema management  Nutrition optimization  Good Diabetic control    Plan:     Orders Placed This Encounter    CULTURE, WOUND W GRAM STAIN     Right knee wound culture for C&S     Standing Status:   Standing     Number of Occurrences:   1    WOUND CARE, DRESSING CHANGE     Right Medial Upper Leg (Lower Knee)  Cleanse wound with normal saline. DO NOT GET WET IN SHOWER, WEAR CAST COVER IN SHOWER! Apply Iodosorb to wound bed. Cover with 2x2 gauze and Covrsite. Change 3 times a week. Left Knee:  Cleanse wound with normal saline. DO NOT GET WET IN SHOWER! Apply Aquacel Ag sheet to wound bed, Cover with 2x2 gauze and Covrsite. Change 3 times a week. Culture obtained from right knee. Standing Status:   Standing     Number of Occurrences:   1        Patient understood and agrees with plan. Questions answered. Follow-up Information     Follow up With Specialties Details Why Contact Info    [address] In 1 week  [facility] [address] Millicent Cast[place] [address] [address]  [phone]             Any procedures done today in the [address] [facility] are documented in a separate note in [facility] and made part of this record by reference.      Dictated using voice recognition software; proofread, but unrecognized errors may exist.    Signed By: Lizeth Fallon MD     October 15, 2019

## 2019-10-18 LAB
BACTERIA SPEC CULT: ABNORMAL
GRAM STN SPEC: ABNORMAL
SERVICE CMNT-IMP: ABNORMAL

## 2019-10-21 RX ORDER — CIPROFLOXACIN 500 MG/1
500 TABLET ORAL 2 TIMES DAILY
Qty: 20 TAB | Refills: 0 | Status: SHIPPED | OUTPATIENT
Start: 2019-10-21 | End: 2019-10-31

## 2019-10-21 NOTE — WOUND CARE
10.21. 19-This RN called patient and instructed that Cipro 500mg po 2x/day was called into [employer]. Patient states that wound to Right Knee continues to drain. Patient states that she will  script today and will keep appointment at [facility] tomorrow.

## 2019-10-22 ENCOUNTER — HOSPITAL ENCOUNTER (OUTPATIENT)
Dept: WOUND CARE | Age: 79
Discharge: HOME | End: 2019-10-22
Attending: SURGERY
Payer: MEDICARE

## 2019-10-22 VITALS
TEMPERATURE: 98.2 F | HEART RATE: 89 BPM | SYSTOLIC BLOOD PRESSURE: 126 MMHG | DIASTOLIC BLOOD PRESSURE: 69 MMHG | WEIGHT: 207.8 LBS | OXYGEN SATURATION: 99 % | BODY MASS INDEX: 35.48 KG/M2 | RESPIRATION RATE: 18 BRPM | HEIGHT: 64 IN

## 2019-10-22 PROCEDURE — 99213 OFFICE O/P EST LOW 20 MIN: CPT

## 2019-10-22 NOTE — PROGRESS NOTES
Wound Center Progress Note    Patient: Josue Lechuga MRN: [id number]  SSN: [id number]    YOB: 1940  Age: 70 y.o. Sex: female      Subjective:     Chief Complaint:  Josue Lechuga is a 70 y.o. WHITE OR  female who presents with R lower leg medial, L lower leg medial wound of 3 months duration. History of Present Illness:     Less drainage. Continue same dressing  Wound Caused By: none  Associated Signs and Symptoms: 0  Timing: Intermittent  Quality: Cramping  Severity: 2/10  Modifying Factors: TKA  Current Wound Care: see rn note    Past Medical History:   Diagnosis Date    Calculus of kidney     Gastric ulcer     2018    GERD (gastroesophageal reflux disease)     Headache(784.0)     Hypercholesterolemia     Hypertension     Primary insomnia 7/17/2017    Thyroid disease       Past Surgical History:   Procedure Laterality Date    HX COLONOSCOPY  Jan 2013    4 polyps, repeat 5 years    HX COLONOSCOPY  7/5/2016    repeat 5 yrs tubular adenoma    HX SALPINGO-OOPHORECTOMY      REMOVAL OF KIDNEY STONE       Family History   Problem Relation Age of Onset    Hypertension Mother     Arthritis-rheumatoid Mother     High Cholesterol Mother     Broken Bones Mother     Heart Failure Father     Heart Attack Father     Hypertension Brother     High Cholesterol Brother     Arthritis-osteo Brother         Knee    High Cholesterol Brother     Hypertension Brother     Heart Attack Brother     Heart Attack Paternal Grandmother     Heart Attack Paternal Grandfather     Breast Cancer Neg Hx       Social History     Tobacco Use    Smoking status: Never Smoker    Smokeless tobacco: Never Used   Substance Use Topics    Alcohol use: No       Prior to Admission medications    Medication Sig Start Date End Date Taking? Authorizing Provider   ciprofloxacin HCl (CIPRO) 500 mg tablet Take 1 Tab by mouth two (2) times a day for 10 days.  10/21/19 10/31/19  Xiomara Crum Rossy De Paz MD   losartan-hydroCHLOROthiazide [facility]) 100-25 mg per tablet TAKE ONE TABLET DAILY. 8/27/19   Yordan Shah MD   levothyroxine (SYNTHROID) 25 mcg tablet Take 1 Tab by mouth every morning. 8/22/19   Joss Doherty MD   potassium chloride (K-DUR, KLOR-CON) 20 mEq tablet TAKE 1 TABLET BY MOUTH 2 TIMES A DAY 8/22/19   Joss Doherty MD   traZODone (DESYREL) 50 mg tablet Take 1 Tab by mouth nightly. 8/22/19   Omega Doherty MD   gabapentin (NEURONTIN) 100 mg capsule Take 1 Cap by mouth two (2) times a day. 4/23/19   Omega Doherty MD   atorvastatin (LIPITOR) 40 mg tablet Take 1 Tab by mouth daily. 4/23/19   Omega Doherty MD   ondansetron (ZOFRAN ODT) 4 mg disintegrating tablet Take 1 Tab by mouth every eight (8) hours as needed for Nausea. 10/5/18   Jeff Young MD   FLOVENT  mcg/actuation inhaler Take 2 Puffs by inhalation two (2) times a day. 10/28/15   Provider, Historical   acetaminophen (TYLENOL ARTHRITIS PAIN) 650 mg CR tablet Take 650 mg by mouth every eight (8) hours as needed (pain). Provider, Historical   albuterol (VENTOLIN HFA) 90 mcg/actuation inhaler Take 2 Puffs by inhalation every six (6) hours as needed for Wheezing or Shortness of Breath. Provider, Historical   CALCIUM CITRATE + PO take 1 Tab by mouth two (2) times a day. Provider, Historical     Allergies   Allergen Reactions    Pneumococcal 23-Janell Ps Vaccine Other (comments)     fever  Other reaction(s): Other (comments)  fever        Review of Systems:  A comprehensive review of systems was negative except for that written in the History of Present Illness.      No results found for: HBA1C, NSI0HCUN, HGBE8, ISL0UFUU, GDS0XBYR     Immunization History   Administered Date(s) Administered    Influenza High Dose Vaccine PF 09/08/2016, 11/28/2017    Influenza Vaccine 10/10/2013, 10/09/2014    Influenza Vaccine (Quad) PF 09/14/2018    Influenza Vaccine PF 10/26/2015    Influenza Vaccine Split 10/29/2012    Pneumococcal Polysaccharide (PPSV-23) 06/28/2007, 08/27/2007    TB Skin Test (PPD) Intradermal 10/12/2018    TDAP Vaccine 04/12/2012, 10/29/2012    Tdap 04/12/2012, 10/29/2012    Zoster 10/04/2011    Zoster Vaccine, Live 10/04/2011       Body mass index is 35.67 kg/m². Counseling regarding nutrition done: No     Current medications:  Current Outpatient Medications   Medication Sig Dispense Refill    ciprofloxacin HCl (CIPRO) 500 mg tablet Take 1 Tab by mouth two (2) times a day for 10 days. 20 Tab 0    losartan-hydroCHLOROthiazide (HYZAAR) 100-25 mg per tablet TAKE ONE TABLET DAILY. 90 Tab 3    levothyroxine (SYNTHROID) 25 mcg tablet Take 1 Tab by mouth every morning. 90 Tab 3    potassium chloride (K-DUR, KLOR-CON) 20 mEq tablet TAKE 1 TABLET BY MOUTH 2 TIMES A  Tab 3    traZODone (DESYREL) 50 mg tablet Take 1 Tab by mouth nightly. 30 Tab 3    gabapentin (NEURONTIN) 100 mg capsule Take 1 Cap by mouth two (2) times a day. 180 Cap 3    atorvastatin (LIPITOR) 40 mg tablet Take 1 Tab by mouth daily. 90 Tab 3    ondansetron (ZOFRAN ODT) 4 mg disintegrating tablet Take 1 Tab by mouth every eight (8) hours as needed for Nausea. 20 Tab 0    FLOVENT  mcg/actuation inhaler Take 2 Puffs by inhalation two (2) times a day. 3    acetaminophen (TYLENOL ARTHRITIS PAIN) 650 mg CR tablet Take 650 mg by mouth every eight (8) hours as needed (pain).  albuterol (VENTOLIN HFA) 90 mcg/actuation inhaler Take 2 Puffs by inhalation every six (6) hours as needed for Wheezing or Shortness of Breath.  CALCIUM CITRATE + PO take 1 Tab by mouth two (2) times a day. Objective:     Physical Exam:     Visit Vitals  /69 (BP 1 Location: Right arm)   Pulse 89   Temp 98.2 °F (36.8 °C)   Resp 18   Ht 5' 4\" (1.626 m)   Wt 94.3 kg (207 lb 12.8 oz)   SpO2 99%   BMI 35.67 kg/m²       General: well developed, well nourished, pleasant , NAD. Hygiene good  Psych: cooperative. Pleasant.  No anxiety or depression. Normal mood and affect. Neuro: alert and oriented to person/place/situation. Otherwise nonfocal.  Derm: Normal turgor for age, dry skin  HEENT: Normocephalic, atraumatic. EOMI. Conjunctiva clear. No scleral icterus. Neck: Normal range of motion. No masses. Chest: Good air entry bilaterally. Respirations nonlabored  Cardio: Normal heart sounds,no rubs, murmurs or gallops  Abdomen: Soft, nontender, nondistended, normoactive bowel sounds  Lower extremities: color normal; temperature normal. Hair growth is not present. Calves are supple, nontender, approximately equally sized in comparison. Capillary refill <3 sec            Ulcer Description:   Wound Knee Left; Anterior (Active)   Number of days: 406       Wound Knee Left;Medial (Active)   Number of days: 375       Wound Knee Left;Lateral (Active)   Number of days: 375       Wound Pretibial Right (Active)   Number of days: 60       Wound Leg Right;Medial;Upper (Active)   Dressing Status Old drainage 10/22/2019  2:11 PM   Non-staged Wound Description Full thickness 10/22/2019  2:11 PM   Wound Length (cm) 1 cm 10/22/2019  2:11 PM   Wound Width (cm) 1.5 cm 10/22/2019  2:11 PM   Wound Depth (cm) 0.1 cm 10/22/2019  2:11 PM   Wound Volume (cm^3) 0.15 cm^3 10/22/2019  2:11 PM   Condition of Base [place] 10/22/2019  2:11 PM   Tissue Type Percent Pink 100 10/22/2019  2:11 PM   Tissue Type Percent Yellow 100 9/17/2019 11:18 AM   Drainage Amount Small 10/22/2019  2:11 PM   Drainage Color Serosanguinous 10/22/2019  2:11 PM   Wound Odor None 10/22/2019  2:11 PM   Rosario-wound Assessment Edema 9/17/2019 11:18 AM   Cleansing and Cleansing Agents  Normal saline 10/22/2019  2:11 PM   Dressing Changed Changed/New 10/22/2019  2:11 PM   Number of days: 42       Wound Knee Left (Active)   Dressing Status Old drainage 10/22/2019  2:11 PM   Non-staged Wound Description Full thickness 10/22/2019  2:11 PM   Wound Length (cm) 1.5 cm 10/22/2019  2:11 PM   Wound Width (cm) 2.5 cm 10/22/2019  2:11 PM   Wound Depth (cm) 0.1 cm 10/22/2019  2:11 PM   Wound Volume (cm^3) 0.38 cm^3 10/22/2019  2:11 PM   Condition of Base Eschar;Granulation;Slough 10/22/2019  2:11 PM   Tissue Type Percent Black 5 % 10/22/2019  2:11 PM   Tissue Type Percent Red 70 10/22/2019  2:11 PM   Tissue Type Percent Yellow 25 10/22/2019  2:11 PM   Drainage Amount Small 10/22/2019  2:11 PM   Drainage Color Serosanguinous 10/22/2019  2:11 PM   Wound Odor None 10/22/2019  2:11 PM   Rosario-wound Assessment Edema 10/22/2019  2:11 PM   Cleansing and Cleansing Agents  Normal saline 10/22/2019  2:11 PM   Dressing Changed Changed/New 10/22/2019  2:11 PM   Number of days: 7       [REMOVED] Wound Knee Right;Medial large blister on medial aspect of right knee 09/04/19 (Removed)   Number of days: 48         Data Review:   No results found for this or any previous visit (from the past 24 hour(s)). Assessment:     70 y.o. female with R lower leg medial, L lower leg medial combined ulcer.     Problem List  Date Reviewed: 8/1/2018          Codes Class Noted    Chronic kidney disease, stage III (moderate) ([facility] ICD-10-CM: N18.3  ICD-9-CM: 585.3  11/12/2014        Asthma ICD-10-CM: J45.909  ICD-9-CM: 493.90  10/29/2012        Pyogenic arthritis of right knee joint ([facility] ICD-10-CM: M00.9  ICD-9-CM: 711.06  9/10/2019        Cellulitis of right leg ICD-10-CM: L03.115  ICD-9-CM: 682.6  8/22/2019        Acute kidney injury superimposed on CKD ([facility] ICD-10-CM: N17.9, N18.9  ICD-9-CM: 866.00, 585.9  8/22/2019        Gastric ulcer ICD-10-CM: K25.9  ICD-9-CM: 531.90  Unknown        Acute gastric ulcer without hemorrhage or perforation ICD-10-CM: K25.3  ICD-9-CM: 531.30  10/14/2018        Acute pancreatitis ICD-10-CM: K85.90  ICD-9-CM: 173.6  10/12/2018        Acute renal failure superimposed on stage 3 chronic kidney disease ([facility] ICD-10-CM: N17.9, N18.3  ICD-9-CM: 584.9, 585.3  10/11/2018        Hypokalemia ICD-10-CM: E87.6  ICD-9-CM: 276.8 10/11/2018        Dehydration ICD-10-CM: E86.0  ICD-9-CM: 276.51  10/11/2018        Orthostatic hypotension ICD-10-CM: I95.1  ICD-9-CM: 458.0  10/11/2018        Cellulitis and abscess of left lower extremity ICD-10-CM: L03.116, L02.416  ICD-9-CM: 682.6  9/11/2018        Severe obesity (BMI 35.0-39. 9) with comorbidity ([facility] ICD-10-CM: E66.01  ICD-9-CM: 278.01  3/28/2018        Primary insomnia ICD-10-CM: F51.01  ICD-9-CM: 307.42  7/17/2017        Right upper quadrant abdominal pain ICD-10-CM: R10.11  ICD-9-CM: 789.01  12/14/2016    Overview Signed 12/14/2016 10:18 AM by Donato Eaton MD     S/P thorough eval Dr Braxton Dela Cruz, gastroenterologist including EGD, colonoscopy, abd U/S.   Lasts only minutes about once a week, worse when constipated             Raynaud's phenomenon ICD-10-CM: I73.00  ICD-9-CM: 443.0  2/25/2015        Post Menopausal Osteopenia ICD-10-CM: M89.9  ICD-9-CM: 733.90  2/25/2015        Pedal edema ICD-10-CM: R60.0  ICD-9-CM: 782.3  1/28/2014    Overview Signed 1/28/2014  4:03 PM by Donato Eaton MD     Worse on left             Chronic pain syndrome ICD-10-CM: G89.4  ICD-9-CM: 338.4  11/11/2013    Overview Addendum 1/14/2016  3:45 PM by Donato Eaton MD     Chronic feet, hands, hips osteoarthritis, S/P bilateral TKA, gets pain with walking 100 ft             Colon polyps ICD-10-CM: K63.5  ICD-9-CM: 211.3  4/30/2013    Overview Signed 4/30/2013 10:01 AM by Donato Eaton MD     Next colo due Jan 2018             HTN (hypertension) ICD-10-CM: I10  ICD-9-CM: 401.9  8/16/2012        Hyperlipidemia ICD-10-CM: E78.5  ICD-9-CM: 272.4  8/16/2012    Overview Addendum 10/28/2012  6:29 PM by Donato Eaton MD     Calcium score of 140, Goal LDL under 130             Hypothyroidism ICD-10-CM: E03.9  ICD-9-CM: 244.9  8/16/2012        Female stress incontinence ICD-10-CM: N39.3  ICD-9-CM: 625.6  7/35/9125        Umbilical hernia GXQ-09-FD: K42.9  ICD-9-CM: 553.1  8/16/2012        S/P total knee arthroplasty ICD-10-CM: B10.564  ICD-9-CM: V43.65  8/16/2012    Overview Addendum 8/16/2012  8:38 AM by Harleen Manuel     Bilateral--2009             Kidney stones ICD-10-CM: N20.0  ICD-9-CM: 592.0  8/16/2012        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 DO NOT GET WET IN SHOWER, WEAR CAST COVER IN SHOWER! Apply Iodosorb to wound bed. Cover with 2x2 gauze and Covrsite. Change 3 times a week.      Left Knee:  Cleanse wound with normal saline. DO NOT GET WET IN SHOWER! Apply Hydrofera Blue: Cut to wound size, moisten with saline, and apply to wound bed, Cover with 2x2 gauze and Covrsite. Change 3 times a week. Continue antibiotics until finished.       Standing Status:   Standing     Number of Occurrences:   1        Patient understood and agrees with plan. Questions answered. Follow-up Information     Follow up With Specialties Details Why Contact Info    [address] In 1 week  [facility] [address] Bernadette Estrada [address] [id number] [phone]             Any procedures done today in the [address] [facility] are documented in a separate note in [facility] and made part of this record by reference.      Dictated using voice recognition software; proofread, but unrecognized errors may exist.    Signed By: Malcolm Werner MD     October 22, 2019

## 2019-10-22 NOTE — WOUND CARE
[address] [place] [address] [address] Phone: [phone] Fax: [phone] Patient: Blaze Kincaid MRN: [id number]  SSN: [id number] YOB: 1940  Age: 70 y.o. Sex: female Return Appointment: 1 week with [employer] Goodell, MD 
 
Instructions:  
Right Medial Upper Leg (Lower Knee) Cleanse wound with normal saline. DO NOT GET WET IN SHOWER, WEAR CAST COVER IN SHOWER! Apply Iodosorb to wound bed. Cover with 2x2 gauze and Covrsite. Change 3 times a week.  
  
Left Knee: 
Cleanse wound with normal saline. DO NOT GET WET IN SHOWER! Apply Hydrofera Blue: Cut to wound size, moisten with saline, and apply to wound bed, Cover with 2x2 gauze and Covrsite. Change 3 times a week. Continue antibiotics until finished.   
 
Should you experience increased redness, swelling, pain, foul odor, size of wound(s), or have a temperature over 101 degrees please contact the [address] [place] [address] Road at [phone] or if after hours contact your primary care physician or go to the hospital emergency department. Signed By: Benji Bejarano RN October 22, 2019

## 2019-10-22 NOTE — WOUND CARE
Clinic Level of Care Assessment NAME:  Jessica Roach YOB: 1940 GENDER: female MEDICAL RECORD NUMBER:  [id number] DATE:  10/22/2019 Wound Count Document in [facility] Number of Wounds Assessed Points No Wounds/Ulcers []   0 Less than Three Wounds/Ulcers [x]   1  
3-6 Wounds/Ulcers []   2 Greater than 6 Wounds/Ulcers []   3 Ambulation Status Document in Coord/ADAMA/Mobility tab Status Definition Points Independent Independently able to ambulate. Fully able (without any assistance) to get on/off exam table/chair. [x]   0 Minimal Physical Assistance Requires physical assistance of one person to ambulate and/or position patient to be examined. Includes necessary physical assistance to position lower extremities on/off stool. []   1 Moderate Physical Assistance Requires at least one staff member to physically assist patient in ambulating into treatment room, and on/off exam table. []   2 Full Assistance Requires assistance of at least two staff members to transfer patient into treatment room and/or on/off exam table/chair. \"Total Transfer\". []   3 Dressing Complexity Document in [facility] and Write Appropriate Order Complexity Definition Points No Dressing  []   0 Simple Minimal, simple dressing. i.e. Band-aid, gauze, simple wrap. []   1 Intermediate Moderately complicated requiring licensed personnel to apply i.e. collagen matrix, ointments, gels, alginates. [x]   2 Complex Complicated requiring licensed personnel to apply dressings 6 or more wounds. []   3 Teaching Effort Document in Education Tab Effort Definition Points No Teaching  []   0 Simple Reinforce two or less topics. Document in Education navigator. [x]   1 Intermediate Reinforce three to five topics and/or one additional  
new topic. Document in Education navigator. []   2 Complex Teach more than one new topic. New patient information packet reviewed and/or reinforce more than three topics. Document in Education navigator. HBO initial instruction. []   3 Patient Assessment and Planning Planning Definition Points Simple Multiple System Simple: Simple follow-up with routine assessment and planning. If Discharged, instructions and long term/follow-up care given to patient/caregiver. Discharged, instructions and/or After Visit Summary given to patient/caregiver and instructions completed. [x]   1 Intermediate Multiple System Intermediate: Contact with outside resources; i.e. Telephone calls to [place] health, [facility] May include filling out forms and writing letters, arranging transportation, communication with insurance , vendors, etc.  Discharged, instructions and/or After Visit Summary given to patient/caregiver and instructions completed. []   2 Complex Multiple System Complex: Full, comprehensive assessment and planning. Follow the entire navigator under Wound Visit charting filling out each tab which includes OP Adm Database Screening, Education and CarePlan  HBO risk assessment completed. Discharged, instructions and/or After Visit Summary given to patient/caregiver and instructions completed. []   3 Is this the Patient's First Visit to the [address] [place] [address] Road No 
 
 
Is this Patient Established @ [facility] 
Yes Clinical Level of Care Points  0-2  Level 1 [] Points  3-5  Level 2 [] Points  6-9  Level 3 [x] Points  10-12  Level 4 [] Points  13-15  Level 5 [] Electronically signed by Aretha Gibbons RN on 10/22/2019 at 2:47 PM

## 2019-10-22 NOTE — DISCHARGE INSTRUCTIONS
Right Medial Upper Leg (Lower Knee)  Cleanse wound with normal saline. DO NOT GET WET IN SHOWER, WEAR CAST COVER IN SHOWER! Apply Iodosorb to wound bed. Cover with 2x2 gauze and Covrsite. Change 3 times a week.      Left Knee:  Cleanse wound with normal saline. DO NOT GET WET IN SHOWER! Apply Hydrofera Blue: Cut to wound size, moisten with saline, and apply to wound bed, Cover with 2x2 gauze and Covrsite. Change 3 times a week.      Continue antibiotics until finished.

## 2019-10-22 NOTE — WOUND CARE
10/22/19 1411 Wound Knee Left Date First Assessed/Time First Assessed: 10/15/19 1122   Present on Hospital Admission: Yes  Primary Wound Type: Traumatic  Location: Knee  Wound Location Orientation: Left Dressing Status Old drainage Dressing Type  
(aquacel ag, bordered foam) Non-staged Wound Description Full thickness Wound Length (cm) 1.5 cm Wound Width (cm) 2.5 cm Wound Depth (cm) 0.1 cm Wound Volume (cm^3) 0.38 cm^3 Condition of Base Eschar;Granulation;Slough Tissue Type Percent Black 5 % Tissue Type Percent Red 70 Tissue Type Percent Yellow 25 Drainage Amount Small Drainage Color Serosanguinous Wound Odor None Rosario-wound Assessment Edema Cleansing and Cleansing Agents  Normal saline Dressing Changed Changed/New Wound Leg Right;Medial;Upper Date First Assessed/Time First Assessed: 09/10/19 1504   Present on Hospital Admission: Yes  Primary Wound Type: Abscess  Location: Leg  Wound Location Orientation: Right;Medial;Upper Dressing Status Old drainage Dressing Type  
(iodosorb, covrsite) Non-staged Wound Description Full thickness Wound Length (cm) 1 cm Wound Width (cm) 1.5 cm Wound Depth (cm) 0.1 cm Wound Volume (cm^3) 0.15 cm^3 Condition of Base Pink Tissue Type Percent Pink 100 Drainage Amount Small Drainage Color Serosanguinous Wound Odor None Cleansing and Cleansing Agents  Normal saline Dressing Changed Changed/New Patient's wounds were mechanically debrided with normal saline and gauze. Patient does not take an anticoagulant.

## 2019-10-29 ENCOUNTER — HOSPITAL ENCOUNTER (OUTPATIENT)
Dept: WOUND CARE | Age: 79
Discharge: HOME | End: 2019-10-29
Attending: SURGERY
Payer: MEDICARE

## 2019-10-29 VITALS
HEART RATE: 77 BPM | TEMPERATURE: 97.6 F | WEIGHT: 209 LBS | BODY MASS INDEX: 35.68 KG/M2 | DIASTOLIC BLOOD PRESSURE: 70 MMHG | HEIGHT: 64 IN | RESPIRATION RATE: 18 BRPM | SYSTOLIC BLOOD PRESSURE: 140 MMHG | OXYGEN SATURATION: 94 %

## 2019-10-29 PROCEDURE — 99212 OFFICE O/P EST SF 10 MIN: CPT

## 2019-10-29 NOTE — WOUND CARE
10/29/19 1504 Wound Knee Left Date First Assessed/Time First Assessed: 10/15/19 1122   Present on Hospital Admission: Yes  Primary Wound Type: Traumatic  Location: Knee  Wound Location Orientation: Left Dressing Status Old drainage Dressing Type  
(hydrofera blue, band-aid) Non-staged Wound Description Full thickness Wound Length (cm) 1 cm Wound Width (cm) 2 cm Wound Depth (cm) 0.1 cm Wound Volume (cm^3) 0.2 cm^3 Condition of Base Eschar;Granulation;Slough Tissue Type Percent Black 5 % Tissue Type Percent Red 25 Tissue Type Percent Yellow 70 Drainage Amount Small Drainage Color Serosanguinous Wound Odor None Rosario-wound Assessment Edema Cleansing and Cleansing Agents  Normal saline Dressing Changed Changed/New Wound Leg Right;Medial;Upper Date First Assessed/Time First Assessed: 09/10/19 1504   Present on Hospital Admission: Yes  Primary Wound Type: Abscess  Location: Leg  Wound Location Orientation: Right;Medial;Upper Dressing Status Old drainage Dressing Type (Iodosorb, band-aid) Non-staged Wound Description Full thickness Wound Length (cm) 1 cm Wound Width (cm) 0.3 cm Wound Depth (cm) 1 cm Wound Volume (cm^3) 0.3 cm^3 Condition of Base Pink Tissue Type Percent Pink 100 Tunneling (cm) 1.8 cm Direction of Tunnel 9 o'clock Drainage Amount Small Drainage Color Serosanguinous Wound Odor None Cleansing and Cleansing Agents  Normal saline Dressing Changed Changed/New Wounds were mechanically debrided with gauze and saline. Patient is not taking an anticoagulant.

## 2019-10-29 NOTE — WOUND CARE
[address] [place] [address] [address] Phone: [phone] Fax: [phone] Patient: Reginaldo Bhatia MRN: [id number]  SSN: [id number] YOB: 1940  Age: 70 y.o. Sex: female Return Appointment: 1 week with Russel Rodriguez MD 
 
Instructions:  
Right Medial Upper Leg (Lower Knee) Cleanse wound with normal saline. DO NOT GET WET IN SHOWER, WEAR CAST COVER IN SHOWER! Apply Iodoform- pack loosely into wound, filling all dead space. Cover with 2x2 gauze and Covrsite. Change daily. 
  
Left Knee: 
Cleanse wound with normal saline. DO NOT GET WET IN SHOWER! Apply Hydrofera Blue: Cut to wound size, moisten with saline, and apply to wound bed, Cover with 2x2 gauze and Covrsite. Change 3 times a week.  
  
Continue antibiotics until finished.   
 
Should you experience increased redness, swelling, pain, foul odor, size of wound(s), or have a temperature over 101 degrees please contact the [address] [place] [address] Road at [phone] or if after hours contact your primary care physician or go to the hospital emergency department. Signed By: Cathie Perez RN October 29, 2019

## 2019-10-29 NOTE — WOUND CARE
Clinic Level of Care Assessment NAME:  Alayna Perez YOB: 1940 GENDER: female MEDICAL RECORD NUMBER:  [id number] DATE:  10/29/2019 Wound Count Document in [facility] Number of Wounds Assessed Points No Wounds/Ulcers []   0 Less than Three Wounds/Ulcers [x]   1  
3-6 Wounds/Ulcers []   2 Greater than 6 Wounds/Ulcers []   3 Ambulation Status Document in Coord/ADAMA/Mobility tab Status Definition Points Independent Independently able to ambulate. Fully able (without any assistance) to get on/off exam table/chair. [x]   0 Minimal Physical Assistance Requires physical assistance of one person to ambulate and/or position patient to be examined. Includes necessary physical assistance to position lower extremities on/off stool. []   1 Moderate Physical Assistance Requires at least one staff member to physically assist patient in ambulating into treatment room, and on/off exam table. []   2 Full Assistance Requires assistance of at least two staff members to transfer patient into treatment room and/or on/off exam table/chair. \"Total Transfer\". []   3 Dressing Complexity Document in [facility] and Write Appropriate Order Complexity Definition Points No Dressing  []   0 Simple Minimal, simple dressing. i.e. Band-aid, gauze, simple wrap. []   1 Intermediate Moderately complicated requiring licensed personnel to apply i.e. collagen matrix, ointments, gels, alginates. [x]   2 Complex Complicated requiring licensed personnel to apply dressings 6 or more wounds. []   3 Teaching Effort Document in Education Tab Effort Definition Points No Teaching  []   0 Simple Reinforce two or less topics. Document in Education navigator. [x]   1 Intermediate Reinforce three to five topics and/or one additional  
new topic. Document in Education navigator. []   2 Complex Teach more than one new topic. New patient information packet reviewed and/or reinforce more than three topics. Document in Education navigator. HBO initial instruction. []   3 Patient Assessment and Planning Planning Definition Points Simple Multiple System Simple: Simple follow-up with routine assessment and planning. If Discharged, instructions and long term/follow-up care given to patient/caregiver. Discharged, instructions and/or After Visit Summary given to patient/caregiver and instructions completed. [x]   1 Intermediate Multiple System Intermediate: Contact with outside resources; i.e. Telephone calls to home health, [facility]. May include filling out forms and writing letters, arranging transportation, communication with insurance , vendors, etc.  Discharged, instructions and/or After Visit Summary given to patient/caregiver and instructions completed. []   2 Complex Multiple System Complex: Full, comprehensive assessment and planning. Follow the entire navigator under Wound Visit charting filling out each tab which includes OP Adm Database Screening, Education and CarePlan  HBO risk assessment completed. Discharged, instructions and/or After Visit Summary given to patient/caregiver and instructions completed. []   3 Is this the Patient's First Visit to the [address] [place] [address] Road No 
 
 
Is this Patient Established @ [facility] 
Yes Clinical Level of Care Points  0-2  Level 1 [] Points  3-5  Level 2 [x] Points  6-9  Level 3 [] Points  10-12  Level 4 [] Points  13-15  Level 5 [] Electronically signed by Maximino Hdz RN on 10/29/2019 at 3:20 PM

## 2019-10-29 NOTE — DISCHARGE INSTRUCTIONS
Right Medial Upper Leg (Lower Knee)  Cleanse wound with normal saline. DO NOT GET WET IN SHOWER, WEAR CAST COVER IN SHOWER! Apply Iodoform- pack loosely into wound, filling all dead space. Cover with 2x2 gauze and Covrsite. Change daily.     Left Knee:  Cleanse wound with normal saline. DO NOT GET WET IN SHOWER! Apply Hydrofera Blue: Cut to wound size, moisten with saline, and apply to wound bed, Cover with 2x2 gauze and Covrsite.  Change 3 times a week.      Continue antibiotics until finished.

## 2019-10-29 NOTE — PROGRESS NOTES
The fistulous tract on the right knee probes down to 1.2 cm medially and inferiorly. There are some areas of induration which is slightly tender and one can express whitish fluid from this I feel that is probably going to need to be re-opened and will refer her to Dr. Shanika Witt for his consideration. Wound Center Progress Note    Patient: Jessica Roach MRN: [id number]  SSN: [id number]    YOB: 1940  Age: 70 y.o. Sex: female      Subjective:     Chief Complaint:  Jessica Roach is a 70 y.o. WHITE OR  female who presents with R lower leg anterior, L lower leg anterior wound of 4 months duration. History of Present Illness:     See above note is  Wound Caused By: non-healed surgical wound for knee replacement  Associated Signs and Symptoms: Mild pain and drainage  Timing: Intermittent  Quality: Burning, Pressure  Severity: 3/10  Modifying Factors: None  Current Wound Care: See nurse's notes will be referred to Dr. Shanika Witt who will see her a week from Friday.     Past Medical History:   Diagnosis Date    Calculus of kidney     Gastric ulcer     2018    GERD (gastroesophageal reflux disease)     Headache(784.0)     Hypercholesterolemia     Hypertension     Primary insomnia 7/17/2017    Thyroid disease       Past Surgical History:   Procedure Laterality Date    HX COLONOSCOPY  Jan 2013    4 polyps, repeat 5 years    HX COLONOSCOPY  7/5/2016    repeat 5 yrs tubular adenoma    HX SALPINGO-OOPHORECTOMY      REMOVAL OF KIDNEY STONE       Family History   Problem Relation Age of Onset    Hypertension Mother     Arthritis-rheumatoid Mother     High Cholesterol Mother     Broken Bones Mother     Heart Failure Father     Heart Attack Father     Hypertension Brother     High Cholesterol Brother     Arthritis-osteo Brother         Knee    High Cholesterol Brother     Hypertension Brother     Heart Attack Brother     Heart Attack Paternal Grandmother     Heart Attack Paternal Grandfather     Breast Cancer Neg Hx       Social History     Tobacco Use    Smoking status: Never Smoker    Smokeless tobacco: Never Used   Substance Use Topics    Alcohol use: No       Prior to Admission medications    Medication Sig Start Date End Date Taking? Authorizing Provider   ciprofloxacin HCl (CIPRO) 500 mg tablet Take 1 Tab by mouth two (2) times a day for 10 days. 10/21/19 10/31/19  Norma Corona MD   losartan-hydroCHLOROthiazide (HYZAAR) 100-25 mg per tablet TAKE ONE TABLET DAILY. 8/27/19   Ashlee Cazares MD   levothyroxine (SYNTHROID) 25 mcg tablet Take 1 Tab by mouth every morning. 8/22/19   Joss Doherty MD   potassium chloride (K-DUR, KLOR-CON) 20 mEq tablet TAKE 1 TABLET BY MOUTH 2 TIMES A DAY 8/22/19   Joss Doherty MD   traZODone (DESYREL) 50 mg tablet Take 1 Tab by mouth nightly. 8/22/19   Peace Doherty MD   gabapentin (NEURONTIN) 100 mg capsule Take 1 Cap by mouth two (2) times a day. 4/23/19   Peace Doherty MD   atorvastatin (LIPITOR) 40 mg tablet Take 1 Tab by mouth daily. 4/23/19   Peace Doherty MD   ondansetron (ZOFRAN ODT) 4 mg disintegrating tablet Take 1 Tab by mouth every eight (8) hours as needed for Nausea. 10/5/18   Adeline Young MD   FLOVENT  mcg/actuation inhaler Take 2 Puffs by inhalation two (2) times a day. 10/28/15   Provider, Historical   acetaminophen (TYLENOL ARTHRITIS PAIN) 650 mg CR tablet Take 650 mg by mouth every eight (8) hours as needed (pain). Provider, Historical   albuterol (VENTOLIN HFA) 90 mcg/actuation inhaler Take 2 Puffs by inhalation every six (6) hours as needed for Wheezing or Shortness of Breath. Provider, Historical   CALCIUM CITRATE + PO take 1 Tab by mouth two (2) times a day. Provider, Historical     Allergies   Allergen Reactions    Pneumococcal 23-Janell Ps Vaccine Other (comments)     fever  Other reaction(s):  Other (comments)  fever        Review of Systems:  A comprehensive review of systems was negative except for that written in the History of Present Illness. No results found for: HBA1C, TRP6XZWE, HGBE8, ZGB2KPIX, TMM0MHAA     Immunization History   Administered Date(s) Administered    Influenza High Dose Vaccine PF 09/08/2016, 11/28/2017    Influenza Vaccine 10/10/2013, 10/09/2014    Influenza Vaccine (Quad) PF 09/14/2018    Influenza Vaccine PF 10/26/2015    Influenza Vaccine Split 10/29/2012    Pneumococcal Polysaccharide (PPSV-23) 06/28/2007, 08/27/2007    TB Skin Test (PPD) Intradermal 10/12/2018    TDAP Vaccine 04/12/2012, 10/29/2012    Tdap 04/12/2012, 10/29/2012    Zoster 10/04/2011    Zoster Vaccine, Live 10/04/2011       Body mass index is 35.87 kg/m². Counseling regarding nutrition done: No     Current medications:  Current Outpatient Medications   Medication Sig Dispense Refill    ciprofloxacin HCl (CIPRO) 500 mg tablet Take 1 Tab by mouth two (2) times a day for 10 days. 20 Tab 0    losartan-hydroCHLOROthiazide (HYZAAR) 100-25 mg per tablet TAKE ONE TABLET DAILY. 90 Tab 3    levothyroxine (SYNTHROID) 25 mcg tablet Take 1 Tab by mouth every morning. 90 Tab 3    potassium chloride (K-DUR, KLOR-CON) 20 mEq tablet TAKE 1 TABLET BY MOUTH 2 TIMES A  Tab 3    traZODone (DESYREL) 50 mg tablet Take 1 Tab by mouth nightly. 30 Tab 3    gabapentin (NEURONTIN) 100 mg capsule Take 1 Cap by mouth two (2) times a day. 180 Cap 3    atorvastatin (LIPITOR) 40 mg tablet Take 1 Tab by mouth daily. 90 Tab 3    ondansetron (ZOFRAN ODT) 4 mg disintegrating tablet Take 1 Tab by mouth every eight (8) hours as needed for Nausea. 20 Tab 0    FLOVENT  mcg/actuation inhaler Take 2 Puffs by inhalation two (2) times a day. 3    acetaminophen (TYLENOL ARTHRITIS PAIN) 650 mg CR tablet Take 650 mg by mouth every eight (8) hours as needed (pain).       albuterol (VENTOLIN HFA) 90 mcg/actuation inhaler Take 2 Puffs by inhalation every six (6) hours as needed for Wheezing or Shortness of Breath.  CALCIUM CITRATE + PO take 1 Tab by mouth two (2) times a day. Objective:     Physical Exam:     Visit Vitals  /70 (BP 1 Location: Left arm)   Pulse 77   Temp 97.6 °F (36.4 °C)   Resp 18   Ht 5' 4\" (1.626 m)   Wt 94.8 kg (209 lb)   SpO2 94%   BMI 35.87 kg/m²       General: well developed, well nourished, pleasant , NAD. Hygiene good  Psych: cooperative. Pleasant. No anxiety or depression. Normal mood and affect. Neuro: alert and oriented to person/place/situation. Otherwise nonfocal.  Derm: Normal turgor for age, dry skin  HEENT: Normocephalic, atraumatic. EOMI. Conjunctiva clear. No scleral icterus. Neck: Normal range of motion. No masses. Chest: Good air entry bilaterally. Respirations nonlabored  Cardio: Normal heart sounds,no rubs, murmurs or gallops  Abdomen: Soft, nontender, nondistended, normoactive bowel sounds  Lower extremities: color normal; temperature normal. Hair growth is not present. Calves are supple, nontender, approximately equally sized in comparison. Capillary refill <3 sec            Ulcer Description:   Wound Knee Left; Anterior (Active)   Number of days: 413       Wound Knee Left;Medial (Active)   Number of days: 382       Wound Knee Left;Lateral (Active)   Number of days: 382       Wound Pretibial Right (Active)   Number of days: 67       Wound Leg Right;Medial;Upper (Active)   Dressing Status Old drainage 10/29/2019  3:04 PM   Non-staged Wound Description Full thickness 10/29/2019  3:04 PM   Wound Length (cm) 1 cm 10/29/2019  3:04 PM   Wound Width (cm) 0.3 cm 10/29/2019  3:04 PM   Wound Depth (cm) 1 cm 10/29/2019  3:04 PM   Wound Volume (cm^3) 0.3 cm^3 10/29/2019  3:04 PM   Condition of Base [place] 10/29/2019  3:04 PM   Tissue Type Percent Pink 100 10/29/2019  3:04 PM   Tissue Type Percent Yellow 100 9/17/2019 11:18 AM   Tunneling (cm) 1.8 cm 10/29/2019  3:04 PM   Direction of Tunnel 9 o'clock 10/29/2019  3:04 PM   Drainage Amount Small 10/29/2019  3:04 PM   Drainage Color Serosanguinous 10/29/2019  3:04 PM   Wound Odor None 10/29/2019  3:04 PM   Rosario-wound Assessment Edema 9/17/2019 11:18 AM   Cleansing and Cleansing Agents  Normal saline 10/29/2019  3:04 PM   Dressing Changed Changed/New 10/29/2019  3:04 PM   Number of days: 49       Wound Knee Left (Active)   Dressing Status Old drainage 10/29/2019  3:04 PM   Non-staged Wound Description Full thickness 10/29/2019  3:04 PM   Wound Length (cm) 1 cm 10/29/2019  3:04 PM   Wound Width (cm) 2 cm 10/29/2019  3:04 PM   Wound Depth (cm) 0.1 cm 10/29/2019  3:04 PM   Wound Volume (cm^3) 0.2 cm^3 10/29/2019  3:04 PM   Condition of Base Eschar;Granulation;Slough 10/29/2019  3:04 PM   Tissue Type Percent Black 5 % 10/29/2019  3:04 PM   Tissue Type Percent Red 25 10/29/2019  3:04 PM   Tissue Type Percent Yellow 70 10/29/2019  3:04 PM   Drainage Amount Small 10/29/2019  3:04 PM   Drainage Color Serosanguinous 10/29/2019  3:04 PM   Wound Odor None 10/29/2019  3:04 PM   Rosario-wound Assessment Edema 10/29/2019  3:04 PM   Cleansing and Cleansing Agents  Normal saline 10/29/2019  3:04 PM   Dressing Changed Changed/New 10/29/2019  3:04 PM   Number of days: 14       [REMOVED] Wound Knee Right;Medial large blister on medial aspect of right knee 09/04/19 (Removed)   Number of days: 48         Data Review:   No results found for this or any previous visit (from the past 24 hour(s)). Assessment:     70 y.o. female with R lower leg anterior, L lower leg anterior combined ulcer.     Problem List  Date Reviewed: 8/1/2018          Codes Class Noted    Chronic kidney disease, stage III (moderate) ([facility] ICD-10-CM: N18.3  ICD-9-CM: 585.3  11/12/2014        Asthma ICD-10-CM: J45.909  ICD-9-CM: 493.90  10/29/2012        Pyogenic arthritis of right knee joint ([facility] ICD-10-CM: M00.9  ICD-9-CM: 711.06  9/10/2019        Cellulitis of right leg ICD-10-CM: L03.115  ICD-9-CM: 682.6  8/22/2019        Acute kidney injury superimposed on CKD ([facility] ICD-10-CM: N17.9, N18.9  ICD-9-CM: 866.00, 585.9  8/22/2019        Gastric ulcer ICD-10-CM: K25.9  ICD-9-CM: 531.90  Unknown        Acute gastric ulcer without hemorrhage or perforation ICD-10-CM: K25.3  ICD-9-CM: 531.30  10/14/2018        Acute pancreatitis ICD-10-CM: K85.90  ICD-9-CM: 577.0  10/12/2018        Acute renal failure superimposed on stage 3 chronic kidney disease ([facility] ICD-10-CM: N17.9, N18.3  ICD-9-CM: 584.9, 585.3  10/11/2018        Hypokalemia ICD-10-CM: E87.6  ICD-9-CM: 276.8  10/11/2018        Dehydration ICD-10-CM: E86.0  ICD-9-CM: 276.51  10/11/2018        Orthostatic hypotension ICD-10-CM: I95.1  ICD-9-CM: 458.0  10/11/2018        Cellulitis and abscess of left lower extremity ICD-10-CM: L03.116, L02.416  ICD-9-CM: 682.6  9/11/2018        Severe obesity (BMI 35.0-39. 9) with comorbidity ([facility] ICD-10-CM: E66.01  ICD-9-CM: 278.01  3/28/2018        Primary insomnia ICD-10-CM: F51.01  ICD-9-CM: 307.42  7/17/2017        Right upper quadrant abdominal pain ICD-10-CM: R10.11  ICD-9-CM: 789.01  12/14/2016    Overview Signed 12/14/2016 10:18 AM by Phil Oh MD     S/P thorough eval Dr Karyn Slater, gastroenterologist including EGD, colonoscopy, abd U/S.   Lasts only minutes about once a week, worse when constipated             Raynaud's phenomenon ICD-10-CM: I73.00  ICD-9-CM: 443.0  2/25/2015        Post Menopausal Osteopenia ICD-10-CM: M89.9  ICD-9-CM: 733.90  2/25/2015        Pedal edema ICD-10-CM: R60.0  ICD-9-CM: 782.3  1/28/2014    Overview Signed 1/28/2014  4:03 PM by Phil Oh MD     Worse on left             Chronic pain syndrome ICD-10-CM: G89.4  ICD-9-CM: 338.4  11/11/2013    Overview Addendum 1/14/2016  3:45 PM by Phil Oh MD     Chronic feet, hands, hips osteoarthritis, S/P bilateral TKA, gets pain with walking 100 ft             Colon polyps ICD-10-CM: K63.5  ICD-9-CM: 211.3  4/30/2013    Overview Signed 4/30/2013 10:01 AM by Phil Oh MD     Next colo due Jan 2018 HTN (hypertension) ICD-10-CM: I10  ICD-9-CM: 401.9  8/16/2012        Hyperlipidemia ICD-10-CM: E78.5  ICD-9-CM: 272.4  8/16/2012    Overview Addendum 10/28/2012  6:29 PM by Itzel Chow MD     Calcium score of 140, Goal LDL under 130             Hypothyroidism ICD-10-CM: E03.9  ICD-9-CM: 244.9  8/16/2012        Female stress incontinence ICD-10-CM: N39.3  ICD-9-CM: 625.6  5/16/5474        Umbilical hernia FAQ-15-UC: K42.9  ICD-9-CM: 553.1  8/16/2012        S/P total knee arthroplasty ICD-10-CM: H74.767  ICD-9-CM: V43.65  8/16/2012    Overview Addendum 8/16/2012  8:38 AM by Torrie Lechuga     Bilateral--2009             Kidney stones ICD-10-CM: N20.0  ICD-9-CM: 592.0  8/16/2012        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 DO NOT GET WET IN SHOWER, WEAR CAST COVER IN SHOWER! Apply Iodoform- pack loosely into wound, filling all dead space. Cover with 2x2 gauze and Covrsite. Change daily.     Left Knee:  Cleanse wound with normal saline. DO NOT GET WET IN SHOWER! Apply Hydrofera Blue: Cut to wound size, moisten with saline, and apply to wound bed, Cover with 2x2 gauze and Covrsite. Change 3 times a week.      Continue antibiotics until finished.       Standing Status:   Standing     Number of Occurrences:   1        Patient understood and agrees with plan. Questions answered.     Follow-up Information     Follow up With Specialties Details Why Contact Info    [address] In 1 week Friday with [id number] [place] [id number] [phone] Any procedures done today in the [address] [facility] are documented in a separate note in [address] [facility] and made part of this record by reference.      Dictated using voice recognition software; proofread, but unrecognized errors may exist.    Signed By: Tamika Alonzo MD     October 29, 2019

## 2019-11-08 ENCOUNTER — HOSPITAL ENCOUNTER (OUTPATIENT)
Dept: WOUND CARE | Age: 79
Discharge: HOME | End: 2019-11-08
Attending: SURGERY
Payer: MEDICARE

## 2019-11-08 VITALS
HEIGHT: 64 IN | OXYGEN SATURATION: 95 % | TEMPERATURE: 97.5 F | BODY MASS INDEX: 35.37 KG/M2 | SYSTOLIC BLOOD PRESSURE: 150 MMHG | WEIGHT: 207.2 LBS | RESPIRATION RATE: 18 BRPM | HEART RATE: 79 BPM | DIASTOLIC BLOOD PRESSURE: 67 MMHG

## 2019-11-08 PROCEDURE — 74011000250 HC RX REV CODE- 250: Performed by: SURGERY

## 2019-11-08 PROCEDURE — 99213 OFFICE O/P EST LOW 20 MIN: CPT

## 2019-11-08 RX ORDER — LIDOCAINE HYDROCHLORIDE AND EPINEPHRINE 10; 10 MG/ML; UG/ML
1.5 INJECTION, SOLUTION INFILTRATION; PERINEURAL ONCE
Status: DISCONTINUED | OUTPATIENT
Start: 2019-11-08 | End: 2019-11-08

## 2019-11-08 NOTE — WOUND CARE
11/08/19 1416 Wound Knee Left Date First Assessed/Time First Assessed: 10/15/19 1122   Present on Hospital Admission: Yes  Primary Wound Type: Traumatic  Location: Knee  Wound Location Orientation: Left Dressing Status Clean, dry, and intact Dressing Type  
(hydrofera bLUE,covrsite) Non-staged Wound Description Full thickness Wound Length (cm) 0.2 cm Wound Width (cm) 2 cm Wound Depth (cm) 0.1 cm Wound Surface Area (cm^2) 0.4 cm^2 Wound Volume (cm^3) 0.04 cm^3 Condition of Base Granulation;Slough Tissue Type Percent Red 20 Tissue Type Percent Yellow 80 Drainage Amount Scant Drainage Color Serosanguinous Wound Odor None Rosario-wound Assessment Intact Cleansing and Cleansing Agents  Normal saline Wound Leg Right;Medial;Upper Date First Assessed/Time First Assessed: 09/10/19 1504   Present on Hospital Admission: Yes  Primary Wound Type: Abscess  Location: Leg  Wound Location Orientation: Right;Medial;Upper Dressing Status Clean, dry, and intact Dressing Type  
(idoform, covrsite) Non-staged Wound Description Full thickness Wound Length (cm) 0.4 cm Wound Width (cm) 2.5 cm Wound Depth (cm) 0.5 cm Wound Surface Area (cm^2) 1 cm^2 Wound Volume (cm^3) 0.5 cm^3 Condition of Base Pink Tissue Type Percent Pink 100 Tunneling (cm) 2.3 cm Direction of Tunnel 9 o'clock Drainage Amount Small Drainage Color Serosanguinous Wound Odor None Cleansing and Cleansing Agents  Normal saline Patient is not taking any anticoagulents

## 2019-11-08 NOTE — WOUND CARE
Clinic Level of Care Assessment NAME:  Charles Peacock YOB: 1940 GENDER: female MEDICAL RECORD NUMBER:  [id number] DATE:  11/8/2019 Wound Count Document in [facility] Number of Wounds Assessed Points No Wounds/Ulcers []   0 Less than Three Wounds/Ulcers [x]   1  
3-6 Wounds/Ulcers []   2 Greater than 6 Wounds/Ulcers []   3 Ambulation Status Document in Coord/ADAMA/Mobility tab Status Definition Points Independent Independently able to ambulate. Fully able (without any assistance) to get on/off exam table/chair. [x]   0 Minimal Physical Assistance Requires physical assistance of one person to ambulate and/or position patient to be examined. Includes necessary physical assistance to position lower extremities on/off stool. []   1 Moderate Physical Assistance Requires at least one staff member to physically assist patient in ambulating into treatment room, and on/off exam table. []   2 Full Assistance Requires assistance of at least two staff members to transfer patient into treatment room and/or on/off exam table/chair. \"Total Transfer\". []   3 Dressing Complexity Document in [facility] and Write Appropriate Order Complexity Definition Points No Dressing  []   0 Simple Minimal, simple dressing. i.e. Band-aid, gauze, simple wrap. []   1 Intermediate Moderately complicated requiring licensed personnel to apply i.e. collagen matrix, ointments, gels, alginates. []   2 Complex Complicated requiring licensed personnel to apply dressings 6 or more wounds. [x]   3 Teaching Effort Document in Education Tab Effort Definition Points No Teaching  []   0 Simple Reinforce two or less topics. Document in Education navigator.  []   1 Intermediate Reinforce three to five topics and/or one additional  
new topic. Document in Education navigator. [x]   2 Complex Teach more than one new topic. New patient information packet reviewed and/or reinforce more than three topics. Document in Education navigator. HBO initial instruction. []   3 Patient Assessment and Planning Planning Definition Points Simple Multiple System Simple: Simple follow-up with routine assessment and planning. If Discharged, instructions and long term/follow-up care given to patient/caregiver. Discharged, instructions and/or After Visit Summary given to patient/caregiver and instructions completed. [x]   1 Intermediate Multiple System Intermediate: Contact with outside resources; i.e. Telephone calls to home health, [facility]. May include filling out forms and writing letters, arranging transportation, communication with insurance , vendors, etc.  Discharged, instructions and/or After Visit Summary given to patient/caregiver and instructions completed. []   2 Complex Multiple System Complex: Full, comprehensive assessment and planning. Follow the entire navigator under Wound Visit charting filling out each tab which includes OP Adm Database Screening, Education and CarePlan  HBO risk assessment completed. Discharged, instructions and/or After Visit Summary given to patient/caregiver and instructions completed. []   3 Is this the Patient's First Visit to the [address] [place] [address] Road No 
 
 
Is this Patient Established @ [facility] 
Yes Clinical Level of Care Points  0-2  Level 1 [] Points  3-5  Level 2 [] Points  6-9  Level 3 [x] Points  10-12  Level 4 [] Points  13-15  Level 5 [] Electronically signed by Aixa Schmidt RN on 11/8/2019 at 3:23 PM

## 2019-11-08 NOTE — WOUND CARE
[address] [place] [address], [facility] [address] Phone: [phone] Fax: [phone] Patient: Mildred Reyna MRN: [id number]  SSN: [id number] YOB: 1940  Age: 70 y.o. Sex: female Return Appointment: 1 week with Gomez Tejada MD 
 
  
Instructions:  
Right Medial Upper Leg (Lower Knee) Cleanse wound with normal saline. DO NOT GET WET IN SHOWER, WEAR CAST COVER IN SHOWER! Mesalt ribbon- pack loosely into wound, filling all dead space. Cover with ABD,wrap with rolled gauze. Change daily. 
  
Left Knee: 
Cleanse wound with normal saline. DO NOT GET WET IN SHOWER! Apply Hydrofera Blue: Cut to wound size, moisten with saline, and apply to wound bed, Cover with 2x2 gauze and Covrsite. Change 3 times a week. Should you experience increased redness, swelling, pain, foul odor, size of wound(s), or have a temperature over 101 degrees please contact the [address] [place] [address] at [phone] or if after hours contact your primary care physician or go to the hospital emergency department. Signed By: David Allred RN November 8, 2019

## 2019-11-13 NOTE — PROGRESS NOTES
Wound Center Progress Note    Patient: Josue Lechuga MRN: [id number]  SSN: [id number]    YOB: 1940  Age: 70 y.o. Sex: female      Subjective:     Chief Complaint:  Josue Lechuga is a 70 y.o. WHITE OR  female who presents with R lower leg anterior, L lower leg anterior wound of 4 months duration. History of Present Illness:     See above note is  Wound Caused By: non-healed surgical wound for knee replacement  Associated Signs and Symptoms: Mild pain and drainage  Timing: Intermittent  Quality: Burning, Pressure  Severity: 3/10  Modifying Factors: None    Seen at the request of Dr Xiomara Crum. Previously seen for a left pre-patellar bursitis. New fall and wound on the left and right knee. Left knee wound improving but right has stalled.        Past Medical History:   Diagnosis Date    Calculus of kidney     Gastric ulcer     2018    GERD (gastroesophageal reflux disease)     Headache(784.0)     Hypercholesterolemia     Hypertension     Primary insomnia 7/17/2017    Thyroid disease       Past Surgical History:   Procedure Laterality Date    HX COLONOSCOPY  Jan 2013    4 polyps, repeat 5 years    HX COLONOSCOPY  7/5/2016    repeat 5 yrs tubular adenoma    HX SALPINGO-OOPHORECTOMY      REMOVAL OF KIDNEY STONE       Family History   Problem Relation Age of Onset    Hypertension Mother     Arthritis-rheumatoid Mother     High Cholesterol Mother     Broken Bones Mother     Heart Failure Father     Heart Attack Father     Hypertension Brother     High Cholesterol Brother     Arthritis-osteo Brother         Knee    High Cholesterol Brother     Hypertension Brother     Heart Attack Brother     Heart Attack Paternal Grandmother     Heart Attack Paternal Grandfather     Breast Cancer Neg Hx       Social History     Tobacco Use    Smoking status: Never Smoker    Smokeless tobacco: Never Used   Substance Use Topics    Alcohol use: No       Prior to Admission medications    Medication Sig Start Date End Date Taking? Authorizing Provider   losartan-hydroCHLOROthiazide (HYZAAR) 100-25 mg per tablet TAKE ONE TABLET DAILY. 8/27/19   Declan Saini MD   levothyroxine (SYNTHROID) 25 mcg tablet Take 1 Tab by mouth every morning. 8/22/19   Joss Doherty MD   potassium chloride (K-DUR, KLOR-CON) 20 mEq tablet TAKE 1 TABLET BY MOUTH 2 TIMES A DAY 8/22/19   Joss Doherty MD   traZODone (DESYREL) 50 mg tablet Take 1 Tab by mouth nightly. 8/22/19   Lydia Doherty MD   gabapentin (NEURONTIN) 100 mg capsule Take 1 Cap by mouth two (2) times a day. 4/23/19   Lydia Doherty MD   atorvastatin (LIPITOR) 40 mg tablet Take 1 Tab by mouth daily. 4/23/19   Lydia Doherty MD   ondansetron (ZOFRAN ODT) 4 mg disintegrating tablet Take 1 Tab by mouth every eight (8) hours as needed for Nausea. 10/5/18   Kristen Young MD   FLOVENT  mcg/actuation inhaler Take 2 Puffs by inhalation two (2) times a day. 10/28/15   Provider, Historical   acetaminophen (TYLENOL ARTHRITIS PAIN) 650 mg CR tablet Take 650 mg by mouth every eight (8) hours as needed (pain). Provider, Historical   albuterol (VENTOLIN HFA) 90 mcg/actuation inhaler Take 2 Puffs by inhalation every six (6) hours as needed for Wheezing or Shortness of Breath. Provider, Historical   CALCIUM CITRATE + PO take 1 Tab by mouth two (2) times a day. Provider, Historical     Allergies   Allergen Reactions    Pneumococcal 23-Janell Ps Vaccine Other (comments)     fever  Other reaction(s): Other (comments)  fever        Review of Systems:  A comprehensive review of systems was negative except for that written in the History of Present Illness.      No results found for: HBA1C, TYQ2DYBN, HGBE8, TJV1GJJP, DNA9WIXQ, PZO4JJBI     Immunization History   Administered Date(s) Administered    Influenza High Dose Vaccine PF 09/08/2016, 11/28/2017    Influenza Vaccine 10/10/2013, 10/09/2014    Influenza Vaccine (Quad) PF 09/14/2018    Influenza Vaccine PF 10/26/2015    Influenza Vaccine Split 10/29/2012    Pneumococcal Polysaccharide (PPSV-23) 06/28/2007, 08/27/2007    TB Skin Test (PPD) Intradermal 10/12/2018    TDAP Vaccine 04/12/2012, 10/29/2012    Tdap 04/12/2012, 10/29/2012    Zoster 10/04/2011    Zoster Vaccine, Live 10/04/2011       Body mass index is 35.57 kg/m². Counseling regarding nutrition done: No     Current medications:  Current Outpatient Medications   Medication Sig Dispense Refill    losartan-hydroCHLOROthiazide (HYZAAR) 100-25 mg per tablet TAKE ONE TABLET DAILY. 90 Tab 3    levothyroxine (SYNTHROID) 25 mcg tablet Take 1 Tab by mouth every morning. 90 Tab 3    potassium chloride (K-DUR, KLOR-CON) 20 mEq tablet TAKE 1 TABLET BY MOUTH 2 TIMES A  Tab 3    traZODone (DESYREL) 50 mg tablet Take 1 Tab by mouth nightly. 30 Tab 3    gabapentin (NEURONTIN) 100 mg capsule Take 1 Cap by mouth two (2) times a day. 180 Cap 3    atorvastatin (LIPITOR) 40 mg tablet Take 1 Tab by mouth daily. 90 Tab 3    ondansetron (ZOFRAN ODT) 4 mg disintegrating tablet Take 1 Tab by mouth every eight (8) hours as needed for Nausea. 20 Tab 0    FLOVENT  mcg/actuation inhaler Take 2 Puffs by inhalation two (2) times a day. 3    acetaminophen (TYLENOL ARTHRITIS PAIN) 650 mg CR tablet Take 650 mg by mouth every eight (8) hours as needed (pain).  albuterol (VENTOLIN HFA) 90 mcg/actuation inhaler Take 2 Puffs by inhalation every six (6) hours as needed for Wheezing or Shortness of Breath.  CALCIUM CITRATE + PO take 1 Tab by mouth two (2) times a day. Objective:     Physical Exam:     Visit Vitals  /67 (BP 1 Location: Left arm)   Pulse 79   Temp 97.5 °F (36.4 °C)   Resp 18   Ht 5' 4\" (1.626 m)   Wt 94 kg (207 lb 3.2 oz)   SpO2 95%   BMI 35.57 kg/m²       General: well developed, well nourished, pleasant , NAD. Hygiene good  Psych: cooperative. Pleasant. No anxiety or depression.  Normal mood and affect. Neuro: alert and oriented to person/place/situation. Otherwise nonfocal.  Derm: Normal turgor for age, dry skin  HEENT: Normocephalic, atraumatic. EOMI. Conjunctiva clear. No scleral icterus. Neck: Normal range of motion. No masses. Chest: Good air entry bilaterally. Respirations nonlabored  Cardio: Normal heart sounds,no rubs, murmurs or gallops  Abdomen: Soft, nontender, nondistended, normoactive bowel sounds  Lower extremities: color normal; temperature normal. Hair growth is not present. Calves are supple, nontender, approximately equally sized in comparison. Capillary refill <3 sec                Ulcer Description:   Wound Knee Left; Anterior (Active)   Number of days: 428       Wound Knee Left;Medial (Active)   Number of days: 397       Wound Knee Left;Lateral (Active)   Number of days: 397       Wound Pretibial Right (Active)   Number of days: 82       Wound Leg Right;Medial;Upper (Active)   Dressing Status Clean, dry, and intact 11/8/2019  2:16 PM   Non-staged Wound Description Full thickness 11/8/2019  2:16 PM   Wound Length (cm) 0.4 cm 11/8/2019  2:16 PM   Wound Width (cm) 2.5 cm 11/8/2019  2:16 PM   Wound Depth (cm) 0.5 cm 11/8/2019  2:16 PM   Wound Surface Area (cm^2) 1 cm^2 11/8/2019  2:16 PM   Wound Volume (cm^3) 0.5 cm^3 11/8/2019  2:16 PM   Condition of Base [place] 11/8/2019  2:16 PM   Tissue Type Percent Pink 100 11/8/2019  2:16 PM   Tissue Type Percent Yellow 100 9/17/2019 11:18 AM   Tunneling (cm) 2.3 cm 11/8/2019  2:16 PM   Direction of Tunnel 9 o'clock 11/8/2019  2:16 PM   Drainage Amount Small 11/8/2019  2:16 PM   Drainage Color Serosanguinous 11/8/2019  2:16 PM   Wound Odor None 11/8/2019  2:16 PM   Rosario-wound Assessment Edema 9/17/2019 11:18 AM   Cleansing and Cleansing Agents  Normal saline 11/8/2019  2:16 PM   Dressing Changed Changed/New 11/8/2019  2:16 PM   Number of days: 64       Wound Knee Left (Active)   Dressing Status Clean, dry, and intact 11/8/2019  2:16 PM   Non-staged Wound Description Full thickness 11/8/2019  2:16 PM   Wound Length (cm) 0.2 cm 11/8/2019  2:16 PM   Wound Width (cm) 2 cm 11/8/2019  2:16 PM   Wound Depth (cm) 0.1 cm 11/8/2019  2:16 PM   Wound Surface Area (cm^2) 0.4 cm^2 11/8/2019  2:16 PM   Wound Volume (cm^3) 0.04 cm^3 11/8/2019  2:16 PM   Condition of Base Granulation;Slough 11/8/2019  2:16 PM   Tissue Type Percent Black 5 % 10/29/2019  3:04 PM   Tissue Type Percent Red 20 11/8/2019  2:16 PM   Tissue Type Percent Yellow 80 11/8/2019  2:16 PM   Drainage Amount Scant 11/8/2019  2:16 PM   Drainage Color Serosanguinous 11/8/2019  2:16 PM   Wound Odor None 11/8/2019  2:16 PM   Rosario-wound Assessment Intact 11/8/2019  2:16 PM   Cleansing and Cleansing Agents  Normal saline 11/8/2019  2:16 PM   Dressing Changed Changed/New 11/8/2019  2:16 PM   Number of days: 29       [REMOVED] Wound Knee Right;Medial large blister on medial aspect of right knee 09/04/19 (Removed)   Number of days: 48         PROCEDURE: Complex right knee incision and debridement    Preop dx: R knee wound  Post dx: Same    Following informed consent a circumferential block was performed with 20 mls 1% lidocaine with epi. Tract probed and sharply incised. Extended into the subq but not to the joint. Pallid granulation tissue sharply excised. Hemostasis obtained with pressure. Packed with mesalt. EBL: Minimal  Complications : None            Data Review:   No results found for this or any previous visit (from the past 24 hour(s)). Assessment:     70 y.o. female with R lower leg anterior, L lower leg anterior combined ulcer.     Problem List  Date Reviewed: 8/1/2018          Codes Class Noted    Chronic kidney disease, stage III (moderate) ([facility] ICD-10-CM: N18.3  ICD-9-CM: 585.3  11/12/2014        Asthma ICD-10-CM: J45.909  ICD-9-CM: 493.90  10/29/2012        Pyogenic arthritis of right knee joint ([address] ICD-10-CM: M00.9  ICD-9-CM: 711.06  9/10/2019        Cellulitis of right leg ICD-10-CM: H89.688  ICD-9-CM: 682.6  8/22/2019        Acute kidney injury superimposed on CKD ([facility] ICD-10-CM: N17.9, N18.9  ICD-9-CM: 866.00, 585.9  8/22/2019        Gastric ulcer ICD-10-CM: K25.9  ICD-9-CM: 531.90  Unknown        Acute gastric ulcer without hemorrhage or perforation ICD-10-CM: K25.3  ICD-9-CM: 531.30  10/14/2018        Acute pancreatitis ICD-10-CM: K85.90  ICD-9-CM: 577.0  10/12/2018        Acute renal failure superimposed on stage 3 chronic kidney disease (HCC) ICD-10-CM: N17.9, N18.3  ICD-9-CM: 584.9, 585.3  10/11/2018        Hypokalemia ICD-10-CM: E87.6  ICD-9-CM: 276.8  10/11/2018        Dehydration ICD-10-CM: E86.0  ICD-9-CM: 276.51  10/11/2018        Orthostatic hypotension ICD-10-CM: I95.1  ICD-9-CM: 458.0  10/11/2018        Cellulitis and abscess of left lower extremity ICD-10-CM: L03.116, L02.416  ICD-9-CM: 682.6  9/11/2018        Severe obesity (BMI 35.0-39. 9) with comorbidity ([facility] ICD-10-CM: E66.01  ICD-9-CM: 278.01  3/28/2018        Primary insomnia ICD-10-CM: F51.01  ICD-9-CM: 307.42  7/17/2017        Right upper quadrant abdominal pain ICD-10-CM: R10.11  ICD-9-CM: 789.01  12/14/2016    Overview Signed 12/14/2016 10:18 AM by Nirali Briceno MD     S/P thorough eval Dr Disha Arguello, gastroenterologist including EGD, colonoscopy, abd U/S.   Lasts only minutes about once a week, worse when constipated             Raynaud's phenomenon ICD-10-CM: I73.00  ICD-9-CM: 443.0  2/25/2015        Post Menopausal Osteopenia ICD-10-CM: M89.9  ICD-9-CM: 733.90  2/25/2015        Pedal edema ICD-10-CM: R60.0  ICD-9-CM: 782.3  1/28/2014    Overview Signed 1/28/2014  4:03 PM by Nirali Briceno MD     Worse on left             Chronic pain syndrome ICD-10-CM: G89.4  ICD-9-CM: 338.4  11/11/2013    Overview Addendum 1/14/2016  3:45 PM by Nirali Briceno MD     Chronic feet, hands, hips osteoarthritis, S/P bilateral TKA, gets pain with walking 100 ft             Colon polyps ICD-10-CM: K63.5  ICD-9-CM: 211.3  4/30/2013    Overview Signed 4/30/2013 10:01 AM by Latonya Whitney MD     Next colo due Jan 2018             HTN (hypertension) ICD-10-CM: I10  ICD-9-CM: 401.9  8/16/2012        Hyperlipidemia ICD-10-CM: E78.5  ICD-9-CM: 272.4  8/16/2012    Overview Addendum 10/28/2012  6:29 PM by Latonya Whitney MD     Calcium score of 140, Goal LDL under 130             Hypothyroidism ICD-10-CM: E03.9  ICD-9-CM: 244.9  8/16/2012        Female stress incontinence ICD-10-CM: N39.3  ICD-9-CM: 625.6  6/91/8072        Umbilical hernia SVB-23-WN: K42.9  ICD-9-CM: 553.1  8/16/2012        S/P total knee arthroplasty ICD-10-CM: T93.200  ICD-9-CM: V43.65  8/16/2012    Overview Addendum 8/16/2012  8:38 AM by Belkis Argentina     Bilateral--2009             Kidney stones ICD-10-CM: N20.0  ICD-9-CM: 592.0  8/16/2012        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 Questions answered. Follow-up Information     Follow up With Specialties Details Why Contact Info    [address] In 1 week  [employer] [address] [place] [address] [address]           Continue HF to the left. Right side incised, packed. Continue daily packing.      Signed By: David Garber MD

## 2019-11-15 ENCOUNTER — HOSPITAL ENCOUNTER (OUTPATIENT)
Dept: WOUND CARE | Age: 79
Discharge: HOME | End: 2019-11-15
Attending: SURGERY
Payer: MEDICARE

## 2019-11-15 VITALS
WEIGHT: 212 LBS | SYSTOLIC BLOOD PRESSURE: 158 MMHG | TEMPERATURE: 98.3 F | HEART RATE: 93 BPM | HEIGHT: 64 IN | DIASTOLIC BLOOD PRESSURE: 64 MMHG | BODY MASS INDEX: 36.19 KG/M2 | OXYGEN SATURATION: 98 % | RESPIRATION RATE: 18 BRPM

## 2019-11-15 PROCEDURE — 99212 OFFICE O/P EST SF 10 MIN: CPT

## 2019-11-15 NOTE — WOUND CARE
Clinic Level of Care Assessment NAME:  Josue Lechuga YOB: 1940 GENDER: female MEDICAL RECORD NUMBER:  [id number] DATE:  11/15/2019 Wound Count Document in [facility] Number of Wounds Assessed Points No Wounds/Ulcers []   0 Less than Three Wounds/Ulcers [x]   1  
3-6 Wounds/Ulcers []   2 Greater than 6 Wounds/Ulcers []   3 Ambulation Status Document in Coord/ADAMA/Mobility tab Status Definition Points Independent Independently able to ambulate. Fully able (without any assistance) to get on/off exam table/chair. [x]   0 Minimal Physical Assistance Requires physical assistance of one person to ambulate and/or position patient to be examined. Includes necessary physical assistance to position lower extremities on/off stool. []   1 Moderate Physical Assistance Requires at least one staff member to physically assist patient in ambulating into treatment room, and on/off exam table. []   2 Full Assistance Requires assistance of at least two staff members to transfer patient into treatment room and/or on/off exam table/chair. \"Total Transfer\". []   3 Dressing Complexity Document in [facility] and Write Appropriate Order Complexity Definition Points No Dressing  []   0 Simple Minimal, simple dressing. i.e. Band-aid, gauze, simple wrap. []   1 Intermediate Moderately complicated requiring licensed personnel to apply i.e. collagen matrix, ointments, gels, alginates. [x]   2 Complex Complicated requiring licensed personnel to apply dressings 6 or more wounds. []   3 Teaching Effort Document in Education Tab Effort Definition Points No Teaching  []   0 Simple Reinforce two or less topics. Document in Education navigator. [x]   1 Intermediate Reinforce three to five topics and/or one additional  
new topic. Document in Education navigator. []   2 Complex Teach more than one new topic. New patient information packet reviewed and/or reinforce more than three topics. Document in Education navigator. HBO initial instruction. []   3 Patient Assessment and Planning Planning Definition Points Simple Multiple System Simple: Simple follow-up with routine assessment and planning. If Discharged, instructions and long term/follow-up care given to patient/caregiver. Discharged, instructions and/or After Visit Summary given to patient/caregiver and instructions completed. [x]   1 Intermediate Multiple System Intermediate: Contact with outside resources; i.e. Telephone calls to home health, [facility]. May include filling out forms and writing letters, arranging transportation, communication with insurance , vendors, etc.  Discharged, instructions and/or After Visit Summary given to patient/caregiver and instructions completed. []   2 Complex Multiple System Complex: Full, comprehensive assessment and planning. Follow the entire navigator under Wound Visit charting filling out each tab which includes OP Adm Database Screening, Education and CarePlan  HBO risk assessment completed. Discharged, instructions and/or After Visit Summary given to patient/caregiver and instructions completed. []   3 Is this the Patient's First Visit to the [address] [place] [address] Road No 
 
 
Is this Patient Established @ [facility] 
Yes Clinical Level of Care Points  0-2  Level 1 [] Points  3-5  Level 2 [x] Points  6-9  Level 3 [] Points  10-12  Level 4 [] Points  13-15  Level 5 [] Electronically signed by Gordon Padron RN on 11/15/2019 at 2:24 PM

## 2019-11-15 NOTE — WOUND CARE
11/15/19 1350 Wound Leg Right;Medial;Upper Date First Assessed/Time First Assessed: 09/10/19 1504   Present on Hospital Admission: Yes  Primary Wound Type: Abscess  Location: Leg  Wound Location Orientation: Right;Medial;Upper Dressing Status Old drainage Dressing Type  
(mesalt, abd, bandaid) Non-staged Wound Description Full thickness Wound Length (cm) 0.4 cm Wound Width (cm) 2.5 cm Wound Depth (cm) 1 cm Wound Surface Area (cm^2) 1 cm^2 Wound Volume (cm^3) 1 cm^3 Condition of Base Granulation Tissue Type Percent Red 100 Drainage Amount Small Drainage Color Serosanguinous Wound Odor None Rosario-wound Assessment Blanchable erythema Cleansing and Cleansing Agents  Normal saline Wound Knee Left Date First Assessed/Time First Assessed: 10/15/19 1122   Present on Hospital Admission: Yes  Primary Wound Type: Traumatic  Location: Knee  Wound Location Orientation: Left Dressing Status Clean, dry, and intact Dressing Type  
(2X2, bandaid) Non-staged Wound Description Full thickness Wound Length (cm) 0.1 cm Wound Width (cm) 0.1 cm Wound Depth (cm) 0.1 cm Wound Surface Area (cm^2) 0.01 cm^2 Wound Volume (cm^3) 0 cm^3 Condition of Base Epithelializing Epithelialization (%) 99 Drainage Amount None Wound Odor None Rosario-wound Assessment Intact Cleansing and Cleansing Agents  Normal saline Procedure Tolerated Well PATIENT IS NOT ON AN ANTICOAGULANT.

## 2019-11-15 NOTE — WOUND CARE
[address] [place] [address], [facility] [address] Phone: [phone] Fax: [phone] Patient: Agatha Ramirez MRN: [id number]  SSN: [id number] YOB: 1940  Age: 70 y.o. Sex: female Return Appointment: 1 week with Deri Bosworth, MD 
 
Instructions:  
Right Medial Upper Leg (Lower Knee) Cleanse wound with normal saline. DO NOT GET WET IN SHOWER, WEAR CAST COVER IN SHOWER! Mesalt ribbon- pack loosely into wound, filling all dead space. Cover with ABD,wrap with rolled gauze. Change daily. Should you experience increased redness, swelling, pain, foul odor, size of wound(s), or have a temperature over 101 degrees please contact the [address] [place] [address] Road at [phone] or if after hours contact your primary care physician or go to the hospital emergency department. Signed By: Cleo Nuñez RN November 15, 2019

## 2019-11-20 NOTE — PROGRESS NOTES
Wound Center Progress Note    Patient: Yasmany Sauceda MRN: [id number]  SSN: [id number]    YOB: 1940  Age: 70 y.o. Sex: female      Subjective:     Chief Complaint:  Yasmany Sauceda is a 70 y.o. WHITE OR  female who presents with R lower leg anterior, L lower leg anterior wound of 4 months duration. History of Present Illness:     See above note is  Wound Caused By: non-healed surgical wound for knee replacement  Associated Signs and Symptoms: Mild pain and drainage  Timing: Intermittent  Quality: Burning, Pressure  Severity: 3/10  Modifying Factors: None    Seen at the request of Dr Liat Almaraz. Previously seen for a left pre-patellar bursitis. New fall and wound on the left and right knee. Left knee wound improving but right has stalled. 11/15/2019  Doing better s/p debridement. Much less drainage. No new issues.        Past Medical History:   Diagnosis Date    Calculus of kidney     Gastric ulcer     2018    GERD (gastroesophageal reflux disease)     Headache(784.0)     Hypercholesterolemia     Hypertension     Primary insomnia 7/17/2017    Thyroid disease       Past Surgical History:   Procedure Laterality Date    HX COLONOSCOPY  Jan 2013    4 polyps, repeat 5 years    HX COLONOSCOPY  7/5/2016    repeat 5 yrs tubular adenoma    HX SALPINGO-OOPHORECTOMY      REMOVAL OF KIDNEY STONE       Family History   Problem Relation Age of Onset    Hypertension Mother     Arthritis-rheumatoid Mother     High Cholesterol Mother     Broken Bones Mother     Heart Failure Father     Heart Attack Father     Hypertension Brother     High Cholesterol Brother     Arthritis-osteo Brother         Knee    High Cholesterol Brother     Hypertension Brother     Heart Attack Brother     Heart Attack Paternal Grandmother     Heart Attack Paternal Grandfather     Breast Cancer Neg Hx       Social History     Tobacco Use    Smoking status: Never Smoker    Smokeless tobacco: Never Used   Substance Use Topics    Alcohol use: No       Prior to Admission medications    Medication Sig Start Date End Date Taking? Authorizing Provider   losartan-hydroCHLOROthiazide (HYZAAR) 100-25 mg per tablet TAKE ONE TABLET DAILY. 8/27/19   Blaine Greenwood MD   levothyroxine (SYNTHROID) 25 mcg tablet Take 1 Tab by mouth every morning. 8/22/19   Joss Doherty MD   potassium chloride (K-DUR, KLOR-CON) 20 mEq tablet TAKE 1 TABLET BY MOUTH 2 TIMES A DAY 8/22/19   Joss Doherty MD   traZODone (DESYREL) 50 mg tablet Take 1 Tab by mouth nightly. 8/22/19   Brandi Doherty MD   gabapentin (NEURONTIN) 100 mg capsule Take 1 Cap by mouth two (2) times a day. 4/23/19   Brandi Doherty MD   atorvastatin (LIPITOR) 40 mg tablet Take 1 Tab by mouth daily. 4/23/19   Brandi Doherty MD   ondansetron (ZOFRAN ODT) 4 mg disintegrating tablet Take 1 Tab by mouth every eight (8) hours as needed for Nausea. 10/5/18   Amie Young MD   FLOVENT  mcg/actuation inhaler Take 2 Puffs by inhalation two (2) times a day. 10/28/15   Provider, Historical   acetaminophen (TYLENOL ARTHRITIS PAIN) 650 mg CR tablet Take 650 mg by mouth every eight (8) hours as needed (pain). Provider, Historical   albuterol (VENTOLIN HFA) 90 mcg/actuation inhaler Take 2 Puffs by inhalation every six (6) hours as needed for Wheezing or Shortness of Breath. Provider, Historical   CALCIUM CITRATE + PO take 1 Tab by mouth two (2) times a day. Provider, Historical     Allergies   Allergen Reactions    Pneumococcal 23-Janell Ps Vaccine Other (comments)     fever  Other reaction(s): Other (comments)  fever        Review of Systems:  A comprehensive review of systems was negative except for that written in the History of Present Illness.      No results found for: HBA1C, TPD7KZFP, HGBE8, LUP1UDJK, ZLM2IPUB, FZN1MRGT     Immunization History   Administered Date(s) Administered    Influenza High Dose Vaccine PF 09/08/2016, 11/28/2017    Influenza Vaccine 10/10/2013, 10/09/2014    Influenza Vaccine (Quad) PF 09/14/2018    Influenza Vaccine PF 10/26/2015    Influenza Vaccine Split 10/29/2012    Pneumococcal Polysaccharide (PPSV-23) 06/28/2007, 08/27/2007    TB Skin Test (PPD) Intradermal 10/12/2018    TDAP Vaccine 04/12/2012, 10/29/2012    Tdap 04/12/2012, 10/29/2012    Zoster 10/04/2011    Zoster Vaccine, Live 10/04/2011       Body mass index is 36.39 kg/m². Counseling regarding nutrition done: No     Current medications:  Current Outpatient Medications   Medication Sig Dispense Refill    losartan-hydroCHLOROthiazide (HYZAAR) 100-25 mg per tablet TAKE ONE TABLET DAILY. 90 Tab 3    levothyroxine (SYNTHROID) 25 mcg tablet Take 1 Tab by mouth every morning. 90 Tab 3    potassium chloride (K-DUR, KLOR-CON) 20 mEq tablet TAKE 1 TABLET BY MOUTH 2 TIMES A  Tab 3    traZODone (DESYREL) 50 mg tablet Take 1 Tab by mouth nightly. 30 Tab 3    gabapentin (NEURONTIN) 100 mg capsule Take 1 Cap by mouth two (2) times a day. 180 Cap 3    atorvastatin (LIPITOR) 40 mg tablet Take 1 Tab by mouth daily. 90 Tab 3    ondansetron (ZOFRAN ODT) 4 mg disintegrating tablet Take 1 Tab by mouth every eight (8) hours as needed for Nausea. 20 Tab 0    FLOVENT  mcg/actuation inhaler Take 2 Puffs by inhalation two (2) times a day. 3    acetaminophen (TYLENOL ARTHRITIS PAIN) 650 mg CR tablet Take 650 mg by mouth every eight (8) hours as needed (pain).  albuterol (VENTOLIN HFA) 90 mcg/actuation inhaler Take 2 Puffs by inhalation every six (6) hours as needed for Wheezing or Shortness of Breath.  CALCIUM CITRATE + PO take 1 Tab by mouth two (2) times a day. Objective:     Physical Exam:     Visit Vitals  /64 (BP 1 Location: Right arm)   Pulse 93   Temp 98.3 °F (36.8 °C)   Resp 18   Ht 5' 4\" (1.626 m)   Wt 96.2 kg (212 lb)   SpO2 98%   BMI 36.39 kg/m²       General: well developed, well nourished, pleasant , NAD.  Hygiene good  Psych: cooperative. Pleasant. No anxiety or depression. Normal mood and affect. Neuro: alert and oriented to person/place/situation. Otherwise nonfocal.  Derm: Normal turgor for age, dry skin  HEENT: Normocephalic, atraumatic. EOMI. Conjunctiva clear. No scleral icterus. Neck: Normal range of motion. No masses. Chest: Good air entry bilaterally. Respirations nonlabored  Cardio: Normal heart sounds,no rubs, murmurs or gallops  Abdomen: Soft, nontender, nondistended, normoactive bowel sounds  Lower extremities: color normal; temperature normal. Hair growth is not present. Calves are supple, nontender, approximately equally sized in comparison. Capillary refill <3 sec                Ulcer Description:   Wound Knee Left; Anterior (Active)   Number of days: 434       Wound Knee Left;Medial (Active)   Number of days: 403       Wound Knee Left;Lateral (Active)   Number of days: 403       Wound Pretibial Right (Active)   Number of days: 88       Wound Leg Right;Medial;Upper (Active)   Dressing Status Old drainage 11/15/2019  1:50 PM   Non-staged Wound Description Full thickness 11/15/2019  1:50 PM   Wound Length (cm) 0.4 cm 11/15/2019  1:50 PM   Wound Width (cm) 2.5 cm 11/15/2019  1:50 PM   Wound Depth (cm) 1 cm 11/15/2019  1:50 PM   Wound Surface Area (cm^2) 1 cm^2 11/15/2019  1:50 PM   Wound Volume (cm^3) 1 cm^3 11/15/2019  1:50 PM   Condition of Base Granulation 11/15/2019  1:50 PM   Tissue Type Percent Pink 100 11/8/2019  2:16 PM   Tissue Type Percent Red 100 11/15/2019  1:50 PM   Tissue Type Percent Yellow 100 9/17/2019 11:18 AM   Tunneling (cm) 2.3 cm 11/8/2019  2:16 PM   Direction of Tunnel 9 o'clock 11/8/2019  2:16 PM   Drainage Amount Small 11/15/2019  1:50 PM   Drainage Color Serosanguinous 11/15/2019  1:50 PM   Wound Odor None 11/15/2019  1:50 PM   Rosario-wound Assessment Blanchable erythema 11/15/2019  1:50 PM   Cleansing and Cleansing Agents  Normal saline 11/15/2019  1:50 PM   Dressing Changed Changed/New 11/15/2019  1:50 PM   Procedure Tolerated Well 11/15/2019  1:50 PM   Number of days: 70       [REMOVED] Wound Knee Right;Medial large blister on medial aspect of right knee 09/04/19 (Removed)   Number of days: 48       [REMOVED] Wound Knee Left (Removed)   Dressing Status Clean, dry, and intact 11/15/2019  1:50 PM   Non-staged Wound Description Full thickness 11/15/2019  1:50 PM   Wound Length (cm) 0.1 cm 11/15/2019  1:50 PM   Wound Width (cm) 0.1 cm 11/15/2019  1:50 PM   Wound Depth (cm) 0.1 cm 11/15/2019  1:50 PM   Wound Surface Area (cm^2) 0.01 cm^2 11/15/2019  1:50 PM   Wound Volume (cm^3) 0 cm^3 11/15/2019  1:50 PM   Condition of Base Epithelializing 11/15/2019  1:50 PM   Epithelialization (%) 99 11/15/2019  1:50 PM   Tissue Type Percent Black 5 % 10/29/2019  3:04 PM   Tissue Type Percent Red 20 11/8/2019  2:16 PM   Tissue Type Percent Yellow 80 11/8/2019  2:16 PM   Drainage Amount None 11/15/2019  1:50 PM   Drainage Color Serosanguinous 11/8/2019  2:16 PM   Wound Odor None 11/15/2019  1:50 PM   Rosario-wound Assessment Intact 11/15/2019  1:50 PM   Cleansing and Cleansing Agents  Normal saline 11/15/2019  1:50 PM   Dressing Changed Changed/New 11/8/2019  2:16 PM   Dressing Type Applied Open to air 11/15/2019  1:50 PM   Procedure Tolerated Well 11/15/2019  1:50 PM   Number of days: 31         PROCEDURE: Complex right knee incision and debridement    Preop dx: R knee wound  Post dx: Same    Following informed consent a circumferential block was performed with 20 mls 1% lidocaine with epi. Tract probed and sharply incised. Extended into the subq but not to the joint. Pallid granulation tissue sharply excised. Hemostasis obtained with pressure. Packed with mesalt. EBL: Minimal  Complications : None            Data Review:   No results found for this or any previous visit (from the past 24 hour(s)).     Assessment:     70 y.o. female with R lower leg anterior, L lower leg anterior combined ulcer.    Problem List  Date Reviewed: 8/1/2018          Codes Class Noted    Chronic kidney disease, stage III (moderate) ([facility] ICD-10-CM: N18.3  ICD-9-CM: 585.3  11/12/2014        Asthma ICD-10-CM: J45.909  ICD-9-CM: 493.90  10/29/2012        Pyogenic arthritis of right knee joint ([facility] ICD-10-CM: M00.9  ICD-9-CM: 711.06  9/10/2019        Cellulitis of right leg ICD-10-CM: L03.115  ICD-9-CM: 682.6  8/22/2019        Acute kidney injury superimposed on CKD ([facility] ICD-10-CM: N17.9, N18.9  ICD-9-CM: 866.00, 585.9  8/22/2019        Gastric ulcer ICD-10-CM: K25.9  ICD-9-CM: 531.90  Unknown        Acute gastric ulcer without hemorrhage or perforation ICD-10-CM: K25.3  ICD-9-CM: 531.30  10/14/2018        Acute pancreatitis ICD-10-CM: K85.90  ICD-9-CM: 577.0  10/12/2018        Acute renal failure superimposed on stage 3 chronic kidney disease ([facility] ICD-10-CM: N17.9, N18.3  ICD-9-CM: 584.9, 585.3  10/11/2018        Hypokalemia ICD-10-CM: E87.6  ICD-9-CM: 276.8  10/11/2018        Dehydration ICD-10-CM: E86.0  ICD-9-CM: 276.51  10/11/2018        Orthostatic hypotension ICD-10-CM: I95.1  ICD-9-CM: 458.0  10/11/2018        Cellulitis and abscess of left lower extremity ICD-10-CM: L03.116, L02.416  ICD-9-CM: 682.6  9/11/2018        Severe obesity (BMI 35.0-39. 9) with comorbidity ([facility] ICD-10-CM: E66.01  ICD-9-CM: 278.01  3/28/2018        Primary insomnia ICD-10-CM: F51.01  ICD-9-CM: 307.42  7/17/2017        Right upper quadrant abdominal pain ICD-10-CM: R10.11  ICD-9-CM: 789.01  12/14/2016    Overview Signed 12/14/2016 10:18 AM by Bella Park MD     S/P thorough eval Dr Faheem Singh, gastroenterologist including EGD, colonoscopy, abd U/S.   Lasts only minutes about once a week, worse when constipated             Raynaud's phenomenon ICD-10-CM: I73.00  ICD-9-CM: 443.0  2/25/2015        Post Menopausal Osteopenia ICD-10-CM: M89.9  ICD-9-CM: 733.90  2/25/2015        Pedal edema ICD-10-CM: R60.0  ICD-9-CM: 782.3  1/28/2014    Overview Signed 1/28/2014  4:03 PM by Sanjana Shetty MD     Worse on left             Chronic pain syndrome ICD-10-CM: G89.4  ICD-9-CM: 338.4  11/11/2013    Overview Addendum 1/14/2016  3:45 PM by Sanjana Shetty MD     Chronic feet, hands, hips osteoarthritis, S/P bilateral TKA, gets pain with walking 100 ft             Colon polyps ICD-10-CM: K63.5  ICD-9-CM: 211.3  4/30/2013    Overview Signed 4/30/2013 10:01 AM by Sanjana Shetty MD     Next colo due Jan 2018             HTN (hypertension) ICD-10-CM: I10  ICD-9-CM: 401.9  8/16/2012        Hyperlipidemia ICD-10-CM: E78.5  ICD-9-CM: 272.4  8/16/2012    Overview Addendum 10/28/2012  6:29 PM by Sanjana Shetty MD     Calcium score of 140, Goal LDL under 130             Hypothyroidism ICD-10-CM: E03.9  ICD-9-CM: 244.9  8/16/2012        Female stress incontinence ICD-10-CM: N39.3  ICD-9-CM: 625.6  7/11/3437        Umbilical hernia LCK-45-ZW: K42.9  ICD-9-CM: 553.1  8/16/2012        S/P total knee arthroplasty ICD-10-CM: I05.018  ICD-9-CM: V43.65  8/16/2012    Overview Addendum 8/16/2012  8:38 AM by Dianne Loo     Bilateral--2009             Kidney stones ICD-10-CM: N20.0  ICD-9-CM: 592.0  8/16/2012        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 Patient understood and agrees with plan. Questions answered. Follow-up Information     Follow up With Specialties Details Why Contact Info    [address] In 1 week  [facility] [address]  [phone]           Continue dressings. Previously sent culture results unavailable but knee looks much better.       Signed By: Riky Rocha MD

## 2019-11-22 ENCOUNTER — HOSPITAL ENCOUNTER (OUTPATIENT)
Dept: WOUND CARE | Age: 79
Discharge: HOME | End: 2019-11-22
Attending: SURGERY
Payer: MEDICARE

## 2019-11-22 VITALS
WEIGHT: 209.4 LBS | HEIGHT: 64 IN | SYSTOLIC BLOOD PRESSURE: 129 MMHG | RESPIRATION RATE: 18 BRPM | BODY MASS INDEX: 35.75 KG/M2 | HEART RATE: 91 BPM | TEMPERATURE: 97.7 F | DIASTOLIC BLOOD PRESSURE: 83 MMHG

## 2019-11-22 PROCEDURE — 99212 OFFICE O/P EST SF 10 MIN: CPT

## 2019-11-22 NOTE — WOUND CARE
[address] [place] [address], [facility] [address] Phone: [phone] Fax: [phone] Patient: Maya Peres MRN: [id number]  SSN: [id number] YOB: 1940  Age: 70 y.o. Sex: female Return Appointment: 2 weeks with Mauro Amin MD 
 
Instructions:  
Right Medial Upper Leg (Lower Knee) Cleanse wound with normal saline. DO NOT GET WET IN SHOWER, WEAR CAST COVER IN SHOWER! Apply Hydrofera Blue: Cut to wound size, DO NOT moisten with saline, and apply to wound bed. Apply horizontally into wound bed. Cover with ABD,wrap with rolled gauze, or cover with covrsite. Change dressing DAILY. Should you experience increased redness, swelling, pain, foul odor, size of wound(s), or have a temperature over 101 degrees please contact the [address] [place] [address] Road at [phone] or if after hours contact your primary care physician or go to the hospital emergency department. Signed By: Leonardo Garcia RN November 22, 2019

## 2019-11-22 NOTE — WOUND CARE
11/22/19 1402 Wound Leg Right;Medial;Upper Date First Assessed/Time First Assessed: 09/10/19 1504   Present on Hospital Admission: Yes  Primary Wound Type: Abscess  Location: Leg  Wound Location Orientation: Right;Medial;Upper Dressing Status Clean, dry, and intact Dressing Type  
(mesalt ribbon, abd, coversite) Non-staged Wound Description Full thickness Wound Length (cm) 0.2 cm Wound Width (cm) 2 cm Wound Depth (cm) 0.5 cm Wound Surface Area (cm^2) 0.4 cm^2 Wound Volume (cm^3) 0.2 cm^3 Condition of Base Granulation Tissue Type Percent Pink 100 Drainage Amount Small Drainage Color Serosanguinous Wound Odor None Rosario-wound Assessment Intact Cleansing and Cleansing Agents  Normal saline Patient is not taking any anticoagulents

## 2019-11-22 NOTE — WOUND CARE
Clinic Level of Care Assessment NAME:  Reginaldo Bhatia YOB: 1940 GENDER: female MEDICAL RECORD NUMBER:  [id number] DATE:  11/22/2019 Wound Count Document in [facility] Number of Wounds Assessed Points No Wounds/Ulcers []   0 Less than Three Wounds/Ulcers [x]   1  
3-6 Wounds/Ulcers []   2 Greater than 6 Wounds/Ulcers []   3 Ambulation Status Document in Coord/ADAMA/Mobility tab Status Definition Points Independent Independently able to ambulate. Fully able (without any assistance) to get on/off exam table/chair. [x]   0 Minimal Physical Assistance Requires physical assistance of one person to ambulate and/or position patient to be examined. Includes necessary physical assistance to position lower extremities on/off stool. []   1 Moderate Physical Assistance Requires at least one staff member to physically assist patient in ambulating into treatment room, and on/off exam table. []   2 Full Assistance Requires assistance of at least two staff members to transfer patient into treatment room and/or on/off exam table/chair. \"Total Transfer\". []   3 Dressing Complexity Document in [facility] and Write Appropriate Order Complexity Definition Points No Dressing  []   0 Simple Minimal, simple dressing. i.e. Band-aid, gauze, simple wrap. []   1 Intermediate Moderately complicated requiring licensed personnel to apply i.e. collagen matrix, ointments, gels, alginates. [x]   2 Complex Complicated requiring licensed personnel to apply dressings 6 or more wounds. []   3 Teaching Effort Document in Education Tab Effort Definition Points No Teaching  []   0 Simple Reinforce two or less topics. Document in Education navigator. [x]   1 Intermediate Reinforce three to five topics and/or one additional  
new topic. Document in Education navigator. []   2 Complex Teach more than one new topic. New patient information packet reviewed and/or reinforce more than three topics. Document in Education navigator. HBO initial instruction. []   3 Patient Assessment and Planning Planning Definition Points Simple Multiple System Simple: Simple follow-up with routine assessment and planning. If Discharged, instructions and long term/follow-up care given to patient/caregiver. Discharged, instructions and/or After Visit Summary given to patient/caregiver and instructions completed. [x]   1 Intermediate Multiple System Intermediate: Contact with outside resources; i.e. Telephone calls to home health, [facility]. May include filling out forms and writing letters, arranging transportation, communication with insurance , vendors, etc.  Discharged, instructions and/or After Visit Summary given to patient/caregiver and instructions completed. []   2 Complex Multiple System Complex: Full, comprehensive assessment and planning. Follow the entire navigator under Wound Visit charting filling out each tab which includes OP Adm Database Screening, Education and CarePlan  HBO risk assessment completed. Discharged, instructions and/or After Visit Summary given to patient/caregiver and instructions completed. []   3 Is this the Patient's First Visit to the [address] [place] [address] Road No 
 
 
Is this Patient Established @ [facility] 
Yes Clinical Level of Care Points  0-2  Level 1 [] Points  3-5  Level 2 [x] Points  6-9  Level 3 [] Points  10-12  Level 4 [] Points  13-15  Level 5 [] Electronically signed by Kaye Brown RN on 11/22/2019 at 2:10 PM

## 2019-11-27 NOTE — PROGRESS NOTES
Wound Center Progress Note    Patient: Jorge Dozier MRN: [id number]  SSN: [id number]    YOB: 1940  Age: 70 y.o. Sex: female      Subjective:     Chief Complaint:  Jorge Dozier is a 70 y.o. WHITE OR  female who presents with R lower leg anterior, L lower leg anterior wound of 4 months duration. History of Present Illness:     See above note is  Wound Caused By: non-healed surgical wound for knee replacement  Associated Signs and Symptoms: Mild pain and drainage  Timing: Intermittent  Quality: Burning, Pressure  Severity: 3/10  Modifying Factors: None    Seen at the request of Dr Leo Tate. Previously seen for a left pre-patellar bursitis. New fall and wound on the left and right knee. Left knee wound improving but right has stalled. 11/22/2019  Doing better s/p debridement. Much less drainage. No new issues.        Past Medical History:   Diagnosis Date    Calculus of kidney     Gastric ulcer     2018    GERD (gastroesophageal reflux disease)     Headache(784.0)     Hypercholesterolemia     Hypertension     Primary insomnia 7/17/2017    Thyroid disease       Past Surgical History:   Procedure Laterality Date    HX COLONOSCOPY  Jan 2013    4 polyps, repeat 5 years    HX COLONOSCOPY  7/5/2016    repeat 5 yrs tubular adenoma    HX SALPINGO-OOPHORECTOMY      REMOVAL OF KIDNEY STONE       Family History   Problem Relation Age of Onset    Hypertension Mother     Arthritis-rheumatoid Mother     High Cholesterol Mother     Broken Bones Mother     Heart Failure Father     Heart Attack Father     Hypertension Brother     High Cholesterol Brother     Arthritis-osteo Brother         Knee    High Cholesterol Brother     Hypertension Brother     Heart Attack Brother     Heart Attack Paternal Grandmother     Heart Attack Paternal Grandfather     Breast Cancer Neg Hx       Social History     Tobacco Use    Smoking status: Never Smoker    Smokeless tobacco: Never Used   Substance Use Topics    Alcohol use: No       Prior to Admission medications    Medication Sig Start Date End Date Taking? Authorizing Provider   losartan-hydroCHLOROthiazide (HYZAAR) 100-25 mg per tablet TAKE ONE TABLET DAILY. 8/27/19   Angela James MD   levothyroxine (SYNTHROID) 25 mcg tablet Take 1 Tab by mouth every morning. 8/22/19   Joss Doherty MD   potassium chloride (K-DUR, KLOR-CON) 20 mEq tablet TAKE 1 TABLET BY MOUTH 2 TIMES A DAY 8/22/19   Joss Doherty MD   traZODone (DESYREL) 50 mg tablet Take 1 Tab by mouth nightly. 8/22/19   Taz Doherty MD   gabapentin (NEURONTIN) 100 mg capsule Take 1 Cap by mouth two (2) times a day. 4/23/19   Taz Doherty MD   atorvastatin (LIPITOR) 40 mg tablet Take 1 Tab by mouth daily. 4/23/19   Taz Doherty MD   ondansetron (ZOFRAN ODT) 4 mg disintegrating tablet Take 1 Tab by mouth every eight (8) hours as needed for Nausea. 10/5/18   Jaxson Young MD   FLOVENT  mcg/actuation inhaler Take 2 Puffs by inhalation two (2) times a day. 10/28/15   Provider, Historical   acetaminophen (TYLENOL ARTHRITIS PAIN) 650 mg CR tablet Take 650 mg by mouth every eight (8) hours as needed (pain). Provider, Historical   albuterol (VENTOLIN HFA) 90 mcg/actuation inhaler Take 2 Puffs by inhalation every six (6) hours as needed for Wheezing or Shortness of Breath. Provider, Historical   CALCIUM CITRATE + PO take 1 Tab by mouth two (2) times a day. Provider, Historical     Allergies   Allergen Reactions    Pneumococcal 23-Janell Ps Vaccine Other (comments)     fever  Other reaction(s): Other (comments)  fever        Review of Systems:  A comprehensive review of systems was negative except for that written in the History of Present Illness.      No results found for: HBA1C, HWK0VDZR, HGBE8, TCR6WWTF, FVH4FTKM, AEL6IPCZ     Immunization History   Administered Date(s) Administered    Influenza High Dose Vaccine PF 09/08/2016, 11/28/2017    Influenza Vaccine 10/10/2013, 10/09/2014    Influenza Vaccine (Quad) PF 09/14/2018    Influenza Vaccine PF 10/26/2015    Influenza Vaccine Split 10/29/2012    Pneumococcal Polysaccharide (PPSV-23) 06/28/2007, 08/27/2007    TB Skin Test (PPD) Intradermal 10/12/2018    TDAP Vaccine 04/12/2012, 10/29/2012    Tdap 04/12/2012, 10/29/2012    Zoster 10/04/2011    Zoster Vaccine, Live 10/04/2011       Body mass index is 35.94 kg/m². Counseling regarding nutrition done: No     Current medications:  Current Outpatient Medications   Medication Sig Dispense Refill    losartan-hydroCHLOROthiazide (HYZAAR) 100-25 mg per tablet TAKE ONE TABLET DAILY. 90 Tab 3    levothyroxine (SYNTHROID) 25 mcg tablet Take 1 Tab by mouth every morning. 90 Tab 3    potassium chloride (K-DUR, KLOR-CON) 20 mEq tablet TAKE 1 TABLET BY MOUTH 2 TIMES A  Tab 3    traZODone (DESYREL) 50 mg tablet Take 1 Tab by mouth nightly. 30 Tab 3    gabapentin (NEURONTIN) 100 mg capsule Take 1 Cap by mouth two (2) times a day. 180 Cap 3    atorvastatin (LIPITOR) 40 mg tablet Take 1 Tab by mouth daily. 90 Tab 3    ondansetron (ZOFRAN ODT) 4 mg disintegrating tablet Take 1 Tab by mouth every eight (8) hours as needed for Nausea. 20 Tab 0    FLOVENT  mcg/actuation inhaler Take 2 Puffs by inhalation two (2) times a day. 3    acetaminophen (TYLENOL ARTHRITIS PAIN) 650 mg CR tablet Take 650 mg by mouth every eight (8) hours as needed (pain).  albuterol (VENTOLIN HFA) 90 mcg/actuation inhaler Take 2 Puffs by inhalation every six (6) hours as needed for Wheezing or Shortness of Breath.  CALCIUM CITRATE + PO take 1 Tab by mouth two (2) times a day. Objective:     Physical Exam:     Visit Vitals  /83 (BP 1 Location: Left arm)   Pulse 91   Temp 97.7 °F (36.5 °C)   Resp 18   Ht 5' 4\" (1.626 m)   Wt 95 kg (209 lb 6.4 oz)   BMI 35.94 kg/m²       General: well developed, well nourished, pleasant , NAD.  Hygiene good  Psych: cooperative. Pleasant. No anxiety or depression. Normal mood and affect. Neuro: alert and oriented to person/place/situation. Otherwise nonfocal.  Derm: Normal turgor for age, dry skin  HEENT: Normocephalic, atraumatic. EOMI. Conjunctiva clear. No scleral icterus. Neck: Normal range of motion. No masses. Chest: Good air entry bilaterally. Respirations nonlabored  Cardio: Normal heart sounds,no rubs, murmurs or gallops  Abdomen: Soft, nontender, nondistended, normoactive bowel sounds  Lower extremities: color normal; temperature normal. Hair growth is not present. Calves are supple, nontender, approximately equally sized in comparison. Capillary refill <3 sec                Ulcer Description:   Wound Knee Left; Anterior (Active)   Number of days: 442       Wound Knee Left;Medial (Active)   Number of days: 411       Wound Knee Left;Lateral (Active)   Number of days: 411       Wound Pretibial Right (Active)   Number of days: 96       Wound Leg Right;Medial;Upper (Active)   Dressing Status Clean, dry, and intact 11/22/2019  2:02 PM   Non-staged Wound Description Full thickness 11/22/2019  2:02 PM   Wound Length (cm) 0.2 cm 11/22/2019  2:02 PM   Wound Width (cm) 2 cm 11/22/2019  2:02 PM   Wound Depth (cm) 0.5 cm 11/22/2019  2:02 PM   Wound Surface Area (cm^2) 0.4 cm^2 11/22/2019  2:02 PM   Wound Volume (cm^3) 0.2 cm^3 11/22/2019  2:02 PM   Condition of Base Granulation 11/22/2019  2:02 PM   Tissue Type Percent Pink 100 11/22/2019  2:02 PM   Tissue Type Percent Red 100 11/15/2019  1:50 PM   Tissue Type Percent Yellow 100 9/17/2019 11:18 AM   Tunneling (cm) 2.3 cm 11/8/2019  2:16 PM   Direction of Tunnel 9 o'clock 11/8/2019  2:16 PM   Drainage Amount Small 11/22/2019  2:02 PM   Drainage Color Serosanguinous 11/22/2019  2:02 PM   Wound Odor None 11/22/2019  2:02 PM   Rosario-wound Assessment Intact 11/22/2019  2:02 PM   Cleansing and Cleansing Agents  Normal saline 11/22/2019  2:02 PM   Dressing Changed Changed/New 11/22/2019  2:02 PM   Procedure Tolerated Well 11/22/2019  2:02 PM   Number of days: 78       [REMOVED] Wound Knee Right;Medial large blister on medial aspect of right knee 09/04/19 (Removed)   Number of days: 48       [REMOVED] Wound Knee Left (Removed)   Dressing Status Clean, dry, and intact 11/15/2019  1:50 PM   Non-staged Wound Description Full thickness 11/15/2019  1:50 PM   Wound Length (cm) 0.1 cm 11/15/2019  1:50 PM   Wound Width (cm) 0.1 cm 11/15/2019  1:50 PM   Wound Depth (cm) 0.1 cm 11/15/2019  1:50 PM   Wound Surface Area (cm^2) 0.01 cm^2 11/15/2019  1:50 PM   Wound Volume (cm^3) 0 cm^3 11/15/2019  1:50 PM   Condition of Base Epithelializing 11/15/2019  1:50 PM   Epithelialization (%) 99 11/15/2019  1:50 PM   Tissue Type Percent Black 5 % 10/29/2019  3:04 PM   Tissue Type Percent Red 20 11/8/2019  2:16 PM   Tissue Type Percent Yellow 80 11/8/2019  2:16 PM   Drainage Amount None 11/15/2019  1:50 PM   Drainage Color Serosanguinous 11/8/2019  2:16 PM   Wound Odor None 11/15/2019  1:50 PM   Rosario-wound Assessment Intact 11/15/2019  1:50 PM   Cleansing and Cleansing Agents  Normal saline 11/15/2019  1:50 PM   Dressing Changed Changed/New 11/8/2019  2:16 PM   Dressing Type Applied Open to air 11/15/2019  1:50 PM   Procedure Tolerated Well 11/15/2019  1:50 PM   Number of days: 31         PROCEDURE: Complex right knee incision and debridement    Preop dx: R knee wound  Post dx: Same    Following informed consent a circumferential block was performed with 20 mls 1% lidocaine with epi. Tract probed and sharply incised. Extended into the subq but not to the joint. Pallid granulation tissue sharply excised. Hemostasis obtained with pressure. Packed with mesalt. EBL: Minimal  Complications : None            Data Review:   No results found for this or any previous visit (from the past 24 hour(s)).     Assessment:     70 y.o. female with R lower leg anterior, L lower leg anterior combined ulcer.    Problem List  Date Reviewed: 8/1/2018          Codes Class Noted    Chronic kidney disease, stage III (moderate) ([facility] ICD-10-CM: N18.3  ICD-9-CM: 585.3  11/12/2014        Asthma ICD-10-CM: J45.909  ICD-9-CM: 493.90  10/29/2012        Pyogenic arthritis of right knee joint ([facility] ICD-10-CM: M00.9  ICD-9-CM: 711.06  9/10/2019        Cellulitis of right leg ICD-10-CM: L03.115  ICD-9-CM: 682.6  8/22/2019        Acute kidney injury superimposed on CKD ([facility] ICD-10-CM: N17.9, N18.9  ICD-9-CM: 866.00, 585.9  8/22/2019        Gastric ulcer ICD-10-CM: K25.9  ICD-9-CM: 531.90  Unknown        Acute gastric ulcer without hemorrhage or perforation ICD-10-CM: K25.3  ICD-9-CM: 531.30  10/14/2018        Acute pancreatitis ICD-10-CM: K85.90  ICD-9-CM: 577.0  10/12/2018        Acute renal failure superimposed on stage 3 chronic kidney disease ([facility] ICD-10-CM: N17.9, N18.3  ICD-9-CM: 584.9, 585.3  10/11/2018        Hypokalemia ICD-10-CM: E87.6  ICD-9-CM: 276.8  10/11/2018        Dehydration ICD-10-CM: E86.0  ICD-9-CM: 276.51  10/11/2018        Orthostatic hypotension ICD-10-CM: I95.1  ICD-9-CM: 458.0  10/11/2018        Cellulitis and abscess of left lower extremity ICD-10-CM: L03.116, L02.416  ICD-9-CM: 682.6  9/11/2018        Severe obesity (BMI 35.0-39. 9) with comorbidity ([facility] ICD-10-CM: E66.01  ICD-9-CM: 278.01  3/28/2018        Primary insomnia ICD-10-CM: F51.01  ICD-9-CM: 307.42  7/17/2017        Right upper quadrant abdominal pain ICD-10-CM: R10.11  ICD-9-CM: 789.01  12/14/2016    Overview Signed 12/14/2016 10:18 AM by Tiara Burnette     S/P thorough eval Dr Vijay Brito, gastroenterologist including EGD, colonoscopy, abd U/S.   Lasts only minutes about once a week, worse when constipated             Raynaud's phenomenon ICD-10-CM: I73.00  ICD-9-CM: 443.0  2/25/2015        Post Menopausal Osteopenia ICD-10-CM: M89.9  ICD-9-CM: 733.90  2/25/2015        Pedal edema ICD-10-CM: R60.0  ICD-9-CM: 782.3  1/28/2014    Overview Signed 1/28/2014  4:03 PM by Rocio Maravilla     Worse on left             Chronic pain syndrome ICD-10-CM: G89.4  ICD-9-CM: 338.4  11/11/2013    Overview Addendum 1/14/2016  3:45 PM by Chuck RODRIGUEZ     Chronic feet, hands, hips osteoarthritis, S/P bilateral TKA, gets pain with walking 100 ft             Colon polyps ICD-10-CM: K63.5  ICD-9-CM: 211.3  4/30/2013    Overview Signed 4/30/2013 10:01 AM by Rocio Maravilla     Next colo due Jan 2018             HTN (hypertension) ICD-10-CM: I10  ICD-9-CM: 401.9  8/16/2012        Hyperlipidemia ICD-10-CM: E78.5  ICD-9-CM: 272.4  8/16/2012    Overview Addendum 10/28/2012  6:29 PM by Chuck RODRIGUEZ     Calcium score of 140, Goal LDL under 130             Hypothyroidism ICD-10-CM: E03.9  ICD-9-CM: 244.9  8/16/2012        Female stress incontinence ICD-10-CM: N39.3  ICD-9-CM: 625.6  9/03/5795        Umbilical hernia ZFO-93-WX: K42.9  ICD-9-CM: 553.1  8/16/2012        S/P total knee arthroplasty ICD-10-CM: X90.274  ICD-9-CM: V43.65  8/16/2012    Overview Addendum 8/16/2012  8:38 AM by Ian Adonayjaqueline     Bilateral--2009             Kidney stones ICD-10-CM: N20.0  ICD-9-CM: 592.0  8/16/2012        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 Patient understood and agrees with plan. Questions answered. Follow-up Information     Follow up With Specialties Details Why Contact Info    [address] In 2 weeks  [address] [place] [id number] [id number] [phone]           Continue dressings. Change to HF.      Signed By: Dustin Barbour MD

## 2019-12-06 ENCOUNTER — HOSPITAL ENCOUNTER (OUTPATIENT)
Dept: WOUND CARE | Age: 79
Discharge: HOME | End: 2019-12-06
Attending: SURGERY
Payer: MEDICARE

## 2019-12-06 VITALS
HEART RATE: 88 BPM | RESPIRATION RATE: 18 BRPM | SYSTOLIC BLOOD PRESSURE: 139 MMHG | WEIGHT: 211.4 LBS | TEMPERATURE: 98.3 F | BODY MASS INDEX: 36.09 KG/M2 | HEIGHT: 64 IN | DIASTOLIC BLOOD PRESSURE: 84 MMHG

## 2019-12-06 PROCEDURE — 99213 OFFICE O/P EST LOW 20 MIN: CPT

## 2019-12-06 NOTE — WOUND CARE
12/06/19 1416 Wound Leg upper Right;Lateral 12/06/19 Date First Assessed/Time First Assessed: 12/06/19 1418   Wound Approximate Age at First Assessment (Weeks): 1 weeks  Primary Wound Type: Skin Tear  Location: Leg upper  Wound Location Orientation: Right;Lateral  Date of First Observation: 12/06/19 Dressing Status Clean, dry, and intact; Old drainage Dressing Type  
(hydrofera, cover dressing) Wound Length (cm) 5.5 cm Wound Width (cm) 2.6 cm Wound Depth (cm) 0.1 cm Wound Surface Area (cm^2) 14.3 cm^2 Wound Volume (cm^3) 1.43 cm^3 Tissue Type Percent Pink 100 Drainage Amount Small Drainage Color Serosanguinous Wound Odor None Rosario-wound Assessment Intact Cleansing and Cleansing Agents  Normal saline Dressing Changed Changed/New Wound Leg Right;Medial;Upper Date First Assessed/Time First Assessed: 09/10/19 1504   Present on Hospital Admission: Yes  Primary Wound Type: Abscess  Location: Leg  Wound Location Orientation: Right;Medial;Upper Dressing Status Clean, dry, and intact Dressing Type  
(hydrofera, cover dressing) Non-staged Wound Description Full thickness Wound Length (cm) 0.2 cm Wound Width (cm) 1.4 cm Wound Depth (cm) 0.2 cm Wound Surface Area (cm^2) 0.28 cm^2 Wound Volume (cm^3) 0.06 cm^3 Condition of Base Granulation Tissue Type Percent Pink 100 Drainage Amount Scant Drainage Color Serous Wound Odor None Rosario-wound Assessment Intact Cleansing and Cleansing Agents  Normal saline Dressing Changed Changed/New Patient is not on an anticoagulant

## 2019-12-06 NOTE — WOUND CARE
[address] [place] [address] [address] Phone: [phone] Fax: [phone] Patient: Luma Lopez MRN: [id number]  SSN: [id number] YOB: 1940  Age: 70 y.o. Sex: female Return Appointment: 2 weeks with Melanie Marte MD 
 
Instructions: Right knee wounds: 
Clean wounds with saline. Apply xeroform and cover with foam dressing or bandaid. Change daily. Should you experience increased redness, swelling, pain, foul odor, size of wound(s), or have a temperature over 101 degrees please contact the [address] [place] [address] Road at [phone] or if after hours contact your primary care physician or go to the hospital emergency department. Signed By: Brisa Tavarez RN December 6, 2019

## 2019-12-06 NOTE — WOUND CARE
Clinic Level of Care Assessment NAME:  Reginaldo Bhatia YOB: 1940 GENDER: female MEDICAL RECORD NUMBER:  [id number] DATE:  12/6/2019 Wound Count Document in [facility] Number of Wounds Assessed Points No Wounds/Ulcers []   0 Less than Three Wounds/Ulcers [x]   1  
3-6 Wounds/Ulcers []   2 Greater than 6 Wounds/Ulcers []   3 Ambulation Status Document in Coord/ADAMA/Mobility tab Status Definition Points Independent Independently able to ambulate. Fully able (without any assistance) to get on/off exam table/chair. [x]   0 Minimal Physical Assistance Requires physical assistance of one person to ambulate and/or position patient to be examined. Includes necessary physical assistance to position lower extremities on/off stool. []   1 Moderate Physical Assistance Requires at least one staff member to physically assist patient in ambulating into treatment room, and on/off exam table. []   2 Full Assistance Requires assistance of at least two staff members to transfer patient into treatment room and/or on/off exam table/chair. \"Total Transfer\". []   3 Dressing Complexity Document in [facility] and Write Appropriate Order Complexity Definition Points No Dressing  []   0 Simple Minimal, simple dressing. i.e. Band-aid, gauze, simple wrap. [x]   1 Intermediate Moderately complicated requiring licensed personnel to apply i.e. collagen matrix, ointments, gels, alginates. []   2 Complex Complicated requiring licensed personnel to apply dressings 6 or more wounds. []   3 Teaching Effort Document in Education Tab Effort Definition Points No Teaching  []   0 Simple Reinforce two or less topics. Document in Education navigator.  []   1 Intermediate Reinforce three to five topics and/or one additional  
new topic. Document in Education navigator. [x]   2 Complex Teach more than one new topic. New patient information packet reviewed and/or reinforce more than three topics. Document in Education navigator. HBO initial instruction. []   3 Patient Assessment and Planning Planning Definition Points Simple Multiple System Simple: Simple follow-up with routine assessment and planning. If Discharged, instructions and long term/follow-up care given to patient/caregiver. Discharged, instructions and/or After Visit Summary given to patient/caregiver and instructions completed. []   1 Intermediate Multiple System Intermediate: Contact with outside resources; i.e. Telephone calls to home health, [facility]. May include filling out forms and writing letters, arranging transportation, communication with insurance , vendors, etc.  Discharged, instructions and/or After Visit Summary given to patient/caregiver and instructions completed. [x]   2 Complex Multiple System Complex: Full, comprehensive assessment and planning. Follow the entire navigator under Wound Visit charting filling out each tab which includes OP Adm Database Screening, Education and CarePlan  HBO risk assessment completed. Discharged, instructions and/or After Visit Summary given to patient/caregiver and instructions completed. []   3 Is this the Patient's First Visit to the [address] [place] [address] Road No 
 
 
Is this Patient Established @ [facility] 
Yes Clinical Level of Care Points  0-2  Level 1 [] Points  3-5  Level 2 [] Points  6-9  Level 3 [x] Points  10-12  Level 4 [] Points  13-15  Level 5 [] Electronically signed by Kelli Stewart RN on 12/6/2019 at 2:35 PM

## 2019-12-11 NOTE — PROGRESS NOTES
Wound Center Progress Note    Patient: Constantino Davey MRN: [id number]  SSN: [id number]    YOB: 1940  Age: 70 y.o. Sex: female      Subjective:     Chief Complaint:  R knee wound    History of Present Illness:     See above note is  Wound Caused By: non-healed surgical wound for knee replacement  Associated Signs and Symptoms: Mild pain and drainage  Timing: Intermittent  Quality: Burning, Pressure  Severity: 3/10  Modifying Factors: None    Seen at the request of Dr Jagjit Hernández. Previously seen for a left pre-patellar bursitis. New fall and wound on the left and right knee. Left knee wound improving but right has stalled. 11/22/2019  Doing better s/p debridement. Much less drainage. No new issues. New fall, but previous wound much better.        Past Medical History:   Diagnosis Date    Calculus of kidney     Gastric ulcer     2018    GERD (gastroesophageal reflux disease)     Headache(784.0)     Hypercholesterolemia     Hypertension     Primary insomnia 7/17/2017    Thyroid disease       Past Surgical History:   Procedure Laterality Date    HX COLONOSCOPY  Jan 2013    4 polyps, repeat 5 years    HX COLONOSCOPY  7/5/2016    repeat 5 yrs tubular adenoma    HX SALPINGO-OOPHORECTOMY      REMOVAL OF KIDNEY STONE       Family History   Problem Relation Age of Onset    Hypertension Mother     Arthritis-rheumatoid Mother     High Cholesterol Mother     Broken Bones Mother     Heart Failure Father     Heart Attack Father     Hypertension Brother     High Cholesterol Brother     Arthritis-osteo Brother         Knee    High Cholesterol Brother     Hypertension Brother     Heart Attack Brother     Heart Attack Paternal Grandmother     Heart Attack Paternal Grandfather     Breast Cancer Neg Hx       Social History     Tobacco Use    Smoking status: Never Smoker    Smokeless tobacco: Never Used   Substance Use Topics    Alcohol use: No       Prior to Admission medications    Medication Sig Start Date End Date Taking? Authorizing Provider   losartan-hydroCHLOROthiazide (HYZAAR) 100-25 mg per tablet TAKE ONE TABLET DAILY. 8/27/19   Alissa Adrian MD   levothyroxine (SYNTHROID) 25 mcg tablet Take 1 Tab by mouth every morning. 8/22/19   Joss Doherty MD   potassium chloride (K-DUR, KLOR-CON) 20 mEq tablet TAKE 1 TABLET BY MOUTH 2 TIMES A DAY 8/22/19   Joss Doherty MD   traZODone (DESYREL) 50 mg tablet Take 1 Tab by mouth nightly. 8/22/19   Berto Doherty MD   gabapentin (NEURONTIN) 100 mg capsule Take 1 Cap by mouth two (2) times a day. 4/23/19   Berto Doherty MD   atorvastatin (LIPITOR) 40 mg tablet Take 1 Tab by mouth daily. 4/23/19   Berto Doherty MD   ondansetron (ZOFRAN ODT) 4 mg disintegrating tablet Take 1 Tab by mouth every eight (8) hours as needed for Nausea. 10/5/18   Alison Young MD   FLOVENT  mcg/actuation inhaler Take 2 Puffs by inhalation two (2) times a day. 10/28/15   Provider, Historical   acetaminophen (TYLENOL ARTHRITIS PAIN) 650 mg CR tablet Take 650 mg by mouth every eight (8) hours as needed (pain). Provider, Historical   albuterol (VENTOLIN HFA) 90 mcg/actuation inhaler Take 2 Puffs by inhalation every six (6) hours as needed for Wheezing or Shortness of Breath. Provider, Historical   CALCIUM CITRATE + PO take 1 Tab by mouth two (2) times a day. Provider, Historical     Allergies   Allergen Reactions    Pneumococcal 23-Janell Ps Vaccine Other (comments)     fever  Other reaction(s): Other (comments)  fever        Review of Systems:  A comprehensive review of systems was negative except for that written in the History of Present Illness.      No results found for: HBA1C, CAW9FWAT, HGBE8, QDK1COOH, YMX6RXLW, UCX5HOHT     Immunization History   Administered Date(s) Administered    Influenza High Dose Vaccine PF 09/08/2016, 11/28/2017    Influenza Vaccine 10/10/2013, 10/09/2014    Influenza Vaccine (Quad) PF 09/14/2018    Influenza Vaccine PF 10/26/2015    Influenza Vaccine Split 10/29/2012    Pneumococcal Polysaccharide (PPSV-23) 06/28/2007, 08/27/2007    TB Skin Test (PPD) Intradermal 10/12/2018    TDAP Vaccine 04/12/2012, 10/29/2012    Tdap 04/12/2012, 10/29/2012    Zoster 10/04/2011    Zoster Vaccine, Live 10/04/2011       Body mass index is 36.29 kg/m². Counseling regarding nutrition done: No     Current medications:  Current Outpatient Medications   Medication Sig Dispense Refill    losartan-hydroCHLOROthiazide (HYZAAR) 100-25 mg per tablet TAKE ONE TABLET DAILY. 90 Tab 3    levothyroxine (SYNTHROID) 25 mcg tablet Take 1 Tab by mouth every morning. 90 Tab 3    potassium chloride (K-DUR, KLOR-CON) 20 mEq tablet TAKE 1 TABLET BY MOUTH 2 TIMES A  Tab 3    traZODone (DESYREL) 50 mg tablet Take 1 Tab by mouth nightly. 30 Tab 3    gabapentin (NEURONTIN) 100 mg capsule Take 1 Cap by mouth two (2) times a day. 180 Cap 3    atorvastatin (LIPITOR) 40 mg tablet Take 1 Tab by mouth daily. 90 Tab 3    ondansetron (ZOFRAN ODT) 4 mg disintegrating tablet Take 1 Tab by mouth every eight (8) hours as needed for Nausea. 20 Tab 0    FLOVENT  mcg/actuation inhaler Take 2 Puffs by inhalation two (2) times a day. 3    acetaminophen (TYLENOL ARTHRITIS PAIN) 650 mg CR tablet Take 650 mg by mouth every eight (8) hours as needed (pain).  albuterol (VENTOLIN HFA) 90 mcg/actuation inhaler Take 2 Puffs by inhalation every six (6) hours as needed for Wheezing or Shortness of Breath.  CALCIUM CITRATE + PO take 1 Tab by mouth two (2) times a day. Objective:     Physical Exam:     Visit Vitals  /84 (BP 1 Location: Left arm)   Pulse 88   Temp 98.3 °F (36.8 °C)   Resp 18   Ht 5' 4\" (1.626 m)   Wt 95.9 kg (211 lb 6.4 oz)   BMI 36.29 kg/m²       General: well developed, well nourished, pleasant , NAD. Hygiene good  Psych: cooperative. Pleasant. No anxiety or depression. Normal mood and affect.   Neuro: alert and oriented to person/place/situation. Otherwise nonfocal.  Derm: Normal turgor for age, dry skin  HEENT: Normocephalic, atraumatic. EOMI. Conjunctiva clear. No scleral icterus. Neck: Normal range of motion. No masses. Chest: Good air entry bilaterally. Respirations nonlabored  Cardio: Normal heart sounds,no rubs, murmurs or gallops  Abdomen: Soft, nontender, nondistended, normoactive bowel sounds  Lower extremities: color normal; temperature normal. Hair growth is not present. Calves are supple, nontender, approximately equally sized in comparison. Capillary refill <3 sec                        Ulcer Description:   Wound Knee Left; Anterior (Active)   Number of days: 456       Wound Knee Left;Medial (Active)   Number of days: 425       Wound Knee Left;Lateral (Active)   Number of days: 425       Wound Pretibial Right (Active)   Number of days: 110       Wound Leg Right;Medial;Upper (Active)   Dressing Status Clean, dry, and intact 12/6/2019  2:16 PM   Non-staged Wound Description Full thickness 12/6/2019  2:16 PM   Wound Length (cm) 0.2 cm 12/6/2019  2:16 PM   Wound Width (cm) 1.4 cm 12/6/2019  2:16 PM   Wound Depth (cm) 0.2 cm 12/6/2019  2:16 PM   Wound Surface Area (cm^2) 0.28 cm^2 12/6/2019  2:16 PM   Wound Volume (cm^3) 0.06 cm^3 12/6/2019  2:16 PM   Condition of Base Granulation 12/6/2019  2:16 PM   Tissue Type Percent Pink 100 12/6/2019  2:16 PM   Tissue Type Percent Red 100 11/15/2019  1:50 PM   Tissue Type Percent Yellow 100 9/17/2019 11:18 AM   Tunneling (cm) 2.3 cm 11/8/2019  2:16 PM   Direction of Tunnel 9 o'clock 11/8/2019  2:16 PM   Drainage Amount Scant 12/6/2019  2:16 PM   Drainage Color Serous 12/6/2019  2:16 PM   Wound Odor None 12/6/2019  2:16 PM   Rosario-wound Assessment Intact 12/6/2019  2:16 PM   Cleansing and Cleansing Agents  Normal saline 12/6/2019  2:16 PM   Dressing Changed Changed/New 12/6/2019  2:16 PM   Procedure Tolerated Well 11/22/2019  2:02 PM   Number of days: 92       Wound Leg upper Right;Lateral 12/06/19 (Active)   Dressing Status Clean, dry, and intact; Old drainage 12/6/2019  2:16 PM   Wound Length (cm) 5.5 cm 12/6/2019  2:16 PM   Wound Width (cm) 2.6 cm 12/6/2019  2:16 PM   Wound Depth (cm) 0.1 cm 12/6/2019  2:16 PM   Wound Surface Area (cm^2) 14.3 cm^2 12/6/2019  2:16 PM   Wound Volume (cm^3) 1.43 cm^3 12/6/2019  2:16 PM   Tissue Type Percent Pink 100 12/6/2019  2:16 PM   Drainage Amount Small 12/6/2019  2:16 PM   Drainage Color Serosanguinous 12/6/2019  2:16 PM   Wound Odor None 12/6/2019  2:16 PM   Rosario-wound Assessment Intact 12/6/2019  2:16 PM   Cleansing and Cleansing Agents  Normal saline 12/6/2019  2:16 PM   Dressing Changed Changed/New 12/6/2019  2:16 PM   Number of days: 5       [REMOVED] Wound Knee Right;Medial large blister on medial aspect of right knee 09/04/19 (Removed)   Number of days: 48       [REMOVED] Wound Knee Left (Removed)   Dressing Status Clean, dry, and intact 11/15/2019  1:50 PM   Non-staged Wound Description Full thickness 11/15/2019  1:50 PM   Wound Length (cm) 0.1 cm 11/15/2019  1:50 PM   Wound Width (cm) 0.1 cm 11/15/2019  1:50 PM   Wound Depth (cm) 0.1 cm 11/15/2019  1:50 PM   Wound Surface Area (cm^2) 0.01 cm^2 11/15/2019  1:50 PM   Wound Volume (cm^3) 0 cm^3 11/15/2019  1:50 PM   Condition of Base Epithelializing 11/15/2019  1:50 PM   Epithelialization (%) 99 11/15/2019  1:50 PM   Tissue Type Percent Black 5 % 10/29/2019  3:04 PM   Tissue Type Percent Red 20 11/8/2019  2:16 PM   Tissue Type Percent Yellow 80 11/8/2019  2:16 PM   Drainage Amount None 11/15/2019  1:50 PM   Drainage Color Serosanguinous 11/8/2019  2:16 PM   Wound Odor None 11/15/2019  1:50 PM   Rosario-wound Assessment Intact 11/15/2019  1:50 PM   Cleansing and Cleansing Agents  Normal saline 11/15/2019  1:50 PM   Dressing Changed Changed/New 11/8/2019  2:16 PM   Dressing Type Applied Open to air 11/15/2019  1:50 PM   Procedure Tolerated Well 11/15/2019  1:50 PM   Number of days: 31 PROCEDURE: Complex right knee incision and debridement    Preop dx: R knee wound  Post dx: Same    Following informed consent a circumferential block was performed with 20 mls 1% lidocaine with epi. Tract probed and sharply incised. Extended into the subq but not to the joint. Pallid granulation tissue sharply excised. Hemostasis obtained with pressure. Packed with mesalt. EBL: Minimal  Complications : None            Data Review:   No results found for this or any previous visit (from the past 24 hour(s)). Assessment:     70 y.o. female with R lower leg anterior, L lower leg anterior combined ulcer. Problem List  Date Reviewed: 8/1/2018          Codes Class Noted    Chronic kidney disease, stage III (moderate) ([facility] ICD-10-CM: N18.3  ICD-9-CM: 585.3  11/12/2014        Asthma ICD-10-CM: J45.909  ICD-9-CM: 493.90  10/29/2012        Pyogenic arthritis of right knee joint ([facility] ICD-10-CM: M00.9  ICD-9-CM: 711.06  9/10/2019        Cellulitis of right leg ICD-10-CM: L03.115  ICD-9-CM: 682.6  8/22/2019        Acute kidney injury superimposed on CKD ([facility] ICD-10-CM: N17.9, N18.9  ICD-9-CM: 866.00, 585.9  8/22/2019        Gastric ulcer ICD-10-CM: K25.9  ICD-9-CM: 531.90  Unknown        Acute gastric ulcer without hemorrhage or perforation ICD-10-CM: K25.3  ICD-9-CM: 531.30  10/14/2018        Acute pancreatitis ICD-10-CM: K85.90  ICD-9-CM: 577.0  10/12/2018        Acute renal failure superimposed on stage 3 chronic kidney disease ([facility] ICD-10-CM: N17.9, N18.3  ICD-9-CM: 584.9, 585.3  10/11/2018        Hypokalemia ICD-10-CM: E87.6  ICD-9-CM: 276.8  10/11/2018        Dehydration ICD-10-CM: E86.0  ICD-9-CM: 276.51  10/11/2018        Orthostatic hypotension ICD-10-CM: I95.1  ICD-9-CM: 458.0  10/11/2018        Cellulitis and abscess of left lower extremity ICD-10-CM: L03.116, L02.416  ICD-9-CM: 682.6  9/11/2018        Severe obesity (BMI 35.0-39. 9) with comorbidity ([facility] ICD-10-CM: E66.01  ICD-9-CM: 278.01  3/28/2018 Primary insomnia ICD-10-CM: F51.01  ICD-9-CM: 307.42  7/17/2017        Right upper quadrant abdominal pain ICD-10-CM: R10.11  ICD-9-CM: 789.01  12/14/2016    Overview Signed 12/14/2016 10:18 AM by Lanny Kelly     S/P thorough eval Dr Dalia Joel, gastroenterologist including EGD, colonoscopy, abd U/S.   Lasts only minutes about once a week, worse when constipated             Raynaud's phenomenon ICD-10-CM: I73.00  ICD-9-CM: 443.0  2/25/2015        Post Menopausal Osteopenia ICD-10-CM: M89.9  ICD-9-CM: 733.90  2/25/2015        Pedal edema ICD-10-CM: R60.0  ICD-9-CM: 782.3  1/28/2014    Overview Signed 1/28/2014  4:03 PM by Lanny Kelly     Worse on left             Chronic pain syndrome ICD-10-CM: G89.4  ICD-9-CM: 338.4  11/11/2013    Overview Addendum 1/14/2016  3:45 PM by Cheyanne RODRIGUEZ     Chronic feet, hands, hips osteoarthritis, S/P bilateral TKA, gets pain with walking 100 ft             Colon polyps ICD-10-CM: K63.5  ICD-9-CM: 211.3  4/30/2013    Overview Signed 4/30/2013 10:01 AM by Lanny Kelly     Next colo due Jan 2018             HTN (hypertension) ICD-10-CM: I10  ICD-9-CM: 401.9  8/16/2012        Hyperlipidemia ICD-10-CM: E78.5  ICD-9-CM: 272.4  8/16/2012    Overview Addendum 10/28/2012  6:29 PM by Cheyanne RODRIGUEZ     Calcium score of 140, Goal LDL under 130             Hypothyroidism ICD-10-CM: E03.9  ICD-9-CM: 244.9  8/16/2012        Female stress incontinence ICD-10-CM: N39.3  ICD-9-CM: 625.6  1/66/5807        Umbilical hernia PFG-25-BG: K42.9  ICD-9-CM: 553.1  8/16/2012        S/P total knee arthroplasty ICD-10-CM: M99.337  ICD-9-CM: V43.65  8/16/2012    Overview Addendum 8/16/2012  8:38 AM by Thi King--2009             Kidney stones ICD-10-CM: N20.0  ICD-9-CM: 592.0  8/16/2012        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 Patient understood and agrees with plan. Questions answered. Follow-up Information     Follow up With Specialties Details Why Contact Info    [address] In 2 weeks  [facility]  [phone]         New skin tear - xeroform. Start xeroform to previous as well.      Signed By: David Fuentes MD

## 2019-12-20 ENCOUNTER — HOSPITAL ENCOUNTER (OUTPATIENT)
Dept: WOUND CARE | Age: 79
Discharge: HOME | End: 2019-12-20
Attending: SURGERY
Payer: MEDICARE

## 2019-12-20 VITALS
HEIGHT: 64 IN | OXYGEN SATURATION: 98 % | SYSTOLIC BLOOD PRESSURE: 167 MMHG | HEART RATE: 83 BPM | RESPIRATION RATE: 20 BRPM | BODY MASS INDEX: 35.68 KG/M2 | DIASTOLIC BLOOD PRESSURE: 92 MMHG | WEIGHT: 209 LBS

## 2019-12-20 PROCEDURE — 99212 OFFICE O/P EST SF 10 MIN: CPT

## 2019-12-20 NOTE — WOUND CARE
[address] [place] [address] [address] Phone: [phone] Fax: [phone] Patient: Alayna Perez MRN: [id number]  SSN: [id number] YOB: 1940  Age: 70 y.o. Sex: female Return Appointment: 2 weeks with Soco Rodriguez MD 
 
Instructions: Right knee wounds: 
Clean wounds with saline. Apply xeroform and cover with foam dressing or bandaid. Change daily. Should you experience increased redness, swelling, pain, foul odor, size of wound(s), or have a temperature over 101 degrees please contact the [address] [place] [address] Road at [phone] or if after hours contact your primary care physician or go to the hospital emergency department. Signed By: John Singletary RN December 20, 2019

## 2019-12-20 NOTE — WOUND CARE
Clinic Level of Care Assessment NAME:  Vickie Duvall YOB: 1940 GENDER: female MEDICAL RECORD NUMBER:  [id number] DATE:  12/20/2019 Wound Count Document in [facility] Number of Wounds Assessed Points No Wounds/Ulcers []   0 Less than Three Wounds/Ulcers [x]   1  
3-6 Wounds/Ulcers []   2 Greater than 6 Wounds/Ulcers []   3 Ambulation Status Document in Coord/ADAMA/Mobility tab Status Definition Points Independent Independently able to ambulate. Fully able (without any assistance) to get on/off exam table/chair. [x]   0 Minimal Physical Assistance Requires physical assistance of one person to ambulate and/or position patient to be examined. Includes necessary physical assistance to position lower extremities on/off stool. []   1 Moderate Physical Assistance Requires at least one staff member to physically assist patient in ambulating into treatment room, and on/off exam table. []   2 Full Assistance Requires assistance of at least two staff members to transfer patient into treatment room and/or on/off exam table/chair. \"Total Transfer\". []   3 Dressing Complexity Document in [facility] and Write Appropriate Order Complexity Definition Points No Dressing  []   0 Simple Minimal, simple dressing. i.e. Band-aid, gauze, simple wrap. []   1 Intermediate Moderately complicated requiring licensed personnel to apply i.e. collagen matrix, ointments, gels, alginates. [x]   2 Complex Complicated requiring licensed personnel to apply dressings 6 or more wounds. []   3 Teaching Effort Document in Education Tab Effort Definition Points No Teaching  []   0 Simple Reinforce two or less topics. Document in Education navigator. [x]   1 Intermediate Reinforce three to five topics and/or one additional  
new topic. Document in Education navigator. []   2 Complex Teach more than one new topic. New patient information packet reviewed and/or reinforce more than three topics. Document in Education navigator. HBO initial instruction. []   3 Patient Assessment and Planning Planning Definition Points Simple Multiple System Simple: Simple follow-up with routine assessment and planning. If Discharged, instructions and long term/follow-up care given to patient/caregiver. Discharged, instructions and/or After Visit Summary given to patient/caregiver and instructions completed. [x]   1 Intermediate Multiple System Intermediate: Contact with outside resources; i.e. Telephone calls to home health, [facility]. May include filling out forms and writing letters, arranging transportation, communication with insurance , vendors, etc.  Discharged, instructions and/or After Visit Summary given to patient/caregiver and instructions completed. []   2 Complex Multiple System Complex: Full, comprehensive assessment and planning. Follow the entire navigator under Wound Visit charting filling out each tab which includes OP Adm Database Screening, Education and CarePlan  HBO risk assessment completed. Discharged, instructions and/or After Visit Summary given to patient/caregiver and instructions completed. []   3 Is this the Patient's First Visit to the [facility] No 
 
 
Is this Patient Established @ [facility] 
Yes Clinical Level of Care Points  0-2  Level 1 [] Points  3-5  Level 2 [x] Points  6-9  Level 3 [] Points  10-12  Level 4 [] Points  13-15  Level 5 [] Electronically signed by Elie Perla RN on 12/20/2019 at 2:29 PM

## 2019-12-20 NOTE — WOUND CARE
12/20/19 1410 Wound Leg upper Right;Lateral 12/06/19 Date First Assessed/Time First Assessed: 12/06/19 1418   Wound Approximate Age at First Assessment (Weeks): 1 weeks  Primary Wound Type: Skin Tear  Location: Leg upper  Wound Location Orientation: Right;Lateral  Date of First Observation: 12/06/19 Dressing Status Clean, dry, and intact Dressing Type  
(Xeroform, Coversite) Wound Length (cm) 5.5 cm Wound Width (cm) 3 cm Wound Depth (cm) 0.1 cm Wound Surface Area (cm^2) 16.5 cm^2 Wound Volume (cm^3) 1.65 cm^3 Tissue Type Percent Red 25 Tissue Type Percent Yellow 75 Drainage Amount Small Drainage Color Serosanguinous Wound Odor None Rosario-wound Assessment Intact Cleansing and Cleansing Agents  Normal saline Wound Leg Right;Medial;Upper Date First Assessed/Time First Assessed: 09/10/19 1504   Present on Hospital Admission: Yes  Primary Wound Type: Abscess  Location: Leg  Wound Location Orientation: Right;Medial;Upper Dressing Status Clean, dry, and intact Dressing Type  
(Xeroform, Bandaid) Non-staged Wound Description Full thickness Wound Length (cm) 0 cm Wound Width (cm) 0 cm Wound Depth (cm) 0 cm Wound Surface Area (cm^2) 0 cm^2 Wound Volume (cm^3) 0 cm^3 Condition of Base Granulation Tissue Type Percent Pink 100 Drainage Amount Scant Drainage Color Serous Wound Odor None Rosario-wound Assessment Intact Cleansing and Cleansing Agents  Normal saline

## 2019-12-26 NOTE — PROGRESS NOTES
Wound Center Progress Note    Patient: Maki Carranza MRN: [id number]  SSN: [id number]    YOB: 1940  Age: 70 y.o. Sex: female      Subjective:     Chief Complaint:  R knee wound    History of Present Illness:     See above note is  Wound Caused By: non-healed surgical wound for knee replacement  Associated Signs and Symptoms: Mild pain and drainage  Timing: Intermittent  Quality: Burning, Pressure  Severity: 3/10  Modifying Factors: None    Seen at the request of Dr Jaquan Patricia. Previously seen for a left pre-patellar bursitis. New fall and wound on the left and right knee. Left knee wound improving but right has stalled. 11/22/2019  Doing better s/p debridement. Much less drainage. No new issues. New fall, but previous wound much better. 12/20/2019  Initial wound healed and continues to tolerate dressings at traumatic wound. No further injuries.        Past Medical History:   Diagnosis Date    Calculus of kidney     Gastric ulcer     2018    GERD (gastroesophageal reflux disease)     Headache(784.0)     Hypercholesterolemia     Hypertension     Primary insomnia 7/17/2017    Thyroid disease       Past Surgical History:   Procedure Laterality Date    HX COLONOSCOPY  Jan 2013    4 polyps, repeat 5 years    HX COLONOSCOPY  7/5/2016    repeat 5 yrs tubular adenoma    HX SALPINGO-OOPHORECTOMY      REMOVAL OF KIDNEY STONE       Family History   Problem Relation Age of Onset    Hypertension Mother     Arthritis-rheumatoid Mother     High Cholesterol Mother     Broken Bones Mother     Heart Failure Father     Heart Attack Father     Hypertension Brother     High Cholesterol Brother     Arthritis-osteo Brother         Knee    High Cholesterol Brother     Hypertension Brother     Heart Attack Brother     Heart Attack Paternal Grandmother     Heart Attack Paternal Grandfather     Breast Cancer Neg Hx       Social History     Tobacco Use    Smoking status: Never Smoker    Smokeless tobacco: Never Used   Substance Use Topics    Alcohol use: No       Prior to Admission medications    Medication Sig Start Date End Date Taking? Authorizing Provider   gabapentin (NEURONTIN) 100 mg capsule Take 1 Cap by mouth three (3) times daily. 12/19/19   Gianni Doherty MD   DISABLED PLACARD (DISABLED PLACARD) DMV Use placard daily 12/19/19   Joss Doherty MD   losartan-hydroCHLOROthiazide (HYZAAR) 100-25 mg per tablet TAKE ONE TABLET DAILY. 8/27/19   Braulio Cao MD   levothyroxine (SYNTHROID) 25 mcg tablet Take 1 Tab by mouth every morning. 8/22/19   Joss Doherty MD   potassium chloride (K-DUR, KLOR-CON) 20 mEq tablet TAKE 1 TABLET BY MOUTH 2 TIMES A DAY 8/22/19   Joss Doherty MD   traZODone (DESYREL) 50 mg tablet Take 1 Tab by mouth nightly. 8/22/19   Gianni Doherty MD   atorvastatin (LIPITOR) 40 mg tablet Take 1 Tab by mouth daily. 4/23/19   Gianni Doherty MD   ondansetron (ZOFRAN ODT) 4 mg disintegrating tablet Take 1 Tab by mouth every eight (8) hours as needed for Nausea. 10/5/18   Alejandra Young MD   FLOVENT  mcg/actuation inhaler Take 2 Puffs by inhalation two (2) times a day. 10/28/15   Provider, Historical   acetaminophen (TYLENOL ARTHRITIS PAIN) 650 mg CR tablet Take 650 mg by mouth every eight (8) hours as needed (pain). Provider, Historical   albuterol (VENTOLIN HFA) 90 mcg/actuation inhaler Take 2 Puffs by inhalation every six (6) hours as needed for Wheezing or Shortness of Breath. Provider, Historical   CALCIUM CITRATE + PO take 1 Tab by mouth two (2) times a day. Provider, Historical     Allergies   Allergen Reactions    Pneumococcal 23-Janell Ps Vaccine Other (comments)     fever  Other reaction(s): Other (comments)  fever        Review of Systems:  A comprehensive review of systems was negative except for that written in the History of Present Illness.      No results found for: HBA1C, NSG6XFQR, HGBE8, FZS5QNKF, HKN5UBSL, VWX4VIIG Immunization History   Administered Date(s) Administered    Influenza High Dose Vaccine PF 09/08/2016, 11/28/2017, 10/01/2019    Influenza Vaccine 10/10/2013, 10/09/2014    Influenza Vaccine (Quad) PF 09/14/2018    Influenza Vaccine PF 10/26/2015    Influenza Vaccine Split 10/29/2012    Pneumococcal Polysaccharide (PPSV-23) 06/28/2007, 08/27/2007    TB Skin Test (PPD) Intradermal 10/12/2018    TDAP Vaccine 04/12/2012, 10/29/2012    Tdap 04/12/2012, 10/29/2012    Zoster 10/04/2011    Zoster Vaccine, Live 10/04/2011       Body mass index is 35.87 kg/m². Counseling regarding nutrition done: No     Current medications:  Current Outpatient Medications   Medication Sig Dispense Refill    gabapentin (NEURONTIN) 100 mg capsule Take 1 Cap by mouth three (3) times daily. 270 Cap 3    DISABLED PLACARD (DISABLED PLACARD) DMV Use placard daily 1 Each 0    losartan-hydroCHLOROthiazide (HYZAAR) 100-25 mg per tablet TAKE ONE TABLET DAILY. 90 Tab 3    levothyroxine (SYNTHROID) 25 mcg tablet Take 1 Tab by mouth every morning. 90 Tab 3    potassium chloride (K-DUR, KLOR-CON) 20 mEq tablet TAKE 1 TABLET BY MOUTH 2 TIMES A  Tab 3    traZODone (DESYREL) 50 mg tablet Take 1 Tab by mouth nightly. 30 Tab 3    atorvastatin (LIPITOR) 40 mg tablet Take 1 Tab by mouth daily. 90 Tab 3    ondansetron (ZOFRAN ODT) 4 mg disintegrating tablet Take 1 Tab by mouth every eight (8) hours as needed for Nausea. 20 Tab 0    FLOVENT  mcg/actuation inhaler Take 2 Puffs by inhalation two (2) times a day. 3    acetaminophen (TYLENOL ARTHRITIS PAIN) 650 mg CR tablet Take 650 mg by mouth every eight (8) hours as needed (pain).  albuterol (VENTOLIN HFA) 90 mcg/actuation inhaler Take 2 Puffs by inhalation every six (6) hours as needed for Wheezing or Shortness of Breath.  CALCIUM CITRATE + PO take 1 Tab by mouth two (2) times a day.            Objective:     Physical Exam:     Visit Vitals  BP (!) 167/92 (BP 1 Location: Right arm)   Pulse 83   Resp 20   Ht 5' 4\" (1.626 m)   Wt 94.8 kg (209 lb)   SpO2 98%   BMI 35.87 kg/m²       General: well developed, well nourished, pleasant , NAD. Hygiene good  Psych: cooperative. Pleasant. No anxiety or depression. Normal mood and affect. Neuro: alert and oriented to person/place/situation. Otherwise nonfocal.  Derm: Normal turgor for age, dry skin  HEENT: Normocephalic, atraumatic. EOMI. Conjunctiva clear. No scleral icterus. Neck: Normal range of motion. No masses. Chest: Good air entry bilaterally. Respirations nonlabored  Cardio: Normal heart sounds,no rubs, murmurs or gallops  Abdomen: Soft, nontender, nondistended, normoactive bowel sounds  Lower extremities: color normal; temperature normal. Hair growth is not present. Calves are supple, nontender, approximately equally sized in comparison. Capillary refill <3 sec                            Ulcer Description:   Wound Knee Left; Anterior (Active)   Number of days: 471       Wound Knee Left;Medial (Active)   Number of days: 440       Wound Knee Left;Lateral (Active)   Number of days: 440       Wound Pretibial Right (Active)   Number of days: 125       Wound Leg upper Right;Lateral 12/06/19 (Active)   Dressing Status Clean, dry, and intact 12/20/2019  2:10 PM   Wound Length (cm) 5.5 cm 12/20/2019  2:10 PM   Wound Width (cm) 3 cm 12/20/2019  2:10 PM   Wound Depth (cm) 0.1 cm 12/20/2019  2:10 PM   Wound Surface Area (cm^2) 16.5 cm^2 12/20/2019  2:10 PM   Wound Volume (cm^3) 1.65 cm^3 12/20/2019  2:10 PM   Tissue Type Percent Pink 100 12/6/2019  2:16 PM   Tissue Type Percent Red 25 12/20/2019  2:10 PM   Tissue Type Percent Yellow 75 12/20/2019  2:10 PM   Drainage Amount Small 12/20/2019  2:10 PM   Drainage Color Serosanguinous 12/20/2019  2:10 PM   Wound Odor None 12/20/2019  2:10 PM   Rosario-wound Assessment Intact 12/20/2019  2:10 PM   Cleansing and Cleansing Agents  Normal saline 12/20/2019  2:10 PM   Dressing Changed Changed/New 12/20/2019  2:10 PM   Number of days: 20       [REMOVED] Wound Knee Right;Medial large blister on medial aspect of right knee 09/04/19 (Removed)   Number of days: 48       [REMOVED] Wound Leg Right;Medial;Upper (Removed)   Dressing Status Clean, dry, and intact 12/20/2019  2:10 PM   Non-staged Wound Description Full thickness 12/20/2019  2:10 PM   Wound Length (cm) 0 cm 12/20/2019  2:10 PM   Wound Width (cm) 0 cm 12/20/2019  2:10 PM   Wound Depth (cm) 0 cm 12/20/2019  2:10 PM   Wound Surface Area (cm^2) 0 cm^2 12/20/2019  2:10 PM   Wound Volume (cm^3) 0 cm^3 12/20/2019  2:10 PM   Condition of Base Granulation 12/20/2019  2:10 PM   Tissue Type Percent Pink 100 12/20/2019  2:10 PM   Tissue Type Percent Red 100 11/15/2019  1:50 PM   Tissue Type Percent Yellow 100 9/17/2019 11:18 AM   Tunneling (cm) 2.3 cm 11/8/2019  2:16 PM   Direction of Tunnel 9 o'clock 11/8/2019  2:16 PM   Drainage Amount Scant 12/20/2019  2:10 PM   Drainage Color Serous 12/20/2019  2:10 PM   Wound Odor None 12/20/2019  2:10 PM   Rosario-wound Assessment Intact 12/20/2019  2:10 PM   Cleansing and Cleansing Agents  Normal saline 12/20/2019  2:10 PM   Dressing Changed Changed/New 12/6/2019  2:16 PM   Procedure Tolerated Well 11/22/2019  2:02 PM   Number of days: 101       [REMOVED] Wound Knee Left (Removed)   Dressing Status Clean, dry, and intact 11/15/2019  1:50 PM   Non-staged Wound Description Full thickness 11/15/2019  1:50 PM   Wound Length (cm) 0.1 cm 11/15/2019  1:50 PM   Wound Width (cm) 0.1 cm 11/15/2019  1:50 PM   Wound Depth (cm) 0.1 cm 11/15/2019  1:50 PM   Wound Surface Area (cm^2) 0.01 cm^2 11/15/2019  1:50 PM   Wound Volume (cm^3) 0 cm^3 11/15/2019  1:50 PM   Condition of Base Epithelializing 11/15/2019  1:50 PM   Epithelialization (%) 99 11/15/2019  1:50 PM   Tissue Type Percent Black 5 % 10/29/2019  3:04 PM   Tissue Type Percent Red 20 11/8/2019  2:16 PM   Tissue Type Percent Yellow 80 11/8/2019  2:16 PM   Drainage Amount None 11/15/2019  1:50 PM   Drainage Color Serosanguinous 11/8/2019  2:16 PM   Wound Odor None 11/15/2019  1:50 PM   Rosario-wound Assessment Intact 11/15/2019  1:50 PM   Cleansing and Cleansing Agents  Normal saline 11/15/2019  1:50 PM   Dressing Changed Changed/New 11/8/2019  2:16 PM   Dressing Type Applied Open to air 11/15/2019  1:50 PM   Procedure Tolerated Well 11/15/2019  1:50 PM   Number of days: 31         PROCEDURE: Complex right knee incision and debridement    Preop dx: R knee wound  Post dx: Same    Following informed consent a circumferential block was performed with 20 mls 1% lidocaine with epi. Tract probed and sharply incised. Extended into the subq but not to the joint. Pallid granulation tissue sharply excised. Hemostasis obtained with pressure. Packed with mesalt. EBL: Minimal  Complications : None            Data Review:   No results found for this or any previous visit (from the past 24 hour(s)). Assessment:     70 y.o. female with R lower leg anterior, L lower leg anterior combined ulcer.     Problem List  Date Reviewed: 8/1/2018          Codes Class Noted    Chronic kidney disease, stage III (moderate) ([facility] ICD-10-CM: N18.3  ICD-9-CM: 585.3  11/12/2014        Asthma ICD-10-CM: J45.909  ICD-9-CM: 493.90  10/29/2012        Pyogenic arthritis of right knee joint ([facility] ICD-10-CM: M00.9  ICD-9-CM: 711.06  9/10/2019        Cellulitis of right leg ICD-10-CM: L03.115  ICD-9-CM: 682.6  8/22/2019        Acute kidney injury superimposed on CKD ([facility] ICD-10-CM: N17.9, N18.9  ICD-9-CM: 866.00, 585.9  8/22/2019        Gastric ulcer ICD-10-CM: K25.9  ICD-9-CM: 531.90  Unknown        Acute gastric ulcer without hemorrhage or perforation ICD-10-CM: K25.3  ICD-9-CM: 531.30  10/14/2018        Acute pancreatitis ICD-10-CM: K85.90  ICD-9-CM: 025.9  10/12/2018        Acute renal failure superimposed on stage 3 chronic kidney disease ([facility] ICD-10-CM: N17.9, N18.3  ICD-9-CM: 584.9, 585.3  10/11/2018        Hypokalemia ICD-10-CM: E87.6  ICD-9-CM: 276.8  10/11/2018        Dehydration ICD-10-CM: E86.0  ICD-9-CM: 276.51  10/11/2018        Orthostatic hypotension ICD-10-CM: I95.1  ICD-9-CM: 458.0  10/11/2018        Cellulitis and abscess of left lower extremity ICD-10-CM: L03.116, L02.416  ICD-9-CM: 682.6  9/11/2018        Severe obesity (BMI 35.0-39. 9) with comorbidity ([facility] ICD-10-CM: E66.01  ICD-9-CM: 278.01  3/28/2018        Primary insomnia ICD-10-CM: F51.01  ICD-9-CM: 307.42  7/17/2017        Right upper quadrant abdominal pain ICD-10-CM: R10.11  ICD-9-CM: 789.01  12/14/2016    Overview Signed 12/14/2016 10:18 AM by Donato Eaton     S/P thorough eval Dr Braxton Dela Cruz, gastroenterologist including EGD, colonoscopy, abd U/S.   Lasts only minutes about once a week, worse when constipated             Raynaud's phenomenon ICD-10-CM: I73.00  ICD-9-CM: 443.0  2/25/2015        Post Menopausal Osteopenia ICD-10-CM: M89.9  ICD-9-CM: 733.90  2/25/2015        Pedal edema ICD-10-CM: R60.0  ICD-9-CM: 782.3  1/28/2014    Overview Signed 1/28/2014  4:03 PM by Donato Eaton     Worse on left             Chronic pain syndrome ICD-10-CM: G89.4  ICD-9-CM: 338.4  11/11/2013    Overview Addendum 1/14/2016  3:45 PM by Agustín RODRIGUEZ     Chronic feet, hands, hips osteoarthritis, S/P bilateral TKA, gets pain with walking 100 ft             Colon polyps ICD-10-CM: K63.5  ICD-9-CM: 211.3  4/30/2013    Overview Signed 4/30/2013 10:01 AM by Donato Eaton     Next colo due Jan 2018             HTN (hypertension) ICD-10-CM: I10  ICD-9-CM: 401.9  8/16/2012        Hyperlipidemia ICD-10-CM: E78.5  ICD-9-CM: 272.4  8/16/2012    Overview Addendum 10/28/2012  6:29 PM by Agustín RODRIGUEZ     Calcium score of 140, Goal LDL under 130             Hypothyroidism ICD-10-CM: E03.9  ICD-9-CM: 244.9  8/16/2012        Female stress incontinence ICD-10-CM: N39.3  ICD-9-CM: 625.6  3/99/7461        Umbilical hernia DIK-02-EQ: K42.9  ICD-9-CM: 553.1  8/16/2012        S/P total knee arthroplasty ICD-10-CM: Z31.539  ICD-9-CM: V43.65  8/16/2012    Overview Addendum 8/16/2012  8:38 AM by Belkistara King--2009             Kidney stones ICD-10-CM: N20.0  ICD-9-CM: 592.0  8/16/2012        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 Epithelializing new wound. Continue dressings.         Signed By: David Garber MD

## 2020-01-03 ENCOUNTER — APPOINTMENT (OUTPATIENT)
Dept: WOUND CARE | Age: 80
End: 2020-01-03
Attending: SURGERY
Payer: MEDICARE

## 2020-01-10 ENCOUNTER — HOSPITAL ENCOUNTER (OUTPATIENT)
Dept: WOUND CARE | Age: 80
Discharge: HOME | End: 2020-01-10
Attending: SURGERY
Payer: MEDICARE

## 2020-01-10 VITALS
HEIGHT: 64 IN | DIASTOLIC BLOOD PRESSURE: 81 MMHG | HEART RATE: 91 BPM | BODY MASS INDEX: 35.68 KG/M2 | WEIGHT: 209 LBS | SYSTOLIC BLOOD PRESSURE: 124 MMHG | OXYGEN SATURATION: 99 % | TEMPERATURE: 97.6 F

## 2020-01-10 PROCEDURE — 99211 OFF/OP EST MAY X REQ PHY/QHP: CPT

## 2020-01-10 NOTE — WOUND CARE
[address]  [address], [address]   Phone: [phone]  Fax: [phone]    Patient: Maci Blue MRN: [id number]  SSN: [id number]    YOB: 1940  Age: 70 y.o. Sex: female       Return Appointment: as needed with Laura Lopez MD    Instructions: Right lateral leg  Wear compression stockings daily. Discharge from wound center. Should you experience increased redness, swelling, pain, foul odor, size of wound(s), or have a temperature over 101 degrees please contact the [address] [place] [address] Road at [phone] or if after hours contact your primary care physician or go to the hospital emergency department.     Signed By: Emilia Talamantes     January 10, 2020

## 2020-01-10 NOTE — WOUND CARE
Clinic Level of Care Assessment    NAME:  Robert Arevalo OF BIRTH:  1940 GENDER: female  MEDICAL RECORD NUMBER:  [id number]   DATE:  1/10/2020      Wound Count Document in [facility]  Number of Wounds Assessed Points   No Wounds/Ulcers [x]   0   Less than Three Wounds/Ulcers []   1   3-6 Wounds/Ulcers []   2   Greater than 6 Wounds/Ulcers []   3     Ambulation Status Document in Coord/ADAMA/Mobility tab  Status Definition Points   Independent Independently able to ambulate. Fully able (without any assistance) to get on/off exam table/chair. [x]   0   Minimal Physical Assistance Requires physical assistance of one person to ambulate and/or position patient to be examined. Includes necessary physical assistance to position lower extremities on/off stool. []   1   Moderate Physical Assistance Requires at least one staff member to physically assist patient in ambulating into treatment room, and on/off exam table. []   2   Full Assistance Requires assistance of at least two staff members to transfer patient into treatment room and/or on/off exam table/chair. \"Total Transfer\". []   3     Dressing Complexity Document in LDA and Write Appropriate Order  Complexity Definition Points   No Dressing  [x]   0   Simple Minimal, simple dressing. i.e. Band-aid, gauze, simple wrap. []   1   Intermediate Moderately complicated requiring licensed personnel to apply i.e. collagen matrix, ointments, gels, alginates. []   2   Complex Complicated requiring licensed personnel to apply dressings 6 or more wounds. []   3     Teaching Effort Document in Education Tab   Effort Definition Points   No Teaching  []   0   Simple Reinforce two or less topics. Document in Education navigator. [x]   1   Intermediate Reinforce three to five topics and/or one additional   new topic. Document in Education navigator. []   2   Complex Teach more than one new topic.  New patient information   packet reviewed and/or reinforce more than three topics. Document in Education navigator. HBO initial instruction. []   3       Patient Assessment and Planning  Planning Definition Points   Simple Multiple System Simple: Simple follow-up with routine assessment and planning. If Discharged, instructions and long term/follow-up care given to patient/caregiver. Discharged, instructions and/or After Visit Summary given to patient/caregiver and instructions completed. [x]   1   Intermediate Multiple System Intermediate: Contact with outside resources; i.e. [place] [facility] [place] May include filling out forms and writing letters, arranging transportation, communication with insurance , vendors, etc.  Discharged, instructions and/or After Visit Summary given to patient/caregiver and instructions completed. []   2   Complex Multiple System Complex: Full, comprehensive assessment and planning. Follow the entire navigator under Wound Visit charting filling out each tab which includes OP Adm Database Screening, Education and CarePlan  HBO risk assessment completed. Discharged, instructions and/or After Visit Summary given to patient/caregiver and instructions completed.    []   3           Is this the Patient's First Visit to the [address] [place] [address] Road  No      Is this Patient Established @ [facility]  Yes             Clinical Level of Care      Points  0-2  Level 1 [x]     Points  3-5  Level 2 []     Points  6-9  Level 3 []     Points  10-12  Level 4 []     Points  13-15  Level 5 []       Electronically signed by Yi Herrera on 1/10/2020 at 3:05 PM

## 2020-01-10 NOTE — WOUND CARE
01/10/20 1444   [REMOVED] Wound Leg upper Right;Lateral 12/06/19   Final Assessment Date/Final Assessment Time: 01/10/20 1504  Date First Assessed/Time First Assessed: 12/06/19 1418   Wound Approximate Age at First Assessment (Weeks): 1 weeks  Primary Wound Type: Skin Tear  Location: Leg upper  Wound Location [place]. ..    Dressing Type   (xeroform, border foam)   Wound Length (cm) 0 cm   Wound Width (cm) 0 cm   Wound Depth (cm) 0 cm   Wound Surface Area (cm^2) 0 cm^2   Wound Volume (cm^3) 0 cm^3   Epithelialization (%) 100   Drainage Amount None   Wound Odor None   Rosario-wound Assessment Intact   Cleansing and Cleansing Agents  Normal saline   Dressing Changed Changed/New

## 2020-01-15 NOTE — PROGRESS NOTES
Wound Center Progress Note    Patient: Jacqueline Eaton MRN: [id number]  SSN: [id number]    YOB: 1940  Age: 70 y.o. Sex: female      Subjective:     Chief Complaint:  R knee wound    History of Present Illness:     See above note is  Wound Caused By: non-healed surgical wound for knee replacement  Associated Signs and Symptoms: Mild pain and drainage  Timing: Intermittent  Quality: Burning, Pressure  Severity: 3/10  Modifying Factors: None    Seen at the request of Dr Jayne Roberts. Previously seen for a left pre-patellar bursitis. New fall and wound on the left and right knee. Left knee wound improving but right has stalled. 11/22/2019  Doing better s/p debridement. Much less drainage. No new issues. New fall, but previous wound much better. 12/20/2019 Initial wound healed and continues to tolerate dressings at traumatic wound. No further injuries. 1/10/2020 Feels may be healed. No new wounds.        Past Medical History:   Diagnosis Date    Calculus of kidney     Gastric ulcer     2018    GERD (gastroesophageal reflux disease)     Headache(784.0)     Hypercholesterolemia     Hypertension     Primary insomnia 7/17/2017    Thyroid disease       Past Surgical History:   Procedure Laterality Date    HX COLONOSCOPY  Jan 2013    4 polyps, repeat 5 years    HX COLONOSCOPY  7/5/2016    repeat 5 yrs tubular adenoma    HX SALPINGO-OOPHORECTOMY      REMOVAL OF KIDNEY STONE       Family History   Problem Relation Age of Onset    Hypertension Mother     Arthritis-rheumatoid Mother     High Cholesterol Mother     Broken Bones Mother     Heart Failure Father     Heart Attack Father     Hypertension Brother     High Cholesterol Brother     Arthritis-osteo Brother         Knee    High Cholesterol Brother     Hypertension Brother     Heart Attack Brother     Heart Attack Paternal Grandmother     Heart Attack Paternal Grandfather     Breast Cancer Neg Hx       Social History     Tobacco Use    Smoking status: Never Smoker    Smokeless tobacco: Never Used   Substance Use Topics    Alcohol use: No       Prior to Admission medications    Medication Sig Start Date End Date Taking? Authorizing Provider   amLODIPine (NORVASC) 10 mg tablet Take 1 Tab by mouth daily. 1/3/20   Luda Doherty MD   gabapentin (NEURONTIN) 100 mg capsule Take 1 Cap by mouth three (3) times daily. 12/19/19   Luda Doherty MD   DISABLED PLACARD (DISABLED PLACARD) DMV Use placard daily 12/19/19   Joss Doherty MD   losartan-hydroCHLOROthiazide (HYZAAR) 100-25 mg per tablet TAKE ONE TABLET DAILY. 8/27/19   Shima Rosas MD   levothyroxine (SYNTHROID) 25 mcg tablet Take 1 Tab by mouth every morning. 8/22/19   Joss Doherty MD   potassium chloride (K-DUR, KLOR-CON) 20 mEq tablet TAKE 1 TABLET BY MOUTH 2 TIMES A DAY 8/22/19   Joss Doherty MD   traZODone (DESYREL) 50 mg tablet Take 1 Tab by mouth nightly. 8/22/19   Luda Doherty MD   atorvastatin (LIPITOR) 40 mg tablet Take 1 Tab by mouth daily. 4/23/19   Luda Doherty MD   ondansetron (ZOFRAN ODT) 4 mg disintegrating tablet Take 1 Tab by mouth every eight (8) hours as needed for Nausea. 10/5/18   Maria Guadalupe Young MD   FLOVENT  mcg/actuation inhaler Take 2 Puffs by inhalation two (2) times a day. 10/28/15   Provider, Historical   acetaminophen (TYLENOL ARTHRITIS PAIN) 650 mg CR tablet Take 650 mg by mouth every eight (8) hours as needed (pain). Provider, Historical   albuterol (VENTOLIN HFA) 90 mcg/actuation inhaler Take 2 Puffs by inhalation every six (6) hours as needed for Wheezing or Shortness of Breath. Provider, Historical   CALCIUM CITRATE + PO take 1 Tab by mouth two (2) times a day. Provider, Historical     Allergies   Allergen Reactions    Pneumococcal 23-Janell Ps Vaccine Other (comments)     fever  Other reaction(s):  Other (comments)  fever        Review of Systems:  A comprehensive review of systems was negative except for that written in the History of Present Illness. No results found for: HBA1C, ALQ2RCGC, HGBE8, EMO8ENRZ, KAC0TTJQ, FUS0EZHP     Immunization History   Administered Date(s) Administered    Influenza High Dose Vaccine PF 09/08/2016, 11/28/2017, 10/01/2019    Influenza Vaccine 10/10/2013, 10/09/2014    Influenza Vaccine (Quad) PF 09/14/2018    Influenza Vaccine PF 10/26/2015    Influenza Vaccine Split 10/29/2012    Pneumococcal Polysaccharide (PPSV-23) 06/28/2007, 08/27/2007    TB Skin Test (PPD) Intradermal 10/12/2018    TDAP Vaccine 04/12/2012, 10/29/2012    Tdap 04/12/2012, 10/29/2012    Zoster 10/04/2011    Zoster Vaccine, Live 10/04/2011       Body mass index is 35.87 kg/m². Counseling regarding nutrition done: No     Current medications:  Current Outpatient Medications   Medication Sig Dispense Refill    amLODIPine (NORVASC) 10 mg tablet Take 1 Tab by mouth daily. 30 Tab 3    gabapentin (NEURONTIN) 100 mg capsule Take 1 Cap by mouth three (3) times daily. 270 Cap 3    DISABLED PLACARD (DISABLED PLACARD) DMV Use placard daily 1 Each 0    losartan-hydroCHLOROthiazide (HYZAAR) 100-25 mg per tablet TAKE ONE TABLET DAILY. 90 Tab 3    levothyroxine (SYNTHROID) 25 mcg tablet Take 1 Tab by mouth every morning. 90 Tab 3    potassium chloride (K-DUR, KLOR-CON) 20 mEq tablet TAKE 1 TABLET BY MOUTH 2 TIMES A  Tab 3    traZODone (DESYREL) 50 mg tablet Take 1 Tab by mouth nightly. 30 Tab 3    atorvastatin (LIPITOR) 40 mg tablet Take 1 Tab by mouth daily. 90 Tab 3    ondansetron (ZOFRAN ODT) 4 mg disintegrating tablet Take 1 Tab by mouth every eight (8) hours as needed for Nausea. 20 Tab 0    FLOVENT  mcg/actuation inhaler Take 2 Puffs by inhalation two (2) times a day. 3    acetaminophen (TYLENOL ARTHRITIS PAIN) 650 mg CR tablet Take 650 mg by mouth every eight (8) hours as needed (pain).       albuterol (VENTOLIN HFA) 90 mcg/actuation inhaler Take 2 Puffs by inhalation every six (6) hours as needed for Wheezing or Shortness of Breath.  CALCIUM CITRATE + PO take 1 Tab by mouth two (2) times a day. Objective:     Physical Exam:     Visit Vitals  /81   Pulse 91   Temp 97.6 °F (36.4 °C)   Ht 5' 4\" (1.626 m)   Wt 94.8 kg (209 lb)   SpO2 99%   BMI 35.87 kg/m²       General: well developed, well nourished, pleasant , NAD. Hygiene good  Psych: cooperative. Pleasant. No anxiety or depression. Normal mood and affect. Neuro: alert and oriented to person/place/situation. Otherwise nonfocal.  Derm: Normal turgor for age, dry skin  HEENT: Normocephalic, atraumatic. EOMI. Conjunctiva clear. No scleral icterus. Neck: Normal range of motion. No masses. Chest: Good air entry bilaterally. Respirations nonlabored  Cardio: Normal heart sounds,no rubs, murmurs or gallops  Abdomen: Soft, nontender, nondistended, normoactive bowel sounds  Lower extremities: color normal; temperature normal. Hair growth is not present. Calves are supple, nontender, approximately equally sized in comparison. Capillary refill <3 sec                            Ulcer Description:   Wound Knee Left; Anterior (Active)   Number of days: 491       Wound Knee Left;Medial (Active)   Number of days: 460       Wound Knee Left;Lateral (Active)   Number of days: 460       Wound Pretibial Right (Active)   Number of days: 145       [REMOVED] Wound Knee Right;Medial large blister on medial aspect of right knee 09/04/19 (Removed)   Number of days: 48       [REMOVED] Wound Leg Right;Medial;Upper (Removed)   Dressing Status Clean, dry, and intact 12/20/2019  2:10 PM   Non-staged Wound Description Full thickness 12/20/2019  2:10 PM   Wound Length (cm) 0 cm 12/20/2019  2:10 PM   Wound Width (cm) 0 cm 12/20/2019  2:10 PM   Wound Depth (cm) 0 cm 12/20/2019  2:10 PM   Wound Surface Area (cm^2) 0 cm^2 12/20/2019  2:10 PM   Wound Volume (cm^3) 0 cm^3 12/20/2019  2:10 PM   Condition of Base Granulation 12/20/2019  2:10 PM Tissue Type Percent Pink 100 12/20/2019  2:10 PM   Tissue Type Percent Red 100 11/15/2019  1:50 PM   Tissue Type Percent Yellow 100 9/17/2019 11:18 AM   Tunneling (cm) 2.3 cm 11/8/2019  2:16 PM   Direction of Tunnel 9 o'clock 11/8/2019  2:16 PM   Drainage Amount Scant 12/20/2019  2:10 PM   Drainage Color Serous 12/20/2019  2:10 PM   Wound Odor None 12/20/2019  2:10 PM   Rosario-wound Assessment Intact 12/20/2019  2:10 PM   Cleansing and Cleansing Agents  Normal saline 12/20/2019  2:10 PM   Dressing Changed Changed/New 12/6/2019  2:16 PM   Procedure Tolerated Well 11/22/2019  2:02 PM   Number of days: 101       [REMOVED] Wound Knee Left (Removed)   Dressing Status Clean, dry, and intact 11/15/2019  1:50 PM   Non-staged Wound Description Full thickness 11/15/2019  1:50 PM   Wound Length (cm) 0.1 cm 11/15/2019  1:50 PM   Wound Width (cm) 0.1 cm 11/15/2019  1:50 PM   Wound Depth (cm) 0.1 cm 11/15/2019  1:50 PM   Wound Surface Area (cm^2) 0.01 cm^2 11/15/2019  1:50 PM   Wound Volume (cm^3) 0 cm^3 11/15/2019  1:50 PM   Condition of Base Epithelializing 11/15/2019  1:50 PM   Epithelialization (%) 99 11/15/2019  1:50 PM   Tissue Type Percent Black 5 % 10/29/2019  3:04 PM   Tissue Type Percent Red 20 11/8/2019  2:16 PM   Tissue Type Percent Yellow 80 11/8/2019  2:16 PM   Drainage Amount None 11/15/2019  1:50 PM   Drainage Color Serosanguinous 11/8/2019  2:16 PM   Wound Odor None 11/15/2019  1:50 PM   Rosario-wound Assessment Intact 11/15/2019  1:50 PM   Cleansing and Cleansing Agents  Normal saline 11/15/2019  1:50 PM   Dressing Changed Changed/New 11/8/2019  2:16 PM   Dressing Type Applied Open to air 11/15/2019  1:50 PM   Procedure Tolerated Well 11/15/2019  1:50 PM   Number of days: 31       [REMOVED] Wound Leg upper Right;Lateral 12/06/19 (Removed)   Dressing Status Clean, dry, and intact 12/20/2019  2:10 PM   Wound Length (cm) 0 cm 1/10/2020  2:44 PM   Wound Width (cm) 0 cm 1/10/2020  2:44 PM   Wound Depth (cm) 0 cm 1/10/2020  2:44 PM   Wound Surface Area (cm^2) 0 cm^2 1/10/2020  2:44 PM   Wound Volume (cm^3) 0 cm^3 1/10/2020  2:44 PM   Epithelialization (%) 100 1/10/2020  2:44 PM   Tissue Type Percent Pink 100 12/6/2019  2:16 PM   Tissue Type Percent Red 25 12/20/2019  2:10 PM   Tissue Type Percent Yellow 75 12/20/2019  2:10 PM   Drainage Amount None 1/10/2020  2:44 PM   Drainage Color Serosanguinous 12/20/2019  2:10 PM   Wound Odor None 1/10/2020  2:44 PM   Rosario-wound Assessment Intact 1/10/2020  2:44 PM   Cleansing and Cleansing Agents  Normal saline 1/10/2020  2:44 PM   Dressing Changed Changed/New 1/10/2020  2:44 PM   Number of days: 35         PROCEDURE: Complex right knee incision and debridement    Preop dx: R knee wound  Post dx: Same    Following informed consent a circumferential block was performed with 20 mls 1% lidocaine with epi. Tract probed and sharply incised. Extended into the subq but not to the joint. Pallid granulation tissue sharply excised. Hemostasis obtained with pressure. Packed with mesalt. EBL: Minimal  Complications : None            Data Review:   No results found for this or any previous visit (from the past 24 hour(s)). Assessment:     70 y.o. female with R lower leg anterior, L lower leg anterior combined ulcer.     Problem List  Date Reviewed: 8/1/2018          Codes Class Noted    Chronic kidney disease, stage III (moderate) ([facility] ICD-10-CM: N18.3  ICD-9-CM: 585.3  11/12/2014        Asthma ICD-10-CM: J45.909  ICD-9-CM: 493.90  10/29/2012        Pyogenic arthritis of right knee joint ([facility] ICD-10-CM: M00.9  ICD-9-CM: 711.06  9/10/2019        Cellulitis of right leg ICD-10-CM: L03.115  ICD-9-CM: 682.6  8/22/2019        Acute kidney injury superimposed on CKD [facility]) ICD-10-CM: N17.9, N18.9  ICD-9-CM: 866.00, 585.9  8/22/2019        Gastric ulcer ICD-10-CM: K25.9  ICD-9-CM: 531.90  Unknown        Acute gastric ulcer without hemorrhage or perforation ICD-10-CM: K25.3  ICD-9-CM: 531.30 10/14/2018        Acute pancreatitis ICD-10-CM: K85.90  ICD-9-CM: 577.0  10/12/2018        Acute renal failure superimposed on stage 3 chronic kidney disease ([facility] ICD-10-CM: N17.9, N18.3  ICD-9-CM: 584.9, 585.3  10/11/2018        Hypokalemia ICD-10-CM: E87.6  ICD-9-CM: 276.8  10/11/2018        Dehydration ICD-10-CM: E86.0  ICD-9-CM: 276.51  10/11/2018        Orthostatic hypotension ICD-10-CM: I95.1  ICD-9-CM: 458.0  10/11/2018        Cellulitis and abscess of left lower extremity ICD-10-CM: L03.116, L02.416  ICD-9-CM: 682.6  9/11/2018        Severe obesity (BMI 35.0-39. 9) with comorbidity ([facility] ICD-10-CM: E66.01  ICD-9-CM: 278.01  3/28/2018        Primary insomnia ICD-10-CM: F51.01  ICD-9-CM: 307.42  7/17/2017        Right upper quadrant abdominal pain ICD-10-CM: R10.11  ICD-9-CM: 789.01  12/14/2016    Overview Signed 12/14/2016 10:18 AM by Ricardo Yeager     S/P thorough eval Dr Malachi Mayen, gastroenterologist including EGD, colonoscopy, abd U/S.   Lasts only minutes about once a week, worse when constipated             Raynaud's phenomenon ICD-10-CM: I73.00  ICD-9-CM: 443.0  2/25/2015        Post Menopausal Osteopenia ICD-10-CM: M89.9  ICD-9-CM: 733.90  2/25/2015        Pedal edema ICD-10-CM: R60.0  ICD-9-CM: 782.3  1/28/2014    Overview Signed 1/28/2014  4:03 PM by Ricardo Yeager     Worse on left             Chronic pain syndrome ICD-10-CM: G89.4  ICD-9-CM: 338.4  11/11/2013    Overview Addendum 1/14/2016  3:45 PM by Doyle RODRIGUEZ     Chronic feet, hands, hips osteoarthritis, S/P bilateral TKA, gets pain with walking 100 ft             Colon polyps ICD-10-CM: K63.5  ICD-9-CM: 211.3  4/30/2013    Overview Signed 4/30/2013 10:01 AM by Ricardo Yeager     Next colo due Jan 2018             HTN (hypertension) ICD-10-CM: I10  ICD-9-CM: 401.9  8/16/2012        Hyperlipidemia ICD-10-CM: E78.5  ICD-9-CM: 272.4  8/16/2012    Overview Addendum 10/28/2012  6:29 PM by Doyle RODRIGUEZ     Calcium score of 140, Goal LDL under 130 Hypothyroidism ICD-10-CM: E03.9  ICD-9-CM: 244.9  8/16/2012        Female stress incontinence ICD-10-CM: N39.3  ICD-9-CM: 625.6  3/34/6944        Umbilical hernia OND-33-UR: K42.9  ICD-9-CM: 553.1  8/16/2012        S/P total knee arthroplasty ICD-10-CM: W75.333  ICD-9-CM: V43.65  8/16/2012    Overview Addendum 8/16/2012  8:38 AM by Charline Bomblinden     Bilateral--2009             Kidney stones ICD-10-CM: N20.0  ICD-9-CM: 592.0  8/16/2012        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 Avoid further trauma.  Recheck PRN     Signed By: Ryan Charlton MD

## 2020-02-10 ENCOUNTER — HOSPITAL ENCOUNTER (OUTPATIENT)
Dept: INFUSION THERAPY | Age: 80
Discharge: HOME | End: 2020-02-10
Payer: MEDICARE

## 2020-02-10 VITALS
TEMPERATURE: 98.5 F | OXYGEN SATURATION: 91 % | SYSTOLIC BLOOD PRESSURE: 158 MMHG | DIASTOLIC BLOOD PRESSURE: 80 MMHG | RESPIRATION RATE: 18 BRPM | HEART RATE: 89 BPM

## 2020-02-10 LAB
ANION GAP SERPL CALC-SCNC: 6 MMOL/L (ref 7–16)
BUN SERPL-MCNC: 23 MG/DL (ref 8–23)
CALCIUM SERPL-MCNC: 9.6 MG/DL (ref 8.3–10.4)
CHLORIDE SERPL-SCNC: 106 MMOL/L (ref 98–107)
CO2 SERPL-SCNC: 28 MMOL/L (ref 21–32)
CREAT SERPL-MCNC: 1.28 MG/DL (ref 0.6–1)
ERYTHROCYTE [DISTWIDTH] IN BLOOD BY AUTOMATED COUNT: 14.3 % (ref 11.9–14.6)
GLUCOSE SERPL-MCNC: 89 MG/DL (ref 65–100)
HCT VFR BLD AUTO: 34.2 % (ref 35.8–46.3)
HGB BLD-MCNC: 11 G/DL (ref 11.7–15.4)
MCH RBC QN AUTO: 30.2 PG (ref 26.1–32.9)
MCHC RBC AUTO-ENTMCNC: 32.2 G/DL (ref 31.4–35)
MCV RBC AUTO: 94 FL (ref 79.6–97.8)
NRBC # BLD: 0 K/UL (ref 0–0.2)
PLATELET # BLD AUTO: 191 K/UL (ref 150–450)
PMV BLD AUTO: 11.6 FL (ref 9.4–12.3)
POTASSIUM SERPL-SCNC: 3.8 MMOL/L (ref 3.5–5.1)
RBC # BLD AUTO: 3.64 M/UL (ref 4.05–5.25)
SODIUM SERPL-SCNC: 140 MMOL/L (ref 136–145)
WBC # BLD AUTO: 6.6 K/UL (ref 4.3–11.1)

## 2020-02-10 PROCEDURE — 80048 BASIC METABOLIC PNL TOTAL CA: CPT

## 2020-02-10 PROCEDURE — 96372 THER/PROPH/DIAG INJ SC/IM: CPT

## 2020-02-10 PROCEDURE — 74011250636 HC RX REV CODE- 250/636: Performed by: FAMILY MEDICINE

## 2020-02-10 PROCEDURE — 85027 COMPLETE CBC AUTOMATED: CPT

## 2020-02-10 PROCEDURE — 36415 COLL VENOUS BLD VENIPUNCTURE: CPT

## 2020-02-10 RX ADMIN — DENOSUMAB 60 MG: 60 INJECTION SUBCUTANEOUS at 16:20

## 2020-02-10 NOTE — PROGRESS NOTES
Arrived to the [facility]. Prolia completed.    Provided education on Prolia-patient has received previously   Patient instructed to report any side affects to ordering provider-  Patient tolerated Well   Any issues or concerns during appointment: No  Patient aware of next infusion appointment on Monday,August 11th @ 1030  Discharged home ambulatory

## 2020-08-11 ENCOUNTER — HOSPITAL ENCOUNTER (OUTPATIENT)
Dept: INFUSION THERAPY | Age: 80
Discharge: HOME | End: 2020-08-11

## 2020-08-11 ENCOUNTER — HOSPITAL ENCOUNTER (OUTPATIENT)
Dept: INFUSION THERAPY | Age: 80
Discharge: HOME | End: 2020-08-11
Payer: MEDICARE

## 2020-08-11 VITALS
RESPIRATION RATE: 18 BRPM | OXYGEN SATURATION: 96 % | HEART RATE: 79 BPM | SYSTOLIC BLOOD PRESSURE: 131 MMHG | DIASTOLIC BLOOD PRESSURE: 70 MMHG | TEMPERATURE: 98.5 F

## 2020-08-11 LAB
ANION GAP SERPL CALC-SCNC: 6 MMOL/L (ref 7–16)
BASOPHILS # BLD: 0 K/UL (ref 0–0.2)
BASOPHILS NFR BLD: 0 % (ref 0–2)
BUN SERPL-MCNC: 29 MG/DL (ref 8–23)
CALCIUM SERPL-MCNC: 9.1 MG/DL (ref 8.3–10.4)
CHLORIDE SERPL-SCNC: 109 MMOL/L (ref 98–107)
CO2 SERPL-SCNC: 27 MMOL/L (ref 21–32)
CREAT SERPL-MCNC: 1.5 MG/DL (ref 0.6–1)
DIFFERENTIAL METHOD BLD: ABNORMAL
EOSINOPHIL # BLD: 0.1 K/UL (ref 0–0.8)
EOSINOPHIL NFR BLD: 2 % (ref 0.5–7.8)
ERYTHROCYTE [DISTWIDTH] IN BLOOD BY AUTOMATED COUNT: 13.2 % (ref 11.9–14.6)
GLUCOSE SERPL-MCNC: 90 MG/DL (ref 65–100)
HCT VFR BLD AUTO: 35 % (ref 35.8–46.3)
HGB BLD-MCNC: 11 G/DL (ref 11.7–15.4)
IMM GRANULOCYTES # BLD AUTO: 0 K/UL (ref 0–0.5)
IMM GRANULOCYTES NFR BLD AUTO: 1 % (ref 0–5)
LYMPHOCYTES # BLD: 1.1 K/UL (ref 0.5–4.6)
LYMPHOCYTES NFR BLD: 19 % (ref 13–44)
MCH RBC QN AUTO: 30.5 PG (ref 26.1–32.9)
MCHC RBC AUTO-ENTMCNC: 31.4 G/DL (ref 31.4–35)
MCV RBC AUTO: 97 FL (ref 79.6–97.8)
MONOCYTES # BLD: 0.8 K/UL (ref 0.1–1.3)
MONOCYTES NFR BLD: 13 % (ref 4–12)
NEUTS SEG # BLD: 4 K/UL (ref 1.7–8.2)
NEUTS SEG NFR BLD: 66 % (ref 43–78)
NRBC # BLD: 0 K/UL (ref 0–0.2)
PLATELET # BLD AUTO: 177 K/UL (ref 150–450)
PMV BLD AUTO: 11.6 FL (ref 9.4–12.3)
POTASSIUM SERPL-SCNC: 4.1 MMOL/L (ref 3.5–5.1)
RBC # BLD AUTO: 3.61 M/UL (ref 4.05–5.25)
SODIUM SERPL-SCNC: 142 MMOL/L (ref 136–145)
WBC # BLD AUTO: 6.2 K/UL (ref 4.3–11.1)

## 2020-08-11 PROCEDURE — 80048 BASIC METABOLIC PNL TOTAL CA: CPT

## 2020-08-11 PROCEDURE — 96372 THER/PROPH/DIAG INJ SC/IM: CPT

## 2020-08-11 PROCEDURE — 74011250636 HC RX REV CODE- 250/636: Performed by: FAMILY MEDICINE

## 2020-08-11 PROCEDURE — 36415 COLL VENOUS BLD VENIPUNCTURE: CPT

## 2020-08-11 PROCEDURE — 85025 COMPLETE CBC W/AUTO DIFF WBC: CPT

## 2020-08-11 RX ADMIN — DENOSUMAB 60 MG: 60 INJECTION SUBCUTANEOUS at 11:00

## 2020-08-11 NOTE — PROGRESS NOTES
Labs are stable except probable mild dehydration. Push fluids. Make sure pt has scheduled f/u with a new provider. Chas's last note indicates she was going to schedule with a new office closer to home.

## 2020-08-11 NOTE — PROGRESS NOTES
Arrived to the [facility]. Prolia completed. Provided education on Prolia-patient has previously received this medication  Patient instructed to report any side affects to ordering provider.   Patient tolerated well   Any issues or concerns during appointment: No  Patient has no future appointments in OPI @ this time  Discharged home via wheelchair

## 2020-08-12 NOTE — PROGRESS NOTES
Spoke with pt informed her that her labs are stable except probable mild dehydration. Push fluids. Pt does not yet have a primary yet closer to home but will find one and schedule or will call her to see if she can see another provider here.

## 2020-12-18 ENCOUNTER — APPOINTMENT (OUTPATIENT)
Dept: CT IMAGING | Age: 80
End: 2020-12-18
Attending: EMERGENCY MEDICINE
Payer: MEDICARE

## 2020-12-18 ENCOUNTER — HOSPITAL ENCOUNTER (EMERGENCY)
Age: 80
Discharge: HOME | End: 2020-12-18
Attending: EMERGENCY MEDICINE
Payer: MEDICARE

## 2020-12-18 VITALS
OXYGEN SATURATION: 96 % | BODY MASS INDEX: 33.29 KG/M2 | HEIGHT: 64 IN | SYSTOLIC BLOOD PRESSURE: 126 MMHG | HEART RATE: 96 BPM | DIASTOLIC BLOOD PRESSURE: 58 MMHG | TEMPERATURE: 99.1 F | RESPIRATION RATE: 18 BRPM | WEIGHT: 195 LBS

## 2020-12-18 DIAGNOSIS — R19.7 DIARRHEA, UNSPECIFIED TYPE: Primary | ICD-10-CM

## 2020-12-18 LAB
ALBUMIN SERPL-MCNC: 2.5 G/DL (ref 3.2–4.6)
ALBUMIN/GLOB SERPL: 0.5 {RATIO} (ref 1.2–3.5)
ALP SERPL-CCNC: 80 U/L (ref 50–136)
ALT SERPL-CCNC: 21 U/L (ref 12–65)
ANION GAP SERPL CALC-SCNC: 9 MMOL/L (ref 7–16)
AST SERPL-CCNC: 21 U/L (ref 15–37)
BASOPHILS # BLD: 0.1 K/UL (ref 0–0.2)
BASOPHILS NFR BLD: 0 % (ref 0–2)
BILIRUB SERPL-MCNC: 0.5 MG/DL (ref 0.2–1.1)
BUN SERPL-MCNC: 31 MG/DL (ref 8–23)
CALCIUM SERPL-MCNC: 7.9 MG/DL (ref 8.3–10.4)
CHLORIDE SERPL-SCNC: 104 MMOL/L (ref 98–107)
CO2 SERPL-SCNC: 25 MMOL/L (ref 21–32)
CREAT SERPL-MCNC: 1.99 MG/DL (ref 0.6–1)
DIFFERENTIAL METHOD BLD: ABNORMAL
EOSINOPHIL # BLD: 0.1 K/UL (ref 0–0.8)
EOSINOPHIL NFR BLD: 1 % (ref 0.5–7.8)
ERYTHROCYTE [DISTWIDTH] IN BLOOD BY AUTOMATED COUNT: 13.8 % (ref 11.9–14.6)
GLOBULIN SER CALC-MCNC: 4.8 G/DL (ref 2.3–3.5)
GLUCOSE SERPL-MCNC: 99 MG/DL (ref 65–100)
HCT VFR BLD AUTO: 30.5 % (ref 35.8–46.3)
HGB BLD-MCNC: 9.6 G/DL (ref 11.7–15.4)
IMM GRANULOCYTES # BLD AUTO: 0.1 K/UL (ref 0–0.5)
IMM GRANULOCYTES NFR BLD AUTO: 1 % (ref 0–5)
LYMPHOCYTES # BLD: 1.4 K/UL (ref 0.5–4.6)
LYMPHOCYTES NFR BLD: 7 % (ref 13–44)
MCH RBC QN AUTO: 28.4 PG (ref 26.1–32.9)
MCHC RBC AUTO-ENTMCNC: 31.5 G/DL (ref 31.4–35)
MCV RBC AUTO: 90.2 FL (ref 79.6–97.8)
MONOCYTES # BLD: 1.9 K/UL (ref 0.1–1.3)
MONOCYTES NFR BLD: 9 % (ref 4–12)
NEUTS SEG # BLD: 17 K/UL (ref 1.7–8.2)
NEUTS SEG NFR BLD: 83 % (ref 43–78)
NRBC # BLD: 0 K/UL (ref 0–0.2)
PLATELET # BLD AUTO: 344 K/UL (ref 150–450)
PMV BLD AUTO: 10.5 FL (ref 9.4–12.3)
POTASSIUM SERPL-SCNC: 4.3 MMOL/L (ref 3.5–5.1)
PROT SERPL-MCNC: 7.3 G/DL (ref 6.3–8.2)
RBC # BLD AUTO: 3.38 M/UL (ref 4.05–5.2)
SODIUM SERPL-SCNC: 138 MMOL/L (ref 136–145)
WBC # BLD AUTO: 20.5 K/UL (ref 4.3–11.1)

## 2020-12-18 PROCEDURE — 99283 EMERGENCY DEPT VISIT LOW MDM: CPT

## 2020-12-18 PROCEDURE — 74011250636 HC RX REV CODE- 250/636: Performed by: EMERGENCY MEDICINE

## 2020-12-18 PROCEDURE — 74011000636 HC RX REV CODE- 636: Performed by: EMERGENCY MEDICINE

## 2020-12-18 PROCEDURE — 96361 HYDRATE IV INFUSION ADD-ON: CPT

## 2020-12-18 PROCEDURE — 85025 COMPLETE CBC W/AUTO DIFF WBC: CPT

## 2020-12-18 PROCEDURE — 80053 COMPREHEN METABOLIC PANEL: CPT

## 2020-12-18 PROCEDURE — 96360 HYDRATION IV INFUSION INIT: CPT

## 2020-12-18 PROCEDURE — 74176 CT ABD & PELVIS W/O CONTRAST: CPT

## 2020-12-18 PROCEDURE — 87493 C DIFF AMPLIFIED PROBE: CPT

## 2020-12-18 RX ORDER — METRONIDAZOLE 500 MG/1
500 TABLET ORAL 2 TIMES DAILY
Qty: 20 TAB | Refills: 0 | Status: SHIPPED | OUTPATIENT
Start: 2020-12-18 | End: 2020-12-31

## 2020-12-18 RX ORDER — TRAMADOL HYDROCHLORIDE 50 MG/1
50 TABLET ORAL
COMMUNITY
End: 2020-12-31

## 2020-12-18 RX ADMIN — SODIUM CHLORIDE 1000 ML: 900 INJECTION, SOLUTION INTRAVENOUS at 18:41

## 2020-12-18 RX ADMIN — DIATRIZOATE MEGLUMINE AND DIATRIZOATE SODIUM 15 ML: 660; 100 LIQUID ORAL; RECTAL at 18:43

## 2020-12-18 NOTE — ED NOTES
Pt here from home with c/o diarrhea since yesterday but has worsened today. Pt feeling weak and denies being around anyone with COVID. Denies abd pain. Pt admits to being on antibiotics about 2 weeks ago for cellulitis in her left leg. Masked.

## 2020-12-19 LAB
BACTERIA SPEC CULT: ABNORMAL
BACTERIA SPEC CULT: ABNORMAL
SERVICE CMNT-IMP: ABNORMAL

## 2020-12-19 NOTE — ED NOTES
I have reviewed discharge instructions with the patient. The patient verbalized understanding. Patient left ED via Discharge Method: ambulatory to Home with family. Opportunity for questions and clarification provided. Patient given 1 scripts. To continue your aftercare when you leave the hospital, you may receive an automated call from our care team to check in on how you are doing. This is a free service and part of our promise to provide the best care and service to meet your aftercare needs.  If you have questions, or wish to unsubscribe from this service please call [phone]. Thank you for Choosing our [facility] Emergency Department.

## 2020-12-19 NOTE — DISCHARGE INSTRUCTIONS
As we discussed, we did not find the exact cause of your symptoms tonight in the emergency department. We suspect you may have an infection called C. difficile. Therefore, it is important for you to return a stool sample at your earliest convenience. Follow-up with a primary care doctor for reevaluation.

## 2020-12-19 NOTE — ED NOTES
12/19/20 0152 lab called stating pt had positive c- diff.  Notified MD working in the ED pt will be called by the PA this AM. The pt was scribed medication to treat c - diff

## 2020-12-19 NOTE — ED NOTES
Bedside report from [facility] [place], [place]. Pt lying on stretcher with family at bedside. Pt has no complaints at this time. Awaiting Ct scan. Will continue to monitor.

## 2020-12-19 NOTE — ED PROVIDER NOTES
21-year-old lady presents with concerns about copious diarrhea. She says that she cannot seem to stay out of the bathroom. She notes this all started several days ago. She finished a course of Augmentin for a left lower leg cellulitis about 10 days ago and the diarrhea started after that. She said that she had some mild abdominal crampiness but no significant pain. She had no vomiting and no fevers. No blood in her bowels. She said no cough or shortness of breath and no loss of sense of taste or smell. No other associated symptoms. Elements of this note were created using speech recognition software. As such, errors of speech recognition may be present.            Past Medical History:   Diagnosis Date    Calculus of kidney     Gastric ulcer     2018    GERD (gastroesophageal reflux disease)     Headache(784.0)     Hypercholesterolemia     Hypertension     Primary insomnia 7/17/2017    Thyroid disease        Past Surgical History:   Procedure Laterality Date    HX COLONOSCOPY  Jan 2013    4 polyps, repeat 5 years    HX COLONOSCOPY  7/5/2016    repeat 5 yrs tubular adenoma    HX SALPINGO-OOPHORECTOMY      REMOVAL OF KIDNEY STONE           Family History:   Problem Relation Age of Onset    Hypertension Mother     Arthritis-rheumatoid Mother     High Cholesterol Mother     Broken Bones Mother     Heart Failure Father     Heart Attack Father     Hypertension Brother     High Cholesterol Brother     Arthritis-osteo Brother         Knee    High Cholesterol Brother     Hypertension Brother     Heart Attack Brother     Heart Attack Paternal Grandmother     Heart Attack Paternal Grandfather     Breast Cancer Neg Hx        Social History     Socioeconomic History    Marital status:      Spouse name: Not on file    Number of children: Not on file    Years of education: Not on file    Highest education level: Not on file   Occupational History    Not on file   Social Needs  Financial resource strain: Not on file   Giacomo-Husam insecurity     Worry: Not on file     Inability: Not on file   Button needs     Medical: Not on file     Non-medical: Not on file   Tobacco Use    Smoking status: Never Smoker    Smokeless tobacco: Never Used   Substance and Sexual Activity    Alcohol use: No    Drug use: No    Sexual activity: Not on file   Lifestyle    Physical activity     Days per week: Not on file     Minutes per session: Not on file    Stress: Not on file   Relationships    Social connections     Talks on phone: Not on file     Gets together: Not on file     Attends Shinto service: Not on file     Active member of club or organization: Not on file     Attends meetings of clubs or organizations: Not on file     Relationship status: Not on file    Intimate partner violence     Fear of current or ex partner: Not on file     Emotionally abused: Not on file     Physically abused: Not on file     Forced sexual activity: Not on file   Other Topics Concern    Not on file   Social History Narrative    Lives with  who is chronically ill with end stage lung disease and early dementia and depression. 5 children, daughter Rosio Mi and son Delvin Desouza (PharmD) have her [de-identified], sons Lynnette Wagoner and Ethel Choi have durable POA         ALLERGIES: Pneumococcal 23-layton ps vaccine    Review of Systems   Constitutional: Positive for appetite change and fatigue. Negative for chills, diaphoresis and fever. HENT: Negative for congestion, rhinorrhea and sore throat. Eyes: Negative for redness and visual disturbance. Respiratory: Negative for cough, chest tightness, shortness of breath and wheezing. Cardiovascular: Negative for chest pain and palpitations. Gastrointestinal: Positive for diarrhea. Negative for blood in stool, nausea and vomiting. Abdominal cramping   Endocrine: Negative for polydipsia and polyuria. Genitourinary: Negative for dysuria and hematuria.    Musculoskeletal: Negative for arthralgias, myalgias and neck stiffness. Skin: Negative for rash. Allergic/Immunologic: Negative for environmental allergies and food allergies. Neurological: Negative for dizziness, weakness and headaches. Hematological: Negative for adenopathy. Does not bruise/bleed easily. Vitals:    12/18/20 1656   BP: (!) 105/55   Pulse: 77   Resp: 18   Temp: 99.1 °F (37.3 °C)   SpO2: 99%   Weight: 88.5 kg (195 lb)   Height: 5' 4\" (1.626 m)            Physical Exam  Vitals signs and nursing note reviewed. Constitutional:       General: She is not in acute distress. Appearance: She is well-developed. She is not toxic-appearing. HENT:      Head: Normocephalic and atraumatic. Eyes:      General: No scleral icterus. Right eye: No discharge. Left eye: No discharge. Conjunctiva/sclera: Conjunctivae normal.      Pupils: Pupils are equal, round, and reactive to light. Neck:      Musculoskeletal: Normal range of motion. No neck rigidity. Cardiovascular:      Rate and Rhythm: Normal rate and regular rhythm. Heart sounds: Normal heart sounds. Pulmonary:      Effort: Pulmonary effort is normal. No respiratory distress. Breath sounds: Normal breath sounds. No wheezing or rales. Chest:      Chest wall: No tenderness. Abdominal:      General: There is no distension. Palpations: Abdomen is soft. Tenderness: There is no guarding or rebound. Comments: Hyperactive bowel sounds   Musculoskeletal: Normal range of motion. General: No tenderness. Lymphadenopathy:      Cervical: No cervical adenopathy. Skin:     General: Skin is warm and dry. Comments: Erythema to her left lower leg. I did not fully peelback the bandage but she does have some significant dry skin and some mild erythema down there. Neurological:      General: No focal deficit present. Mental Status: She is alert and oriented to person, place, and time.    Psychiatric: Mood and Affect: Mood normal.         Behavior: Behavior normal.          MDM  Number of Diagnoses or Management Options  Diarrhea, unspecified type  Diagnosis management comments: Patient was able to give us a stool sample prior to discharge. I will discharge her home with a prescription for Flagyl given her symptoms and she will receive a phone call with the results of the C. difficile test.    ED Course as of Dec 18 2334   Fri Dec 18, 2020   1912 His white count is significantly elevated. Her hemoglobin is mildly lower. Her creatinine is up to 1.99 from 1.77 at [facility] a couple weeks ago.   Treat with some IV fluids and see if we get a C. difficile sample.    [AC]      ED Course User Index  [AC] Favian Cazares MD       Procedures

## 2020-12-20 NOTE — PROGRESS NOTES
Called patient to inform her of the test results. She provided her name and  for ID. She was given the test results. She is feeling better and taking the Metronidazole. I will refer to GI. Spoke with patient's daughter as well. She will follow up with PCP for recheck and GI. She will hydrate the patient and return if worse.

## 2020-12-28 ENCOUNTER — HOSPITAL ENCOUNTER (INPATIENT)
Age: 80
LOS: 3 days | Discharge: NURSING, REHAB OR LONG-TERM CARE | DRG: 603 | End: 2020-12-31
Attending: EMERGENCY MEDICINE | Admitting: HOSPITALIST
Payer: MEDICARE

## 2020-12-28 DIAGNOSIS — E83.51 HYPOCALCEMIA: ICD-10-CM

## 2020-12-28 DIAGNOSIS — A04.72 C. DIFFICILE COLITIS: ICD-10-CM

## 2020-12-28 DIAGNOSIS — L03.90 CELLULITIS, UNSPECIFIED CELLULITIS SITE: Primary | ICD-10-CM

## 2020-12-28 DIAGNOSIS — E87.6 HYPOKALEMIA: ICD-10-CM

## 2020-12-28 DIAGNOSIS — E86.0 DEHYDRATION: ICD-10-CM

## 2020-12-28 LAB
ALBUMIN SERPL-MCNC: 2.3 G/DL (ref 3.2–4.6)
ALBUMIN/GLOB SERPL: 0.5 {RATIO} (ref 1.2–3.5)
ALP SERPL-CCNC: 61 U/L (ref 50–136)
ALT SERPL-CCNC: 15 U/L (ref 12–65)
ANION GAP SERPL CALC-SCNC: 9 MMOL/L (ref 7–16)
AST SERPL-CCNC: 16 U/L (ref 15–37)
BASOPHILS # BLD: 0 K/UL (ref 0–0.2)
BASOPHILS NFR BLD: 0 % (ref 0–2)
BILIRUB SERPL-MCNC: 0.4 MG/DL (ref 0.2–1.1)
BUN SERPL-MCNC: 28 MG/DL (ref 8–23)
CA-I BLD-MCNC: 3.68 MG/DL (ref 4–5.2)
CALCIUM SERPL-MCNC: 7 MG/DL (ref 8.3–10.4)
CHLORIDE SERPL-SCNC: 107 MMOL/L (ref 98–107)
CO2 SERPL-SCNC: 25 MMOL/L (ref 21–32)
CREAT SERPL-MCNC: 1.22 MG/DL (ref 0.6–1)
DIFFERENTIAL METHOD BLD: ABNORMAL
EOSINOPHIL # BLD: 0.1 K/UL (ref 0–0.8)
EOSINOPHIL NFR BLD: 1 % (ref 0.5–7.8)
ERYTHROCYTE [DISTWIDTH] IN BLOOD BY AUTOMATED COUNT: 14.7 % (ref 11.9–14.6)
GLOBULIN SER CALC-MCNC: 4.9 G/DL (ref 2.3–3.5)
GLUCOSE SERPL-MCNC: 112 MG/DL (ref 65–100)
HCT VFR BLD AUTO: 32.8 % (ref 35.8–46.3)
HGB BLD-MCNC: 10.5 G/DL (ref 11.7–15.4)
IMM GRANULOCYTES # BLD AUTO: 0.1 K/UL (ref 0–0.5)
IMM GRANULOCYTES NFR BLD AUTO: 1 % (ref 0–5)
LACTATE SERPL-SCNC: 1.6 MMOL/L (ref 0.4–2)
LIPASE SERPL-CCNC: 341 U/L (ref 73–393)
LYMPHOCYTES # BLD: 1.4 K/UL (ref 0.5–4.6)
LYMPHOCYTES NFR BLD: 10 % (ref 13–44)
MAGNESIUM SERPL-MCNC: 0.8 MG/DL (ref 1.8–2.4)
MCH RBC QN AUTO: 28 PG (ref 26.1–32.9)
MCHC RBC AUTO-ENTMCNC: 32 G/DL (ref 31.4–35)
MCV RBC AUTO: 87.5 FL (ref 79.6–97.8)
MONOCYTES # BLD: 1.1 K/UL (ref 0.1–1.3)
MONOCYTES NFR BLD: 8 % (ref 4–12)
NEUTS SEG # BLD: 11.1 K/UL (ref 1.7–8.2)
NEUTS SEG NFR BLD: 80 % (ref 43–78)
NRBC # BLD: 0 K/UL (ref 0–0.2)
PHOSPHATE SERPL-MCNC: 2.2 MG/DL (ref 2.3–3.7)
PLATELET # BLD AUTO: 296 K/UL (ref 150–450)
PMV BLD AUTO: 10.6 FL (ref 9.4–12.3)
POTASSIUM SERPL-SCNC: 3.4 MMOL/L (ref 3.5–5.1)
PROT SERPL-MCNC: 7.2 G/DL (ref 6.3–8.2)
RBC # BLD AUTO: 3.75 M/UL (ref 4.05–5.2)
SODIUM SERPL-SCNC: 141 MMOL/L (ref 136–145)
WBC # BLD AUTO: 13.8 K/UL (ref 4.3–11.1)

## 2020-12-28 PROCEDURE — 74011000302 HC RX REV CODE- 302: Performed by: HOSPITALIST

## 2020-12-28 PROCEDURE — 87040 BLOOD CULTURE FOR BACTERIA: CPT

## 2020-12-28 PROCEDURE — 87077 CULTURE AEROBIC IDENTIFY: CPT

## 2020-12-28 PROCEDURE — 74011250636 HC RX REV CODE- 250/636: Performed by: HOSPITALIST

## 2020-12-28 PROCEDURE — 2709999900 HC NON-CHARGEABLE SUPPLY

## 2020-12-28 PROCEDURE — 82330 ASSAY OF CALCIUM: CPT

## 2020-12-28 PROCEDURE — 83605 ASSAY OF LACTIC ACID: CPT

## 2020-12-28 PROCEDURE — 83690 ASSAY OF LIPASE: CPT

## 2020-12-28 PROCEDURE — 74011250637 HC RX REV CODE- 250/637: Performed by: HOSPITALIST

## 2020-12-28 PROCEDURE — 74011250636 HC RX REV CODE- 250/636: Performed by: EMERGENCY MEDICINE

## 2020-12-28 PROCEDURE — 81003 URINALYSIS AUTO W/O SCOPE: CPT

## 2020-12-28 PROCEDURE — 85025 COMPLETE CBC W/AUTO DIFF WBC: CPT

## 2020-12-28 PROCEDURE — 99282 EMERGENCY DEPT VISIT SF MDM: CPT

## 2020-12-28 PROCEDURE — 74011000258 HC RX REV CODE- 258: Performed by: HOSPITALIST

## 2020-12-28 PROCEDURE — 87205 SMEAR GRAM STAIN: CPT

## 2020-12-28 PROCEDURE — 84100 ASSAY OF PHOSPHORUS: CPT

## 2020-12-28 PROCEDURE — 86580 TB INTRADERMAL TEST: CPT | Performed by: HOSPITALIST

## 2020-12-28 PROCEDURE — 83735 ASSAY OF MAGNESIUM: CPT

## 2020-12-28 PROCEDURE — 87186 SC STD MICRODIL/AGAR DIL: CPT

## 2020-12-28 PROCEDURE — 80053 COMPREHEN METABOLIC PANEL: CPT

## 2020-12-28 PROCEDURE — 65270000029 HC RM PRIVATE

## 2020-12-28 RX ORDER — AMLODIPINE BESYLATE 10 MG/1
10 TABLET ORAL DAILY
Status: CANCELLED | OUTPATIENT
Start: 2020-12-29

## 2020-12-28 RX ORDER — SODIUM CHLORIDE 9 MG/ML
50 INJECTION, SOLUTION INTRAVENOUS CONTINUOUS
Status: DISPENSED | OUTPATIENT
Start: 2020-12-28 | End: 2020-12-29

## 2020-12-28 RX ORDER — LEVOTHYROXINE SODIUM 50 UG/1
25 TABLET ORAL
Status: CANCELLED | OUTPATIENT
Start: 2020-12-29

## 2020-12-28 RX ORDER — HEPARIN SODIUM 5000 [USP'U]/ML
5000 INJECTION, SOLUTION INTRAVENOUS; SUBCUTANEOUS EVERY 12 HOURS
Status: CANCELLED | OUTPATIENT
Start: 2020-12-28

## 2020-12-28 RX ORDER — LANOLIN ALCOHOL/MO/W.PET/CERES
800 CREAM (GRAM) TOPICAL 2 TIMES DAILY
Status: DISCONTINUED | OUTPATIENT
Start: 2020-12-28 | End: 2020-12-30

## 2020-12-28 RX ORDER — MAGNESIUM SULFATE HEPTAHYDRATE 40 MG/ML
2 INJECTION, SOLUTION INTRAVENOUS ONCE
Status: COMPLETED | OUTPATIENT
Start: 2020-12-28 | End: 2020-12-29

## 2020-12-28 RX ORDER — TRAZODONE HYDROCHLORIDE 50 MG/1
50 TABLET ORAL
Status: DISCONTINUED | OUTPATIENT
Start: 2020-12-28 | End: 2020-12-31 | Stop reason: HOSPADM

## 2020-12-28 RX ORDER — VANCOMYCIN/0.9 % SOD CHLORIDE 1.5G/250ML
1500 PLASTIC BAG, INJECTION (ML) INTRAVENOUS
Status: DISCONTINUED | OUTPATIENT
Start: 2020-12-28 | End: 2020-12-28

## 2020-12-28 RX ORDER — POTASSIUM CHLORIDE 20 MEQ/1
20 TABLET, EXTENDED RELEASE ORAL
Status: COMPLETED | OUTPATIENT
Start: 2020-12-28 | End: 2020-12-28

## 2020-12-28 RX ORDER — CLONIDINE HYDROCHLORIDE 0.1 MG/1
0.2 TABLET ORAL
Status: CANCELLED | OUTPATIENT
Start: 2020-12-28

## 2020-12-28 RX ORDER — TRAZODONE HYDROCHLORIDE 50 MG/1
50 TABLET ORAL
Status: CANCELLED | OUTPATIENT
Start: 2020-12-28

## 2020-12-28 RX ADMIN — SODIUM CHLORIDE 50 ML/HR: 900 INJECTION, SOLUTION INTRAVENOUS at 17:35

## 2020-12-28 RX ADMIN — PIPERACILLIN AND TAZOBACTAM 3.38 G: 3; .375 INJECTION, POWDER, LYOPHILIZED, FOR SOLUTION INTRAVENOUS at 18:21

## 2020-12-28 RX ADMIN — SODIUM CHLORIDE 1000 ML: 900 INJECTION, SOLUTION INTRAVENOUS at 16:28

## 2020-12-28 RX ADMIN — PIPERACILLIN AND TAZOBACTAM 3.38 G: 3; .375 INJECTION, POWDER, LYOPHILIZED, FOR SOLUTION INTRAVENOUS at 23:59

## 2020-12-28 RX ADMIN — TRAZODONE HYDROCHLORIDE 50 MG: 50 TABLET ORAL at 23:59

## 2020-12-28 RX ADMIN — TUBERCULIN PURIFIED PROTEIN DERIVATIVE 5 UNITS: 5 INJECTION, SOLUTION INTRADERMAL at 20:35

## 2020-12-28 RX ADMIN — MAGNESIUM GLUCONATE 500 MG ORAL TABLET 800 MG: 500 TABLET ORAL at 18:24

## 2020-12-28 RX ADMIN — POTASSIUM CHLORIDE 20 MEQ: 1500 TABLET, EXTENDED RELEASE ORAL at 18:24

## 2020-12-28 RX ADMIN — MAGNESIUM SULFATE HEPTAHYDRATE 2 G: 40 INJECTION, SOLUTION INTRAVENOUS at 18:50

## 2020-12-28 NOTE — H&P
INTERNAL MEDICINE H&P/CONSULT    Subjective:     77-year-old female presents with multiple complaints. She was diagnosed with wounds and cellulitis of her left leg about 1 month ago. She has been treated with both amoxicillin and Augmentin. She has had bandaging by home health. Wounds have worsened with increased drainage, swelling, and redness. She also developed profuse diarrhea 10 days ago and was seen in the emergency department. She was diagnosed with C. difficile and placed on Flagyl. She reports persistent generalized weakness, poor appetite, and diarrhea. Diarrhea has been alternating with formed stools. No documented fevers. Also reports painful and itchy rash for the past month. She has not yet seen a dermatologist or Dr. Eddie Tubbs for her symptoms. She has not been referred back to wound care clinic yet. On further questioning, her diarrhea stopped at least 2 days ago. She just finished 10 days course of flagyl orally. She denies fever or chills. Her L leg swelling go back to August of this year. She denies hx of leg injuries. Past Medical History:   Diagnosis Date    Calculus of kidney     Gastric ulcer     2018    GERD (gastroesophageal reflux disease)     Headache(784.0)     Hypercholesterolemia     Hypertension     Primary insomnia 7/17/2017    Thyroid disease       Past Surgical History:   Procedure Laterality Date    HX COLONOSCOPY  Jan 2013    4 polyps, repeat 5 years    HX COLONOSCOPY  7/5/2016    repeat 5 yrs tubular adenoma    HX SALPINGO-OOPHORECTOMY      REMOVAL OF KIDNEY STONE        Prior to Admission medications    Medication Sig Start Date End Date Taking? Authorizing Provider   traMADoL (ULTRAM) 50 mg tablet Take 50 mg by mouth every six (6) hours as needed for Pain. Other, MD Rachana   metroNIDAZOLE (FlagyL) 500 mg tablet Take 1 Tab by mouth two (2) times a day for 10 days.  12/18/20 12/28/20  Pino Livingston MD   atorvastatin (LIPITOR) 40 mg tablet TAKE 1 TABLET BY MOUTH DAILY 6/17/20   Naveen Doherty MD   gabapentin (NEURONTIN) 100 mg capsule Take 1 Cap by mouth three (3) times daily. 4/21/20   Naveen Doherty MD   losartan (COZAAR) 100 mg tablet Take 1 Tab by mouth daily. 4/21/20   Naveen Doherty MD   hydroCHLOROthiazide (HYDRODIURIL) 25 mg tablet Take 1 Tab by mouth daily. 4/21/20   Alexandra Sinclair MD   levothyroxine (synthroid) 25 mcg tablet Take 1 Tab by mouth every morning. 4/21/20   Joss Doherty MD   amLODIPine (NORVASC) 10 mg tablet Take 1 Tab by mouth daily. 1/3/20   Naveen Dhoerty MD   DISABLED PLACARD (DISABLED PLACARD) DMV Use placard daily 12/19/19   Joss Doherty MD   losartan-hydroCHLOROthiazide (HYZAAR) 100-25 mg per tablet TAKE ONE TABLET DAILY. 8/27/19   Naveen Doherty MD   potassium chloride (K-DUR, KLOR-CON) 20 mEq tablet TAKE 1 TABLET BY MOUTH 2 TIMES A DAY 8/22/19   Joss Doherty MD   traZODone (DESYREL) 50 mg tablet Take 1 Tab by mouth nightly. 8/22/19   Naveen Doherty MD   ondansetron (ZOFRAN ODT) 4 mg disintegrating tablet Take 1 Tab by mouth every eight (8) hours as needed for Nausea. 10/5/18   Aleksandar Young MD   FLOVENT  mcg/actuation inhaler Take 2 Puffs by inhalation two (2) times a day. 10/28/15   Provider, Historical   acetaminophen (TYLENOL ARTHRITIS PAIN) 650 mg CR tablet Take 650 mg by mouth every eight (8) hours as needed (pain). Provider, Historical   albuterol (VENTOLIN HFA) 90 mcg/actuation inhaler Take 2 Puffs by inhalation every six (6) hours as needed for Wheezing or Shortness of Breath. Provider, Historical   CALCIUM CITRATE + PO take 1 Tab by mouth two (2) times a day. Provider, Historical     Allergies   Allergen Reactions    Pneumococcal 23-Janell Ps Vaccine Other (comments)     fever  Other reaction(s):  Other (comments)  fever      Social History     Tobacco Use    Smoking status: Never Smoker    Smokeless tobacco: Never Used   Substance Use Topics    Alcohol use: No        Family History:  HTN    Review of Systems   A comprehensive review of systems was negative except for that written in the HPI. Objective: Intake / Output:  No intake/output data recorded. No intake/output data recorded. Physical Exam:  Visit Vitals  /64 (BP 1 Location: Left arm, BP Patient Position: At rest)   Pulse (!) 113   Temp 98.5 °F (36.9 °C)   Resp 16   Ht 5' 4\" (1.626 m)   Wt 88.5 kg (195 lb)   SpO2 100%   BMI 33.47 kg/m²     General appearance: awake, alert, cooperative, moderate distress, appears stated age, overweight  Head: Normocephalic, without obvious abnormality, atraumatic  Back: symmetric, no curvature. ROM normal. No CVA tenderness. Lungs: clear to auscultation bilaterally - Diminished bs bibasilar. Heart: regular rate and rhythm, S1, S2 normal, no murmur, click, rub or gallop. Abdomen: soft, no tenderness, no distension, normal bowel sound, no masses, no organomegaly  Extremities: atraumatic, no cyanosis - Bilateral lower limbs edema none. Skin: L leg swelling w/ redness and multiple superf ulcerations, up to 7 cm, w/ serous drainage. Diffuse maculopapular rash as described. Neurologic: Grossly intact     ECG: sinus rhythm     Data Review (Labs):   Recent Results (from the past 24 hour(s))   CBC WITH AUTOMATED DIFF    Collection Time: 12/28/20  2:54 PM   Result Value Ref Range    WBC 13.8 (H) 4.3 - 11.1 K/uL    RBC 3.75 (L) 4.05 - 5.2 M/uL    HGB 10.5 (L) 11.7 - 15.4 g/dL    HCT 32.8 (L) 35.8 - 46.3 %    MCV 87.5 79.6 - 97.8 FL    MCH 28.0 26.1 - 32.9 PG    MCHC 32.0 31.4 - 35.0 g/dL    RDW 14.7 (H) 11.9 - 14.6 %    PLATELET 919 188 - 856 K/uL    MPV 10.6 9.4 - 12.3 FL    ABSOLUTE NRBC 0.00 0.0 - 0.2 K/uL    DF AUTOMATED      NEUTROPHILS 80 (H) 43 - 78 %    LYMPHOCYTES 10 (L) 13 - 44 %    MONOCYTES 8 4.0 - 12.0 %    EOSINOPHILS 1 0.5 - 7.8 %    BASOPHILS 0 0.0 - 2.0 %    IMMATURE GRANULOCYTES 1 0.0 - 5.0 %    ABS. NEUTROPHILS 11.1 (H) 1.7 - 8.2 K/UL    ABS.  LYMPHOCYTES 1.4 0.5 - 4.6 K/UL    ABS. MONOCYTES 1.1 0.1 - 1.3 K/UL    ABS. EOSINOPHILS 0.1 0.0 - 0.8 K/UL    ABS. BASOPHILS 0.0 0.0 - 0.2 K/UL    ABS. IMM. GRANS. 0.1 0.0 - 0.5 K/UL   METABOLIC PANEL, COMPREHENSIVE    Collection Time: 12/28/20  2:54 PM   Result Value Ref Range    Sodium 141 136 - 145 mmol/L    Potassium 3.4 (L) 3.5 - 5.1 mmol/L    Chloride 107 98 - 107 mmol/L    CO2 25 21 - 32 mmol/L    Anion gap 9 7 - 16 mmol/L    Glucose 112 (H) 65 - 100 mg/dL    BUN 28 (H) 8 - 23 MG/DL    Creatinine 1.22 (H) 0.6 - 1.0 MG/DL    GFR est AA 55 (L) >60 ml/min/1.73m2    GFR est non-AA 45 (L) >60 ml/min/1.73m2    Calcium 7.0 (L) 8.3 - 10.4 MG/DL    Bilirubin, total 0.4 0.2 - 1.1 MG/DL    ALT (SGPT) 15 12 - 65 U/L    AST (SGOT) 16 15 - 37 U/L    Alk. phosphatase 61 50 - 136 U/L    Protein, total 7.2 6.3 - 8.2 g/dL    Albumin 2.3 (L) 3.2 - 4.6 g/dL    Globulin 4.9 (H) 2.3 - 3.5 g/dL    A-G Ratio 0.5 (L) 1.2 - 3.5     LIPASE    Collection Time: 12/28/20  2:54 PM   Result Value Ref Range    Lipase 341 73 - 393 U/L   MAGNESIUM    Collection Time: 12/28/20  2:54 PM   Result Value Ref Range    Magnesium 0.8 (L) 1.8 - 2.4 mg/dL   PHOSPHORUS    Collection Time: 12/28/20  2:54 PM   Result Value Ref Range    Phosphorus 2.2 (L) 2.3 - 3.7 MG/DL       Assessment:     1-Cellulitis of L leg, ac on ch, recently grew Pseudomonas  2- s/p C difficile colitis, appears to have responded to flagyl course.  3- Hypomagnesemia, severe, a/w mild hypokalemia.  4- Ca 7, corrected to 8 w/ mild hypoalbuminemia  5- CKD 3, asthma- stable  6- HTN, controlled    Plan:     Zosyn IV  Follow cultures  Contact isolation at this time, may dc soon  ID consulted  Lytes replacement iv/po as ordered  IVF hydration  Blood pressure control  AM lab  Continue essential home medications.  Stop HCTZ  Patient is full code.  Further management depends on patient progress.  Thank you for the oppourtinity to contribute in the care of your patient.  Time 35 minutes.    Signed By: Harman  MD Rena     December 28, 2020

## 2020-12-28 NOTE — ED TRIAGE NOTES
Diagnosed with c-diff on 12/18 advises continued with fatigue, states finished flagyl today diarrhea had slowed but episode today. Mask on during triage. Patient with occasional left sided abdominal pain.

## 2020-12-28 NOTE — ED PROVIDER NOTES
31-year-old female presents with multiple complaints. She was diagnosed with wounds and cellulitis of her left leg about 1 month ago. She has been treated with both amoxicillin and Augmentin. She has had bandaging by home health. Wounds have worsened with increased drainage, swelling, and redness. She also developed profuse diarrhea 10 days ago and was seen in the emergency department. She was diagnosed with C. difficile and placed on Flagyl. She reports persistent generalized weakness, poor appetite, and diarrhea. Diarrhea has been alternating with formed stools. No documented fevers. Also reports painful and itchy rash for the past month. She has not yet seen a dermatologist or Dr. Diana Luz for her symptoms. She has not been referred back to wound care clinic yet. Fatigue  Associated symptoms include nausea. Pertinent negatives include no shortness of breath, no chest pain, no vomiting, no confusion and no headaches. Diarrhea   Associated symptoms include diarrhea and nausea. Pertinent negatives include no vomiting, no dysuria, no headaches and no chest pain.         Past Medical History:   Diagnosis Date    Calculus of kidney     Gastric ulcer     2018    GERD (gastroesophageal reflux disease)     Headache(784.0)     Hypercholesterolemia     Hypertension     Primary insomnia 7/17/2017    Thyroid disease        Past Surgical History:   Procedure Laterality Date    HX COLONOSCOPY  Jan 2013    4 polyps, repeat 5 years    HX COLONOSCOPY  7/5/2016    repeat 5 yrs tubular adenoma    HX SALPINGO-OOPHORECTOMY      REMOVAL OF KIDNEY STONE           Family History:   Problem Relation Age of Onset    Hypertension Mother     Arthritis-rheumatoid Mother     High Cholesterol Mother     Broken Bones Mother     Heart Failure Father     Heart Attack Father     Hypertension Brother     High Cholesterol Brother     Arthritis-osteo Brother         Knee    High Cholesterol Brother     Hypertension Brother    • Heart Attack Brother    • Heart Attack Paternal Grandmother    • Heart Attack Paternal Grandfather    • Breast Cancer Neg Hx        Social History     Socioeconomic History   • Marital status:      Spouse name: Not on file   • Number of children: Not on file   • Years of education: Not on file   • Highest education level: Not on file   Occupational History   • Not on file   Social Needs   • Financial resource strain: Not on file   • Food insecurity     Worry: Not on file     Inability: Not on file   • Transportation needs     Medical: Not on file     Non-medical: Not on file   Tobacco Use   • Smoking status: Never Smoker   • Smokeless tobacco: Never Used   Substance and Sexual Activity   • Alcohol use: No   • Drug use: No   • Sexual activity: Not on file   Lifestyle   • Physical activity     Days per week: Not on file     Minutes per session: Not on file   • Stress: Not on file   Relationships   • Social connections     Talks on phone: Not on file     Gets together: Not on file     Attends [employer] service: Not on file     Active member of club or organization: Not on file     Attends meetings of clubs or organizations: Not on file     Relationship status: Not on file   • Intimate partner violence     Fear of current or ex partner: Not on file     Emotionally abused: Not on file     Physically abused: Not on file     Forced sexual activity: Not on file   Other Topics Concern   • Not on file   Social History Narrative    Lives with  who is chronically ill with end stage lung disease and early dementia and depression.  5 children, daughter Ana Luisa and son Miguel (PharmD) have her HCPOA, sons Korey and Dilan have durable POA         ALLERGIES: Pneumococcal 23-layton ps vaccine    Review of Systems   Constitutional: Positive for fatigue.   HENT: Negative for congestion.    Eyes: Negative for visual disturbance.   Respiratory: Negative for cough and shortness of breath.   Cardiovascular: Negative for chest pain. Gastrointestinal: Positive for diarrhea and nausea. Negative for abdominal pain and vomiting. Genitourinary: Positive for decreased urine volume. Negative for dysuria. Musculoskeletal: Negative for neck pain. Skin: Positive for color change, rash and wound. Neurological: Negative for headaches. Psychiatric/Behavioral: Negative for confusion. Vitals:    12/28/20 1359   BP: 111/64   Pulse: (!) 113   Resp: 16   Temp: 98.5 °F (36.9 °C)   SpO2: 100%   Weight: 88.5 kg (195 lb)   Height: 5' 4\" (1.626 m)            Physical Exam  Vitals signs and nursing note reviewed. Constitutional:       Appearance: She is well-developed. HENT:      Head: Normocephalic and atraumatic. Mouth/Throat:      Mouth: Mucous membranes are dry. Eyes:      Pupils: Pupils are equal, round, and reactive to light. Neck:      Musculoskeletal: Normal range of motion and neck supple. Cardiovascular:      Rate and Rhythm: Regular rhythm. Tachycardia present. Heart sounds: Normal heart sounds. Pulmonary:      Effort: Pulmonary effort is normal.      Breath sounds: Normal breath sounds. Abdominal:      Palpations: Abdomen is soft. Tenderness: There is no abdominal tenderness. Musculoskeletal: Normal range of motion. Left lower leg: She exhibits tenderness and swelling. Edema present. Legs:    Skin:     General: Skin is warm and dry. Comments: Scattered erythematous scaling pruritic rash   Neurological:      Mental Status: She is alert. Psychiatric:         Behavior: Behavior normal.          MDM  Number of Diagnoses or Management Options  Diagnosis management comments: Parts of this document were created using dragon voice recognition software. The chart has been reviewed but errors may still be present. I wore appropriate PPE throughout this patient's ED visit.  Aliya Barragan MD, 3:54 PM    Dehydration, hypocalcemia, with C. difficile and worsening cellulitis. Hospitalist paged for admission. Amount and/or Complexity of Data Reviewed  Clinical lab tests: ordered and reviewed (Results for orders placed or performed during the hospital encounter of 12/28/20  -CBC WITH AUTOMATED DIFF       Result                      Value             Ref Range           WBC                         13.8 (H)          4.3 - 11.1 K*       RBC                         3.75 (L)          4.05 - 5.2 M*       HGB                         10.5 (L)          11.7 - 15.4 *       HCT                         32.8 (L)          35.8 - 46.3 %       MCV                         87.5              79.6 - 97.8 *       MCH                         28.0              26.1 - 32.9 *       MCHC                        32.0              31.4 - 35.0 *       RDW                         14.7 (H)          11.9 - 14.6 %       PLATELET                    296               150 - 450 K/*       MPV                         10.6              9.4 - 12.3 FL       ABSOLUTE NRBC               0.00              0.0 - 0.2 K/*       DF                          AUTOMATED                             NEUTROPHILS                 80 (H)            43 - 78 %           LYMPHOCYTES                 10 (L)            13 - 44 %           MONOCYTES                   8                 4.0 - 12.0 %        EOSINOPHILS                 1                 0.5 - 7.8 %         BASOPHILS                   0                 0.0 - 2.0 %         IMMATURE GRANULOCYTES       1                 0.0 - 5.0 %         ABS. NEUTROPHILS            11.1 (H)          1.7 - 8.2 K/*       ABS. LYMPHOCYTES            1.4               0.5 - 4.6 K/*       ABS. MONOCYTES              1.1               0.1 - 1.3 K/*       ABS. EOSINOPHILS            0.1               0.0 - 0.8 K/*       ABS. BASOPHILS              0.0               0.0 - 0.2 K/*       ABS. IMM.  GRANS.            0.1               0.0 - 0.5 K/*  -METABOLIC PANEL, COMPREHENSIVE       Result Value             Ref Range           Sodium                      141               136 - 145 mm*       Potassium                   3.4 (L)           3.5 - 5.1 mm*       Chloride                    107               98 - 107 mmo*       CO2                         25                21 - 32 mmol*       Anion gap                   9                 7 - 16 mmol/L       Glucose                     112 (H)           65 - 100 mg/*       BUN                         28 (H)            8 - 23 MG/DL        Creatinine                  1.22 (H)          0.6 - 1.0 MG*       GFR est AA                  55 (L)            >60 ml/min/1*       GFR est non-AA              45 (L)            >60 ml/min/1*       Calcium                     7.0 (L)           8.3 - 10.4 M*       Bilirubin, total            0.4               0.2 - 1.1 MG*       ALT (SGPT)                  15                12 - 65 U/L         AST (SGOT)                  16                15 - 37 U/L         Alk.  phosphatase            61                50 - 136 U/L        Protein, total              7.2               6.3 - 8.2 g/*       Albumin                     2.3 (L)           3.2 - 4.6 g/*       Globulin                    4.9 (H)           2.3 - 3.5 g/*       A-G Ratio                   0.5 (L)           1.2 - 3.5      -LIPASE       Result                      Value             Ref Range           Lipase                      341               73 - 393 U/L   )  Tests in the medicine section of CPT®: reviewed and ordered           Procedures

## 2020-12-28 NOTE — PROGRESS NOTES
Telephone report received from Coco Morales, [facility]. Requested that stat Lactic acid ordered at 1545 be drawn prior to sending pt to room 364.

## 2020-12-29 LAB
ANION GAP SERPL CALC-SCNC: 10 MMOL/L (ref 7–16)
BUN SERPL-MCNC: 21 MG/DL (ref 8–23)
CALCIUM SERPL-MCNC: 6.9 MG/DL (ref 8.3–10.4)
CHLORIDE SERPL-SCNC: 111 MMOL/L (ref 98–107)
CO2 SERPL-SCNC: 24 MMOL/L (ref 21–32)
CREAT SERPL-MCNC: 1.1 MG/DL (ref 0.6–1)
GLUCOSE SERPL-MCNC: 89 MG/DL (ref 65–100)
MAGNESIUM SERPL-MCNC: 1.7 MG/DL (ref 1.8–2.4)
MM INDURATION POC: 0 MM (ref 0–5)
POTASSIUM SERPL-SCNC: 3.6 MMOL/L (ref 3.5–5.1)
PPD POC: NEGATIVE NEGATIVE
SODIUM SERPL-SCNC: 145 MMOL/L (ref 136–145)

## 2020-12-29 PROCEDURE — 74011250637 HC RX REV CODE- 250/637: Performed by: HOSPITALIST

## 2020-12-29 PROCEDURE — 74011000258 HC RX REV CODE- 258: Performed by: HOSPITALIST

## 2020-12-29 PROCEDURE — 36415 COLL VENOUS BLD VENIPUNCTURE: CPT

## 2020-12-29 PROCEDURE — 83735 ASSAY OF MAGNESIUM: CPT

## 2020-12-29 PROCEDURE — 74011250636 HC RX REV CODE- 250/636: Performed by: HOSPITALIST

## 2020-12-29 PROCEDURE — 80048 BASIC METABOLIC PNL TOTAL CA: CPT

## 2020-12-29 PROCEDURE — 65270000029 HC RM PRIVATE

## 2020-12-29 RX ORDER — ACETAMINOPHEN 325 MG/1
650 TABLET ORAL
Status: DISCONTINUED | OUTPATIENT
Start: 2020-12-29 | End: 2020-12-31 | Stop reason: HOSPADM

## 2020-12-29 RX ADMIN — PIPERACILLIN AND TAZOBACTAM 3.38 G: 3; .375 INJECTION, POWDER, LYOPHILIZED, FOR SOLUTION INTRAVENOUS at 16:53

## 2020-12-29 RX ADMIN — MAGNESIUM GLUCONATE 500 MG ORAL TABLET 800 MG: 500 TABLET ORAL at 16:53

## 2020-12-29 RX ADMIN — MAGNESIUM GLUCONATE 500 MG ORAL TABLET 800 MG: 500 TABLET ORAL at 09:00

## 2020-12-29 RX ADMIN — PIPERACILLIN AND TAZOBACTAM 3.38 G: 3; .375 INJECTION, POWDER, LYOPHILIZED, FOR SOLUTION INTRAVENOUS at 09:00

## 2020-12-29 NOTE — PROGRESS NOTES
Left leg wound was redressed and cleaned with dermal wound cleanser, xeroform, abd pads and kerflex.     Jessee Kidd RN

## 2020-12-29 NOTE — PROGRESS NOTES
Care Management Interventions  PCP Verified by CM: Yes([facility])  Transition of Care Consult (CM Consult): Discharge Planning  Discharge Durable Medical Equipment: No  Current Support Network: Own Home, Family Lives Nearby  Confirm Follow Up Transport: Family  Discharge Location  Discharge Placement: Home with home health    CM met with patient and daughter at bedside. Proper PPE in place and social distancing observed. Demographics and insurance verified. Patient admitted with LLE cellulitis. She lives alone; has private pay caregivers daily from 9:30-12:30. Daughter lives in area. Patient was independent with ADLs prior to current illness; has required assistance over the past month. She ambulates with walker; also has w/c and shower chair in home. Patient is current with [facility]; receiving RN for wound care and PT services. CM contacted [facility] at [phone]; notified them of patient admission. Patient/daughter are currently planning for patient to return home with home health at discharge. CM will need to fax resumption of care orders to [facility] at [phone]). CM will continue to follow to assist with discharge needs.

## 2020-12-29 NOTE — WOUND CARE
Discussed briefly with primary nurse, she completed leg dressing. Wound team will follow up tomorrow for full assessment.

## 2020-12-29 NOTE — PROGRESS NOTES
INTERNAL MEDICINE    Subjective:     81-year-old female presents with multiple complaints. She was diagnosed with wounds and cellulitis of her left leg about 1 month ago. She has been treated with both amoxicillin and Augmentin. She has had bandaging by home health. Wounds have worsened with increased drainage, swelling, and redness. She also developed profuse diarrhea 10 days ago and was seen in the emergency department. She was diagnosed with C. difficile and placed on Flagyl. She reports persistent generalized weakness, poor appetite, and diarrhea. Diarrhea has been alternating with formed stools. No documented fevers. Also reports painful and itchy rash for the past month. She has not yet seen a dermatologist or Dr. Kim Beckham for her symptoms. She has not been referred back to wound care clinic yet. On further questioning, her diarrhea stopped at least 2 days ago. She just finished 10 days course of flagyl orally. She denies fever or chills. Her L leg swelling go back to August of this year. She denies hx of leg injuries. Today, feels better, no fever. No diarrhea    Objective: Intake / Output:  No intake/output data recorded. 12/27 1901 - 12/29 0700  In: -   Out: 350 [Urine:350]    Physical Exam:  Visit Vitals  BP (!) 120/58 (BP 1 Location: Right arm, BP Patient Position: At rest)   Pulse 91   Temp 98.4 °F (36.9 °C)   Resp 16   Ht 5' 4\" (1.626 m)   Wt 88.5 kg (195 lb)   SpO2 94%   BMI 33.47 kg/m²     General appearance: awake, alert, cooperative, mild distress, appears stated age, overweight  Lungs: clear to auscultation bilaterally - Diminished bs bibasilar. Heart: regular rate and rhythm, S1, S2 normal, no murmur, click, rub or gallop. Abdomen: soft, no tenderness, no distension, normal bowel sound, no masses, no organomegaly  Extremities: atraumatic, no cyanosis - Bilateral lower limbs edema none.   Skin: L leg swelling w/ redness and multiple superf ulcerations, up to 7 cm, w/ serous drainage. Diffuse maculopapular rash as described. Neurologic: Grossly intact     ECG: sinus rhythm     Data Review (Labs):   Recent Results (from the past 24 hour(s))   CBC WITH AUTOMATED DIFF    Collection Time: 12/28/20  2:54 PM   Result Value Ref Range    WBC 13.8 (H) 4.3 - 11.1 K/uL    RBC 3.75 (L) 4.05 - 5.2 M/uL    HGB 10.5 (L) 11.7 - 15.4 g/dL    HCT 32.8 (L) 35.8 - 46.3 %    MCV 87.5 79.6 - 97.8 FL    MCH 28.0 26.1 - 32.9 PG    MCHC 32.0 31.4 - 35.0 g/dL    RDW 14.7 (H) 11.9 - 14.6 %    PLATELET 459 558 - 125 K/uL    MPV 10.6 9.4 - 12.3 FL    ABSOLUTE NRBC 0.00 0.0 - 0.2 K/uL    DF AUTOMATED      NEUTROPHILS 80 (H) 43 - 78 %    LYMPHOCYTES 10 (L) 13 - 44 %    MONOCYTES 8 4.0 - 12.0 %    EOSINOPHILS 1 0.5 - 7.8 %    BASOPHILS 0 0.0 - 2.0 %    IMMATURE GRANULOCYTES 1 0.0 - 5.0 %    ABS. NEUTROPHILS 11.1 (H) 1.7 - 8.2 K/UL    ABS. LYMPHOCYTES 1.4 0.5 - 4.6 K/UL    ABS. MONOCYTES 1.1 0.1 - 1.3 K/UL    ABS. EOSINOPHILS 0.1 0.0 - 0.8 K/UL    ABS. BASOPHILS 0.0 0.0 - 0.2 K/UL    ABS. IMM. GRANS. 0.1 0.0 - 0.5 K/UL   METABOLIC PANEL, COMPREHENSIVE    Collection Time: 12/28/20  2:54 PM   Result Value Ref Range    Sodium 141 136 - 145 mmol/L    Potassium 3.4 (L) 3.5 - 5.1 mmol/L    Chloride 107 98 - 107 mmol/L    CO2 25 21 - 32 mmol/L    Anion gap 9 7 - 16 mmol/L    Glucose 112 (H) 65 - 100 mg/dL    BUN 28 (H) 8 - 23 MG/DL    Creatinine 1.22 (H) 0.6 - 1.0 MG/DL    GFR est AA 55 (L) >60 ml/min/1.73m2    GFR est non-AA 45 (L) >60 ml/min/1.73m2    Calcium 7.0 (L) 8.3 - 10.4 MG/DL    Bilirubin, total 0.4 0.2 - 1.1 MG/DL    ALT (SGPT) 15 12 - 65 U/L    AST (SGOT) 16 15 - 37 U/L    Alk.  phosphatase 61 50 - 136 U/L    Protein, total 7.2 6.3 - 8.2 g/dL    Albumin 2.3 (L) 3.2 - 4.6 g/dL    Globulin 4.9 (H) 2.3 - 3.5 g/dL    A-G Ratio 0.5 (L) 1.2 - 3.5     LIPASE    Collection Time: 12/28/20  2:54 PM   Result Value Ref Range    Lipase 341 73 - 393 U/L   MAGNESIUM    Collection Time: 12/28/20  2:54 PM   Result Value Ref Range    Magnesium 0.8 (L) 1.8 - 2.4 mg/dL   PHOSPHORUS    Collection Time: 12/28/20  2:54 PM   Result Value Ref Range    Phosphorus 2.2 (L) 2.3 - 3.7 MG/DL   CULTURE, WOUND W GRAM STAIN    Collection Time: 12/28/20  4:26 PM    Specimen: Leg   Result Value Ref Range    Special Requests: LEFT  ABS LOWER SWAB        GRAM STAIN NO WBC'S SEEN      GRAM STAIN NO DEFINITE ORGANISM SEEN      Culture result: PENDING    CULTURE, BLOOD    Collection Time: 12/28/20  4:35 PM    Specimen: Blood   Result Value Ref Range    Special Requests: RIGHT ANTECUBITAL      Culture result: NO GROWTH AFTER 14 HOURS     CULTURE, BLOOD    Collection Time: 12/28/20  5:54 PM    Specimen: Blood   Result Value Ref Range    Special Requests: RIGHT  Antecubital        Culture result: NO GROWTH AFTER 13 HOURS     LACTIC ACID    Collection Time: 12/28/20  5:54 PM   Result Value Ref Range    Lactic acid 1.6 0.4 - 2.0 MMOL/L   CALCIUM, IONIZED    Collection Time: 12/28/20  5:54 PM   Result Value Ref Range    Calcium, ionized 3.68 (L) 4.0 - 5.2 mg/dL   METABOLIC PANEL, BASIC    Collection Time: 12/29/20  9:36 AM   Result Value Ref Range    Sodium 145 136 - 145 mmol/L    Potassium 3.6 3.5 - 5.1 mmol/L    Chloride 111 (H) 98 - 107 mmol/L    CO2 24 21 - 32 mmol/L    Anion gap 10 7 - 16 mmol/L    Glucose 89 65 - 100 mg/dL    BUN 21 8 - 23 MG/DL    Creatinine 1.10 (H) 0.6 - 1.0 MG/DL    GFR est AA >60 >60 ml/min/1.73m2    GFR est non-AA 51 (L) >60 ml/min/1.73m2    Calcium 6.9 (L) 8.3 - 10.4 MG/DL   MAGNESIUM    Collection Time: 12/29/20  9:36 AM   Result Value Ref Range    Magnesium 1.7 (L) 1.8 - 2.4 mg/dL       Assessment:     1-Cellulitis of L leg, ac on ch, recently grew Pseudomonas  2- s/p C difficile colitis, appears to have responded to flagyl course. 3- Hypomagnesemia, severe, a/w mild hypokalemia.   4- Ca 7, corrected to 8 w/ mild hypoalbuminemia  5- CKD 3, asthma- stable  6- HTN, controlled  7- Dehydration, resolvd    Plan:     Zosyn IV  Follow cultures  Contact isolation at this time, may dc soon  ID consulted  Lytes replacement iv/po as needed  Blood pressure control  AM lab as needed  Continue essential home medications.   Stop HCTZ    Dispo: likely home    Signed By: Isa Simon MD     December 29, 2020

## 2020-12-29 NOTE — PROGRESS NOTES
12/28/20 1730   Dual Skin Pressure Injury Assessment   Dual Skin Pressure Injury Assessment WDL   Second Care Provider (Based on [address] [place] [address]) Radha Smith RN   Skin Integumentary   Skin Integumentary (WDL) X    Pressure  Injury Documentation No Pressure Injury Noted-Pressure Ulcer Prevention Initiated   Skin Color Red  (LLE)   Skin Condition/Temp Inflamed;Hot  (LLE)   Skin Integrity Wound (add Wound LDA); Weeping  (LLE)   LLE red, warm/hot, and weeping. Approx 3 cm ulcerated wound to lt calf, pink wound base. Leg cleaned with dermal wound cleanser. Xeroform, abd pads, and kerlex applied.

## 2020-12-29 NOTE — PROGRESS NOTES
Problem: Risk for Spread of Infection  Goal: Prevent transmission of infectious organism to others  Description: Prevent the transmission of infectious organisms to other patients, staff members, and visitors. Outcome: Progressing Towards Goal     Problem: Patient Education:  Go to Education Activity  Goal: Patient/Family Education  Outcome: Progressing Towards Goal     Problem: Pressure Injury - Risk of  Goal: *Prevention of pressure injury  Description: Document Jeffery Scale and appropriate interventions in the flowsheet.   Outcome: Progressing Towards Goal  Note: Pressure Injury Interventions:  Sensory Interventions: Assess changes in LOC, Assess need for specialty bed    Moisture Interventions: Absorbent underpads, Minimize layers    Activity Interventions: Assess need for specialty bed, Increase time out of bed    Mobility Interventions: Pressure redistribution bed/mattress (bed type)    Nutrition Interventions: Document food/fluid/supplement intake, Offer support with meals,snacks and hydration    Friction and Shear Interventions: Apply protective barrier, creams and emollients

## 2020-12-30 LAB
MM INDURATION POC: 0 MM (ref 0–5)
PPD POC: NEGATIVE NEGATIVE

## 2020-12-30 PROCEDURE — 74011250637 HC RX REV CODE- 250/637: Performed by: HOSPITALIST

## 2020-12-30 PROCEDURE — 65270000029 HC RM PRIVATE

## 2020-12-30 PROCEDURE — 74011250636 HC RX REV CODE- 250/636: Performed by: INTERNAL MEDICINE

## 2020-12-30 PROCEDURE — 2709999900 HC NON-CHARGEABLE SUPPLY

## 2020-12-30 PROCEDURE — 74011000258 HC RX REV CODE- 258: Performed by: HOSPITALIST

## 2020-12-30 PROCEDURE — 74011000250 HC RX REV CODE- 250: Performed by: INTERNAL MEDICINE

## 2020-12-30 PROCEDURE — 74011250636 HC RX REV CODE- 250/636: Performed by: HOSPITALIST

## 2020-12-30 RX ORDER — POTASSIUM CHLORIDE 20 MEQ/1
40 TABLET, EXTENDED RELEASE ORAL
Status: COMPLETED | OUTPATIENT
Start: 2020-12-30 | End: 2020-12-30

## 2020-12-30 RX ORDER — LORATADINE 10 MG/1
10 TABLET ORAL DAILY
Status: DISCONTINUED | OUTPATIENT
Start: 2020-12-31 | End: 2020-12-31 | Stop reason: HOSPADM

## 2020-12-30 RX ORDER — LANOLIN ALCOHOL/MO/W.PET/CERES
400 CREAM (GRAM) TOPICAL 2 TIMES DAILY
Status: DISCONTINUED | OUTPATIENT
Start: 2020-12-30 | End: 2020-12-31 | Stop reason: HOSPADM

## 2020-12-30 RX ORDER — FUROSEMIDE 10 MG/ML
40 INJECTION INTRAMUSCULAR; INTRAVENOUS ONCE
Status: COMPLETED | OUTPATIENT
Start: 2020-12-30 | End: 2020-12-30

## 2020-12-30 RX ADMIN — TRAZODONE HYDROCHLORIDE 25 MG: 50 TABLET ORAL at 02:30

## 2020-12-30 RX ADMIN — FUROSEMIDE 40 MG: 10 INJECTION, SOLUTION INTRAMUSCULAR; INTRAVENOUS at 18:28

## 2020-12-30 RX ADMIN — VANCOMYCIN HYDROCHLORIDE 125 MG: 5 INJECTION, POWDER, LYOPHILIZED, FOR SOLUTION INTRAVENOUS at 18:27

## 2020-12-30 RX ADMIN — POTASSIUM CHLORIDE 40 MEQ: 1500 TABLET, EXTENDED RELEASE ORAL at 18:27

## 2020-12-30 RX ADMIN — PIPERACILLIN AND TAZOBACTAM 3.38 G: 3; .375 INJECTION, POWDER, LYOPHILIZED, FOR SOLUTION INTRAVENOUS at 00:51

## 2020-12-30 RX ADMIN — MAGNESIUM GLUCONATE 500 MG ORAL TABLET 400 MG: 500 TABLET ORAL at 18:27

## 2020-12-30 RX ADMIN — VANCOMYCIN HYDROCHLORIDE 125 MG: 5 INJECTION, POWDER, LYOPHILIZED, FOR SOLUTION INTRAVENOUS at 23:42

## 2020-12-30 RX ADMIN — MAGNESIUM GLUCONATE 500 MG ORAL TABLET 400 MG: 500 TABLET ORAL at 08:57

## 2020-12-30 RX ADMIN — TRAZODONE HYDROCHLORIDE 50 MG: 50 TABLET ORAL at 20:24

## 2020-12-30 RX ADMIN — PIPERACILLIN AND TAZOBACTAM 3.38 G: 3; .375 INJECTION, POWDER, LYOPHILIZED, FOR SOLUTION INTRAVENOUS at 08:57

## 2020-12-30 NOTE — PROGRESS NOTES
Drsg changed to LLE by Abdirizak Cortez RN. LLE cleansed with dermal wound cleanser, then Aquaphor applied, and xeroform gauze & gauze applied then wrapped with kerlix, per Lexii MART, and wound care RN consulted for tomorrow. Pt tolerated without difficulty. (Site not seen by this RN, but approx quarter size ulcerated area reported to left calf with cellulitis to LLE by Abdirizak Cortez RN).

## 2020-12-30 NOTE — PROGRESS NOTES
Problem: Risk for Spread of Infection  Goal: Prevent transmission of infectious organism to others  Description: Prevent the transmission of infectious organisms to other patients, staff members, and visitors. Outcome: Progressing Towards Goal     Problem: Pressure Injury - Risk of  Goal: *Prevention of pressure injury  Description: Document Jeffery Scale and appropriate interventions in the flowsheet.   Outcome: Progressing Towards Goal  Note: Pressure Injury Interventions:  Sensory Interventions: Assess changes in LOC, Keep linens dry and wrinkle-free, Minimize linen layers, Monitor skin under medical devices, Pressure redistribution bed/mattress (bed type)    Moisture Interventions: Absorbent underpads, Minimize layers    Activity Interventions: Increase time out of bed    Mobility Interventions: Pressure redistribution bed/mattress (bed type), HOB 30 degrees or less    Nutrition Interventions: Document food/fluid/supplement intake    Friction and Shear Interventions: Minimize layers, HOB 30 degrees or less

## 2020-12-30 NOTE — PROGRESS NOTES
INTERNAL MEDICINE    Subjective:     72-year-old female presents with multiple complaints. She was diagnosed with wounds and cellulitis of her left leg about 1 month ago. She has been treated with both amoxicillin and Augmentin. She has had bandaging by home health. Wounds have worsened with increased drainage, swelling, and redness. She also developed profuse diarrhea 10 days ago and was seen in the emergency department. She was diagnosed with C. difficile and placed on Flagyl. She reports persistent generalized weakness, poor appetite, and diarrhea. Diarrhea has been alternating with formed stools. No documented fevers. Also reports painful and itchy rash for the past month. She has not yet seen a dermatologist or Dr. Jeremy Marie for her symptoms. She has not been referred back to wound care clinic yet. On further questioning, her diarrhea stopped at least 2 days ago. She just finished 10 days course of flagyl orally. She denies fever or chills. Her L leg swelling go back to August of this year. She denies hx of leg injuries. Today, feels better, no fever. Mild  diarrhea    Objective: Intake / Output:  No intake/output data recorded. 12/28 1901 - 12/30 0700  In: -   Out: 350 [Urine:350]    Physical Exam:  Visit Vitals  BP (!) 124/56 (BP 1 Location: Right arm, BP Patient Position: At rest)   Pulse 84   Temp 97.8 °F (36.6 °C)   Resp 17   Ht 5' 4\" (1.626 m)   Wt 88.5 kg (195 lb)   SpO2 97%   BMI 33.47 kg/m²     General appearance: awake, alert, cooperative, mild distress, appears stated age, overweight  Lungs: clear to auscultation bilaterally - Diminished bs bibasilar. Heart: regular rate and rhythm, S1, S2 normal, no murmur, click, rub or gallop. Abdomen: soft, no tenderness, no distension, normal bowel sound, no masses, no organomegaly  Extremities: atraumatic, no cyanosis - Bilateral lower limbs edema none.   Skin: L leg less swelling w/ less redness and multiple superf ulcerations, up to 7 cm, w/ serous drainage. Diffuse maculopapular rash as described. Neurologic: Grossly intact     ECG: sinus rhythm     Data Review (Labs):   Recent Results (from the past 24 hour(s))   PLEASE READ & DOCUMENT PPD TEST IN 24 HRS    Collection Time: 12/29/20  8:38 PM   Result Value Ref Range    PPD Negative Negative    mm Induration 0 0 - 5 mm       Assessment:     1-Cellulitis of L leg, ac on ch, recently grew Pseudomonas  2- s/p C difficile colitis, appears to have responded to flagyl course. 3- Hypomagnesemia, severe, a/w mild hypokalemia. 4- Ca 7, corrected to 8 w/ mild hypoalbuminemia  5- CKD 3, asthma- stable  6- HTN, controlled  7- Dehydration, resolvd    Plan:     Off Zosyn  Follow cultures  Topical Rx  Legs compressions  Lasix iv x1  Vanco po per ID  Lytes replacement iv/po as needed  Blood pressure control  AM lab as needed  Continue essential home medications.   Stop HCTZ    Dispo:  home tomorrow    Signed By: Jonatan Mayfield MD     December 30, 2020

## 2020-12-30 NOTE — WOUND CARE
Pt seen for wounds on LLE documented as present on admission. Per family and patient she hit her leg on the side of her car door back in August and it just swelled up from there and started weeping to the point where they could not control the drainage. Pt had home health ordered from her primary MD prior to this hospitalization for management and had previously tried compression wraps. Pt and family in agreement to try a light compression with ace changed daily while in hospital and agreed to keep scheduled follow up appointment at [facility] wound center. Recommend while in hospital cleanse areas with dermal wound cleanser apply xeroform over open areas cover with ABD pads and wrap from base of toes to just below the knee with kerlix and ace. Change daily and as needed for saturation. Wound team will continue to follow while in acute care setting.

## 2020-12-30 NOTE — CONSULTS
Infectious Disease Consult    Impression:   · Venous stasis ulcer/wound LLE: no active signs local cellulitis; wound superficial  · C-diff: responded to Flagyl but ABX started for LLE wound shortly thereafter. · Drug reaction to Amoxicillin    Plan:   · Stop Zosyn  · Topical neosporin to wound L leg bid x 7 days  · Agree with light compression L leg and elevated while awake or in bed  · Claritin for drug rash; not bad enough for steroids and want to avoid given recent C-diff  · Has had several more looser BM in last 24 hours; may just be due ABX themselves but only finished Flagyl just before arrived and somewhat longer time to respond to it so empirically retreat with oral Vanc 125mg QID x 10 days. Anti-infectives:   1. Zosyn 12/28-    Subjective:   Date of Consultation:  December 30, 2020  Date of Admission: 12/28/2020   Referring Physician: Andry Malloy  Reason for Consult: wound infection, C-diff    Patient is a 68 y.o. female with CKD3 who was admitted on 12/28/2020 with unclear complaints. History of wound on L leg for about a month. Recently given Amoxacillin and Augmentin for wound cx that grew Pseudomonas. Developed diarrhea which was C-diff positive. Treated with 10 days Flagyl with improvement: diarrhea resolved 2 days prior this admission. On admission she was afebrile, L leg noted to be diffusely swollen and red with multiple superficial ulcerations and diffuse maculopapular rash also noted. WBC 13.8. Admitted and started on Zosyn for cellulitis. No fever, no repeat WBC. Blood cx from admission negative. Has had what looks on exam and in photos to be drug rash from Amox/augmentin since late November without clearly worsening. Is itching however. LLE wound began several months ago after hitting leg with car door by accident. Primary issue bothering her on arrival was swelling in LLE. Started on Zosyn here. Reports looser BM starting yesterday but no abdominal pain or fever or nausea.  Blow out type BM with little warning starting yesterday afternoon. TIARRA has resolved but still feels very weak. Lost her  this fall. Lives alone, Has someone coming in for 3 hours a day. Daughter is in [address]. Son is pharmacist.     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 9) with comorbidity (HCC) E66.01    Cellulitis and abscess of left lower extremity L03.116, L02.416    Acute renal failure superimposed on stage 3 chronic kidney disease (HCC) N17.9, N18.30    Hypokalemia E87.6    Dehydration E86.0    Orthostatic hypotension I95.1    Acute pancreatitis K85.90    Acute gastric ulcer without hemorrhage or perforation K25.3    Gastric ulcer K25.9    Cellulitis of right leg L03.115    Acute kidney injury superimposed on CKD (HCC) N17.9, N18.9    Pyogenic arthritis of right knee joint (HCC) M00.9    Cellulitis L03.90     Past Medical History:   Diagnosis Date    Calculus of kidney     Gastric ulcer     2018    GERD (gastroesophageal reflux disease)     Headache(784.0)     Hypercholesterolemia     Hypertension     Primary insomnia 7/17/2017    Thyroid disease       Family History   Problem Relation Age of Onset    Hypertension Mother     Arthritis-rheumatoid Mother     High Cholesterol Mother     Broken Bones Mother     Heart Failure Father     Heart Attack Father     Hypertension Brother     High Cholesterol Brother     Arthritis-osteo Brother         Knee    High Cholesterol Brother     Hypertension Brother     Heart Attack Brother     Heart Attack Paternal Grandmother     Heart Attack Paternal Grandfather     Breast Cancer Neg Hx       Social History     Tobacco Use    Smoking status: Never Smoker    Smokeless tobacco: Never Used   Substance Use Topics    Alcohol use: No     Past Surgical History:   Procedure Laterality Date    HX COLONOSCOPY  Jan 2013    4 polyps, repeat 5 years    HX COLONOSCOPY  7/5/2016    repeat 5 yrs tubular adenoma    HX SALPINGO-OOPHORECTOMY      REMOVAL OF KIDNEY STONE        Prior to Admission medications    Medication Sig Start Date End Date Taking? Authorizing Provider   traMADoL (ULTRAM) 50 mg tablet Take 50 mg by mouth every six (6) hours as needed for Pain. Yes Zoë, MD Rachana   atorvastatin (LIPITOR) 40 mg tablet TAKE 1 TABLET BY MOUTH DAILY 6/17/20  Yes Joss Doherty MD   gabapentin (NEURONTIN) 100 mg capsule Take 1 Cap by mouth three (3) times daily. Patient taking differently: Take 200 mg by mouth three (3) times daily. 200 mg in am, 200 mg in afternoon, 300 mg bedtime 4/21/20  Yes Maddie Beckham MD   losartan (COZAAR) 100 mg tablet Take 1 Tab by mouth daily. 4/21/20  Yes Maddie Beckham MD   hydroCHLOROthiazide (HYDRODIURIL) 25 mg tablet Take 1 Tab by mouth daily. 4/21/20  Yes Maddie Beckham MD   levothyroxine (synthroid) 25 mcg tablet Take 1 Tab by mouth every morning. 4/21/20  Yes Maddie Beckham MD   potassium chloride (K-DUR, KLOR-CON) 20 mEq tablet TAKE 1 TABLET BY MOUTH 2 TIMES A DAY 8/22/19  Yes Joss Doherty MD   ondansetron (ZOFRAN ODT) 4 mg disintegrating tablet Take 1 Tab by mouth every eight (8) hours as needed for Nausea. 10/5/18  Yes Melonie Vasquez MD   FLOVENT  mcg/actuation inhaler Take 2 Puffs by inhalation two (2) times a day.  10/28/15  Yes Provider, Historical   acetaminophen (TYLENOL ARTHRITIS PAIN) 650 mg CR tablet Take 650 mg by mouth every eight (8) hours as needed (pain). Yes Provider, Historical   albuterol (VENTOLIN HFA) 90 mcg/actuation inhaler Take 2 Puffs by inhalation every six (6) hours as needed for Wheezing or Shortness of Breath. Yes Provider, Historical   CALCIUM CITRATE + PO take 1 Tab by mouth two (2) times a day. Yes Provider, Historical   amLODIPine (NORVASC) 10 mg tablet Take 1 Tab by mouth daily. 1/3/20   Gail Doherty MD   DISABLED PLACARD (DISABLED PLACARD) DMV Use placard daily 19   Joss Doherty MD   losartan-hydroCHLOROthiazide (HYZAAR) 100-25 mg per tablet TAKE ONE TABLET DAILY. 19   Gail Doherty MD   traZODone (DESYREL) 50 mg tablet Take 1 Tab by mouth nightly. 19   Mei Dia MD     Allergies   Allergen Reactions    Pneumococcal 23-Janell Ps Vaccine Other (comments)     fever  Other reaction(s): Other (comments)  fever          Review of Systems:  A comprehensive review of systems was negative except for that written in the History of Present Illness. Objective:   Blood pressure (!) 130/59, pulse 89, temperature 98 °F (36.7 °C), resp. rate 17, height 5' 4\" (1.626 m), weight 88.5 kg (195 lb), SpO2 91 %.   Temp (24hrs), Av.3 °F (36.8 °C), Min:97.7 °F (36.5 °C), Max:98.6 °F (37 °C)       Lines: none    Exam:     General: NC/AT, alert and cooperative, looks stated age, in NAD   HEENT: PERRL, non-icteric sclera, no splinter hemorrhages   Neck: supple, symmetric, no masses or lymphadenopathy   Cardiac:Nl S1/S2, no murmurs/rubs/gallops/clicks, no LE edema   Pulmonary:clear bilaterally fransico/wheezes, good air movement    Abdomen: soft, NT, non-distended, BS normal   Skin:large macular rash chest/arms/back and upper legs, 2+ LE edema with venous stasis like shallow tender wound left calf/largest was the initial injury wound; no local heat or redness,     MSK:no enlarged or swollen joints in limbs or sternoclavicular area   Extremities: no cords/clots/cyanosis, pulses palpable and symmetric    Psychiatric: mood and affect appropriate to situation       Data Review:   Recent Results (from the past 24 hour(s))   METABOLIC PANEL, BASIC    Collection Time: 12/29/20  9:36 AM   Result Value Ref Range    Sodium 145 136 - 145 mmol/L    Potassium 3.6 3.5 - 5.1 mmol/L    Chloride 111 (H) 98 - 107 mmol/L    CO2 24 21 - 32 mmol/L    Anion gap 10 7 - 16 mmol/L    Glucose 89 65 - 100 mg/dL    BUN 21 8 - 23 MG/DL    Creatinine 1.10 (H) 0.6 - 1.0 MG/DL    GFR est AA >60 >60 ml/min/1.73m2    GFR est non-AA 51 (L) >60 ml/min/1.73m2    Calcium 6.9 (L) 8.3 - 10.4 MG/DL   MAGNESIUM    Collection Time: 12/29/20  9:36 AM   Result Value Ref Range    Magnesium 1.7 (L) 1.8 - 2.4 mg/dL   PLEASE READ & DOCUMENT PPD TEST IN 24 HRS    Collection Time: 12/29/20  8:38 PM   Result Value Ref Range    PPD Negative Negative    mm Induration 0 0 - 5 mm        Microbiology:    All Micro Results     Procedure Component Value Units Date/Time    CULTURE, Saintair Milling STAIN [[id number]] Collected: 12/28/20 1626    Order Status: Completed Specimen: Leg Updated: 12/29/20 1233     Special Requests: --        LEFT  ABS LOWER SWAB       GRAM STAIN NO WBC'S SEEN         NO DEFINITE ORGANISM SEEN        Culture result: PENDING    CULTURE, BLOOD [[id number]] Collected: 12/28/20 1635    Order Status: Completed Specimen: Blood Updated: 12/29/20 0745     Special Requests: RIGHT ANTECUBITAL        Culture result: NO GROWTH AFTER 14 HOURS       CULTURE, BLOOD [[id number]] Collected: 12/28/20 0664    Order Status: Completed Specimen: Blood Updated: 12/29/20 0745     Special Requests: --        RIGHT  Antecubital       Culture result: NO GROWTH AFTER 13 HOURS             Studies/Imaging:  none      Signed By: Marianela Jerez MD     December 30, 2020

## 2020-12-31 VITALS
HEIGHT: 64 IN | WEIGHT: 195 LBS | TEMPERATURE: 97.8 F | OXYGEN SATURATION: 98 % | RESPIRATION RATE: 20 BRPM | SYSTOLIC BLOOD PRESSURE: 114 MMHG | DIASTOLIC BLOOD PRESSURE: 95 MMHG | HEART RATE: 120 BPM | BODY MASS INDEX: 33.29 KG/M2

## 2020-12-31 LAB
ANION GAP SERPL CALC-SCNC: 9 MMOL/L (ref 7–16)
BUN SERPL-MCNC: 13 MG/DL (ref 8–23)
CALCIUM SERPL-MCNC: 7.5 MG/DL (ref 8.3–10.4)
CHLORIDE SERPL-SCNC: 109 MMOL/L (ref 98–107)
CO2 SERPL-SCNC: 25 MMOL/L (ref 21–32)
CREAT SERPL-MCNC: 0.88 MG/DL (ref 0.6–1)
GLUCOSE SERPL-MCNC: 86 MG/DL (ref 65–100)
MM INDURATION POC: 0 MM (ref 0–5)
POTASSIUM SERPL-SCNC: 3.6 MMOL/L (ref 3.5–5.1)
PPD POC: NEGATIVE NEGATIVE
SODIUM SERPL-SCNC: 143 MMOL/L (ref 136–145)

## 2020-12-31 PROCEDURE — 74011250636 HC RX REV CODE- 250/636: Performed by: INTERNAL MEDICINE

## 2020-12-31 PROCEDURE — 74011000250 HC RX REV CODE- 250: Performed by: INTERNAL MEDICINE

## 2020-12-31 PROCEDURE — 80048 BASIC METABOLIC PNL TOTAL CA: CPT

## 2020-12-31 PROCEDURE — 74011250637 HC RX REV CODE- 250/637: Performed by: HOSPITALIST

## 2020-12-31 PROCEDURE — 97110 THERAPEUTIC EXERCISES: CPT

## 2020-12-31 PROCEDURE — 97161 PT EVAL LOW COMPLEX 20 MIN: CPT

## 2020-12-31 PROCEDURE — 74011000250 HC RX REV CODE- 250: Performed by: HOSPITALIST

## 2020-12-31 PROCEDURE — 36415 COLL VENOUS BLD VENIPUNCTURE: CPT

## 2020-12-31 PROCEDURE — 74011250636 HC RX REV CODE- 250/636: Performed by: HOSPITALIST

## 2020-12-31 PROCEDURE — 2709999900 HC NON-CHARGEABLE SUPPLY

## 2020-12-31 PROCEDURE — 87635 SARS-COV-2 COVID-19 AMP PRB: CPT

## 2020-12-31 PROCEDURE — 74011250637 HC RX REV CODE- 250/637: Performed by: INTERNAL MEDICINE

## 2020-12-31 RX ORDER — ONDANSETRON 2 MG/ML
4 INJECTION INTRAMUSCULAR; INTRAVENOUS
Status: DISCONTINUED | OUTPATIENT
Start: 2020-12-31 | End: 2020-12-31 | Stop reason: HOSPADM

## 2020-12-31 RX ORDER — LANOLIN ALCOHOL/MO/W.PET/CERES
400 CREAM (GRAM) TOPICAL DAILY
Qty: 15 TAB | Refills: 0 | Status: SHIPPED | OUTPATIENT
Start: 2020-12-31

## 2020-12-31 RX ORDER — FAMOTIDINE 20 MG/1
20 TABLET, FILM COATED ORAL ONCE
Status: COMPLETED | OUTPATIENT
Start: 2020-12-31 | End: 2020-12-31

## 2020-12-31 RX ADMIN — ONDANSETRON 4 MG: 2 INJECTION INTRAMUSCULAR; INTRAVENOUS at 12:28

## 2020-12-31 RX ADMIN — FAMOTIDINE 20 MG: 20 TABLET, FILM COATED ORAL at 13:27

## 2020-12-31 RX ADMIN — VANCOMYCIN HYDROCHLORIDE 125 MG: 5 INJECTION, POWDER, LYOPHILIZED, FOR SOLUTION INTRAVENOUS at 06:19

## 2020-12-31 RX ADMIN — LORATADINE 10 MG: 10 TABLET ORAL at 09:37

## 2020-12-31 RX ADMIN — VANCOMYCIN HYDROCHLORIDE 125 MG: 5 INJECTION, POWDER, LYOPHILIZED, FOR SOLUTION INTRAVENOUS at 11:44

## 2020-12-31 RX ADMIN — BACITRACIN ZINC NEOMYCIN SULFATE POLYMYXIN B SULFATE: 400; 3.5; 5 OINTMENT TOPICAL at 09:36

## 2020-12-31 RX ADMIN — MAGNESIUM GLUCONATE 500 MG ORAL TABLET 400 MG: 500 TABLET ORAL at 09:37

## 2020-12-31 NOTE — PROGRESS NOTES
Patient Vitals for the past 12 hrs:   Temp Pulse Resp BP SpO2   12/30/20 1511 97.8 °F (36.6 °C) 84 17 (!) 124/56 97 %   12/30/20 1129 98.1 °F (36.7 °C) 90 18 132/70 92 %   12/30/20 0735 98 °F (36.7 °C) 89 17 (!) 130/59 91 %     Zosyn dc'd per provider today. Lasix x's1 given per orders. PT consulted per provider.   Bedside shift report given to Marco Antonio Melvin, on-coming RN

## 2020-12-31 NOTE — DISCHARGE SUMMARY
Physician Discharge Summary     Patient ID:  Estela Sutton  [id number]  45 y.o.  1940    Admit date: 12/28/2020    Discharge date: 12-    Diagnosis:  1-Cellulitis of left leg, acute on chronic, recently grew Pseudomonas. Treated initially by Zosyn. Continue topical treatment only per infectious disease. 2- s/p C difficile colitis, appears to have responded to flagyl course. To continue addition course of oral Vancomycin per ID.  3- Hypomagnesemia, severe, a/w mild hypokalemia. Replaced. Stopped HCTZ. 4- Ca 7, corrected to 8 w/ mild hypoalbuminemia  5- CKD 3 and asthma- stable  6- Hypertension, controlled  7- Dehydration, resolved     Hospital course:   77-year-old female presents with multiple complaints.  She was diagnosed with wounds and cellulitis of her left leg about 1 month ago. Vaibhav Pearce has been treated with both amoxicillin and Augmentin.  She has had bandaging by home health.  Wounds have worsened with increased drainage, swelling, and redness.  She also developed profuse diarrhea 10 days ago and was seen in the emergency department. Vaibhav Pearce was diagnosed with C. difficile and placed on Flagyl.  She reports persistent generalized weakness, poor appetite, and diarrhea. Edmund Speller has been alternating with formed stools.  No documented fevers.  Also reports painful and itchy rash for the past month.  She has not yet seen a dermatologist or Dr. Jeremy Marie for her symptoms. Vaibhav Pearce has not been referred back to wound care clinic yet. On further questioning, her diarrhea stopped at least 2 days ago. She just finished 10 days course of flagyl orally. She denies fever or chills. Her L leg swelling go back to August of this year. She denies hx of leg injuries. Patient admitted and treated as above.   On day of discharge, patient exam showed normal abdominal exam.    PCP: Ke Duncan MD    Patient Instructions:   Current Discharge Medication List      START taking these medications    Details magnesium oxide (MAG-OX) 400 mg tablet Take 1 Tab by mouth daily. Qty: 15 Tab, Refills: 0      neomycin-bacitracin-polymyxin (NEOSPORIN) 3.5mg-400 unit- 5,000 unit/gram ointment Apply  to affected area two (2) times a day. Qty: 1 Tube, Refills: 1      pantothenic ac-min oil-pet,hyd (AQUAPHOR) 41 % ointment Apply  to affected area as needed for Dry Skin or Other (wound care). Qty: 53 g, Refills: 1      vancomycin 50 mg/mL oral solution (compounded) Take 2.5 mL by mouth every six (6) hours. Qty: 1 Bottle, Refills: 0      witch hazel-glycerin (TUCKS) 12.5-50 % pad 1 Pad by PeriANAL route as needed for Pain or Itching. Qty: 1 Box, Refills: 1         CONTINUE these medications which have NOT CHANGED    Details   atorvastatin (LIPITOR) 40 mg tablet TAKE 1 TABLET BY MOUTH DAILY  Qty: 90 Tab, Refills: 3      gabapentin (NEURONTIN) 100 mg capsule Take 1 Cap by mouth three (3) times daily. Qty: 270 Cap, Refills: 3      losartan (COZAAR) 100 mg tablet Take 1 Tab by mouth daily. Qty: 90 Tab, Refills: 3      levothyroxine (synthroid) 25 mcg tablet Take 1 Tab by mouth every morning. Qty: 90 Tab, Refills: 3      ondansetron (ZOFRAN ODT) 4 mg disintegrating tablet Take 1 Tab by mouth every eight (8) hours as needed for Nausea. Qty: 20 Tab, Refills: 0      FLOVENT  mcg/actuation inhaler Take 2 Puffs by inhalation two (2) times a day. Refills: 3      acetaminophen (TYLENOL ARTHRITIS PAIN) 650 mg CR tablet Take 650 mg by mouth every eight (8) hours as needed (pain). Associated Diagnoses: Elevated serum creatinine      albuterol (VENTOLIN HFA) 90 mcg/actuation inhaler Take 2 Puffs by inhalation every six (6) hours as needed for Wheezing or Shortness of Breath. CALCIUM CITRATE + PO take 1 Tab by mouth two (2) times a day. amLODIPine (NORVASC) 10 mg tablet Take 1 Tab by mouth daily.   Qty: 30 Tab, Refills: 3      DISABLED PLACARD (DISABLED PLACARD) DMV Use placard daily  Qty: 1 Each, Refills: 0 traZODone (DESYREL) 50 mg tablet Take 1 Tab by mouth nightly. Qty: 30 Tab, Refills: 3         STOP taking these medications       traMADoL (ULTRAM) 50 mg tablet Comments:   Reason for Stopping:         metroNIDAZOLE (FlagyL) 500 mg tablet Comments:   Reason for Stopping:         hydroCHLOROthiazide (HYDRODIURIL) 25 mg tablet Comments:   Reason for Stopping:         potassium chloride (K-DUR, KLOR-CON) 20 mEq tablet Comments:   Reason for Stopping:         losartan-hydroCHLOROthiazide (HYZAAR) 100-25 mg per tablet Comments:   Reason for Stopping:               Instructions:     diet and activity as tolerated, legs topical care per nursing instructions    Condition: Stable    Follow-up with primary physician in 1-2 week. Time 35 min    Please send copy to primary physician.     Signed:  Delfina Szymanski MD  12/31/2020  9:36 AM

## 2020-12-31 NOTE — PROGRESS NOTES
Care Management Interventions  PCP Verified by CM: Yes  Mode of Transport at Discharge: BLS([facility] Transport Time of Discharge: 1500  Transition of Care Consult (CM Consult): SNF  Partner SNF: No  Reason Why Partner SNF Not Chosen: Location  Discharge Durable Medical Equipment: No  Physical Therapy Consult: Yes  Occupational Therapy Consult: No  Current Support Network: Own Home, Family Lives Nearby, Lives Alone  Confirm Follow Up Transport: Family  The Patient and/or Patient Representative was Provided with a Choice of Provider and Agrees with the Discharge Plan?: Yes  Name of the Patient Representative Who was Provided with a Choice of Provider and Agrees with the Discharge Plan: Patient, family  Freedom of Choice List was Provided with Basic Dialogue that Supports the Patient's Individualized Plan of Care/Goals, Treatment Preferences and Shares the Quality Data Associated with the Providers?: Yes  Discharge Location  Discharge Placement: Skilled nursing facility([facility])    Patient to be discharged to [address] [place] [address] today. Will go to [facility] room 331-B ([phone]). Transport set with Micah Adkins for 1500. Patient/family aware and agree with discharge plan. Primary RN notified. CM placed call to [facility] and notified that patient will be going to [facility] prior to returning home; referral cancelled at this time. CM will remain available to assist with any additional discharge needs.

## 2020-12-31 NOTE — PROGRESS NOTES
CM spoke with family. Patient had PT eval today. Pt/family now considering STR stay prior to returning home. Referral placed to [facility]-, per family request. 48 hour PPD has been completed. Rapid COVID ordered. CM will await final decision from family ([place] vs STR) and bed availability.

## 2020-12-31 NOTE — PROGRESS NOTES
Problem: Mobility Impaired (Adult and Pediatric)  Goal: *Acute Goals and Plan of Care (Insert Text)  Description:   LTG:  (1.)Ms. Karen Blair will move from supine to sit and sit to supine  in bed with SUPERVISION within 7 treatment day(s). (2.)Ms. Karen Blair will transfer from bed to chair and chair to bed with SUPERVISION using the least restrictive device within 7 treatment day(s). (3.)Ms. Karen Blair will ambulate with SUPERVISION for 100 feet with the least restrictive device within 7 treatment day(s). (4.)Ms. Karen Blair will be independent with a HEP to improve strength within 7 treatment days. ________________________________________________________________________________________________      Outcome: Progressing Towards Goal    PHYSICAL THERAPY: Initial Assessment and AM 12/31/2020  INPATIENT: PT Visit Days : 1  [place] / [place] / Product Type: Medicare /       NAME/AGE/GENDER: Mykel Manuel is a 68 y.o. female   PRIMARY DIAGNOSIS: Cellulitis [L03.90] <principal problem not specified> <principal problem not specified>        ICD-10: Treatment Diagnosis:    Generalized Muscle Weakness (M62.81)  Difficulty in walking, Not elsewhere classified (R26.2)   Precaution/Allergies:  Amoxicillin and Pneumococcal 23-layton ps vaccine      ASSESSMENT:     Ms. Karen Blair presents with above diagnosis. Patient demonstrates decreased strength affecting mobility and independence. Patient was diagnosed with C-diff recently and did complete her course of Flagyl. She has [facility] services currently-RN for wound care as well as PT. Patient lives alone but has paid caregivers 3 hours per day and her children live locally. She ambulates with a walker at this time but reports she had been doing better before getting sick with C-diff and had gotten off the walker. Patient was getting around on her own in the home but reports she didn't leave the house much.   She was able to perform her own ADLs but the caregivers were present when she did her bathing. Patient would benefit from acute therapy services to improve her strength and mobility. She struggled the most with bed mobility and tolerance for activity during this assessment. Patient would benefit from post-acute rehab as she is alone the majority of the day and at night. She is in agreement \"as long as it is safe\". Suggested patient discuss with her children-children are in favor or post-acute rehab. Notified SW. This section established at most recent assessment   PROBLEM LIST (Impairments causing functional limitations):  Decreased Strength  Decreased ADL/Functional Activities  Decreased Transfer Abilities  Decreased Ambulation Ability/Technique  Decreased Balance  Increased Pain  Decreased Activity Tolerance  Decreased [place] with Home Exercise Program   INTERVENTIONS PLANNED: (Benefits and precautions of physical therapy have been discussed with the patient.)  Balance Exercise  Family Education  Gait Training  Home Exercise Program (HEP)  Therapeutic Activites  Therapeutic Exercise/Strengthening     TREATMENT PLAN: Frequency/Duration: 3 times a week for duration of hospital stay  Rehabilitation Potential For Stated Goals: Good     REHAB RECOMMENDATIONS (at time of discharge pending progress):    Placement: It is my opinion, based on this patient's performance to date, that Ms. Heide Talamantes may benefit from intensive therapy at a [address] [place] [address] after discharge due to the functional deficits listed above that are likely to improve with skilled rehabilitation and concerns that he/she may be unsafe to be unsupervised at home due to weakness affecting mobility . Equipment:   None at this time              HISTORY:   History of Present Injury/Illness (Reason for Referral):  24-year-old female presents with multiple complaints. She was diagnosed with wounds and cellulitis of her left leg about 1 month ago.   She has been treated with both amoxicillin and Augmentin. She has had bandaging by home health. Wounds have worsened with increased drainage, swelling, and redness. She also developed profuse diarrhea 10 days ago and was seen in the emergency department. She was diagnosed with C. difficile and placed on Flagyl. She reports persistent generalized weakness, poor appetite, and diarrhea. Diarrhea has been alternating with formed stools. No documented fevers. Also reports painful and itchy rash for the past month. She has not yet seen a dermatologist or Dr. Liam Baires for her symptoms. She has not been referred back to wound care clinic yet. On further questioning, her diarrhea stopped at least 2 days ago. She just finished 10 days course of flagyl orally. She denies fever or chills. Her L leg swelling go back to August of this year. She denies hx of leg injuries. Past Medical History/Comorbidities:   Ms. Chucho Johnson  has a past medical history of Calculus of kidney, Gastric ulcer, GERD (gastroesophageal reflux disease), Headache(784.0), Hypercholesterolemia, Hypertension, Primary insomnia (7/17/2017), and Thyroid disease. Ms. Chucho Johnson  has a past surgical history that includes hx salpingo-oophorectomy; pr removal of kidney stone; hx colonoscopy (Jan 2013); and hx colonoscopy (7/5/2016). Social History/Living Environment:   Home Environment: Private residence  One/Two Story Residence: One story  Living Alone: Yes  Support Systems: Child(amrik), Home care staff(private caregiver 3 hours/day)  Patient Expects to be Discharged to[de-identified] Unknown(STR vs HH)  Current DME Used/Available at Home: Walker, rolling  Prior Level of Function/Work/Activity:  Ambulating with a walker in the home. Caregivers to assist with laundry, cooking, and cleaning and to supervise bathing.      Number of Personal Factors/Comorbidities that affect the Plan of Care: 1-2: MODERATE COMPLEXITY   EXAMINATION:   Most Recent Physical Functioning:   Gross Assessment:  AROM: Generally decreased, functional  Strength: Generally decreased, functional  Coordination: Generally decreased, functional               Posture:     Balance:  Sitting: Intact  Standing: Impaired;Pull to stand; With support  Standing - Static: Good(with walker)  Standing - Dynamic : Fair(with walker) Bed Mobility:  Supine to Sit: Minimum assistance  Scooting: Moderate assistance  Wheelchair Mobility:     Transfers:  Sit to Stand: Contact guard assistance(from elevated bed)  Stand to Sit: Contact guard assistance  Bed to Chair: Contact guard assistance  Gait:     Speed/Nathalie: Slow  Step Length: Left shortened;Right shortened  Gait Abnormalities: Decreased step clearance  Distance (ft): (bed to chair)  Assistive Device: Walker, rolling  Ambulation - Level of Assistance: Contact guard assistance  Interventions: Verbal cues      Body Structures Involved:  Muscles Body Functions Affected: Movement Related Activities and Participation Affected: Mobility   Number of elements that affect the Plan of Care: 3: MODERATE COMPLEXITY   CLINICAL PRESENTATION:   Presentation: Stable and uncomplicated: LOW COMPLEXITY   CLINICAL DECISION MAKIN [facility] [id number] AM-PAC 6 Clicks   Basic Mobility Inpatient Short Form  How much difficulty does the patient currently have. .. Unable A Lot A Little None   1. Turning over in bed (including adjusting bedclothes, sheets and blankets)? [] 1   [] 2   [x] 3   [] 4   2. Sitting down on and standing up from a chair with arms ( e.g., wheelchair, bedside commode, etc.)   [] 1   [] 2   [x] 3   [] 4   3. Moving from lying on back to sitting on the side of the bed? [] 1   [] 2   [x] 3   [] 4   How much help from another person does the patient currently need. .. Total A Lot A Little None   4. Moving to and from a bed to a chair (including a wheelchair)? [] 1   [] 2   [x] 3   [] 4   5. Need to walk in hospital room? [] 1   [] 2   [x] 3   [] 4   6. Climbing 3-5 steps with a railing? [] 1   [] 2   [x] 3   [] 4   © 2007, Trustees of [address] [place] [address] [address] [address], under license to [employer]. All rights reserved      Score:  Initial: 18 Most Recent: X (Date: -- )    Interpretation of Tool:  Represents activities that are increasingly more difficult (i.e. Bed mobility, Transfers, Gait). Medical Necessity:     Patient is expected to demonstrate progress in   strength and coordination   to   increase independence with mobility. .  Reason for Services/Other Comments:  Patient continues to require skilled intervention due to   Decreased strength and decreased tolerance for activity. .   Use of outcome tool(s) and clinical judgement create a POC that gives a: Clear prediction of patient's progress: LOW COMPLEXITY            TREATMENT:   (In addition to Assessment/Re-Assessment sessions the following treatments were rendered)   Pre-treatment Symptoms/Complaints:  Patient pleasant and agreeable. Pain: Initial:   Pain Intensity 1: 4  Pain Location 1: Hip  Pain Orientation 1: Left, Right  Pain Intervention(s) 1: Ambulation/Increased Activity  Post Session:  3/10     Therapeutic Exercise: (10 Minutes):  Exercises per grid below to improve strength and coordination. Required minimal verbal and tactile cues to promote proper body alignment. Progressed repetitions and complexity of movement as indicated. Date:  12/31/20 Date:   Date:     Activity/Exercise Parameters Parameters Parameters   Ankle pumps 20 B     Quad sets 15 B     Gluteal squeeze 10 B     Short arc quad 10 B                           Braces/Orthotics/Lines/Etc:   O2 Device: Room air  Treatment/Session Assessment:    Response to Treatment:  Participated well. Complained of hip pain that didn't improve significantly with getting out of bed. Struggled most with bed mobility.   Interdisciplinary Collaboration:   Physical Therapist  Registered Nurse    After treatment position/precautions:   Up in chair  Bed/Chair-wheels locked  Call light within reach  RN notified   Compliance with Program/Exercises: Will assess as treatment progresses  Recommendations/Intent for next treatment session: \"Next visit will focus on advancements to more challenging activities and reduction in assistance provided\".   Total Treatment Duration:  PT Patient Time In/Time Out  Time In: 0940  Time Out: 0524 [facility] Arana

## 2021-01-03 LAB
COVID-19 RAPID TEST, COVR: NOT DETECTED
SARS COV-2, XPGCVT: NEGATIVE
SOURCE, COVRS: NORMAL

## 2021-01-08 ENCOUNTER — PATIENT OUTREACH (OUTPATIENT)
Dept: CASE MANAGEMENT | Age: 81
End: 2021-01-08

## 2021-01-13 ENCOUNTER — PATIENT OUTREACH (OUTPATIENT)
Dept: CASE MANAGEMENT | Age: 81
End: 2021-01-13

## 2021-01-13 NOTE — PROGRESS NOTES
Community Care Team documentation for patient in [facility]    The information below provided by:[facility] [place]    PT Update: 80FT/SBA/RW - MIN A ADLS PT/OT 7/94      Nursing Update:no concerns    Discharge Date:N, [facility] 1/15/21      Assign to [facility]

## 2021-01-27 PROBLEM — E87.6 HYPOKALEMIA: Status: RESOLVED | Noted: 2018-10-11 | Resolved: 2021-01-27

## 2021-01-27 PROBLEM — N18.30 ACUTE RENAL FAILURE SUPERIMPOSED ON STAGE 3 CHRONIC KIDNEY DISEASE (HCC): Status: RESOLVED | Noted: 2018-10-11 | Resolved: 2021-01-27

## 2021-01-27 PROBLEM — L02.416 CELLULITIS AND ABSCESS OF LEFT LOWER EXTREMITY: Status: RESOLVED | Noted: 2018-09-11 | Resolved: 2021-01-27

## 2021-01-27 PROBLEM — I95.1 ORTHOSTATIC HYPOTENSION: Status: RESOLVED | Noted: 2018-10-11 | Resolved: 2021-01-27

## 2021-01-27 PROBLEM — E86.0 DEHYDRATION: Status: RESOLVED | Noted: 2018-10-11 | Resolved: 2021-01-27

## 2021-01-27 PROBLEM — L03.119 RECURRENT CELLULITIS OF LOWER EXTREMITY: Status: ACTIVE | Noted: 2020-12-28

## 2021-01-27 PROBLEM — N17.9 ACUTE KIDNEY INJURY SUPERIMPOSED ON CKD (HCC): Status: RESOLVED | Noted: 2019-08-22 | Resolved: 2021-01-27

## 2021-01-27 PROBLEM — N18.9 ACUTE KIDNEY INJURY SUPERIMPOSED ON CKD (HCC): Status: RESOLVED | Noted: 2019-08-22 | Resolved: 2021-01-27

## 2021-01-27 PROBLEM — L03.115 CELLULITIS OF RIGHT LEG: Status: RESOLVED | Noted: 2019-08-22 | Resolved: 2021-01-27

## 2021-01-27 PROBLEM — K85.90 ACUTE PANCREATITIS: Status: RESOLVED | Noted: 2018-10-12 | Resolved: 2021-01-27

## 2021-01-27 PROBLEM — Z87.39 HISTORY OF SEPTIC ARTHRITIS: Status: ACTIVE | Noted: 2019-09-10

## 2021-01-27 PROBLEM — G60.9 IDIOPATHIC PERIPHERAL NEUROPATHY: Status: ACTIVE | Noted: 2021-01-27

## 2021-01-27 PROBLEM — Z87.39 HISTORY OF SEPTIC ARTHRITIS: Status: RESOLVED | Noted: 2019-09-10 | Resolved: 2021-01-27

## 2021-01-27 PROBLEM — L03.116 CELLULITIS AND ABSCESS OF LEFT LOWER EXTREMITY: Status: RESOLVED | Noted: 2018-09-11 | Resolved: 2021-01-27

## 2021-01-27 PROBLEM — K25.3 ACUTE GASTRIC ULCER WITHOUT HEMORRHAGE OR PERFORATION: Status: RESOLVED | Noted: 2018-10-14 | Resolved: 2021-01-27

## 2021-01-27 PROBLEM — N17.9 ACUTE RENAL FAILURE SUPERIMPOSED ON STAGE 3 CHRONIC KIDNEY DISEASE (HCC): Status: RESOLVED | Noted: 2018-10-11 | Resolved: 2021-01-27

## 2021-02-17 PROBLEM — Z23 ENCOUNTER FOR IMMUNIZATION: Status: ACTIVE | Noted: 2021-02-17

## 2021-02-17 PROBLEM — Z00.00 MEDICARE ANNUAL WELLNESS VISIT, SUBSEQUENT: Status: ACTIVE | Noted: 2021-02-17

## 2021-02-17 PROBLEM — Z12.39 BREAST CANCER SCREENING: Status: ACTIVE | Noted: 2021-02-17

## 2021-08-17 PROBLEM — N30.00 ACUTE CYSTITIS WITHOUT HEMATURIA: Status: ACTIVE | Noted: 2021-08-17

## 2021-08-17 PROBLEM — E66.9 OBESITY: Status: ACTIVE | Noted: 2018-03-28

## 2022-02-15 PROBLEM — Z86.718 HISTORY OF DVT (DEEP VEIN THROMBOSIS): Status: ACTIVE | Noted: 2022-02-15

## 2022-03-11 NOTE — WOUND CARE
[address]  [address], [address]  Phone: [phone]  Fax: [phone]    Patient: Bard Fox MRN: [id number]  SSN: [id number]    YOB: 1940  Age: 70 y.o. Sex: female       Return Appointment: 1 week with Angel Vera MD    Instructions:   Right Medial Upper Leg (Lower Knee)  Cleanse wound with normal saline. DO NOT GET WET IN SHOWER, WEAR CAST COVER IN SHOWER! Apply Alginate with silver(sheets); cut to size, apply to wound bed. Cover with 2x2 gauze and Covrsite. Change 3 times a week. Stop Bactrim and Keflex, Begin new antibiotic Levaquin. Obtain X-Ray of right knee. Should you experience increased redness, swelling, pain, foul odor, size of wound(s), or have a temperature over 101 degrees please contact the [address] [place] [address] Road at [phone] or if after hours contact your primary care physician or go to the hospital emergency department.     Signed By: Marisa Parker RN     September 10, 2019 Normal

## 2022-03-18 PROBLEM — N30.00 ACUTE CYSTITIS WITHOUT HEMATURIA: Status: ACTIVE | Noted: 2021-08-17

## 2022-03-18 PROBLEM — F51.01 PRIMARY INSOMNIA: Status: ACTIVE | Noted: 2017-07-17

## 2022-03-19 PROBLEM — E66.9 OBESITY: Status: ACTIVE | Noted: 2018-03-28

## 2022-03-19 PROBLEM — Z86.718 HISTORY OF DVT (DEEP VEIN THROMBOSIS): Status: ACTIVE | Noted: 2022-02-15

## 2022-03-19 PROBLEM — Z00.00 MEDICARE ANNUAL WELLNESS VISIT, SUBSEQUENT: Status: ACTIVE | Noted: 2021-02-17

## 2022-03-19 PROBLEM — Z12.39 BREAST CANCER SCREENING: Status: ACTIVE | Noted: 2021-02-17

## 2022-03-20 PROBLEM — Z23 ENCOUNTER FOR IMMUNIZATION: Status: ACTIVE | Noted: 2021-02-17

## 2022-03-20 PROBLEM — L03.119 RECURRENT CELLULITIS OF LOWER EXTREMITY: Status: ACTIVE | Noted: 2020-12-28

## 2022-03-20 PROBLEM — G60.9 IDIOPATHIC PERIPHERAL NEUROPATHY: Status: ACTIVE | Noted: 2021-01-27

## 2022-03-21 NOTE — PROGRESS NOTES
Community Care Team documentation for patient in [facility]    The information below provided by:[facility] [place]    PT Update: 50FT/CGA/RW - MOD TO MAX ADLS        Nursing Update:remains on po Vanco q6, daily dressing change to LLE for cellulitis with wounds      Discharge Date:TBD      Assign to [facility]
no

## 2022-04-27 DIAGNOSIS — I10 ESSENTIAL HYPERTENSION: ICD-10-CM

## 2022-04-27 DIAGNOSIS — E78.00 PURE HYPERCHOLESTEROLEMIA: Primary | ICD-10-CM

## 2022-06-09 ENCOUNTER — OFFICE VISIT (OUTPATIENT)
Dept: INTERNAL MEDICINE CLINIC | Facility: CLINIC | Age: 82
End: 2022-06-09
Payer: MEDICARE

## 2022-06-09 VITALS
WEIGHT: 180.6 LBS | BODY MASS INDEX: 30.83 KG/M2 | DIASTOLIC BLOOD PRESSURE: 68 MMHG | SYSTOLIC BLOOD PRESSURE: 125 MMHG | HEART RATE: 100 BPM | HEIGHT: 64 IN | OXYGEN SATURATION: 99 % | TEMPERATURE: 98.4 F

## 2022-06-09 DIAGNOSIS — G89.29 CHRONIC LEFT-SIDED LOW BACK PAIN WITH LEFT-SIDED SCIATICA: ICD-10-CM

## 2022-06-09 DIAGNOSIS — K21.00 GASTROESOPHAGEAL REFLUX DISEASE WITH ESOPHAGITIS WITHOUT HEMORRHAGE: ICD-10-CM

## 2022-06-09 DIAGNOSIS — J30.1 SEASONAL ALLERGIC RHINITIS DUE TO POLLEN: ICD-10-CM

## 2022-06-09 DIAGNOSIS — L98.9 SKIN LESION OF CHEST WALL: ICD-10-CM

## 2022-06-09 DIAGNOSIS — Z23 ENCOUNTER FOR IMMUNIZATION: ICD-10-CM

## 2022-06-09 DIAGNOSIS — I82.511 CHRONIC DEEP VEIN THROMBOSIS (DVT) OF RIGHT FEMORAL VEIN (HCC): ICD-10-CM

## 2022-06-09 DIAGNOSIS — N18.32 STAGE 3B CHRONIC KIDNEY DISEASE (HCC): ICD-10-CM

## 2022-06-09 DIAGNOSIS — G60.9 IDIOPATHIC PERIPHERAL NEUROPATHY: ICD-10-CM

## 2022-06-09 DIAGNOSIS — I87.2 VENOUS INSUFFICIENCY OF BOTH LOWER EXTREMITIES: ICD-10-CM

## 2022-06-09 DIAGNOSIS — M54.42 CHRONIC LEFT-SIDED LOW BACK PAIN WITH LEFT-SIDED SCIATICA: ICD-10-CM

## 2022-06-09 DIAGNOSIS — Z12.31 ENCOUNTER FOR SCREENING MAMMOGRAM FOR MALIGNANT NEOPLASM OF BREAST: ICD-10-CM

## 2022-06-09 DIAGNOSIS — E03.9 ACQUIRED HYPOTHYROIDISM: ICD-10-CM

## 2022-06-09 DIAGNOSIS — E78.00 PURE HYPERCHOLESTEROLEMIA: ICD-10-CM

## 2022-06-09 DIAGNOSIS — M85.851 OSTEOPENIA OF NECK OF RIGHT FEMUR: ICD-10-CM

## 2022-06-09 DIAGNOSIS — K63.5 POLYP OF COLON, UNSPECIFIED PART OF COLON, UNSPECIFIED TYPE: ICD-10-CM

## 2022-06-09 DIAGNOSIS — J45.40 MODERATE PERSISTENT ASTHMA WITHOUT COMPLICATION: ICD-10-CM

## 2022-06-09 DIAGNOSIS — N39.3 FEMALE STRESS INCONTINENCE: ICD-10-CM

## 2022-06-09 DIAGNOSIS — N30.00 ACUTE CYSTITIS WITHOUT HEMATURIA: ICD-10-CM

## 2022-06-09 DIAGNOSIS — I10 PRIMARY HYPERTENSION: ICD-10-CM

## 2022-06-09 DIAGNOSIS — R30.0 DYSURIA: Primary | ICD-10-CM

## 2022-06-09 PROBLEM — M70.42 PREPATELLAR BURSITIS OF LEFT KNEE: Status: RESOLVED | Noted: 2018-09-28 | Resolved: 2022-06-09

## 2022-06-09 PROBLEM — E66.9 OBESITY: Status: ACTIVE | Noted: 2018-03-28

## 2022-06-09 PROBLEM — I87.009 POST-THROMBOTIC SYNDROME: Status: ACTIVE | Noted: 2021-09-16

## 2022-06-09 PROBLEM — M79.89 LEG SWELLING: Status: RESOLVED | Noted: 2018-08-22 | Resolved: 2022-06-09

## 2022-06-09 PROBLEM — I82.413 ACUTE BILATERAL DEEP VEIN THROMBOSIS (DVT) OF FEMORAL VEINS (HCC): Status: RESOLVED | Noted: 2021-03-16 | Resolved: 2022-06-09

## 2022-06-09 PROBLEM — I82.413 ACUTE BILATERAL DEEP VEIN THROMBOSIS (DVT) OF FEMORAL VEINS (HCC): Status: ACTIVE | Noted: 2021-03-16

## 2022-06-09 PROBLEM — M70.42 PREPATELLAR BURSITIS OF LEFT KNEE: Status: ACTIVE | Noted: 2018-09-28

## 2022-06-09 PROBLEM — Z86.718 HISTORY OF DVT (DEEP VEIN THROMBOSIS): Status: RESOLVED | Noted: 2022-02-15 | Resolved: 2022-06-09

## 2022-06-09 PROBLEM — Z12.39 BREAST CANCER SCREENING: Status: ACTIVE | Noted: 2021-02-17

## 2022-06-09 PROBLEM — M79.89 LEG SWELLING: Status: ACTIVE | Noted: 2018-08-22

## 2022-06-09 LAB
BILIRUBIN, URINE, POC: NEGATIVE
BLOOD URINE, POC: ABNORMAL
GLUCOSE URINE, POC: NEGATIVE
KETONES, URINE, POC: NEGATIVE
LEUKOCYTE ESTERASE, URINE, POC: ABNORMAL
NITRITE, URINE, POC: NEGATIVE
PH, URINE, POC: 6.5 (ref 4.6–8)
PROTEIN,URINE, POC: ABNORMAL
SPECIFIC GRAVITY, URINE, POC: 1.03 (ref 1–1.03)
URINALYSIS CLARITY, POC: ABNORMAL
URINALYSIS COLOR, POC: ABNORMAL
UROBILINOGEN, POC: 0.2

## 2022-06-09 PROCEDURE — 1090F PRES/ABSN URINE INCON ASSESS: CPT | Performed by: INTERNAL MEDICINE

## 2022-06-09 PROCEDURE — 99214 OFFICE O/P EST MOD 30 MIN: CPT | Performed by: INTERNAL MEDICINE

## 2022-06-09 PROCEDURE — G8428 CUR MEDS NOT DOCUMENT: HCPCS | Performed by: INTERNAL MEDICINE

## 2022-06-09 PROCEDURE — 0509F URINE INCON PLAN DOCD: CPT | Performed by: INTERNAL MEDICINE

## 2022-06-09 PROCEDURE — G8399 PT W/DXA RESULTS DOCUMENT: HCPCS | Performed by: INTERNAL MEDICINE

## 2022-06-09 PROCEDURE — 81003 URINALYSIS AUTO W/O SCOPE: CPT | Performed by: INTERNAL MEDICINE

## 2022-06-09 PROCEDURE — G8417 CALC BMI ABV UP PARAM F/U: HCPCS | Performed by: INTERNAL MEDICINE

## 2022-06-09 PROCEDURE — 1036F TOBACCO NON-USER: CPT | Performed by: INTERNAL MEDICINE

## 2022-06-09 PROCEDURE — 1123F ACP DISCUSS/DSCN MKR DOCD: CPT | Performed by: INTERNAL MEDICINE

## 2022-06-09 RX ORDER — CIPROFLOXACIN 500 MG/1
500 TABLET, FILM COATED ORAL 2 TIMES DAILY
Qty: 6 TABLET | Refills: 0 | Status: SHIPPED | OUTPATIENT
Start: 2022-06-09 | End: 2022-06-12

## 2022-06-09 ASSESSMENT — ENCOUNTER SYMPTOMS
RECTAL PAIN: 0
EYE PAIN: 0
VOICE CHANGE: 0
STRIDOR: 0

## 2022-06-09 NOTE — PROGRESS NOTES
FOLLOWUP VISIT    Subjective:    Ms. Koby Coleman is a [de-identified] y.o., female,   Chief Complaint   Patient presents with    Dysuria     c/o burning, pressure low abd x4d       HPI:    Patient presents today with a several day history of dysuria, urgency, and frequency  No fevers or systemic symptoms. No flank pain. No hematuria. The patient reports good asthma control on current therapy with rare need for rescue inhaler use. The patient has hyperlipidemia. The patient has been following a low cholesterol diet and denies any myalgias or weakness on current lipid lowering therapy. The patient has hypothyroidism. The patient denies any symptoms of hypo- or hyperthyroidism on the current dose of levothyroxine. The following portions of the patient's history were reviewed and updated as appropriate:      Past Medical History:   Diagnosis Date    Acute pancreatitis 10/12/2018    Asthma     Calculus of kidney     Colon polyps     Female stress incontinence 8/16/2012    GERD (gastroesophageal reflux disease)     History of DVT (deep vein thrombosis)     9/2021    History of gastric ulcer     2018    History of kidney stones 8/16/2012    History of septic arthritis 09/10/2019    Right knee.   Improved with prolonged IV anitbiotics    Hypercholesterolemia     Hypertension     Hypothyroidism     Ophthalmic migraine     Osteopenia of neck of right femur 2/25/2015    Primary insomnia 07/17/2017    Raynaud's phenomenon 2/25/2015    Recurrent cellulitis of lower extremity 12/28/2020    S/P total knee arthroplasty 8/16/2012    [id number]     Umbilical hernia 6/74/8482    Venous insufficiency of both lower extremities 1/28/2014    Worse on left        Past Surgical History:   Procedure Laterality Date    COLONOSCOPY  Jan 2013    4 polyps, repeat 5 years    COLONOSCOPY  7/5/2016    repeat 5 yrs tubular adenoma    HYSTERECTOMY, TOTAL ABDOMINAL (CERVIX REMOVED)      REMOVAL OF KIDNEY STONE  SALPINGO-OOPHORECTOMY      TOTAL KNEE ARTHROPLASTY Bilateral     partial       Family History   Problem Relation Age of Onset    High Cholesterol Brother     Hypertension Brother     High Cholesterol Brother     Hypertension Brother     Heart Attack Brother     Heart Attack Paternal Grandmother     Heart Attack Paternal Grandfather     Breast Cancer Neg Hx     Hypertension Mother     Rheum Arthritis Mother     High Cholesterol Mother     Broken Bones Mother     Heart Failure Father     Heart Attack Father     Osteoarthritis Brother         Knee       Social History     Socioeconomic History    Marital status:      Spouse name: Not on file    Number of children: Not on file    Years of education: Not on file    Highest education level: Not on file   Occupational History    Not on file   Tobacco Use    Smoking status: Never Smoker    Smokeless tobacco: Never Used   Substance and Sexual Activity    Alcohol use: No    Drug use: No    Sexual activity: Not on file   Other Topics Concern    Not on file   Social History Narrative    Lives with  who is chronically ill with end stage lung disease and early dementia and depression. 5 children, daughter Bryan Roberts and son Анна Jones (PharmD) have her [de-identified], sons Mendoza Scott and Paula Rodriguez have durable POA     Social Determinants of Health     Financial Resource Strain:     Difficulty of Paying Living Expenses: Not on file   Food Insecurity:     Worried About [address] [employer] in the Last Year: Not on file    Sonia of Food in the Last Year: Not on file   Transportation Needs:     Lack of Transportation (Medical): Not on file    Lack of Transportation (Non-Medical):  Not on file   Physical Activity:     Days of Exercise per Week: Not on file    Minutes of Exercise per Session: Not on file   Stress:     Feeling of Stress : Not on file   Social Connections:     Frequency of Communication with Friends and Family: Not on file    Frequency of Social Gatherings with Friends and Family: Not on file    Attends Rastafari Services: Not on file    Active Member of Clubs or Organizations: Not on file    Attends Club or Organization Meetings: Not on file    Marital Status: Not on file   Intimate Partner Violence:     Fear of Current or Ex-Partner: Not on file    Emotionally Abused: Not on file    Physically Abused: Not on file    Sexually Abused: Not on file   Housing Stability:     Unable to Pay for Housing in the Last Year: Not on file    Number of [place] in the Last Year: Not on file    Unstable Housing in the Last Year: Not on file       Current Outpatient Medications   Medication Sig Dispense Refill    albuterol sulfate  (90 Base) MCG/ACT inhaler Inhale 2 puffs into the lungs every 6 hours as needed      atorvastatin (LIPITOR) 40 MG tablet Take 40 mg by mouth daily      fluticasone (FLOVENT HFA) 220 MCG/ACT inhaler Inhale 2 puffs into the lungs 2 times daily      furosemide (LASIX) 40 MG tablet Take 40 mg by mouth daily      gabapentin (NEURONTIN) 100 MG capsule Take 100 mg by mouth 3 times daily.  levothyroxine (SYNTHROID) 25 MCG tablet Take 25 mcg by mouth      magnesium oxide (MAG-OX) 400 MG tablet Take 400 mg by mouth daily      omeprazole (PRILOSEC OTC) 20 MG tablet Take 20 mg by mouth daily      ondansetron (ZOFRAN-ODT) 4 MG disintegrating tablet Take 4 mg by mouth every 8 hours as needed      potassium chloride (KLOR-CON M) 20 MEQ extended release tablet Take 20 mEq by mouth 2 times daily      rivaroxaban (XARELTO) 10 MG TABS tablet Take by mouth Daily with supper       No current facility-administered medications for this visit. Allergies as of 06/09/2022 - Fully Reviewed 06/09/2022   Allergen Reaction Noted    Amoxicillin Rash 12/31/2020    Pneumococcal vaccines Other (See Comments) 10/28/2012       Review of Systems   Constitutional: Negative for activity change and appetite change.    HENT: Negative for drooling and voice change. Eyes: Negative for pain. Respiratory: Negative for stridor. Gastrointestinal: Negative for rectal pain. Endocrine: Negative for polydipsia and polyphagia. Genitourinary: Negative for dyspareunia and enuresis. Musculoskeletal: Negative for gait problem and neck stiffness. Skin: Negative for pallor. Neurological: Negative for facial asymmetry and speech difficulty. Hematological: Does not bruise/bleed easily. Psychiatric/Behavioral: Negative for self-injury. The patient is not hyperactive. Patient Care Team:  Magdiel Reveles MD as PCP - General  Magdiel Reveles MD as PCP - [facility] Provider    Objective:    /68 (Site: Left Upper Arm, Position: Sitting)   Pulse 100   Temp 98.4 °F (36.9 °C) (Temporal)   Ht 5' 4\" (1.626 m)   Wt 180 lb 9.6 oz (81.9 kg)   SpO2 99%   BMI 31.00 kg/m²     Physical Exam  Vitals reviewed. Constitutional:       General: She is not in acute distress. Appearance: She is not toxic-appearing. HENT:      Head: Normocephalic and atraumatic. Right Ear: Tympanic membrane, ear canal and external ear normal.      Left Ear: Tympanic membrane, ear canal and external ear normal.      Nose: Nose normal.      Mouth/Throat:      Mouth: Mucous membranes are moist.      Pharynx: Oropharynx is clear. Eyes:      General: No scleral icterus. Extraocular Movements: Extraocular movements intact. Pupils: Pupils are equal, round, and reactive to light. Cardiovascular:      Rate and Rhythm: Normal rate and regular rhythm. Pulses: Normal pulses. Heart sounds: Normal heart sounds. Pulmonary:      Effort: Pulmonary effort is normal. No respiratory distress. Breath sounds: Normal breath sounds. No stridor. Abdominal:      General: Abdomen is flat. Bowel sounds are normal.      Palpations: Abdomen is soft. There is no mass. Tenderness: There is no guarding or rebound.    Musculoskeletal:         General: Normal range of motion. Cervical back: Normal range of motion and neck supple. Skin:     General: Skin is warm and dry. Coloration: Skin is not jaundiced. Comments: Suspicious appearing skin lesion central chest... might be SCC   Neurological:      Mental Status: She is alert and oriented to person, place, and time. Mental status is at baseline. Psychiatric:         Behavior: Behavior normal.         Thought Content:  Thought content normal.              [facility] Historical Encounter on 02/08/2022   Component Date Value Ref Range Status    TSH 02/08/2022 3.140  0.450 - 4.500 uIU/mL Final    Cholesterol, Total 02/08/2022 208* 100 - 199 mg/dL Final    Triglycerides 02/08/2022 164* 0 - 149 mg/dL Final    HDL 02/08/2022 55  >39 mg/dL Final    VLDL 02/08/2022 29  5 - 40 mg/dL Final    LDL Calculated 02/08/2022 124* 0 - 99 mg/dL Final    WBC 02/08/2022 7.0  3.4 - 10.8 x10E3/uL Final    RBC 02/08/2022 3.84  3.77 - 5.28 x10E6/uL Final    Hemoglobin 02/08/2022 11.5  11.1 - 15.9 g/dL Final    Hematocrit 02/08/2022 35.2  34.0 - 46.6 % Final    MCV 02/08/2022 92  79 - 97 fL Final    MCH 02/08/2022 29.9  26.6 - 33.0 pg Final    MCHC 02/08/2022 32.7  31.5 - 35.7 g/dL Final    RDW 02/08/2022 12.8  11.7 - 15.4 % Final    Platelets 41/89/5614 247  150 - 450 x10E3/uL Final    Neutrophils % 02/08/2022 58  Not Estab. % Final    Lymphocytes % 02/08/2022 29  Not Estab. % Final    Monocytes % 02/08/2022 11  Not Estab. % Final    Eosinophils % 02/08/2022 1  Not Estab. % Final    Basophils % 02/08/2022 1  Not Estab. % Final    Neutrophils Absolute 02/08/2022 4.0  1.4 - 7.0 x10E3/uL Final    Lymphocytes Absolute 02/08/2022 2.0  0.7 - 3.1 x10E3/uL Final    Monocytes Absolute 02/08/2022 0.8  0.1 - 0.9 x10E3/uL Final    Eosinophils Absolute 02/08/2022 0.1  0.0 - 0.4 x10E3/uL Final    Basophils Absolute 02/08/2022 0.1  0.0 - 0.2 x10E3/uL Final    Immature Granulocytes 02/08/2022 0  Not Estab. % Final    GRANULOCYTE ABSOLUTE COUNT 02/08/2022 0.0  0.0 - 0.1 x10E3/uL Final    Glucose 02/08/2022 91  65 - 99 mg/dL Final    BUN 02/08/2022 21  8 - 27 mg/dL Final    CREATININE 02/08/2022 1.34* 0.57 - 1.00 mg/dL Final    EGFR IF NonAfrican American 02/08/2022 37* >59 mL/min/1.73 Final    GFR  02/08/2022 43* >59 mL/min/1.73 Final    Comment: **In accordance with recommendations from the NKF-ASN Task force,**    [facility] is in the process of updating its eGFR calculation to the    2021 CKD-EPI creatinine equation that estimates kidney function    without a race variable.  Bun/Cre Ratio 02/08/2022 16  12 - 28 NA Final    Sodium 02/08/2022 143  134 - 144 mmol/L Final    Potassium 02/08/2022 4.0  3.5 - 5.2 mmol/L Final    Chloride 02/08/2022 104  96 - 106 mmol/L Final    CO2 02/08/2022 22  20 - 29 mmol/L Final    Calcium 02/08/2022 10.0  8.7 - 10.3 mg/dL Final    Total Protein 02/08/2022 6.8  6.0 - 8.5 g/dL Final    Albumin 02/08/2022 4.1  3.7 - 4.7 g/dL Final    Globulin, Total 02/08/2022 2.7  1.5 - 4.5 g/dL Final    Albumin/Globulin Ratio 02/08/2022 1.5  1.2 - 2.2 NA Final    Total Bilirubin 02/08/2022 0.3  0.0 - 1.2 mg/dL Final    Alkaline Phosphatase 02/08/2022 88  44 - 121 IU/L Final    AST 02/08/2022 19  0 - 40 IU/L Final    ALT 02/08/2022 13  0 - 32 IU/L Final         Assessent & Plan:        1. Dysuria  -     AMB POC URINALYSIS DIP STICK AUTO W/O MICRO  -     ciprofloxacin (CIPRO) 500 mg tablet; Take 1 tablet by mouth 2 times daily for 3 days, Disp-6 tablet, R-0Normal  2. Venous insufficiency of both lower extremities  Overview:  The patient has chronic bilateral lower extremity venous insufficiency with venous stasis, left slightly worse than right. She has also had recurrent bilateral lower extremity cellulitis due to same. The patient was advised regarding elevation and compression. She was encouraged to adopt a low-sodium diet. We will continue furosemide 40 mg daily.   Given her stage III chronic kidney disease we will continue to periodically monitor her electrolytes and creatinine. 3. Idiopathic peripheral neuropathy  Overview:  Symptoms improved with gabapentin. The patient will continue the current treatment. 4. Acquired hypothyroidism  Overview:  2/8/22 TSH 3.140 on levothyroxine 25 mcg daily. The patient will continue the current treatment. Labs were reviewed and discussed with the patient. 5. Primary hypertension  Overview:  The patient is normotensive off blood pressure medications following weight loss. 6. Pure hypercholesterolemia  Overview:  2/8/22 total 208, HDL 55, , TG on atorvastatin 40 mg daily (missing doses). 2/15/21 total 128, HDL 42, LDL 66, trig 100 on atorvastatin 40 mg daily. Reviewed the most recent lipid panel with the patient, and interpreted the results within the context of the patient's personal cardiovascular risk factors. Discussed/reinforced a low-cholesterol diet. The patient was advised regarding daily compliance. 7. Gastroesophageal reflux disease with esophagitis without hemorrhage  Overview:  Previous dilatation stricture, EGD Jan 2013 showed some mild esophagitis. Stable on omeprazole 20 mg daily. The patient will continue the current treatment. 8. Encounter for immunization  Overview:  Vaccinations reviewed / discussed. Patient declines Prevnar (she developed flu like symptoms following her initial Pneumovax 23 vaccinations). Recommended Shingrix and Covid booster. 9. Polyp of colon, unspecified part of colon, unspecified type  Overview:  The patient has a previous history of colon polyps. Her last colonoscopy on 7/5/2016 was reviewed. Repeat was recommended in 5 years. The patient wishes to defer additional colonoscopies given age and multiple chronic health conditions.       10. Stage 3b chronic kidney disease ([facility] [address]  Overview:  2/8/22 creatinine 1.34, eGFR 37  2/15/21 creatinine 1.30, eGFR 39      The patient was advised to avoid NSAIDs and other nephrotoxins. Will dose adjust medications as appropriate. Will monitor labs over time. 11. Seasonal allergic rhinitis due to pollen  Overview:  Symptoms controlled with Flonase and OTC antihistamines PRN. 12. Chronic deep vein thrombosis (DVT) of right femoral vein (HCC)  Overview:  The patient had unprovoked bilateral proximal femoral DVT in 2021. Treated with Xarelto 20 mg daily for 3-6 months. Repeat ultrasound revealed residual chronic right femoral DVT. Decision was made to continue indefinite low dose Xarelto 10 mg daily. The patient will continue the current treatment. 15. Encounter for screening mammogram for malignant neoplasm of breast  Overview:  Discussed breast cancer screening. The patient wishes to delay / defer mammograms at this time. She might reconsider in future. 15. Female stress incontinence  Overview:  Patient has mild stress incontinence. Wears pads PRN. 15. Moderate persistent asthma without complication  Overview:  Asthma has been well controlled on Flovent 2 puffs twice daily, recommend continuing this medication. Continue albuterol PRN. Reviewed appropriate time to use this medication discussed difference between maintenance and rescue inhalers  16. Osteopenia of neck of right femur  Overview:  1/8/19 DEXA - right femoral neck BMD 0.849 with T -1.4. Ten yr FRAX fracture risk 17.7 / 8.6%. She received Prolia x 4 doses from 2/2019 through 8/2020. In 8/2021 the patient was converted to Boniva 150 mg monthly. Continue calcium and vitamin D. Fall precautions and weight bearing exercise as tolerated discussed. 17. Chronic left-sided low back pain with left-sided sciatica  Overview:  Symptoms controlled with gabapentin PRN. 18. Acute cystitis without hematuria  Overview:  Patient has acute lower urinary tract symptoms. Denies any systemic symptoms.   A urinalysis today positive leukocyte esterase. She will be treated empirically with Cipro 500 mg p.o. twice daily x3 days. Call if not improving. 19. Skin lesion of chest wall  Overview:  Discussed with patient. Might be skin cancer. Refer to dermatology. Orders:  -     Jovanny Fisher MD, PA        The patient and/or patient representative voiced understanding and agreement with the current diagnoses, recommendations, and possible side effects. Return for appointment as previously scheduled.

## 2022-07-09 PROBLEM — Z12.39 BREAST CANCER SCREENING: Status: RESOLVED | Noted: 2021-02-17 | Resolved: 2022-07-09

## 2022-08-09 DIAGNOSIS — I10 ESSENTIAL HYPERTENSION: ICD-10-CM

## 2022-08-09 DIAGNOSIS — E78.00 PURE HYPERCHOLESTEROLEMIA: ICD-10-CM

## 2022-08-09 LAB
ALBUMIN SERPL-MCNC: 3.6 G/DL (ref 3.2–4.6)
ALBUMIN/GLOB SERPL: 1 {RATIO} (ref 1.2–3.5)
ALP SERPL-CCNC: 88 U/L (ref 50–136)
ALT SERPL-CCNC: 20 U/L (ref 12–65)
ANION GAP SERPL CALC-SCNC: 6 MMOL/L (ref 7–16)
AST SERPL-CCNC: 20 U/L (ref 15–37)
BILIRUB SERPL-MCNC: 0.5 MG/DL (ref 0.2–1.1)
BUN SERPL-MCNC: 24 MG/DL (ref 8–23)
CALCIUM SERPL-MCNC: 9.6 MG/DL (ref 8.3–10.4)
CHLORIDE SERPL-SCNC: 109 MMOL/L (ref 98–107)
CHOLEST SERPL-MCNC: 156 MG/DL
CO2 SERPL-SCNC: 27 MMOL/L (ref 21–32)
CREAT SERPL-MCNC: 1.4 MG/DL (ref 0.6–1)
GLOBULIN SER CALC-MCNC: 3.5 G/DL (ref 2.3–3.5)
GLUCOSE SERPL-MCNC: 88 MG/DL (ref 65–100)
HDLC SERPL-MCNC: 60 MG/DL (ref 40–60)
HDLC SERPL: 2.6 {RATIO}
LDLC SERPL CALC-MCNC: 72 MG/DL
POTASSIUM SERPL-SCNC: 4.1 MMOL/L (ref 3.5–5.1)
PROT SERPL-MCNC: 7.1 G/DL (ref 6.3–8.2)
SODIUM SERPL-SCNC: 142 MMOL/L (ref 136–145)
TRIGL SERPL-MCNC: 120 MG/DL (ref 35–150)
VLDLC SERPL CALC-MCNC: 24 MG/DL (ref 6–23)

## 2022-08-16 ENCOUNTER — OFFICE VISIT (OUTPATIENT)
Dept: INTERNAL MEDICINE CLINIC | Facility: CLINIC | Age: 82
End: 2022-08-16
Payer: MEDICARE

## 2022-08-16 VITALS
SYSTOLIC BLOOD PRESSURE: 146 MMHG | BODY MASS INDEX: 32.03 KG/M2 | TEMPERATURE: 98.6 F | OXYGEN SATURATION: 98 % | HEART RATE: 89 BPM | WEIGHT: 187.6 LBS | DIASTOLIC BLOOD PRESSURE: 70 MMHG | HEIGHT: 64 IN

## 2022-08-16 DIAGNOSIS — G89.29 CHRONIC LEFT-SIDED LOW BACK PAIN WITH LEFT-SIDED SCIATICA: ICD-10-CM

## 2022-08-16 DIAGNOSIS — E78.00 PURE HYPERCHOLESTEROLEMIA: ICD-10-CM

## 2022-08-16 DIAGNOSIS — I82.511 CHRONIC DEEP VEIN THROMBOSIS (DVT) OF RIGHT FEMORAL VEIN (HCC): ICD-10-CM

## 2022-08-16 DIAGNOSIS — Z00.00 MEDICARE ANNUAL WELLNESS VISIT, SUBSEQUENT: Primary | ICD-10-CM

## 2022-08-16 DIAGNOSIS — I10 HYPERTENSION, ESSENTIAL: ICD-10-CM

## 2022-08-16 DIAGNOSIS — J45.40 MODERATE PERSISTENT ASTHMA WITHOUT COMPLICATION: ICD-10-CM

## 2022-08-16 DIAGNOSIS — J30.89 ENVIRONMENTAL AND SEASONAL ALLERGIES: ICD-10-CM

## 2022-08-16 DIAGNOSIS — N18.32 STAGE 3B CHRONIC KIDNEY DISEASE (HCC): ICD-10-CM

## 2022-08-16 DIAGNOSIS — E66.09 CLASS 1 OBESITY DUE TO EXCESS CALORIES WITH SERIOUS COMORBIDITY AND BODY MASS INDEX (BMI) OF 31.0 TO 31.9 IN ADULT: ICD-10-CM

## 2022-08-16 DIAGNOSIS — M85.851 OSTEOPENIA OF NECK OF RIGHT FEMUR: ICD-10-CM

## 2022-08-16 DIAGNOSIS — M54.42 CHRONIC LEFT-SIDED LOW BACK PAIN WITH LEFT-SIDED SCIATICA: ICD-10-CM

## 2022-08-16 DIAGNOSIS — G60.9 IDIOPATHIC PERIPHERAL NEUROPATHY: ICD-10-CM

## 2022-08-16 DIAGNOSIS — I87.2 VENOUS INSUFFICIENCY OF BOTH LOWER EXTREMITIES: ICD-10-CM

## 2022-08-16 DIAGNOSIS — K63.5 POLYP OF COLON, UNSPECIFIED PART OF COLON, UNSPECIFIED TYPE: ICD-10-CM

## 2022-08-16 DIAGNOSIS — Z23 ENCOUNTER FOR IMMUNIZATION: ICD-10-CM

## 2022-08-16 DIAGNOSIS — K21.00 GASTROESOPHAGEAL REFLUX DISEASE WITH ESOPHAGITIS WITHOUT HEMORRHAGE: ICD-10-CM

## 2022-08-16 DIAGNOSIS — E03.9 ACQUIRED HYPOTHYROIDISM: ICD-10-CM

## 2022-08-16 PROCEDURE — 1123F ACP DISCUSS/DSCN MKR DOCD: CPT | Performed by: INTERNAL MEDICINE

## 2022-08-16 PROCEDURE — G8399 PT W/DXA RESULTS DOCUMENT: HCPCS | Performed by: INTERNAL MEDICINE

## 2022-08-16 PROCEDURE — 1090F PRES/ABSN URINE INCON ASSESS: CPT | Performed by: INTERNAL MEDICINE

## 2022-08-16 PROCEDURE — G8428 CUR MEDS NOT DOCUMENT: HCPCS | Performed by: INTERNAL MEDICINE

## 2022-08-16 PROCEDURE — 99214 OFFICE O/P EST MOD 30 MIN: CPT | Performed by: INTERNAL MEDICINE

## 2022-08-16 PROCEDURE — 1036F TOBACCO NON-USER: CPT | Performed by: INTERNAL MEDICINE

## 2022-08-16 PROCEDURE — G8417 CALC BMI ABV UP PARAM F/U: HCPCS | Performed by: INTERNAL MEDICINE

## 2022-08-16 PROCEDURE — G0439 PPPS, SUBSEQ VISIT: HCPCS | Performed by: INTERNAL MEDICINE

## 2022-08-16 RX ORDER — GABAPENTIN 300 MG/1
CAPSULE ORAL
Qty: 270 CAPSULE | Refills: 2 | Status: SHIPPED | OUTPATIENT
Start: 2022-08-16 | End: 2023-08-16

## 2022-08-16 ASSESSMENT — PATIENT HEALTH QUESTIONNAIRE - PHQ9
SUM OF ALL RESPONSES TO PHQ QUESTIONS 1-9: 0
1. LITTLE INTEREST OR PLEASURE IN DOING THINGS: 0
SUM OF ALL RESPONSES TO PHQ QUESTIONS 1-9: 0
2. FEELING DOWN, DEPRESSED OR HOPELESS: 0
SUM OF ALL RESPONSES TO PHQ9 QUESTIONS 1 & 2: 0
SUM OF ALL RESPONSES TO PHQ QUESTIONS 1-9: 0
SUM OF ALL RESPONSES TO PHQ QUESTIONS 1-9: 0

## 2022-08-16 ASSESSMENT — ENCOUNTER SYMPTOMS
EYE PAIN: 0
VOICE CHANGE: 0
RECTAL PAIN: 0
STRIDOR: 0

## 2022-08-16 ASSESSMENT — LIFESTYLE VARIABLES
HOW OFTEN DO YOU HAVE A DRINK CONTAINING ALCOHOL: NEVER
HOW MANY STANDARD DRINKS CONTAINING ALCOHOL DO YOU HAVE ON A TYPICAL DAY: PATIENT DOES NOT DRINK

## 2022-08-16 NOTE — PROGRESS NOTES
FOLLOWUP VISIT and MEDICARE WELLNESS    Subjective:    Ms. Brijesh Trevino is a [de-identified] y.o., female,   Chief Complaint   Patient presents with    Medicare AWV    Follow-up Chronic Condition       HPI:    Patient presents today for follow up of two or more chronic medical problems and review of labs. The patient is also here for the annual Medicare wellness visit. She also has an acute complaint of increased non-radicular low back pain and diffuse osteoarthritis unrelieved by Tylenol. Ibuprofen helps but has to avoid due to CKD. The patient has hypothyroidism. The patient denies any symptoms of hypo- or hyperthyroidism on the current dose of levothyroxine. The patient has hyperlipidemia. The patient has been following a low cholesterol diet and denies any myalgias or weakness on current lipid lowering therapy. The patient has hypertension. The patient has been on an attempted low sodium diet and has been trying to exercise and maintain a healthy weight. The patient reports good compliance with the blood pressure medications. Her home blood pressures have been elevated into the 140-150 range. Interval hx and ROS otherwise no change. The following portions of the patient's history were reviewed and updated as appropriate:      Past Medical History:   Diagnosis Date    Acute pancreatitis 10/12/2018    Asthma     Calculus of kidney     Colon polyps     Female stress incontinence 8/16/2012    GERD (gastroesophageal reflux disease)     History of DVT (deep vein thrombosis)     9/2021    History of gastric ulcer     2018    History of kidney stones 8/16/2012    History of septic arthritis 09/10/2019    Right knee.   Improved with prolonged IV anitbiotics    Hypercholesterolemia     Hypertension     Hypothyroidism     Ophthalmic migraine     Osteopenia of neck of right femur 2/25/2015    Primary insomnia 07/17/2017    Raynaud's phenomenon 2/25/2015    Recurrent cellulitis of lower extremity 12/28/2020    S/P total knee arthroplasty 8/16/2012    [id number]     Umbilical hernia 6/39/9315    Venous insufficiency of both lower extremities 1/28/2014    Worse on left        Past Surgical History:   Procedure Laterality Date    COLONOSCOPY  Jan 2013    4 polyps, repeat 5 years    COLONOSCOPY  7/5/2016    repeat 5 yrs tubular adenoma    HYSTERECTOMY, TOTAL ABDOMINAL (CERVIX REMOVED)      REMOVAL OF KIDNEY STONE      SALPINGO-OOPHORECTOMY      TOTAL KNEE ARTHROPLASTY Bilateral     partial       Family History   Problem Relation Age of Onset    High Cholesterol Brother     Hypertension Brother     High Cholesterol Brother     Hypertension Brother     Heart Attack Brother     Heart Attack Paternal Grandmother     Heart Attack Paternal Grandfather     Breast Cancer Neg Hx     Hypertension Mother     Rheum Arthritis Mother     High Cholesterol Mother     Broken Bones Mother     Heart Failure Father     Heart Attack Father     Osteoarthritis Brother         Knee       Social History     Socioeconomic History    Marital status:      Spouse name: Not on file    Number of children: Not on file    Years of education: Not on file    Highest education level: Not on file   Occupational History    Not on file   Tobacco Use    Smoking status: Never    Smokeless tobacco: Never   Substance and Sexual Activity    Alcohol use: No    Drug use: No    Sexual activity: Not on file   Other Topics Concern    Not on file   Social History Narrative    Lives with  who is chronically ill with end stage lung disease and early dementia and depression.   5 children, daughter Yayo Nguyen and son Ayanna Jacome (PharmD) have her [de-identified], sons Young Guzman and Lopez Ghislaine have durable POA     Social Determinants of Health     Financial Resource Strain: Not on file   Food Insecurity: Not on file   Transportation Needs: Not on file   Physical Activity: Inactive    Days of Exercise per Week: 0 days    Minutes of Exercise per Session: 0 min   Stress: Not on file   Social Connections: Not on file   Intimate Partner Violence: Not on file   Housing Stability: Not on file       Current Outpatient Medications   Medication Sig Dispense Refill    gabapentin (NEURONTIN) 300 MG capsule Take one capsule by mouth three times a day 270 capsule 2    albuterol sulfate  (90 Base) MCG/ACT inhaler Inhale 2 puffs into the lungs every 6 hours as needed      atorvastatin (LIPITOR) 40 MG tablet Take 40 mg by mouth daily      fluticasone (FLOVENT HFA) 220 MCG/ACT inhaler Inhale 2 puffs into the lungs 2 times daily      furosemide (LASIX) 40 MG tablet Take 40 mg by mouth daily      levothyroxine (SYNTHROID) 25 MCG tablet Take 25 mcg by mouth      magnesium oxide (MAG-OX) 400 MG tablet Take 400 mg by mouth daily      omeprazole (PRILOSEC OTC) 20 MG tablet Take 20 mg by mouth daily      ondansetron (ZOFRAN-ODT) 4 MG disintegrating tablet Take 4 mg by mouth every 8 hours as needed      potassium chloride (KLOR-CON M) 20 MEQ extended release tablet Take 20 mEq by mouth 2 times daily      rivaroxaban (XARELTO) 10 MG TABS tablet Take by mouth Daily with supper       No current facility-administered medications for this visit. Allergies as of 08/16/2022 - Fully Reviewed 08/16/2022   Allergen Reaction Noted    Amoxicillin Rash 12/31/2020    Pneumococcal vaccines Other (See Comments) 10/28/2012       Review of Systems   Constitutional:  Negative for activity change and appetite change. HENT:  Negative for drooling and voice change. Eyes:  Negative for pain. Respiratory:  Negative for stridor. Gastrointestinal:  Negative for rectal pain. Endocrine: Negative for polydipsia and polyphagia. Genitourinary:  Negative for dyspareunia and enuresis. Musculoskeletal:  Negative for gait problem and neck stiffness. Skin:  Negative for pallor. Neurological:  Negative for facial asymmetry and speech difficulty. Hematological:  Does not bruise/bleed easily. Psychiatric/Behavioral:  Negative for self-injury. The patient is not hyperactive. Patient Care Team:  Douglas Camarillo MD as PCP - General  Douglas Camarillo MD as PCP - [address] [place], [place] [address]geraldo Garzonled Provider    Objective:    BP (!) 146/70 (Site: Left Upper Arm, Position: Sitting)   Pulse 89   Temp 98.6 °F (37 °C) (Temporal)   Ht 5' 4\" (1.626 m)   Wt 187 lb 9.6 oz (85.1 kg)   SpO2 98%   BMI 32.20 kg/m²     Physical Exam  Vitals reviewed. Constitutional:       General: She is not in acute distress. Appearance: She is not toxic-appearing. HENT:      Head: Normocephalic and atraumatic. Right Ear: Tympanic membrane, ear canal and external ear normal.      Left Ear: Tympanic membrane, ear canal and external ear normal.      Nose: Nose normal.      Mouth/Throat:      Mouth: Mucous membranes are moist.      Pharynx: Oropharynx is clear. Eyes:      General: No scleral icterus. Extraocular Movements: Extraocular movements intact. Pupils: Pupils are equal, round, and reactive to light. Cardiovascular:      Rate and Rhythm: Normal rate and regular rhythm. Pulses: Normal pulses. Heart sounds: Normal heart sounds. Pulmonary:      Effort: Pulmonary effort is normal. No respiratory distress. Breath sounds: Normal breath sounds. No stridor. Abdominal:      General: Abdomen is flat. Bowel sounds are normal.      Palpations: Abdomen is soft. There is no mass. Tenderness: There is no guarding or rebound. Musculoskeletal:         General: Normal range of motion. Cervical back: Normal range of motion and neck supple. Skin:     General: Skin is warm and dry. Coloration: Skin is not jaundiced. Neurological:      Mental Status: She is alert and oriented to person, place, and time. Mental status is at baseline. Psychiatric:         Behavior: Behavior normal.         Thought Content:  Thought content normal.            Orders Only on 08/09/2022   Component Date Value Ref Range U/L Final    Alk Phosphatase 08/09/2022 88  50 - 136 U/L Final    Total Protein 08/09/2022 7.1  6.3 - 8.2 g/dL Final    Albumin 08/09/2022 3.6  3.2 - 4.6 g/dL Final    Globulin 08/09/2022 3.5  2.3 - 3.5 g/dL Final    Albumin/Globulin Ratio 08/09/2022 1.0 (A) 1.2 - 3.5   Final         Assessent & Plan:        1. Medicare annual wellness visit, subsequent  Overview:  8/16/22    2. Chronic deep vein thrombosis (DVT) of right femoral vein (HCC)  Overview:  The patient had unprovoked bilateral proximal femoral DVT in 2021. Treated with Xarelto 20 mg daily for 3-6 months. Repeat ultrasound revealed residual chronic right femoral DVT. Decision was made to continue indefinite low dose Xarelto 10 mg daily. The patient will continue the current treatment. Orders:  -     CBC with Auto Differential; Future  3. Venous insufficiency of both lower extremities  Overview:  The patient has chronic bilateral lower extremity venous insufficiency with venous stasis, left slightly worse than right. She has also had recurrent bilateral lower extremity cellulitis due to same. The patient was advised regarding elevation and compression. She was encouraged to adopt a low-sodium diet. We will continue furosemide 40 mg daily. Given her stage III chronic kidney disease we will continue to periodically monitor her electrolytes and creatinine. 4. Hypertension, essential  Overview:  The patient had been on losartan for hypertension. Losartan was stopped months ago after her blood pressure normalized after weight loss. She has started to regain the lost weight and her blood pressure is starting to go back up. Patient prefers to not resume blood pressure medications yet. .. she would like to try to lose the weight again. She will call me if her home blood pressures do not improve. Orders:  -     Basic Metabolic Panel; Future  5.  Environmental and seasonal allergies  Overview:  Symptoms controlled with Flonase and OTC antihistamines PRN. 6. Moderate persistent asthma without complication  Overview:  Asthma has been well controlled on Flovent 2 puffs twice daily, recommend continuing this medication. Continue albuterol PRN. Reviewed appropriate time to use this medication discussed difference between maintenance and rescue inhalers  7. Polyp of colon, unspecified part of colon, unspecified type  Overview:  The patient has a previous history of colon polyps. Her last colonoscopy on 7/5/2016 was reviewed. Repeat was recommended in 5 years. The patient wishes to defer additional colonoscopies given age and multiple chronic health conditions. 8. Gastroesophageal reflux disease with esophagitis without hemorrhage  Overview:  Previous stricture dilation. 1/2013 EGD revealed mild esophagitis. Stable on omeprazole 20 mg daily. The patient will continue the current treatment. 9. Acquired hypothyroidism  Overview:  2/8/22 TSH 3.140 on levothyroxine 25 mcg daily. The patient will continue the current treatment. Labs were reviewed and discussed with the patient. Orders:  -     TSH; Future  10. Chronic left-sided low back pain with left-sided sciatica  Overview:  Symptoms controlled with gabapentin PRN. She states that her symptoms have slowly worsened despite gabapentin 100 mg TID and Tylenol. Avoiding NSAIDs due to CKD. Increase gabapentin to 300 mg TID. Call if not better. Orders:  -     gabapentin (NEURONTIN) 300 MG capsule; Take one capsule by mouth three times a day, Disp-270 capsule, R-2Normal  11. Idiopathic peripheral neuropathy  Overview:  Symptoms improved with gabapentin. The patient will continue the current treatment. Orders:  -     gabapentin (NEURONTIN) 300 MG capsule; Take one capsule by mouth three times a day, Disp-270 capsule, R-2Normal  12. Osteopenia of neck of right femur  Overview:  1/8/19 DEXA - right femoral neck BMD 0.849 with T -1.4. Ten yr FRAX fracture risk 17.7 / 8.6%. She received Prolia x 4 doses from 2/2019 through 8/2020. In 8/2021 the patient was converted to Boniva 150 mg monthly. Continue calcium and vitamin D. Fall precautions and weight bearing exercise as tolerated discussed. 13. Stage 3b chronic kidney disease ([facility] [address]  Overview:  8/9/22 creatinine 1.40, eGFR 38  2/8/22 creatinine 1.34, eGFR 37  2/15/21 creatinine 1.30, eGFR 39      The patient was advised to avoid NSAIDs and other nephrotoxins. Will dose adjust medications as appropriate. Will monitor labs over time. 14. Pure hypercholesterolemia  Overview:  8/9/22 total 156 HDL 60 LDL 72  on atorvastatin 40 mg daily. Reviewed the most recent lipid panel with the patient, and interpreted the results within the context of the patient's personal cardiovascular risk factors. Discussed/reinforced a low-cholesterol diet. The patient was advised regarding daily compliance. 15. Class 1 obesity due to excess calories with serious comorbidity and body mass index (BMI) of 31.0 to 31.9 in adult  Overview:  Reviewed the patient's BMI. Discussed the need to lose weight in order to avoid many preventable illnesses. Discussed diet, exercise, and weight loss strategies. 16. Encounter for immunization  Overview:  Vaccinations reviewed / discussed. Patient declines Prevnar (she developed flu like symptoms following her initial Pneumovax 23 vaccinations). Recommended Shingrix and Covid booster. The patient and/or patient representative voiced understanding and agreement with the current diagnoses, recommendations, and possible side effects. Return in about 6 months (around 2/16/2023) for follow up of chronic medical problems, review labs.         Medicare Annual Wellness Visit    Lorena Bobby is here for Medicare AWV and Follow-up Chronic Condition    Assessment & Plan   Medicare annual wellness visit, subsequent  Chronic deep vein thrombosis (DVT) of right femoral vein ([facility] [address]  - CBC with Auto Differential; Future  Venous insufficiency of both lower extremities  Hypertension, essential  -     Basic Metabolic Panel; Future  Environmental and seasonal allergies  Moderate persistent asthma without complication  Polyp of colon, unspecified part of colon, unspecified type  Gastroesophageal reflux disease with esophagitis without hemorrhage  Acquired hypothyroidism  -     TSH; Future  Chronic left-sided low back pain with left-sided sciatica  -     gabapentin (NEURONTIN) 300 MG capsule; Take one capsule by mouth three times a day, Disp-270 capsule, R-2Normal  Idiopathic peripheral neuropathy  -     gabapentin (NEURONTIN) 300 MG capsule; Take one capsule by mouth three times a day, Disp-270 capsule, R-2Normal  Osteopenia of neck of right femur  Stage 3b chronic kidney disease ([facility] [address]  Pure hypercholesterolemia  Class 1 obesity due to excess calories with serious comorbidity and body mass index (BMI) of 31.0 to 31.9 in adult  Encounter for immunization    Recommendations for Preventive Services Due: see orders and patient instructions/AVS.  Recommended screening schedule for the next 5-10 years is provided to the patient in written form: see Patient Instructions/AVS.     Return in about 6 months (around 2/16/2023) for follow up of chronic medical problems, review labs. Subjective       Patient's complete Health Risk Assessment and screening values have been reviewed and are found in Flowsheets. The following problems were reviewed today and where indicated follow up appointments were made and/or referrals ordered.     Positive Risk Factor Screenings with Interventions:             General Health and ACP:  General  In general, how would you say your health is?: Good  In the past 7 days, have you experienced any of the following: New or Increased Pain, New or Increased Fatigue, Loneliness, Social Isolation, Stress or Anger?: No  Do you get the social and emotional support that you need?: Yes  Do you have a Living Will?: Yes    Advance Directives       Power of [address] Will ACP-Advance Directive ACP-Power of     Not on File Not on File Not on File Not on File        General Health Risk Interventions:  No Living Will: Advance Care Planning addressed with patient today    Health Habits/Nutrition:  Physical Activity: Inactive    Days of Exercise per Week: 0 days    Minutes of Exercise per Session: 0 min     Have you lost any weight without trying in the past 3 months?: No  Body mass index: (!) 32.2  Have you seen the dentist within the past year?: N/A - wear dentures  Health Habits/Nutrition Interventions:  Inadequate physical activity:  patient agrees to increase physical activity as follows: by walking several days a week             Objective   Vitals:    08/16/22 1357   BP: (!) 146/70   Site: Left Upper Arm   Position: Sitting   Pulse: 89   Temp: 98.6 °F (37 °C)   TempSrc: Temporal   SpO2: 98%   Weight: 187 lb 9.6 oz (85.1 kg)   Height: 5' 4\" (1.626 m)      Body mass index is 32.2 kg/m². Allergies   Allergen Reactions    Amoxicillin Rash     Classic drug rash    Pneumococcal Vaccines Other (See Comments)     fever  Other reaction(s): Other (comments)  fever  Other reaction(s): Other (comments)  fever       Prior to Visit Medications    Medication Sig Taking?  Authorizing Provider   gabapentin (NEURONTIN) 300 MG capsule Take one capsule by mouth three times a day Yes Renaee Lesches, MD   albuterol sulfate  (90 Base) MCG/ACT inhaler Inhale 2 puffs into the lungs every 6 hours as needed Yes Ar Automatic Reconciliation   atorvastatin (LIPITOR) 40 MG tablet Take 40 mg by mouth daily Yes Ar Automatic Reconciliation   fluticasone (FLOVENT HFA) 220 MCG/ACT inhaler Inhale 2 puffs into the lungs 2 times daily Yes Ar Automatic Reconciliation   furosemide (LASIX) 40 MG tablet Take 40 mg by mouth daily Yes Ar Automatic Reconciliation   levothyroxine (SYNTHROID) 25 MCG tablet Take 25 mcg by mouth Yes Ar Automatic Reconciliation   magnesium oxide (MAG-OX) 400 MG tablet Take 400 mg by mouth daily Yes Ar Automatic Reconciliation   omeprazole (PRILOSEC OTC) 20 MG tablet Take 20 mg by mouth daily Yes Ar Automatic Reconciliation   ondansetron (ZOFRAN-ODT) 4 MG disintegrating tablet Take 4 mg by mouth every 8 hours as needed Yes Ar Automatic Reconciliation   potassium chloride (KLOR-CON M) 20 MEQ extended release tablet Take 20 mEq by mouth 2 times daily Yes Ar Automatic Reconciliation   rivaroxaban (XARELTO) 10 MG TABS tablet Take by mouth Daily with supper Yes Ar Automatic Reconciliation       CareTeam (Including outside providers/suppliers regularly involved in providing care):   Patient Care Team:  Norman Campbell MD as PCP - General  Norman Campbell MD as PCP - [facility] Empaneled Provider     Reviewed and updated this visit:  Tobacco  Allergies  Med Hx  Surg Hx  Soc Hx  Fam Hx

## 2022-09-15 PROBLEM — Z00.00 MEDICARE ANNUAL WELLNESS VISIT, SUBSEQUENT: Status: RESOLVED | Noted: 2021-02-17 | Resolved: 2022-09-15

## 2022-09-21 RX ORDER — POTASSIUM CHLORIDE 20 MEQ/1
TABLET, EXTENDED RELEASE ORAL
Qty: 180 TABLET | Refills: 1 | Status: SHIPPED | OUTPATIENT
Start: 2022-09-21

## 2022-11-21 RX ORDER — LEVOTHYROXINE SODIUM 0.03 MG/1
TABLET ORAL
Qty: 90 TABLET | Refills: 3 | Status: SHIPPED | OUTPATIENT
Start: 2022-11-21

## 2022-11-21 RX ORDER — ATORVASTATIN CALCIUM 40 MG/1
TABLET, FILM COATED ORAL
Qty: 90 TABLET | Refills: 3 | Status: SHIPPED | OUTPATIENT
Start: 2022-11-21

## 2022-11-21 NOTE — TELEPHONE ENCOUNTER
Requested Prescriptions     Pending Prescriptions Disp Refills    atorvastatin (LIPITOR) 40 MG tablet [Pharmacy Med Name: ATORVASTATIN 40 MG TABLET 40 Tablet] 90 tablet 3     Sig: TAKE 1 TABLET BY MOUTH DAILY    levothyroxine (SYNTHROID) 25 MCG tablet [Pharmacy Med Name: LEVOTHYROXINE SODIUM 25 MCG 25 Tablet] 90 tablet 3     Sig: TAKE 1 TABLET BY MOUTH EVERY MORNING.

## 2022-12-07 ENCOUNTER — OFFICE VISIT (OUTPATIENT)
Dept: INTERNAL MEDICINE CLINIC | Facility: CLINIC | Age: 82
End: 2022-12-07
Payer: MEDICARE

## 2022-12-07 VITALS
SYSTOLIC BLOOD PRESSURE: 136 MMHG | WEIGHT: 184 LBS | HEIGHT: 64 IN | DIASTOLIC BLOOD PRESSURE: 64 MMHG | HEART RATE: 66 BPM | BODY MASS INDEX: 31.41 KG/M2 | OXYGEN SATURATION: 96 %

## 2022-12-07 DIAGNOSIS — L03.031 CELLULITIS OF GREAT TOE OF RIGHT FOOT: Primary | ICD-10-CM

## 2022-12-07 PROCEDURE — G8484 FLU IMMUNIZE NO ADMIN: HCPCS | Performed by: INTERNAL MEDICINE

## 2022-12-07 PROCEDURE — G8427 DOCREV CUR MEDS BY ELIG CLIN: HCPCS | Performed by: INTERNAL MEDICINE

## 2022-12-07 PROCEDURE — 3074F SYST BP LT 130 MM HG: CPT | Performed by: INTERNAL MEDICINE

## 2022-12-07 PROCEDURE — 1036F TOBACCO NON-USER: CPT | Performed by: INTERNAL MEDICINE

## 2022-12-07 PROCEDURE — 1123F ACP DISCUSS/DSCN MKR DOCD: CPT | Performed by: INTERNAL MEDICINE

## 2022-12-07 PROCEDURE — G8399 PT W/DXA RESULTS DOCUMENT: HCPCS | Performed by: INTERNAL MEDICINE

## 2022-12-07 PROCEDURE — 3078F DIAST BP <80 MM HG: CPT | Performed by: INTERNAL MEDICINE

## 2022-12-07 PROCEDURE — G8417 CALC BMI ABV UP PARAM F/U: HCPCS | Performed by: INTERNAL MEDICINE

## 2022-12-07 PROCEDURE — 1090F PRES/ABSN URINE INCON ASSESS: CPT | Performed by: INTERNAL MEDICINE

## 2022-12-07 PROCEDURE — 99213 OFFICE O/P EST LOW 20 MIN: CPT | Performed by: INTERNAL MEDICINE

## 2022-12-07 RX ORDER — DOXYCYCLINE HYCLATE 100 MG
100 TABLET ORAL 2 TIMES DAILY
Qty: 20 TABLET | Refills: 0 | Status: SHIPPED | OUTPATIENT
Start: 2022-12-07 | End: 2022-12-17

## 2022-12-07 ASSESSMENT — ENCOUNTER SYMPTOMS
RECTAL PAIN: 0
STRIDOR: 0
EYE PAIN: 0
VOICE CHANGE: 0

## 2022-12-07 NOTE — PROGRESS NOTES
FOLLOWUP VISIT    Subjective:    Ms. Nando Ceja is a [de-identified] y.o., female,   Chief Complaint   Patient presents with    Toe Injury     [place] dog fell on her toe - right side. HPI:    Last week patient was moving a concrete dog statue on her front porch when it tipped over and landed on her right great toe. She was wearing shoes and socks. She has been able to move and bend the toe without pain. \"I don't think it is fractured. \"  The following day she developed a blister on the dorsal toe which unroofed while bathing. Now she has a trace bit of redness and discomfort. \"My daughter is a nurse and thinks I am starting to get early cellulitis and thinks I should be put on an antibiotic. \"      The following portions of the patient's history were reviewed and updated as appropriate:      Past Medical History:   Diagnosis Date    Acute pancreatitis 10/12/2018    Asthma     Calculus of kidney     Colon polyps     Female stress incontinence 8/16/2012    GERD (gastroesophageal reflux disease)     History of DVT (deep vein thrombosis)     9/2021    History of gastric ulcer     2018    History of kidney stones 8/16/2012    History of septic arthritis 09/10/2019    Right knee.   Improved with prolonged IV anitbiotics    Hypercholesterolemia     Hypertension     Hypothyroidism     Ophthalmic migraine     Osteopenia of neck of right femur 2/25/2015    Primary insomnia 07/17/2017    Raynaud's phenomenon 2/25/2015    Recurrent cellulitis of lower extremity 12/28/2020    S/P total knee arthroplasty 8/16/2012    [id number]     Umbilical hernia 0/10/6139    Venous insufficiency of both lower extremities 1/28/2014    Worse on left        Past Surgical History:   Procedure Laterality Date    COLONOSCOPY  Jan 2013    4 polyps, repeat 5 years    COLONOSCOPY  7/5/2016    repeat 5 yrs tubular adenoma    HYSTERECTOMY, TOTAL ABDOMINAL (CERVIX REMOVED)      REMOVAL OF KIDNEY STONE      SALPINGO-OOPHORECTOMY      TOTAL KNEE ARTHROPLASTY Bilateral     partial       Family History   Problem Relation Age of Onset    High Cholesterol Brother     Hypertension Brother     High Cholesterol Brother     Hypertension Brother     Heart Attack Brother     Heart Attack Paternal Grandmother     Heart Attack Paternal Grandfather     Breast Cancer Neg Hx     Hypertension Mother     Rheum Arthritis Mother     High Cholesterol Mother     Broken Bones Mother     Heart Failure Father     Heart Attack Father     Osteoarthritis Brother         Knee       Social History     Socioeconomic History    Marital status:      Spouse name: Not on file    Number of children: Not on file    Years of education: Not on file    Highest education level: Not on file   Occupational History    Not on file   Tobacco Use    Smoking status: Never    Smokeless tobacco: Never   Substance and Sexual Activity    Alcohol use: No    Drug use: No    Sexual activity: Not on file   Other Topics Concern    Not on file   Social History Narrative    Lives with  who is chronically ill with end stage lung disease and early dementia and depression. 5 children, daughter Sherri Cole and son Mary Kay García (PharmD) have her [de-identified], sons Inna Rangel and Alessia Keys have durable POA     Social Determinants of Health     Financial Resource Strain: Not on file   Food Insecurity: Not on file   Transportation Needs: Not on file   Physical Activity: Inactive    Days of Exercise per Week: 0 days    Minutes of Exercise per Session: 0 min   Stress: Not on file   Social Connections: Not on file   Intimate Partner Violence: Not on file   Housing Stability: Not on file       Current Outpatient Medications   Medication Sig Dispense Refill    atorvastatin (LIPITOR) 40 MG tablet TAKE 1 TABLET BY MOUTH DAILY 90 tablet 3    levothyroxine (SYNTHROID) 25 MCG tablet TAKE 1 TABLET BY MOUTH EVERY MORNING.  90 tablet 3    potassium chloride (KLOR-CON M) 20 MEQ extended release tablet TAKE 1 TABLET BY MOUTH TWICE A  tablet 1 Appearance: She is not toxic-appearing. HENT:      Head: Normocephalic and atraumatic. Nose: Nose normal.   Eyes:      Extraocular Movements: Extraocular movements intact. Conjunctiva/sclera: Conjunctivae normal.   Pulmonary:      Effort: Pulmonary effort is normal. No respiratory distress. Breath sounds: No stridor. Skin:     Coloration: Skin is not jaundiced or pale. Comments: Right great toe normal alignment. FROM. Can tap on distal toe without any pain. She has an unroofed blister with some slight surrounding blanchable erythema. Neurological:      Mental Status: She is alert and oriented to person, place, and time. Psychiatric:         Thought Content:  Thought content normal.            Orders Only on 08/09/2022   Component Date Value Ref Range Status    Cholesterol, Total 08/09/2022 156  <200 MG/DL Final    Comment: Borderline High: 200-239 mg/dL  High: Greater than or equal to 240 mg/dL      Triglycerides 08/09/2022 120  35 - 150 MG/DL Final    Comment: Borderline High: 150-199 mg/dL, High: 200-499 mg/dL  Very High: Greater than or equal to 500 mg/dL      HDL 08/09/2022 60  40 - 60 MG/DL Final    LDL Calculated 08/09/2022 72  <100 MG/DL Final    Comment: Near Optimal: 100-129 mg/dL  Borderline High: 130-159, High: 160-189 mg/dL  Very High: Greater than or equal to 190 mg/dL      VLDL Cholesterol Calculated 08/09/2022 24 (A)  6.0 - 23.0 MG/DL Final    Chol/HDL Ratio 08/09/2022 2.6    Final    Sodium 08/09/2022 142  136 - 145 mmol/L Final    Potassium 08/09/2022 4.1  3.5 - 5.1 mmol/L Final    Chloride 08/09/2022 109 (A)  98 - 107 mmol/L Final    CO2 08/09/2022 27  21 - 32 mmol/L Final    Anion Gap 08/09/2022 6 (A)  7 - 16 mmol/L Final    Glucose 08/09/2022 88  65 - 100 mg/dL Final    BUN 08/09/2022 24 (A)  8 - 23 MG/DL Final    Creatinine 08/09/2022 1.40 (A)  0.6 - 1.0 MG/DL Final    GFR  08/09/2022 47 (A)  >60 ml/min/1.73m2 Final    GFR Non- 08/09/2022 38 (A)  >60 ml/min/1.73m2 Final    Comment:   Estimated GFR is calculated using the Modification of Diet in Renal Disease (MDRD) Study equation, reported for both  Americans (GFRAA) and non- Americans (GFRNA), and normalized to 1.73m2 body surface area. The physician must decide which value applies to the patient. The MDRD study equation should only be used in individuals age 25 or older. It has not been validated for the following: pregnant women, patients with serious comorbid conditions,or on certain medications, or persons with extremes of body size, muscle mass, or nutritional status. Calcium 08/09/2022 9.6  8.3 - 10.4 MG/DL Final    Total Bilirubin 08/09/2022 0.5  0.2 - 1.1 MG/DL Final    ALT 08/09/2022 20  12 - 65 U/L Final    AST 08/09/2022 20  15 - 37 U/L Final    Alk Phosphatase 08/09/2022 88  50 - 136 U/L Final    Total Protein 08/09/2022 7.1  6.3 - 8.2 g/dL Final    Albumin 08/09/2022 3.6  3.2 - 4.6 g/dL Final    Globulin 08/09/2022 3.5  2.3 - 3.5 g/dL Final    Albumin/Globulin Ratio 08/09/2022 1.0 (A)  1.2 - 3.5   Final         Assessent & Plan:        1. Cellulitis of great toe of right foot  -     doxycycline hyclate (VIBRA-TABS) 100 MG tablet; Take 1 tablet by mouth 2 times daily for 10 days, Disp-20 tablet, R-0Normal    Will treat empirically with doxycycline 100 mg po BID x 7-10 days. The patient was advised to call for reevaluation should the symptoms fail to improve with treatment. The patient and/or patient representative voiced understanding and agreement with the current diagnoses, recommendations, and possible side effects. Return if symptoms worsen or fail to improve.

## 2023-02-03 ENCOUNTER — HOSPITAL ENCOUNTER (INPATIENT)
Age: 83
LOS: 3 days | Discharge: HOME | DRG: 394 | End: 2023-02-06
Attending: INTERNAL MEDICINE | Admitting: HOSPITALIST
Payer: MEDICARE

## 2023-02-03 ENCOUNTER — HOSPITAL ENCOUNTER (EMERGENCY)
Age: 83
Discharge: ANOTHER HOSPITAL | DRG: 394 | End: 2023-02-03
Attending: EMERGENCY MEDICINE
Payer: MEDICARE

## 2023-02-03 ENCOUNTER — APPOINTMENT (OUTPATIENT)
Dept: CT IMAGING | Age: 83
DRG: 394 | End: 2023-02-03
Payer: MEDICARE

## 2023-02-03 VITALS
BODY MASS INDEX: 27.6 KG/M2 | DIASTOLIC BLOOD PRESSURE: 70 MMHG | RESPIRATION RATE: 17 BRPM | HEIGHT: 62 IN | HEART RATE: 68 BPM | SYSTOLIC BLOOD PRESSURE: 156 MMHG | TEMPERATURE: 98.6 F | WEIGHT: 150 LBS | OXYGEN SATURATION: 93 %

## 2023-02-03 DIAGNOSIS — K56.609 INTESTINAL OBSTRUCTION, UNSPECIFIED CAUSE, UNSPECIFIED WHETHER PARTIAL OR COMPLETE (HCC): Primary | ICD-10-CM

## 2023-02-03 DIAGNOSIS — N39.0 URINARY TRACT INFECTION WITHOUT HEMATURIA, SITE UNSPECIFIED: ICD-10-CM

## 2023-02-03 LAB
ALBUMIN SERPL-MCNC: 4.2 G/DL (ref 3.2–4.6)
ALBUMIN/GLOB SERPL: 1.3 (ref 0.4–1.6)
ALP SERPL-CCNC: 89 U/L (ref 45–117)
ALT SERPL-CCNC: 19 U/L (ref 13–61)
ANION GAP SERPL CALC-SCNC: 12 MMOL/L (ref 2–11)
APPEARANCE UR: ABNORMAL
AST SERPL-CCNC: 26 U/L (ref 15–37)
BACTERIA URNS QL MICRO: ABNORMAL /HPF
BASOPHILS # BLD: 0 K/UL (ref 0–0.2)
BASOPHILS NFR BLD: 0 % (ref 0–2)
BILIRUB SERPL-MCNC: 0.5 MG/DL (ref 0.2–1.1)
BILIRUB UR QL: NEGATIVE
BUN SERPL-MCNC: 21 MG/DL (ref 7–18)
CALCIUM SERPL-MCNC: 9.7 MG/DL (ref 8.3–10.4)
CASTS URNS QL MICRO: 0 /LPF
CHLORIDE SERPL-SCNC: 104 MMOL/L (ref 98–107)
CO2 SERPL-SCNC: 28 MMOL/L (ref 21–32)
COLOR UR: YELLOW
CREAT SERPL-MCNC: 1.34 MG/DL (ref 0.6–1)
CRYSTALS URNS QL MICRO: 0 /LPF
DIFFERENTIAL METHOD BLD: ABNORMAL
EKG ATRIAL RATE: 68 BPM
EKG DIAGNOSIS: NORMAL
EKG P AXIS: 40 DEGREES
EKG P-R INTERVAL: 188 MS
EKG Q-T INTERVAL: 405 MS
EKG QRS DURATION: 91 MS
EKG QTC CALCULATION (BAZETT): 434 MS
EKG R AXIS: 22 DEGREES
EKG T AXIS: 31 DEGREES
EKG VENTRICULAR RATE: 69 BPM
EOSINOPHIL # BLD: 0 K/UL (ref 0–0.8)
EOSINOPHIL NFR BLD: 0 % (ref 0.5–7.8)
EPI CELLS #/AREA URNS HPF: ABNORMAL /HPF
ERYTHROCYTE [DISTWIDTH] IN BLOOD BY AUTOMATED COUNT: 13.5 % (ref 11.9–14.6)
GLOBULIN SER CALC-MCNC: 3.2 G/DL (ref 2.8–4.5)
GLUCOSE SERPL-MCNC: 135 MG/DL (ref 65–100)
GLUCOSE UR STRIP.AUTO-MCNC: NEGATIVE MG/DL
HCT VFR BLD AUTO: 36.6 % (ref 35.8–46.3)
HGB BLD-MCNC: 12 G/DL (ref 11.7–15.4)
HGB UR QL STRIP: ABNORMAL
IMM GRANULOCYTES # BLD AUTO: 0.1 K/UL (ref 0–0.5)
IMM GRANULOCYTES NFR BLD AUTO: 1 % (ref 0–5)
KETONES UR QL STRIP.AUTO: ABNORMAL MG/DL
LACTATE SERPL-SCNC: 1.2 MMOL/L (ref 0.4–2)
LEUKOCYTE ESTERASE UR QL STRIP.AUTO: ABNORMAL
LIPASE SERPL-CCNC: 88 U/L (ref 13–60)
LYMPHOCYTES # BLD: 1 K/UL (ref 0.5–4.6)
LYMPHOCYTES NFR BLD: 9 % (ref 13–44)
MCH RBC QN AUTO: 29.9 PG (ref 26.1–32.9)
MCHC RBC AUTO-ENTMCNC: 32.8 G/DL (ref 31.4–35)
MCV RBC AUTO: 91.3 FL (ref 82–102)
MONOCYTES # BLD: 0.7 K/UL (ref 0.1–1.3)
MONOCYTES NFR BLD: 6 % (ref 4–12)
MUCOUS THREADS URNS QL MICRO: 0 /LPF
NEUTS SEG # BLD: 8.9 K/UL (ref 1.7–8.2)
NEUTS SEG NFR BLD: 84 % (ref 43–78)
NITRITE UR QL STRIP.AUTO: NEGATIVE
NRBC # BLD: 0 K/UL (ref 0–0.2)
OTHER OBSERVATIONS: ABNORMAL
PH UR STRIP: 7.5 (ref 5–9)
PLATELET # BLD AUTO: 214 K/UL (ref 150–450)
PMV BLD AUTO: 11.3 FL (ref 9.4–12.3)
POTASSIUM SERPL-SCNC: 3.5 MMOL/L (ref 3.5–5.1)
PROT SERPL-MCNC: 7.4 G/DL (ref 6.4–8.2)
PROT UR STRIP-MCNC: 100 MG/DL
RBC # BLD AUTO: 4.01 M/UL (ref 4.05–5.2)
RBC #/AREA URNS HPF: ABNORMAL /HPF
SODIUM SERPL-SCNC: 144 MMOL/L (ref 133–143)
SP GR UR REFRACTOMETRY: 1.02 (ref 1–1.02)
TROPONIN T SERPL HS-MCNC: 25.9 NG/L (ref 0–14)
TROPONIN T SERPL HS-MCNC: 26.7 NG/L (ref 0–14)
UROBILINOGEN UR QL STRIP.AUTO: 0.2 EU/DL (ref 0.2–1)
WBC # BLD AUTO: 10.6 K/UL (ref 4.3–11.1)
WBC URNS QL MICRO: >100 /HPF

## 2023-02-03 PROCEDURE — 83690 ASSAY OF LIPASE: CPT

## 2023-02-03 PROCEDURE — 99223 1ST HOSP IP/OBS HIGH 75: CPT | Performed by: SURGERY

## 2023-02-03 PROCEDURE — 1100000000 HC RM PRIVATE

## 2023-02-03 PROCEDURE — 97165 OT EVAL LOW COMPLEX 30 MIN: CPT

## 2023-02-03 PROCEDURE — 81001 URINALYSIS AUTO W/SCOPE: CPT

## 2023-02-03 PROCEDURE — 80053 COMPREHEN METABOLIC PANEL: CPT

## 2023-02-03 PROCEDURE — 36415 COLL VENOUS BLD VENIPUNCTURE: CPT

## 2023-02-03 PROCEDURE — 83605 ASSAY OF LACTIC ACID: CPT

## 2023-02-03 PROCEDURE — 94760 N-INVAS EAR/PLS OXIMETRY 1: CPT

## 2023-02-03 PROCEDURE — 97530 THERAPEUTIC ACTIVITIES: CPT

## 2023-02-03 PROCEDURE — 6370000000 HC RX 637 (ALT 250 FOR IP): Performed by: HOSPITALIST

## 2023-02-03 PROCEDURE — 97161 PT EVAL LOW COMPLEX 20 MIN: CPT

## 2023-02-03 PROCEDURE — A4216 STERILE WATER/SALINE, 10 ML: HCPCS | Performed by: HOSPITALIST

## 2023-02-03 PROCEDURE — 94640 AIRWAY INHALATION TREATMENT: CPT

## 2023-02-03 PROCEDURE — 97535 SELF CARE MNGMENT TRAINING: CPT

## 2023-02-03 PROCEDURE — 85025 COMPLETE CBC W/AUTO DIFF WBC: CPT

## 2023-02-03 PROCEDURE — 6360000004 HC RX CONTRAST MEDICATION: Performed by: EMERGENCY MEDICINE

## 2023-02-03 PROCEDURE — 6360000002 HC RX W HCPCS: Performed by: EMERGENCY MEDICINE

## 2023-02-03 PROCEDURE — 2580000003 HC RX 258: Performed by: HOSPITALIST

## 2023-02-03 PROCEDURE — 74177 CT ABD & PELVIS W/CONTRAST: CPT

## 2023-02-03 PROCEDURE — 6360000002 HC RX W HCPCS: Performed by: HOSPITALIST

## 2023-02-03 PROCEDURE — 84484 ASSAY OF TROPONIN QUANT: CPT

## 2023-02-03 PROCEDURE — C9113 INJ PANTOPRAZOLE SODIUM, VIA: HCPCS | Performed by: HOSPITALIST

## 2023-02-03 PROCEDURE — 2580000003 HC RX 258: Performed by: EMERGENCY MEDICINE

## 2023-02-03 RX ORDER — MORPHINE SULFATE 2 MG/ML
2 INJECTION, SOLUTION INTRAMUSCULAR; INTRAVENOUS EVERY 4 HOURS PRN
Status: DISCONTINUED | OUTPATIENT
Start: 2023-02-03 | End: 2023-02-06 | Stop reason: HOSPADM

## 2023-02-03 RX ORDER — POTASSIUM CHLORIDE 20 MEQ/1
40 TABLET, EXTENDED RELEASE ORAL PRN
Status: DISCONTINUED | OUTPATIENT
Start: 2023-02-03 | End: 2023-02-06 | Stop reason: HOSPADM

## 2023-02-03 RX ORDER — SODIUM CHLORIDE, SODIUM LACTATE, POTASSIUM CHLORIDE, CALCIUM CHLORIDE 600; 310; 30; 20 MG/100ML; MG/100ML; MG/100ML; MG/100ML
INJECTION, SOLUTION INTRAVENOUS CONTINUOUS
Status: DISCONTINUED | OUTPATIENT
Start: 2023-02-03 | End: 2023-02-05

## 2023-02-03 RX ORDER — ONDANSETRON 2 MG/ML
4 INJECTION INTRAMUSCULAR; INTRAVENOUS ONCE
Status: COMPLETED | OUTPATIENT
Start: 2023-02-03 | End: 2023-02-03

## 2023-02-03 RX ORDER — ACETAMINOPHEN 325 MG/1
650 TABLET ORAL EVERY 6 HOURS PRN
Status: DISCONTINUED | OUTPATIENT
Start: 2023-02-03 | End: 2023-02-06 | Stop reason: HOSPADM

## 2023-02-03 RX ORDER — 0.9 % SODIUM CHLORIDE 0.9 %
100 INTRAVENOUS SOLUTION INTRAVENOUS ONCE
Status: COMPLETED | OUTPATIENT
Start: 2023-02-03 | End: 2023-02-03

## 2023-02-03 RX ORDER — SODIUM CHLORIDE 0.9 % (FLUSH) 0.9 %
5-40 SYRINGE (ML) INJECTION PRN
Status: DISCONTINUED | OUTPATIENT
Start: 2023-02-03 | End: 2023-02-06 | Stop reason: HOSPADM

## 2023-02-03 RX ORDER — MAGNESIUM SULFATE IN WATER 40 MG/ML
2000 INJECTION, SOLUTION INTRAVENOUS PRN
Status: DISCONTINUED | OUTPATIENT
Start: 2023-02-03 | End: 2023-02-06 | Stop reason: HOSPADM

## 2023-02-03 RX ORDER — SODIUM CHLORIDE 9 MG/ML
INJECTION, SOLUTION INTRAVENOUS PRN
Status: DISCONTINUED | OUTPATIENT
Start: 2023-02-03 | End: 2023-02-06 | Stop reason: HOSPADM

## 2023-02-03 RX ORDER — ONDANSETRON 2 MG/ML
4 INJECTION INTRAMUSCULAR; INTRAVENOUS EVERY 6 HOURS PRN
Status: DISCONTINUED | OUTPATIENT
Start: 2023-02-03 | End: 2023-02-06 | Stop reason: HOSPADM

## 2023-02-03 RX ORDER — POTASSIUM CHLORIDE 7.45 MG/ML
10 INJECTION INTRAVENOUS PRN
Status: DISCONTINUED | OUTPATIENT
Start: 2023-02-03 | End: 2023-02-06 | Stop reason: HOSPADM

## 2023-02-03 RX ORDER — CALCIUM CARBONATE 500(1250)
500 TABLET ORAL 2 TIMES DAILY
COMMUNITY

## 2023-02-03 RX ORDER — ALBUTEROL SULFATE 2.5 MG/3ML
SOLUTION RESPIRATORY (INHALATION)
Status: DISCONTINUED
Start: 2023-02-03 | End: 2023-02-04 | Stop reason: WASHOUT

## 2023-02-03 RX ORDER — POLYETHYLENE GLYCOL 3350 17 G/17G
17 POWDER, FOR SOLUTION ORAL DAILY PRN
Status: DISCONTINUED | OUTPATIENT
Start: 2023-02-03 | End: 2023-02-06 | Stop reason: HOSPADM

## 2023-02-03 RX ORDER — ALBUTEROL SULFATE 90 UG/1
2 AEROSOL, METERED RESPIRATORY (INHALATION) EVERY 6 HOURS PRN
Status: DISCONTINUED | OUTPATIENT
Start: 2023-02-03 | End: 2023-02-06 | Stop reason: HOSPADM

## 2023-02-03 RX ORDER — SODIUM CHLORIDE 0.9 % (FLUSH) 0.9 %
5-40 SYRINGE (ML) INJECTION EVERY 12 HOURS SCHEDULED
Status: DISCONTINUED | OUTPATIENT
Start: 2023-02-03 | End: 2023-02-06 | Stop reason: HOSPADM

## 2023-02-03 RX ORDER — 0.9 % SODIUM CHLORIDE 0.9 %
1000 INTRAVENOUS SOLUTION INTRAVENOUS ONCE
Status: COMPLETED | OUTPATIENT
Start: 2023-02-03 | End: 2023-02-03

## 2023-02-03 RX ORDER — SODIUM CHLORIDE 0.9 % (FLUSH) 0.9 %
10 SYRINGE (ML) INJECTION
Status: COMPLETED | OUTPATIENT
Start: 2023-02-03 | End: 2023-02-03

## 2023-02-03 RX ORDER — ACETAMINOPHEN 650 MG/1
650 SUPPOSITORY RECTAL EVERY 6 HOURS PRN
Status: DISCONTINUED | OUTPATIENT
Start: 2023-02-03 | End: 2023-02-06 | Stop reason: HOSPADM

## 2023-02-03 RX ORDER — ONDANSETRON 4 MG/1
4 TABLET, ORALLY DISINTEGRATING ORAL EVERY 8 HOURS PRN
Status: DISCONTINUED | OUTPATIENT
Start: 2023-02-03 | End: 2023-02-06 | Stop reason: HOSPADM

## 2023-02-03 RX ORDER — LEVOFLOXACIN 5 MG/ML
750 INJECTION, SOLUTION INTRAVENOUS
Status: COMPLETED | OUTPATIENT
Start: 2023-02-03 | End: 2023-02-03

## 2023-02-03 RX ORDER — FLUTICASONE PROPIONATE 110 UG/1
2 AEROSOL, METERED RESPIRATORY (INHALATION) 2 TIMES DAILY
Status: DISCONTINUED | OUTPATIENT
Start: 2023-02-03 | End: 2023-02-06 | Stop reason: HOSPADM

## 2023-02-03 RX ADMIN — CEFTRIAXONE 1000 MG: 1 INJECTION, POWDER, FOR SOLUTION INTRAMUSCULAR; INTRAVENOUS at 16:05

## 2023-02-03 RX ADMIN — SODIUM CHLORIDE, PRESERVATIVE FREE 10 ML: 5 INJECTION INTRAVENOUS at 10:25

## 2023-02-03 RX ADMIN — SODIUM CHLORIDE, POTASSIUM CHLORIDE, SODIUM LACTATE AND CALCIUM CHLORIDE: 600; 310; 30; 20 INJECTION, SOLUTION INTRAVENOUS at 16:08

## 2023-02-03 RX ADMIN — SODIUM CHLORIDE 100 ML: 9 INJECTION, SOLUTION INTRAVENOUS at 10:25

## 2023-02-03 RX ADMIN — LEVOFLOXACIN 750 MG: 5 INJECTION, SOLUTION INTRAVENOUS at 11:23

## 2023-02-03 RX ADMIN — FLUTICASONE PROPIONATE 2 PUFF: 110 AEROSOL, METERED RESPIRATORY (INHALATION) at 22:00

## 2023-02-03 RX ADMIN — SODIUM CHLORIDE 1000 ML: 9 INJECTION, SOLUTION INTRAVENOUS at 09:18

## 2023-02-03 RX ADMIN — SODIUM CHLORIDE 40 MG: 9 INJECTION, SOLUTION INTRAMUSCULAR; INTRAVENOUS; SUBCUTANEOUS at 16:06

## 2023-02-03 RX ADMIN — IOPAMIDOL 100 ML: 755 INJECTION, SOLUTION INTRAVENOUS at 10:25

## 2023-02-03 RX ADMIN — ONDANSETRON 4 MG: 2 INJECTION INTRAMUSCULAR; INTRAVENOUS at 09:18

## 2023-02-03 ASSESSMENT — ENCOUNTER SYMPTOMS
VOMITING: 1
NAUSEA: 1
NAUSEA: 1
DIARRHEA: 0
COUGH: 0
ABDOMINAL DISTENTION: 0
FLATUS: 0
ABDOMINAL PAIN: 1
DIARRHEA: 0
EYE PAIN: 0
FACIAL SWELLING: 0
EYE ITCHING: 0
BLOOD IN STOOL: 0
BELCHING: 0
SHORTNESS OF BREATH: 0
VOMITING: 1
COUGH: 0
CHEST TIGHTNESS: 0
BACK PAIN: 0
HEMATOCHEZIA: 0
CONSTIPATION: 0
CONSTIPATION: 0
HEMATEMESIS: 0
ABDOMINAL PAIN: 1
SHORTNESS OF BREATH: 0

## 2023-02-03 ASSESSMENT — PAIN DESCRIPTION - ORIENTATION: ORIENTATION: LEFT

## 2023-02-03 ASSESSMENT — PAIN SCALES - GENERAL
PAINLEVEL_OUTOF10: 0
PAINLEVEL_OUTOF10: 5

## 2023-02-03 ASSESSMENT — PAIN - FUNCTIONAL ASSESSMENT: PAIN_FUNCTIONAL_ASSESSMENT: 0-10

## 2023-02-03 ASSESSMENT — PAIN DESCRIPTION - FREQUENCY: FREQUENCY: INTERMITTENT

## 2023-02-03 ASSESSMENT — PAIN DESCRIPTION - DESCRIPTORS: DESCRIPTORS: ACHING

## 2023-02-03 ASSESSMENT — PAIN DESCRIPTION - LOCATION: LOCATION: ABDOMEN

## 2023-02-03 NOTE — ED PROVIDER NOTES
Emergency Department Provider Note                   PCP:                Sharon Real MD               Age: 82 y.o. Sex: female       ICD-10-CM    1. Intestinal obstruction, unspecified cause, unspecified whether partial or complete [facility]  K56.609       2. Urinary tract infection without hematuria, site unspecified  N39.0           DISPOSITION          Medical Decision Making  Serafin Welch is a 82 y.o. female who presents to the Emergency Department with chief complaint of  No chief complaint on file. Patient is a 79-year-old female with history of hysterectomy, kidney stone who presents with abdominal pain upper associate with nausea vomiting since Wednesday. Patient denies any diarrhea. patient states her abdominal pain is intermittent    The history is provided by the patient and a relative. Abdominal Pain  Pain location:  Generalized  Pain quality: aching, cramping and dull    Pain radiates to:  Does not radiate  Pain severity:  Moderate  Onset quality:  Gradual  Duration:  2 days  Timing:  Intermittent  Progression:  Waxing and waning  Chronicity:  New  Context: previous surgery    Context: not diet changes, not eating and not recent illness    Relieved by:  Nothing  Worsened by:  Nothing  Ineffective treatments:  None tried  Associated symptoms: nausea and vomiting    Associated symptoms: no anorexia, no belching, no chest pain, no chills, no constipation, no cough, no diarrhea, no dysuria, no fatigue, no fever, no flatus, no hematemesis, no hematochezia, no hematuria and no shortness of breath     Differential diagnosis includes but is not limited to bowel obstruction,, UTI, colitis, diverticulitis, appendicitis. My independent interpretation of patient's chest x-ray is negative for pneumonia or pneumothorax. Patient's CT abdomen pelvis is positive for a bowel obstruction. Patient's urinalysis is positive for urinary tract infection.   We will give IV antibiotics and NG tube and have patient admitted to the hospitalist with surgical consult. Amount and/or Complexity of Data Reviewed  Labs: ordered. Decision-making details documented in ED Course. Radiology: ordered and independent interpretation performed. Decision-making details documented in ED Course. ECG/medicine tests: ordered and independent interpretation performed. Decision-making details documented in ED Course. Risk  Prescription drug management. Complexity of Problem: 1 acute uncomplicated illness requiring admission. (3)  The patients assessment required an independent historian: I spoke with a family member. I have conducted an independent ordering and review of Labs. I have conducted an independent ordering and review of EKG. I have conducted an independent ordering and review of X-rays. I have conducted an independent ordering and review of CT Scan. Considerations: Shared decision making was utilized in the care of this patient. I discussed disposition with another provider. Patient was admitted I have communication with admitting physician. ED Course as of 02/03/23 1113   Fri Feb 03, 2023   1613 EKG interpretation: Sinus rhythm, 69 rate, atrial premature complexes, borderline T wave abnormalities anterior leads.   Unremarkable otherwise. [SH]      ED Course User Index  [SH] Nolan Cortes MD        Orders Placed This Encounter   Procedures    CT ABDOMEN PELVIS W IV CONTRAST Additional Contrast? None    CBC with Auto Differential    CMP    Urinalysis    Troponin T    Lipase    Troponin T    Urinalysis, Micro    EKG 12 Lead        Medications   0.9 % sodium chloride bolus (1,000 mLs IntraVENous New Bag 2/3/23 0918)   ondansetron (ZOFRAN) injection 4 mg (4 mg IntraVENous Given 2/3/23 0918)   iopamidol (ISOVUE-370) 76 % injection 100 mL (100 mLs IntraVENous Given 2/3/23 1025)   0.9 % sodium chloride bolus (100 mLs IntraVENous New Bag 2/3/23 1025)   sodium chloride flush 0.9 % injection 10 mL (10 mLs IntraVENous Given 2/3/23 1025)       New Prescriptions    No medications on file        Carlton Medina is a 82 y.o. female who presents to the Emergency Department with chief complaint of  No chief complaint on file. Patient is a 30-year-old female with history of hysterectomy, kidney stone who presents with abdominal pain upper associate with nausea vomiting since Wednesday. Patient denies any diarrhea. patient states her abdominal pain is intermittent    The history is provided by the patient and a relative. Abdominal Pain  Pain location:  Generalized  Pain quality: aching, cramping and dull    Pain radiates to:  Does not radiate  Pain severity:  Moderate  Onset quality:  Gradual  Duration:  2 days  Timing:  Intermittent  Progression:  Waxing and waning  Chronicity:  New  Context: previous surgery    Context: not diet changes, not eating and not recent illness    Relieved by:  Nothing  Worsened by:  Nothing  Ineffective treatments:  None tried  Associated symptoms: nausea and vomiting    Associated symptoms: no anorexia, no belching, no chest pain, no chills, no constipation, no cough, no diarrhea, no dysuria, no fatigue, no fever, no flatus, no hematemesis, no hematochezia, no hematuria and no shortness of breath        Review of Systems   Constitutional:  Negative for chills, fatigue and fever. Respiratory:  Negative for cough and shortness of breath. Cardiovascular:  Negative for chest pain. Gastrointestinal:  Positive for abdominal pain, nausea and vomiting. Negative for anorexia, constipation, diarrhea, flatus, hematemesis and hematochezia. Genitourinary:  Negative for dysuria and hematuria. All other systems reviewed and are negative.     Past Medical History:   Diagnosis Date    Acute pancreatitis 10/12/2018    Asthma     Calculus of kidney     Colon polyps     Female stress incontinence 8/16/2012    GERD (gastroesophageal reflux disease)     History of DVT (deep vein thrombosis) 9/2021    History of gastric ulcer     2018    History of kidney stones 8/16/2012    History of septic arthritis 09/10/2019    Right knee. Improved with prolonged IV anitbiotics    Hypercholesterolemia     Hypertension     Hypothyroidism     Ophthalmic migraine     Osteopenia of neck of right femur 2/25/2015    Primary insomnia 07/17/2017    Raynaud's phenomenon 2/25/2015    Recurrent cellulitis of lower extremity 12/28/2020    S/P total knee arthroplasty 8/16/2012    [id number]     Umbilical hernia 3/27/9266    Venous insufficiency of both lower extremities 1/28/2014    Worse on left         Past Surgical History:   Procedure Laterality Date    COLONOSCOPY  Jan 2013    4 polyps, repeat 5 years    COLONOSCOPY  7/5/2016    repeat 5 yrs tubular adenoma    HYSTERECTOMY, TOTAL ABDOMINAL (CERVIX REMOVED)      REMOVAL OF KIDNEY STONE      SALPINGO-OOPHORECTOMY      TOTAL KNEE ARTHROPLASTY Bilateral     partial        Family History   Problem Relation Age of Onset    High Cholesterol Brother     Hypertension Brother     High Cholesterol Brother     Hypertension Brother     Heart Attack Brother     Heart Attack Paternal Grandmother     Heart Attack Paternal Grandfather     Breast Cancer Neg Hx     Hypertension Mother     Rheum Arthritis Mother     High Cholesterol Mother     Broken Bones Mother     Heart Failure Father     Heart Attack Father     Osteoarthritis Brother         Knee        Social History     Socioeconomic History    Marital status:    Tobacco Use    Smoking status: Never    Smokeless tobacco: Never   Substance and Sexual Activity    Alcohol use: No    Drug use: No   Social History Narrative    Lives with  who is chronically ill with end stage lung disease and early dementia and depression.   5 children, daughter Mae Otero and son Linh Ceballos (PharmD) have her [de-identified], sons Shakir Olmstead and Daina Loja have durable POA     Social Determinants of Health     Physical Activity: Inactive    Days of Exercise per Week: 0 days    Minutes of Exercise per Session: 0 min         Amoxicillin and Pneumococcal vaccines     Previous Medications    ALBUTEROL SULFATE  (90 BASE) MCG/ACT INHALER    Inhale 2 puffs into the lungs every 6 hours as needed    ATORVASTATIN (LIPITOR) 40 MG TABLET    TAKE 1 TABLET BY MOUTH DAILY    FLUTICASONE (FLOVENT HFA) 220 MCG/ACT INHALER    Inhale 2 puffs into the lungs 2 times daily    FUROSEMIDE (LASIX) 40 MG TABLET    Take 40 mg by mouth daily    GABAPENTIN (NEURONTIN) 300 MG CAPSULE    Take one capsule by mouth three times a day    LEVOTHYROXINE (SYNTHROID) 25 MCG TABLET    TAKE 1 TABLET BY MOUTH EVERY MORNING. MAGNESIUM OXIDE (MAG-OX) 400 MG TABLET    Take 400 mg by mouth daily    OMEPRAZOLE (PRILOSEC OTC) 20 MG TABLET    Take 20 mg by mouth daily    ONDANSETRON (ZOFRAN-ODT) 4 MG DISINTEGRATING TABLET    Take 4 mg by mouth every 8 hours as needed    POTASSIUM CHLORIDE (KLOR-CON M) 20 MEQ EXTENDED RELEASE TABLET    TAKE 1 TABLET BY MOUTH TWICE A DAY    RIVAROXABAN (XARELTO) 10 MG TABS TABLET    Take by mouth Daily with supper        Vitals signs and nursing note reviewed. Patient Vitals for the past 4 hrs:   Temp Pulse Resp BP SpO2   02/03/23 0833 98.9 °F (37.2 °C) 80 16 (!) 173/67 95 %          Physical Exam  Vitals and nursing note reviewed. Constitutional:       Appearance: Normal appearance. HENT:      Head: Normocephalic and atraumatic. Nose: Nose normal.      Mouth/Throat:      Mouth: Mucous membranes are moist.      Pharynx: Oropharynx is clear. Eyes:      Extraocular Movements: Extraocular movements intact. Conjunctiva/sclera: Conjunctivae normal.      Pupils: Pupils are equal, round, and reactive to light. Cardiovascular:      Rate and Rhythm: Normal rate. Pulses: Normal pulses. Pulmonary:      Effort: Pulmonary effort is normal.   Abdominal:      General: Abdomen is flat. Bowel sounds are normal.      Palpations: Abdomen is soft.    Musculoskeletal: General: Normal range of motion. Cervical back: Normal range of motion and neck supple. Skin:     General: Skin is warm. Capillary Refill: Capillary refill takes less than 2 seconds. Neurological:      General: No focal deficit present. Mental Status: She is alert and oriented to person, place, and time. Mental status is at baseline. Psychiatric:         Mood and Affect: Mood normal.         Behavior: Behavior normal.         Thought Content: Thought content normal.         Judgment: Judgment normal.        Procedures    Results for orders placed or performed during the hospital encounter of 02/03/23   CT ABDOMEN PELVIS W IV CONTRAST Additional Contrast? None    Narrative    CT OF THE ABDOMEN AND PELVIS WITH CONTRAST. CLINICAL INDICATION: Left lower quadrant abdominal pain with nausea and vomiting    PROCEDURE: Serial thin section axial images obtained from the lung bases through  the proximal femurs following the administration of 100 cc of Isovue 370  intravenous contrast.  Radiation dose reduction techniques were used for this  study. Our CT scanners use one or all of the following: Automated exposure  control, adjusted of the mA and/or kV according to patient size, iterative  reconstruction    COMPARISON: CT abdomen and pelvis dated 12/18/2020    FINDINGS:     CT ABDOMEN: A ventral hernia is appreciated in the lower central abdomen that  contains a loop of obstructed small bowel with dilated fluid-filled loops of  more proximal small bowel that measure up to 2.6 cm. The distal small bowel  loops are decompressed. The liver, spleen, stomach, and pancreas are normal. The kidneys are symmetric  in size and contrast enhancement. There is a 4 cm simple left renal cyst. The  gallbladder is normal. The adrenal glands are normal. There is no ascites or  adenopathy. CT PELVIS: The bladder is incompletely distended but does show thickened walls  with irregularity. Cystitis is a consideration. The sigmoid colon is entirely  decompressed. There is no inguinal hernia or adenopathy. No aggressive bone lesions identified. Impression    1. Acute small bowel obstruction with the obstruction point it currently at a  ventral hernia in the lower central abdomen. 2. Marked wall thickening to the bladder. Acute cystitis should be considered.   3. 4 cm simple left renal cyst.     CBC with Auto Differential   Result Value Ref Range    WBC 10.6 4.3 - 11.1 K/uL    RBC 4.01 (L) 4.05 - 5.20 M/uL    Hemoglobin 12.0 11.7 - 15.4 g/dL    Hematocrit 36.6 35.8 - 46.3 %    MCV 91.3 82.0 - 102.0 FL    MCH 29.9 26.1 - 32.9 PG    MCHC 32.8 31.4 - 35.0 g/dL    RDW 13.5 11.9 - 14.6 %    Platelets 453 206 - 449 K/uL    MPV 11.3 9.4 - 12.3 FL    nRBC 0.00 0.0 - 0.2 K/uL    Differential Type AUTOMATED      Seg Neutrophils 84 (H) 43 - 78 %    Lymphocytes 9 (L) 13 - 44 %    Monocytes 6 4.0 - 12.0 %    Eosinophils % 0 (L) 0.5 - 7.8 %    Basophils 0 0.0 - 2.0 %    Immature Granulocytes 1 0.0 - 5.0 %    Segs Absolute 8.9 (H) 1.7 - 8.2 K/UL    Absolute Lymph # 1.0 0.5 - 4.6 K/UL    Absolute Mono # 0.7 0.1 - 1.3 K/UL    Absolute Eos # 0.0 0.0 - 0.8 K/UL    Basophils Absolute 0.0 0.0 - 0.2 K/UL    Absolute Immature Granulocyte 0.1 0.0 - 0.5 K/UL   CMP   Result Value Ref Range    Sodium 144 (H) 133 - 143 mmol/L    Potassium 3.5 3.5 - 5.1 mmol/L    Chloride 104 98 - 107 mmol/L    CO2 28 21 - 32 mmol/L    Anion Gap 12 (H) 2 - 11 mmol/L    Glucose 135 (H) 65 - 100 mg/dL    BUN 21 (H) 7.0 - 18.0 MG/DL    Creatinine 1.34 (H) 0.6 - 1.0 MG/DL    Est, Glom Filt Rate 40 (L) >60 ml/min/1.73m2    Calcium 9.7 8.3 - 10.4 MG/DL    Total Bilirubin 0.5 0.2 - 1.1 MG/DL    ALT 19 13.0 - 61.0 U/L    AST 26 15 - 37 U/L    Alk Phosphatase 89 45.0 - 117.0 U/L    Total Protein 7.4 6.4 - 8.2 g/dL    Albumin 4.2 3.2 - 4.6 g/dL    Globulin 3.2 2.8 - 4.5 g/dL    Albumin/Globulin Ratio 1.3 0.4 - 1.6     Urinalysis   Result Value Ref Range    Color, UA YELLOW Appearance CLOUDY      Specific Gravity, UA 1.020 1.001 - 1.023      pH, Urine 7.5 5.0 - 9.0      Protein,  (A) NEG mg/dL    Glucose, UA Negative mg/dL    Ketones, Urine TRACE (A) NEG mg/dL    Bilirubin Urine Negative NEG      Blood, Urine SMALL (A) NEG      Urobilinogen, Urine 0.2 0.2 - 1.0 EU/dL    Nitrite, Urine Negative NEG      Leukocyte Esterase, Urine LARGE (A) NEG     Troponin T   Result Value Ref Range    Troponin T 26.7 (H) 0 - 14 ng/L   Lipase   Result Value Ref Range    Lipase 88 (H) 13 - 60 U/L   Urinalysis, Micro   Result Value Ref Range    WBC, UA >100 0 /hpf    RBC, UA 3-5 0 /hpf    Epithelial Cells UA 0-3 0 /hpf    BACTERIA, URINE 4+ (H) 0 /hpf    Casts 0 0 /lpf    Crystals 0 0 /LPF    Mucus, UA 0 0 /lpf    Other observations RESULTS VERIFIED MANUALLY     EKG 12 Lead   Result Value Ref Range    Ventricular Rate 69 BPM    Atrial Rate 68 BPM    P-R Interval 188 ms    QRS Duration 91 ms    Q-T Interval 405 ms    QTc Calculation (Bazett) 434 ms    P Axis 40 degrees    R Axis 22 degrees    T Axis 31 degrees    Diagnosis Normal sinus rhythm         CT ABDOMEN PELVIS W IV CONTRAST Additional Contrast? None   Final Result   1. Acute small bowel obstruction with the obstruction point it currently at a   ventral hernia in the lower central abdomen. 2. Marked wall thickening to the bladder. Acute cystitis should be considered. 3. 4 cm simple left renal cyst.                             Voice dictation software was used during the making of this note. This software is not perfect and grammatical and other typographical errors may be present. This note has not been completely proofread for errors.       Carmel Lazo MD  02/03/23 2784

## 2023-02-03 NOTE — ED NOTES
TRANSFER - OUT REPORT:    Verbal report given to Landon Dumont  on Meredith Varela  being transferred to [facility]  for routine progression of patient care       Report consisted of patient's Situation, Background, Assessment and   Recommendations(SBAR). Information from the following report(s) Nurse Handoff Report was reviewed with the receiving nurse. Lines:   Peripheral IV 02/03/23 Right Antecubital (Active)   Site Assessment Clean, dry & intact 02/03/23 0838   Line Status Blood return noted; Flushed;Normal saline locked 02/03/23 0838   Dressing Status New dressing applied 02/03/23 8128        Opportunity for questions and clarification was provided.       Patient transported with:  levaquin 750 mg/150 mL infusion      Joce Montanez RN  02/03/23 3432

## 2023-02-03 NOTE — CONSULTS
Sindi Lozano MD   Bariatric & Advanced Laparoscopic Surgery [facility] Endoscopy  [address] [place], [place] [address] [address]  Phone [phone]   Fax [phone]      Date of visit: 2/3/2023      Primary/Requesting provider: Marcia Mayo MD         Name: Rut Goldberg      MRN: [id number]       : 1940       Age: 82 y.o. Sex: female        PCP: Marcia Mayo MD     CC:  No chief complaint on file. HPI:    Rut Goldberg is a 82 y.o. female who presented to the ED with 2 day history of abdominal pain. She reports pain was worse with pressure and better with nothing. She has associated nausea and vomiting. She reports having a bulge near her umbilicus for many years but recently started hurting. She does not know if the bulge was reducible before. Previous abdominal surgeries - vaginal hysterectomy and kidney surgery  Last colonoscopy -     Blood thinners - Xarelto - off since Wed  Immunosuppressants - none        PMH:    Past Medical History:   Diagnosis Date    Acute pancreatitis 10/12/2018    Asthma     Calculus of kidney     Colon polyps     Female stress incontinence 2012    GERD (gastroesophageal reflux disease)     History of DVT (deep vein thrombosis)     2021    History of gastric ulcer     2018    History of kidney stones 2012    History of septic arthritis 09/10/2019    Right knee.   Improved with prolonged IV anitbiotics    Hypercholesterolemia     Hypertension     Hypothyroidism     Ophthalmic migraine     Osteopenia of neck of right femur 2015    Primary insomnia 2017    Raynaud's phenomenon 2015    Recurrent cellulitis of lower extremity 2020    S/P total knee arthroplasty 2012    [id number]     Umbilical hernia     Venous insufficiency of both lower extremities 2014    Worse on left        PSH:    Past Surgical History:   Procedure Laterality Date    COLONOSCOPY  2013    4 polyps, repeat 5 years    COLONOSCOPY  7/5/2016    repeat 5 yrs tubular adenoma    HYSTERECTOMY, TOTAL ABDOMINAL (CERVIX REMOVED)      REMOVAL OF KIDNEY STONE      SALPINGO-OOPHORECTOMY      TOTAL KNEE ARTHROPLASTY Bilateral     partial       MEDS:    Current Facility-Administered Medications   Medication Dose Route Frequency Provider Last Rate Last Admin    albuterol sulfate HFA (PROVENTIL;VENTOLIN;PROAIR) 108 (90 Base) MCG/ACT inhaler 2 puff  2 puff Inhalation Q6H PRN Leandro Pinzon MD        fluticasone (FLOVENT HFA) 110 MCG/ACT inhaler 2 puff  2 puff Inhalation BID Leandro Pinzon MD        sodium chloride flush 0.9 % injection 5-40 mL  5-40 mL IntraVENous 2 times per day Leandro Pinzon MD        sodium chloride flush 0.9 % injection 5-40 mL  5-40 mL IntraVENous PRN Leandro Pinzon MD        0.9 % sodium chloride infusion   IntraVENous PRN Kevon Cardozo Ala, MD        ondansetron (ZOFRAN-ODT) disintegrating tablet 4 mg  4 mg Oral Q8H PRN Kevon Cardozo Ala, MD        Or    ondansetron (ZOFRAN) injection 4 mg  4 mg IntraVENous Q6H PRN Leandro Pinzon MD        polyethylene glycol (GLYCOLAX) packet 17 g  17 g Oral Daily PRN Kevon Cardozo Ala, MD        acetaminophen (TYLENOL) tablet 650 mg  650 mg Oral Q6H PRN Kevon Cardozo Ala, MD        Or    acetaminophen (TYLENOL) suppository 650 mg  650 mg Rectal Q6H PRN Kevon Cardozo Ala, MD        lactated ringers IV soln infusion   IntraVENous Continuous Leandro Pinzon MD        magnesium sulfate 2000 mg in 50 mL IVPB premix  2,000 mg IntraVENous PRN Leandro Pinzon MD        potassium chloride 10 mEq/100 mL IVPB (Peripheral Line)  10 mEq IntraVENous PRN Kevon Cardozo Ala, MD        potassium chloride (KLOR-CON M) extended release tablet 40 mEq  40 mEq Oral PRN Kevon Cardozo Ala, MD        Or    potassium bicarb-citric acid (EFFER-K) effervescent tablet 40 mEq  40 mEq Oral PRN Kevon Cardozo Ala, MD        Or    potassium chloride 10 mEq/100 mL IVPB (Peripheral Line)  10 mEq IntraVENous PRN Leandro Pinzon MD        pantoprazole (PROTONIX) 40 mg in sodium chloride (PF) 0.9 % 10 mL injection  40 mg IntraVENous Daily Leandro Pinzon MD        cefTRIAXone (ROCEPHIN) 1,000 mg in sodium chloride 0.9 % 50 mL IVPB (mini-bag)  1,000 mg IntraVENous Q24H Leandro Pinzon MD        morphine injection 2 mg  2 mg IntraVENous Q4H PRN Jose Ramon Longoria Ala, MD             ALLERGIES:      Allergies   Allergen Reactions    Amoxicillin Rash     Classic drug rash    Pneumococcal Vaccines Other (See Comments)     fever  Other reaction(s): Other (comments)  fever  Other reaction(s): Other (comments)  fever         SH:    Social History     Tobacco Use    Smoking status: Never    Smokeless tobacco: Never   Substance Use Topics    Alcohol use: No    Drug use: No       FH:    Family History   Problem Relation Age of Onset    High Cholesterol Brother     Hypertension Brother     High Cholesterol Brother     Hypertension Brother     Heart Attack Brother     Heart Attack Paternal Grandmother     Heart Attack Paternal Grandfather     Breast Cancer Neg Hx     Hypertension Mother     Rheum Arthritis Mother     High Cholesterol Mother     Broken Bones Mother     Heart Failure Father     Heart Attack Father     Osteoarthritis Brother         Knee       Review of systems:  Review of Systems   Constitutional:  Negative for chills, fatigue, fever and unexpected weight change. HENT:  Negative for ear discharge, ear pain and facial swelling. Eyes:  Negative for pain and itching. Respiratory:  Negative for cough, chest tightness and shortness of breath. Cardiovascular:  Negative for chest pain. Gastrointestinal:  Positive for abdominal pain, nausea and vomiting. Negative for abdominal distention, blood in stool, constipation and diarrhea. Genitourinary:  Negative for difficulty urinating, dysuria and hematuria. Musculoskeletal:  Negative for back pain, gait problem and neck pain. Skin:  Negative for rash and wound. Neurological:  Negative for facial asymmetry, speech difficulty and weakness.    Hematological:  Does not bruise/bleed easily. Psychiatric/Behavioral:  Negative for agitation, decreased concentration and sleep disturbance. Physical Exam:     BP (!) 158/67   Pulse 92   Temp 98 °F (36.7 °C) (Oral)   Resp 17   SpO2 93%     General:  Well-developed, well-nourished, no distress. Psych:  Cooperative, good insight and judgement. Neuro:  Alert, oriented to person, place and time. HEENT:  Normocephalic, atraumatic. Sclera clear. Lungs:  Unlabored breathing. Symmetrical chest expansion. Chest wall:  No tenderness or deformity. Heart:  Regular rate and rhythm. No JVD. Abdomen:  Soft, non-tender, non-distended. Infraumbilical bulge with localized tenderness. Extremities:  Extremities normal, atraumatic, no cyanosis or edema. Skin:  Skin color, texture, turgor normal. No rashes. Labs: All recent labs were reviewed. Normal WBC. Elevated Cr.      Recent Results (from the past 24 hour(s))   CBC with Auto Differential    Collection Time: 02/03/23  8:38 AM   Result Value Ref Range    WBC 10.6 4.3 - 11.1 K/uL    RBC 4.01 (L) 4.05 - 5.20 M/uL    Hemoglobin 12.0 11.7 - 15.4 g/dL    Hematocrit 36.6 35.8 - 46.3 %    MCV 91.3 82.0 - 102.0 FL    MCH 29.9 26.1 - 32.9 PG    MCHC 32.8 31.4 - 35.0 g/dL    RDW 13.5 11.9 - 14.6 %    Platelets 521 117 - 747 K/uL    MPV 11.3 9.4 - 12.3 FL    nRBC 0.00 0.0 - 0.2 K/uL    Differential Type AUTOMATED      Seg Neutrophils 84 (H) 43 - 78 %    Lymphocytes 9 (L) 13 - 44 %    Monocytes 6 4.0 - 12.0 %    Eosinophils % 0 (L) 0.5 - 7.8 %    Basophils 0 0.0 - 2.0 %    Immature Granulocytes 1 0.0 - 5.0 %    Segs Absolute 8.9 (H) 1.7 - 8.2 K/UL    Absolute Lymph # 1.0 0.5 - 4.6 K/UL    Absolute Mono # 0.7 0.1 - 1.3 K/UL    Absolute Eos # 0.0 0.0 - 0.8 K/UL    Basophils Absolute 0.0 0.0 - 0.2 K/UL    Absolute Immature Granulocyte 0.1 0.0 - 0.5 K/UL   CMP    Collection Time: 02/03/23  8:38 AM   Result Value Ref Range    Sodium 144 (H) 133 - 143 mmol/L    Potassium 3.5 3.5 - 5.1 mmol/L Chloride 104 98 - 107 mmol/L    CO2 28 21 - 32 mmol/L    Anion Gap 12 (H) 2 - 11 mmol/L    Glucose 135 (H) 65 - 100 mg/dL    BUN 21 (H) 7.0 - 18.0 MG/DL    Creatinine 1.34 (H) 0.6 - 1.0 MG/DL    Est, Glom Filt Rate 40 (L) >60 ml/min/1.73m2    Calcium 9.7 8.3 - 10.4 MG/DL    Total Bilirubin 0.5 0.2 - 1.1 MG/DL    ALT 19 13.0 - 61.0 U/L    AST 26 15 - 37 U/L    Alk Phosphatase 89 45.0 - 117.0 U/L    Total Protein 7.4 6.4 - 8.2 g/dL    Albumin 4.2 3.2 - 4.6 g/dL    Globulin 3.2 2.8 - 4.5 g/dL    Albumin/Globulin Ratio 1.3 0.4 - 1.6     Troponin T    Collection Time: 02/03/23  8:38 AM   Result Value Ref Range    Troponin T 26.7 (H) 0 - 14 ng/L   Lipase    Collection Time: 02/03/23  8:38 AM   Result Value Ref Range    Lipase 88 (H) 13 - 60 U/L   EKG 12 Lead    Collection Time: 02/03/23  9:05 AM   Result Value Ref Range    Ventricular Rate 69 BPM    Atrial Rate 68 BPM    P-R Interval 188 ms    QRS Duration 91 ms    Q-T Interval 405 ms    QTc Calculation (Bazett) 434 ms    P Axis 40 degrees    R Axis 22 degrees    T Axis 31 degrees    Diagnosis Normal sinus rhythm    Urinalysis    Collection Time: 02/03/23 10:09 AM   Result Value Ref Range    Color, UA YELLOW      Appearance CLOUDY      Specific Gravity, UA 1.020 1.001 - 1.023      pH, Urine 7.5 5.0 - 9.0      Protein,  (A) NEG mg/dL    Glucose, UA Negative mg/dL    Ketones, Urine TRACE (A) NEG mg/dL    Bilirubin Urine Negative NEG      Blood, Urine SMALL (A) NEG      Urobilinogen, Urine 0.2 0.2 - 1.0 EU/dL    Nitrite, Urine Negative NEG      Leukocyte Esterase, Urine LARGE (A) NEG     Urinalysis, Micro    Collection Time: 02/03/23 10:09 AM   Result Value Ref Range    WBC, UA >100 0 /hpf    RBC, UA 3-5 0 /hpf    Epithelial Cells UA 0-3 0 /hpf    BACTERIA, URINE 4+ (H) 0 /hpf    Casts 0 0 /lpf    Crystals 0 0 /LPF    Mucus, UA 0 0 /lpf    Other observations RESULTS VERIFIED MANUALLY     Troponin T    Collection Time: 02/03/23 10:43 AM   Result Value Ref Range Troponin T 25.9 (H) 0 - 14 ng/L   Lactic Acid    Collection Time: 02/03/23  2:05 PM   Result Value Ref Range    Lactic Acid, Plasma 1.2 0.4 - 2.0 MMOL/L       Imaging: CT images were independently reviewed by me. Ventral hernia with loop of bowel concerning for SBO. CT ABDOMEN PELVIS W IV CONTRAST Additional Contrast? None  Narrative: CT OF THE ABDOMEN AND PELVIS WITH CONTRAST. CLINICAL INDICATION: Left lower quadrant abdominal pain with nausea and vomiting    PROCEDURE: Serial thin section axial images obtained from the lung bases through  the proximal femurs following the administration of 100 cc of Isovue 370  intravenous contrast.  Radiation dose reduction techniques were used for this  study. Our CT scanners use one or all of the following: Automated exposure  control, adjusted of the mA and/or kV according to patient size, iterative  reconstruction    COMPARISON: CT abdomen and pelvis dated 12/18/2020    FINDINGS:     CT ABDOMEN: A ventral hernia is appreciated in the lower central abdomen that  contains a loop of obstructed small bowel with dilated fluid-filled loops of  more proximal small bowel that measure up to 2.6 cm. The distal small bowel  loops are decompressed. The liver, spleen, stomach, and pancreas are normal. The kidneys are symmetric  in size and contrast enhancement. There is a 4 cm simple left renal cyst. The  gallbladder is normal. The adrenal glands are normal. There is no ascites or  adenopathy. CT PELVIS: The bladder is incompletely distended but does show thickened walls  with irregularity. Cystitis is a consideration. The sigmoid colon is entirely  decompressed. There is no inguinal hernia or adenopathy. No aggressive bone lesions identified. Impression: 1. Acute small bowel obstruction with the obstruction point it currently at a  ventral hernia in the lower central abdomen. 2. Marked wall thickening to the bladder. Acute cystitis should be considered.   3. 4 cm simple left renal cyst.           Assessment/Plan:  Azra Mar is a 82 y.o. female who has signs and symptoms consistent with incarcerated ventral hernia with SBO. She was also found to have a UTI. Patient was place with flat position and ask to relax. Gentle pressure was applied over the bulge and with some manipulation the hernia was completely reduced. I taught the patient how to feel hernia is reduced and how to feel when it comes out. We will order an abdominal binder to temporize hernia while her UTI is treated. I reviewed in detail with the patient what a hernia is, how it forms and the anatomy of the abdomen. I explained the symptoms of a hernia and complications including pain, incarceration, strangulation and obstruction and the symptoms to monitor for at home. I discussed with the patient in detail an open vs. laparoscopic hernia repair with mesh and reviewed the risks and complications of both procedures to include bleeding, infection, mesh infection, recurrence of hernia, injury to abdominal organs and surrounding structures, deep vein thrombosis, pulmonary embolism, need for reoperation, myocardial infarction and death. She demonstrated understanding of the procedure, risks and complications and I answered all of her questions. Her hernia repair will require mesh placement and with active infection, she will be at risk of mesh infection. We discussed treating the infection and doing the hernia repair in the next few weeks once infection is cleared to decrease risk of mesh infection. Cont NPO, await return of bowel function to feed.    Monitor abdominal exam.       Time: I spent 70 minutes preparing to see patient (including chart review and preparation), obtaining and/or reviewing additional medical history, performing a physical exam and evaluation, documenting clinical information in the electronic health record, independently interpreting results, communicating results to patient, family or caregiver, and/or coordinating care.       Signed: Hu Lord MD  Bariatric & Minimally Invasive Surgery  2/3/2023 2:49 PM

## 2023-02-03 NOTE — PROGRESS NOTES
ACUTE OCCUPATIONAL THERAPY GOALS:   (Developed with and agreed upon by patient and/or caregiver.)  Pt will be supervision to (I) with ADL's and ambulated short distances. OCCUPATIONAL THERAPY Initial Assessment, Daily Note, Discharge, and PM       OT Visit Days: 1  Acknowledge Orders  Time  OT Charge Capture  Rehab Caseload Tracker      Nguyễn Santoro is a 82 y.o. female   PRIMARY DIAGNOSIS: SBO (small bowel obstruction) ([facility] [address]  SBO (small bowel obstruction) ([facility] [address] [K56.609]       Reason for Referral: Generalized Muscle Weakness (M62.81)  Inpatient: Payor: MEDICARE / Plan: MEDICARE PART A AND B / Product Type: *No Product type* /     ASSESSMENT:     REHAB RECOMMENDATIONS:   Recommendation to date pending progress:  Setting:  No further skilled therapy after discharge from hospital    Equipment:    None     ASSESSMENT:  Ms. Ag Carlson was admitted with above diagnosis and seen in room with daughter present. Pt agreeable to therapy. Pt noted to be supervision to independent with self care and functional mobility, no further OT warratned. Pt up to edge of bed, donned gown and shoes. Pt ambulated household distance and returned to room. Pt back to bed and left with all needs.       [employer] AM-PAC 6 Clicks Daily Activity Inpatient Short Form:    AM-PAC Daily Activity - Inpatient   How much help is needed for putting on and taking off regular lower body clothing?: None  How much help is needed for bathing (which includes washing, rinsing, drying)?: None  How much help is needed for toileting (which includes using toilet, bedpan, or urinal)?: None  How much help is needed for putting on and taking off regular upper body clothing?: None  How much help is needed for taking care of personal grooming?: None  How much help for eating meals?: None  AM-PAC Inpatient Daily Activity Raw Score: 24  AM-PAC Inpatient ADL T-Scale Score : 57.54  ADL Inpatient CMS 0-100% Score: 0  ADL Inpatient CMS G-Code Modifier : CH           SUBJECTIVE:     Ms. Ashlie Holguin states, \"I feel better now\"     Social/Functional Lives With: Alone (daughter lives within walking distance)  Type of Home: House  Home Layout: Able to Live on Main level with bedroom/bathroom  Home Access: Stairs to enter with rails  Entrance Stairs - Number of Steps: 4  Entrance Stairs - Rails: Both  Bathroom Shower/Tub: Walk-in shower  Bathroom Equipment: Shower chair, Grab bars around toilet  Home Equipment: deb Crump    OBJECTIVE:     Guerrero James / Natasha Aguilar / Diana Se: None    RESTRICTIONS/PRECAUTIONS:       PAIN: VITALS / O2:   Pre Treatment:          Post Treatment: none       Vitals          Oxygen            GROSS EVALUATION: INTACT IMPAIRED   (See Comments)   UE AROM [x] []   UE PROM [x] []   Strength [x]       Posture / Balance [x]     Sensation [x]     Coordination [x]       Tone [x]       Edema []    Activity Tolerance []  Slightly decreased      Hand Dominance R [] L []      COGNITION/  PERCEPTION: INTACT IMPAIRED   (See Comments)   Orientation [x]     Vision [x]     Hearing [x]     Cognition  [x]     Perception [x]       MOBILITY: I Mod I S SBA CGA Min Mod Max Total  NT x2 Comments:   Bed Mobility    Rolling [] [] [] [] [] [] [] [] [] [] []    Supine to Sit [] [] [x] [] [] [] [] [] [] [] []    Scooting [] [] [x] [] [] [] [] [] [] [] []    Sit to Supine [] [] [x] [] [] [] [] [] [] [] []    Transfers    Sit to Stand [] [] [x] [] [] [] [] [] [] [] []    Bed to Chair [] [] [x] [] [] [] [] [] [] [] []    Stand to Sit [] [] [x] [] [] [] [] [] [] [] []    Tub/Shower [] [] [x] [] [] [] [] [] [] [] []     Toilet [] [] [x] [] [] [] [] [] [] [] []      [] [] [] [] [] [] [] [] [] [] []    I=Independent, Mod I=Modified Independent, S=Supervision/Setup, SBA=Standby Assistance, CGA=Contact Guard Assistance, Min=Minimal Assistance, Mod=Moderate Assistance, Max=Maximal Assistance, Total=Total Assistance, NT=Not Tested    ACTIVITIES OF DAILY LIVING: I Mod I S SBA CGA Min Mod Max Total NT Comments   BASIC ADLs:              Upper Body Bathing  [x] [] [] [] [] [] [] [] [] []    Lower Body Bathing [x] [] [] [] [] [] [] [] [] []    Toileting [x] [] [] [] [] [] [] [] [] []    Upper Body Dressing [x] [] [] [] [] [] [] [] [] []    Lower Body Dressing [x] [] [] [] [] [] [] [] [] []    Feeding [x] [] [] [] [] [] [] [] [] []    Grooming [x] [] [] [] [] [] [] [] [] []    Personal Device Care [] [] [] [] [] [] [] [] [] []    Functional Mobility [] [] [x] [] [] [] [] [] [] []    I=Independent, Mod I=Modified Independent, S=Supervision/Setup, SBA=Standby Assistance, CGA=Contact Guard Assistance, Min=Minimal Assistance, Mod=Moderate Assistance, Max=Maximal Assistance, Total=Total Assistance, NT=Not Tested    PLAN:   FREQUENCY/DURATION   OT Plan of Care:  (1 treatment) for duration of hospital stay or until stated goals are met, whichever comes first.    PROBLEM LIST:   (Skilled intervention is medically necessary to address:)  Decreased Activity Tolerance   INTERVENTIONS PLANNED:  (Benefits and precautions of occupational therapy have been discussed with the patient.)  Self Care Training  Education         TREATMENT:     EVALUATION: LOW COMPLEXITY: (Untimed Charge)    TREATMENT:   Self Care: (15 min): Procedure(s) (per grid) utilized to improve and/or restore self-care/home management as related to dressing and functional mobility . Required minimal verbal and   cueing to facilitate activities of daily living skills. AFTER TREATMENT PRECAUTIONS: Bed, Bed/Chair Locked, Call light within reach, Needs within reach, RN notified, and Visitors at bedside    INTERDISCIPLINARY COLLABORATION:  RN/ PCT, PT/ PTA, and OT/ ROSAS    EDUCATION:  Education Given To: Patient; Family  Education Provided: Role of Therapy;Plan of Care;Energy Conservation; Fall Prevention Strategies  Education Outcome: Verbalized understanding;Demonstrated understanding    TOTAL TREATMENT DURATION AND TIME:  Time In: 1535  Time Out: [address]: [address] Car[address] Lute

## 2023-02-03 NOTE — H&P
Hospitalist History and Physical   Admit Date:  2/3/2023 12:31 PM   Name:  Jaclyn Busch   Age:  82 y.o. Sex:  female  :  1940   MRN:  [id number]   Room:  [facility]    Presenting Complaint: No chief complaint on file. Reason(s) for Admission: SBO (small bowel obstruction) ([facility] [K56.609]     History of Present Illness:   Jaclyn Busch is a 82 y.o. female with medical history of hypertension, moderate persistent asthma, CKD stage III, hypothyroidism, GERD, who presented to the ER at [facility] because of nausea vomiting and abdominal pain. Patient states that she has been doing fairly well until Wednesday night when she started having nausea vomiting and abdominal pain. Patient states she is unable to keep anything down. Abdominal pain severe 10/10 in severity at its worst currently 5/10. She states she is not able to pass flatus. Last bowel movement was yesterday morning. No prior constipation or diarrhea. She has some hiccups. No fever no chills. No chest pain or shortness of breath. No palpitations. No cough. No tingling numbness or weakness of extremities. Patient states she has burning pain with urination. Urinary frequency and urgency. ER course    Patient is afebrile. Hemodynamically stable. BMP fairly unremarkable except for creatinine of 1.3 which is baseline from CKD stage III. CBC fairly unremarkable. Urine analysis suggestive of UTI. CT abdomen and pelvis reviewed by me shows acute small bowel obstruction with transition point at ventral hernia in the lower abdomen. Marked wall thickening of the bladder. General surgery was contacted by ER physician. Patient admitted for further evaluation and management of small bowel obstruction, UTI. Assessment & Plan:      This is a 61-year-old female with    Small bowel obstruction:  CT abdomen and pelvis with findings suggestive of acute small bowel obstruction with transition point at ventral hernia in the lower abdomen. General surgery notified. N.p.o., NG tube, IV fluids. IV Protonix. Lactic acid ordered. UTI  Ceftriaxone pending culture results. CKD stage III  Creatinine at baseline. Hypertension  Monitor blood pressure. Hydralazine as needed    Hypothyroidism  Levothyroxine when able to take p.o. Idiopathic neuropathy  Neurontin to be restarted when able to take p.o. Moderate persistent asthma not in acute exacerbation  Continue home inhalers    History of DVT  Hold Xarelto for now due to anticipation of surgery. Anticipated discharge needs: MedSurg inpatient. Anticipate inpatient hospital stay for 72 hours. Home when medically cleared. Diet: Diet NPO  VTE ppx: Xarelto held in anticipation of surgery. SCDs. Code status: DNR    Hospital Problems:  Principal Problem:    SBO (small bowel obstruction) ([facility])  Active Problems:    Chronic kidney disease, stage III (moderate) ([facility])    Chronic deep vein thrombosis (DVT) of right femoral vein ([facility])    Moderate persistent asthma without complication    Hypertension, essential    Gastroesophageal reflux disease with esophagitis without hemorrhage    Hypothyroidism    Idiopathic peripheral neuropathy  Resolved Problems:    * No resolved hospital problems. *       Past History:     Past Medical History:   Diagnosis Date    Acute pancreatitis 10/12/2018    Asthma     Calculus of kidney     Colon polyps     Female stress incontinence 8/16/2012    GERD (gastroesophageal reflux disease)     History of DVT (deep vein thrombosis)     9/2021    History of gastric ulcer     2018    History of kidney stones 8/16/2012    History of septic arthritis 09/10/2019    Right knee.   Improved with prolonged IV anitbiotics    Hypercholesterolemia     Hypertension     Hypothyroidism     Ophthalmic migraine     Osteopenia of neck of right femur 2/25/2015    Primary insomnia 07/17/2017    Raynaud's phenomenon 2/25/2015    Recurrent cellulitis of lower extremity 12/28/2020    S/P total knee arthroplasty 8/16/2012    [id number]     Umbilical hernia 5/23/2386    Venous insufficiency of both lower extremities 1/28/2014    Worse on left        Past Surgical History:   Procedure Laterality Date    COLONOSCOPY  Jan 2013    4 polyps, repeat 5 years    COLONOSCOPY  7/5/2016    repeat 5 yrs tubular adenoma    HYSTERECTOMY, TOTAL ABDOMINAL (CERVIX REMOVED)      REMOVAL OF KIDNEY STONE      SALPINGO-OOPHORECTOMY      TOTAL KNEE ARTHROPLASTY Bilateral     partial        Social History     Tobacco Use    Smoking status: Never    Smokeless tobacco: Never   Substance Use Topics    Alcohol use: No      Social History     Substance and Sexual Activity   Drug Use No       Family History   Problem Relation Age of Onset    High Cholesterol Brother     Hypertension Brother     High Cholesterol Brother     Hypertension Brother     Heart Attack Brother     Heart Attack Paternal Grandmother     Heart Attack Paternal Grandfather     Breast Cancer Neg Hx     Hypertension Mother     Rheum Arthritis Mother     High Cholesterol Mother     Broken Bones Mother     Heart Failure Father     Heart Attack Father     Osteoarthritis Brother         Knee        Immunization History   Administered Date(s) Administered    COVID-19, MODERNA BLUE border, Primary or Immunocompromised, (age 12y+), IM, 100 mcg/0.5mL 01/13/2021, 02/03/2021    Influenza Trivalent 10/10/2013, 10/09/2014    Influenza Virus Vaccine 10/29/2012, 10/10/2013, 10/09/2014, 10/26/2015, 09/14/2018    Influenza, FLUARIX, FLULAVAL, FLUZONE (age 10 mo+) AND AFLURIA, (age 1 y+), PF, 0.5mL 09/14/2018    Influenza, High Dose (Fluzone 65 yrs and older) 09/08/2016, 11/28/2017, 10/01/2019    Influenza, Trivalent PF 10/26/2015    PPD Test 10/12/2018, 12/28/2020    Pneumococcal Polysaccharide ([id number]) 06/28/2007, 08/27/2007    Tdap (Boostrix, Adacel) 04/12/2012, 04/12/2012, 10/29/2012, 10/29/2012    Zoster Live (Zostavax) 10/04/2011, 10/04/2011 Allergies   Allergen Reactions    Amoxicillin Rash     Classic drug rash    Pneumococcal Vaccines Other (See Comments)     fever  Other reaction(s): Other (comments)  fever  Other reaction(s): Other (comments)  fever       Prior to Admit Medications:  Current Outpatient Medications   Medication Instructions    albuterol sulfate  (90 Base) MCG/ACT inhaler 2 puffs, Inhalation, EVERY 6 HOURS PRN    atorvastatin (LIPITOR) 40 MG tablet TAKE 1 TABLET BY MOUTH DAILY    fluticasone (FLOVENT HFA) 220 MCG/ACT inhaler 2 puffs, Inhalation, 2 TIMES DAILY    furosemide (LASIX) 40 mg, Oral, DAILY    gabapentin (NEURONTIN) 300 MG capsule Take one capsule by mouth three times a day    levothyroxine (SYNTHROID) 25 MCG tablet TAKE 1 TABLET BY MOUTH EVERY MORNING.    magnesium oxide (MAG-OX) 400 mg, Oral, DAILY    omeprazole (PRILOSEC OTC) 20 mg, Oral, DAILY    ondansetron (ZOFRAN-ODT) 4 mg, Oral, EVERY 8 HOURS PRN    potassium chloride (KLOR-CON M) 20 MEQ extended release tablet TAKE 1 TABLET BY MOUTH TWICE A DAY    rivaroxaban (XARELTO) 10 MG TABS tablet Oral, DAILY WITH DINNER         Objective:   Patient Vitals for the past 24 hrs:   Temp Pulse Resp BP SpO2   02/03/23 1233 98 °F (36.7 °C) 92 17 (!) 158/67 93 %       Oxygen Therapy  SpO2: 93 %  O2 Device: None (Room air)    Estimated body mass index is 27.44 kg/m² as calculated from the following:    Height as of an earlier encounter on 2/3/23: 5' 2\" (1.575 m). Weight as of an earlier encounter on 2/3/23: 150 lb (68 kg). No intake or output data in the 24 hours ending 02/03/23 1403      Physical Exam:    Blood pressure (!) 158/67, pulse 92, temperature 98 °F (36.7 °C), temperature source Oral, resp. rate 17, SpO2 93 %. General:    Well nourished. Alert awake elderly female, ill-appearing,  Head:  Normocephalic, atraumatic  Eyes:  Sclerae appear normal.  Pupils equally round. ENT:  Nares appear normal.  Moist oral mucosa  Neck:  No restricted ROM.   Trachea midline   CV:   RRR. No m/r/g. No jugular venous distension. Lungs:   CTAB. No wheezing, rhonchi, or rales. Symmetric expansion. Abdomen:   Soft, lower quadrant, umbilical tenderness, nondistended. Hypo-active bowel sounds, no organomegaly, no guarding, no rigidity,  Extremities: No cyanosis or clubbing. No edema  Skin:     No rashes and normal coloration. Warm and dry. Neuro:  CN II-XII grossly intact. Sensation intact. Psych:  Normal mood and affect.       I have personally reviewed labs and tests:  Recent Labs:  Recent Results (from the past 24 hour(s))   CBC with Auto Differential    Collection Time: 02/03/23  8:38 AM   Result Value Ref Range    WBC 10.6 4.3 - 11.1 K/uL    RBC 4.01 (L) 4.05 - 5.20 M/uL    Hemoglobin 12.0 11.7 - 15.4 g/dL    Hematocrit 36.6 35.8 - 46.3 %    MCV 91.3 82.0 - 102.0 FL    MCH 29.9 26.1 - 32.9 PG    MCHC 32.8 31.4 - 35.0 g/dL    RDW 13.5 11.9 - 14.6 %    Platelets 651 187 - 982 K/uL    MPV 11.3 9.4 - 12.3 FL    nRBC 0.00 0.0 - 0.2 K/uL    Differential Type AUTOMATED      Seg Neutrophils 84 (H) 43 - 78 %    Lymphocytes 9 (L) 13 - 44 %    Monocytes 6 4.0 - 12.0 %    Eosinophils % 0 (L) 0.5 - 7.8 %    Basophils 0 0.0 - 2.0 %    Immature Granulocytes 1 0.0 - 5.0 %    Segs Absolute 8.9 (H) 1.7 - 8.2 K/UL    Absolute Lymph # 1.0 0.5 - 4.6 K/UL    Absolute Mono # 0.7 0.1 - 1.3 K/UL    Absolute Eos # 0.0 0.0 - 0.8 K/UL    Basophils Absolute 0.0 0.0 - 0.2 K/UL    Absolute Immature Granulocyte 0.1 0.0 - 0.5 K/UL   CMP    Collection Time: 02/03/23  8:38 AM   Result Value Ref Range    Sodium 144 (H) 133 - 143 mmol/L    Potassium 3.5 3.5 - 5.1 mmol/L    Chloride 104 98 - 107 mmol/L    CO2 28 21 - 32 mmol/L    Anion Gap 12 (H) 2 - 11 mmol/L    Glucose 135 (H) 65 - 100 mg/dL    BUN 21 (H) 7.0 - 18.0 MG/DL    Creatinine 1.34 (H) 0.6 - 1.0 MG/DL    Est, Glom Filt Rate 40 (L) >60 ml/min/1.73m2    Calcium 9.7 8.3 - 10.4 MG/DL    Total Bilirubin 0.5 0.2 - 1.1 MG/DL    ALT 19 13.0 - 61.0 U/L    AST 26 15 - 37 U/L    Alk Phosphatase 89 45.0 - 117.0 U/L    Total Protein 7.4 6.4 - 8.2 g/dL    Albumin 4.2 3.2 - 4.6 g/dL    Globulin 3.2 2.8 - 4.5 g/dL    Albumin/Globulin Ratio 1.3 0.4 - 1.6     Troponin T    Collection Time: 02/03/23  8:38 AM   Result Value Ref Range    Troponin T 26.7 (H) 0 - 14 ng/L   Lipase    Collection Time: 02/03/23  8:38 AM   Result Value Ref Range    Lipase 88 (H) 13 - 60 U/L   EKG 12 Lead    Collection Time: 02/03/23  9:05 AM   Result Value Ref Range    Ventricular Rate 69 BPM    Atrial Rate 68 BPM    P-R Interval 188 ms    QRS Duration 91 ms    Q-T Interval 405 ms    QTc Calculation (Bazett) 434 ms    P Axis 40 degrees    R Axis 22 degrees    T Axis 31 degrees    Diagnosis Normal sinus rhythm    Urinalysis    Collection Time: 02/03/23 10:09 AM   Result Value Ref Range    Color, UA YELLOW      Appearance CLOUDY      Specific Gravity, UA 1.020 1.001 - 1.023      pH, Urine 7.5 5.0 - 9.0      Protein,  (A) NEG mg/dL    Glucose, UA Negative mg/dL    Ketones, Urine TRACE (A) NEG mg/dL    Bilirubin Urine Negative NEG      Blood, Urine SMALL (A) NEG      Urobilinogen, Urine 0.2 0.2 - 1.0 EU/dL    Nitrite, Urine Negative NEG      Leukocyte Esterase, Urine LARGE (A) NEG     Urinalysis, Micro    Collection Time: 02/03/23 10:09 AM   Result Value Ref Range    WBC, UA >100 0 /hpf    RBC, UA 3-5 0 /hpf    Epithelial Cells UA 0-3 0 /hpf    BACTERIA, URINE 4+ (H) 0 /hpf    Casts 0 0 /lpf    Crystals 0 0 /LPF    Mucus, UA 0 0 /lpf    Other observations RESULTS VERIFIED MANUALLY     Troponin T    Collection Time: 02/03/23 10:43 AM   Result Value Ref Range    Troponin T 25.9 (H) 0 - 14 ng/L       I have personally reviewed imaging studies:  CT ABDOMEN PELVIS W IV CONTRAST Additional Contrast? None    Result Date: 2/3/2023  CT OF THE ABDOMEN AND PELVIS WITH CONTRAST.  CLINICAL INDICATION: Left lower quadrant abdominal pain with nausea and vomiting PROCEDURE: Serial thin section axial images obtained from the lung bases through the proximal femurs following the administration of 100 cc of Isovue 370 intravenous contrast.  Radiation dose reduction techniques were used for this study. Our CT scanners use one or all of the following: Automated exposure control, adjusted of the mA and/or kV according to patient size, iterative reconstruction COMPARISON: CT abdomen and pelvis dated 12/18/2020 FINDINGS: CT ABDOMEN: A ventral hernia is appreciated in the lower central abdomen that contains a loop of obstructed small bowel with dilated fluid-filled loops of more proximal small bowel that measure up to 2.6 cm. The distal small bowel loops are decompressed. The liver, spleen, stomach, and pancreas are normal. The kidneys are symmetric in size and contrast enhancement. There is a 4 cm simple left renal cyst. The gallbladder is normal. The adrenal glands are normal. There is no ascites or adenopathy. CT PELVIS: The bladder is incompletely distended but does show thickened walls with irregularity. Cystitis is a consideration. The sigmoid colon is entirely decompressed. There is no inguinal hernia or adenopathy. No aggressive bone lesions identified. 1. Acute small bowel obstruction with the obstruction point it currently at a ventral hernia in the lower central abdomen. 2. Marked wall thickening to the bladder. Acute cystitis should be considered. 3. 4 cm simple left renal cyst.       Echocardiogram:  No results found for this or any previous visit.         Orders Placed This Encounter   Medications    albuterol sulfate HFA (PROVENTIL;VENTOLIN;PROAIR) 108 (90 Base) MCG/ACT inhaler 2 puff     Order Specific Question:   Initiate RT Bronchodilator Protocol     Answer:   Yes - Inpatient Protocol    fluticasone (FLOVENT HFA) 110 MCG/ACT inhaler 2 puff    sodium chloride flush 0.9 % injection 5-40 mL    sodium chloride flush 0.9 % injection 5-40 mL    0.9 % sodium chloride infusion    OR Linked Order Group ondansetron (ZOFRAN-ODT) disintegrating tablet 4 mg     ondansetron (ZOFRAN) injection 4 mg    polyethylene glycol (GLYCOLAX) packet 17 g    OR Linked Order Group     acetaminophen (TYLENOL) tablet 650 mg     acetaminophen (TYLENOL) suppository 650 mg    lactated ringers IV soln infusion    magnesium sulfate 2000 mg in 50 mL IVPB premix    potassium chloride 10 mEq/100 mL IVPB (Peripheral Line)    OR Linked Order Group     potassium chloride (KLOR-CON M) extended release tablet 40 mEq     potassium bicarb-citric acid (EFFER-K) effervescent tablet 40 mEq     potassium chloride 10 mEq/100 mL IVPB (Peripheral Line)    pantoprazole (PROTONIX) 40 mg in sodium chloride (PF) 0.9 % 10 mL injection    cefTRIAXone (ROCEPHIN) 1,000 mg in sodium chloride 0.9 % 50 mL IVPB (mini-bag)     Order Specific Question:   Antimicrobial Indications     Answer:   Urinary Tract Infection     Order Specific Question:   UTI duration of therapy     Answer:   7 days    morphine injection 2 mg         Signed:  Glenny Ortez MD    Part of this note may have been written by using a voice dictation software. The note has been proof read but may still contain some grammatical/other typographical errors.

## 2023-02-03 NOTE — PROGRESS NOTES
ACUTE PHYSICAL THERAPY GOALS:   (Developed with and agreed upon by patient and/or caregiver.)  STG:  (1.)Ms. Maritza Delgado will move from supine to sit and sit to supine  with INDEPENDENCE with bed flat and no rail within 4-7 treatment day(s). (2.)Ms. Maritza Delgado will transfer from bed to chair and chair to bed with MODIFIED INDEPENDENCE using the least restrictive device within 4-7 treatment day(s). (3.)Ms. Maritza Delgado will ambulate with MODIFIED INDEPENDENCE for 200 feet with the least restrictive device within 4-7 treatment day(s). (4.)Ms. Maritza Delgado will go up and down 4 steps with bilateral rails and SBA within 4-7 treatment days. ________________________________________________________________________________________________     PHYSICAL THERAPY Initial Assessment and PM  (Link to Caseload Tracking: PT Visit Days : 1  Acknowledge Orders  Time In/Out  PT Charge Capture  Rehab Caseload Tracker    Toño Rosario is a 82 y.o. female   PRIMARY DIAGNOSIS: SBO (small bowel obstruction) ([facility])  SBO (small bowel obstruction) ([facility] [K56.609]       Reason for Referral: Generalized Muscle Weakness (M62.81)  Difficulty in walking, Not elsewhere classified (R26.2)  Inpatient: Payor: MEDICARE / Plan: MEDICARE PART A AND B / Product Type: *No Product type* /     ASSESSMENT:     REHAB RECOMMENDATIONS:   Recommendation to date pending progress:  Setting:  Home Health Therapy    Equipment:    None     ASSESSMENT:  Ms. Maritza Delgado is admitted with above diagnosis with incarcerated ventral hernia along with a UTI. Her hernia was reduced by MD and per MD note will require mesh placement in a few weeks once infection is cleared. MD note states that they will be ordering an abdominal binder for patient. She lives alone and her daughter lives within walking distance. She was independent with mobility without assistive devices. She presents with generalized weakness and decreased independence with functional mobility.  She will benefit from therapy interventions to maximize independence with mobility, stairs and strengthening. Today worked on bed mobility, transfers, standing balance, ambulation in son with min assist. On return to room pt returned supine with needs in reach. Hope to progress at next therapy session, may need to try a walker with pt.      [facility] [address] [address] AM-PAC 6 Clicks Basic Mobility Inpatient Short Form  AM-PAC Basic Mobility - Inpatient   How much help is needed turning from your back to your side while in a flat bed without using bedrails?: None  How much help is needed moving from lying on your back to sitting on the side of a flat bed without using bedrails?: None  How much help is needed moving to and from a bed to a chair?: None  How much help is needed standing up from a chair using your arms?: None  How much help is needed walking in hospital room?: A Little  How much help is needed climbing 3-5 steps with a railing?: A Little  [facility] Inpatient Mobility Raw Score : 22  AM-PAC Inpatient T-Scale Score : 53.28  Mobility Inpatient CMS 0-100% Score: 20.91  Mobility Inpatient CMS G-Code Modifier : CJ    SUBJECTIVE:   Ms. Carlos Stewart states, \"it has been a long day\"     Social/Functional Lives With: Alone (daughter lives within walking distance)  Type of Home: House  Home Layout: Able to Live on Main level with bedroom/bathroom  Home Access: Stairs to enter with rails  Entrance Stairs - Number of Steps: 4  Entrance Stairs - Rails: Both  Bathroom Shower/Tub: Walk-in shower  Bathroom Equipment: Shower chair, Grab bars around toilet  Home Equipment: Usama Class, rolling    OBJECTIVE:     PAIN: VITALS / O2: PRECAUTION / Orpha Steven / Raji Cyril:   Pre Treatment: abdomen sore         Post Treatment: still a little sore Vitals        Oxygen      None    RESTRICTIONS/PRECAUTIONS:                    GROSS EVALUATION: Intact Impaired (Comments):   AROM [x]     PROM []    Strength []  Generally decreased, functional   Balance [] Posture: Fair  Sitting - Static: Good  Sitting - Dynamic: Good  Standing - Static: Good, -  Standing - Dynamic: Fair   Posture [] WFL   Sensation [x]     Coordination [x]      Tone [x]     Edema []    Activity Tolerance [] Patient limited by fatigue, Patient limited by pain    []      COGNITION/  PERCEPTION: Intact Impaired (Comments):   Orientation [x]     Vision [x]     Hearing [x]     Cognition  [x]       MOBILITY: I Mod I S SBA CGA Min Mod Max Total  NT x2 Comments:   Bed Mobility    Rolling [] [] [] [] [] [] [] [] [] [] []    Supine to Sit [] [] [] [x] [] [] [] [] [] [] [] Head of bed elevated and bed rail   Scooting [] [] [] [] [] [] [] [] [] [] []    Sit to Supine [] [] [] [x] [] [] [] [] [] [] []    Transfers    Sit to Stand [] [] [] [x] [] [] [] [] [] [] []    Bed to Chair [] [] [] [] [] [] [] [] [] [] []    Stand to Sit [] [] [] [x] [] [] [] [] [] [] []     [] [] [] [] [] [] [] [] [] [] []    I=Independent, Mod I=Modified Independent, S=Supervision, SBA=Standby Assistance, CGA=Contact Guard Assistance,   Min=Minimal Assistance, Mod=Moderate Assistance, Max=Maximal Assistance, Total=Total Assistance, NT=Not Tested    GAIT: I Mod I S SBA CGA Min Mod Max Total  NT x2 Comments:   Level of Assistance [] [] [] [] [] [x] [] [] [] [] []    Distance 80 feet    DME Hand held assist    Gait Quality Decreased reinaldo , Decreased step clearance, Decreased step length, Shuffling , and Trunk sway increased    Weightbearing Status      Stairs      I=Independent, Mod I=Modified Independent, S=Supervision, SBA=Standby Assistance, CGA=Contact Guard Assistance,   Min=Minimal Assistance, Mod=Moderate Assistance, Max=Maximal Assistance, Total=Total Assistance, NT=Not Tested    PLAN:   FREQUENCY AND DURATION: Daily for duration of hospital stay or until stated goals are met, whichever comes first.    THERAPY PROGNOSIS: Good    PROBLEM LIST:   (Skilled intervention is medically necessary to address:)  Decreased ADL/Functional Activities  Decreased Activity Tolerance  Decreased AROM/PROM  Decreased Balance  Decreased Gait Ability  Decreased Strength  Decreased Transfer Abilities INTERVENTIONS PLANNED:   (Benefits and precautions of physical therapy have been discussed with the patient.)  Therapeutic Activity  Therapeutic Exercise/HEP  Gait Training  Education       TREATMENT:   EVALUATION: LOW COMPLEXITY: (Untimed Charge)    TREATMENT:   Co-Treatment PT/OT necessary due to patient's decreased overall endurance/tolerance levels, as well as need for high level skilled assistance to complete functional transfers/mobility and functional tasks  Therapeutic Activity (8 Minutes): Therapeutic activity included Supine to Sit, Sit to Supine, Transfer Training, Ambulation on level ground, Sitting balance , and Standing balance to improve functional Activity tolerance, Balance, Mobility, and Strength. TREATMENT GRID:  N/A    AFTER TREATMENT PRECAUTIONS: Bed, Bed/Chair Locked, Call light within reach, Needs within reach, and Visitors at bedside    INTERDISCIPLINARY COLLABORATION:  RN/ PCT and OT/ ROSAS    EDUCATION: Education Given To: Patient; Family  Education Provided: Role of Therapy;Plan of Care  Education Outcome: Verbalized understanding;Demonstrated understanding    TIME IN/OUT:  Time In: 1535  Time Out: [address]  Minutes: [id number] Fuentes Boyd PT

## 2023-02-03 NOTE — CONSULTS
General Surgery Consult    Carmen Duran  MRN: [id number]  NVB:[birth year]89[birth year]  Age:83 y.o. Chief complaint: Abdominal Pain      HPI: Carmen Duran is a 82 y.o. female who we are asked by Hospitalist MD  to see for Abdominal Pain. Patient has past medical history of  hypertension, moderate persistent asthma, CKD stage III, hypothyroidism, GERD, history of DVT's Bilateral(2020)  and colon polyps (2016). Patient presented to ER at [facility] 2/3/2023 with abdominal pain, nausea and vomiting. Patient reports she began having generalized abdominal pain 2/1/2023 after eating dinner and then she experienced nausea with vomiting. She reports she is unable to tolerate any food or liquids. Abdominal pain has increased daily and became severe this morning. Her abdominal pain is currently 5 on scale 1 to 10. She states she is not able to pass flatus. Last bowel movement was yesterday morning. No prior constipation or diarrhea. She has some hiccups. She reports she has had some chills but no documented fevers. Patient  denies chest pain or dyspnea, no cough, no cardiac palpitations or back pain. She does reports mild dysuria with urination. She reports she has had an umbilical/Ventral  hernia since 2012 and states it has become larger and painful  over the past 2 to 3 years. Patient reports she has history of colon polyps with last colonoscopy done in 2016 and has not had repeat colonoscopy done as instructed . She reports she had history of DVT's Bilateral lower extremities in 2020 after having cellulitis  and has been on Xarelto with last dose taken on Wednesday (2/1/2023). ED workup done 2/3/2023 which includes     CT ABDOMEN PELVIS W IV CONTRAST   Result Date: 2/3/2023  CT OF THE ABDOMEN AND PELVIS WITH CONTRAST.  CLINICAL INDICATION: Left lower quadrant abdominal pain with nausea and vomiting PROCEDURE: Serial thin section axial images obtained from the lung bases through the proximal femurs following the administration of 100 cc of Isovue 370 intravenous contrast.  Radiation dose reduction techniques were used for this study. Our CT scanners use one or all of the following: Automated exposure control, adjusted of the mA and/or kV according to patient size, iterative reconstruction COMPARISON: CT abdomen and pelvis dated 12/18/2020 FINDINGS: CT ABDOMEN: A ventral hernia is appreciated in the lower central abdomen that contains a loop of obstructed small bowel with dilated fluid-filled loops of more proximal small bowel that measure up to 2.6 cm. The distal small bowel loops are decompressed. The liver, spleen, stomach, and pancreas are normal. The kidneys are symmetric in size and contrast enhancement. There is a 4 cm simple left renal cyst. The gallbladder is normal. The adrenal glands are normal. There is no ascites or adenopathy. CT PELVIS: The bladder is incompletely distended but does show thickened walls with irregularity. Cystitis is a consideration. The sigmoid colon is entirely decompressed. There is no inguinal hernia or adenopathy. No aggressive bone lesions identified. 1. Acute small bowel obstruction with the obstruction point  currently at a ventral hernia in the lower central abdomen. 2. Marked wall thickening to the bladder. Acute cystitis should be considered. 3. 4 cm simple left renal cyst.       Labs Today:  WBC 10.6 HG 12.0  K+ 3.5 BUN 21 Cr 1.34 Lipase 88     Past Medical History:   Diagnosis Date    Acute pancreatitis 10/12/2018    Asthma     Calculus of kidney     Colon polyps     Female stress incontinence 8/16/2012    GERD (gastroesophageal reflux disease)     History of DVT (deep vein thrombosis)     9/2021    History of gastric ulcer     2018    History of kidney stones 8/16/2012    History of septic arthritis 09/10/2019    Right knee.   Improved with prolonged IV anitbiotics    Hypercholesterolemia     Hypertension     Hypothyroidism     Ophthalmic migraine Osteopenia of neck of right femur 2/25/2015    Primary insomnia 07/17/2017    Raynaud's phenomenon 2/25/2015    Recurrent cellulitis of lower extremity 12/28/2020    S/P total knee arthroplasty 8/16/2012    [id number]     Umbilical hernia 0/88/1478    Venous insufficiency of both lower extremities 1/28/2014    Worse on left      Past Surgical History:   Procedure Laterality Date    COLONOSCOPY  Jan 2013    4 polyps, repeat 5 years    COLONOSCOPY  7/5/2016    repeat 5 yrs tubular adenoma    HYSTERECTOMY, TOTAL ABDOMINAL (CERVIX REMOVED)      REMOVAL OF KIDNEY STONE      SALPINGO-OOPHORECTOMY      TOTAL KNEE ARTHROPLASTY Bilateral     partial     Current Facility-Administered Medications   Medication Dose Route Frequency    albuterol sulfate HFA (PROVENTIL;VENTOLIN;PROAIR) 108 (90 Base) MCG/ACT inhaler 2 puff  2 puff Inhalation Q6H PRN    fluticasone (FLOVENT HFA) 110 MCG/ACT inhaler 2 puff  2 puff Inhalation BID    sodium chloride flush 0.9 % injection 5-40 mL  5-40 mL IntraVENous 2 times per day    sodium chloride flush 0.9 % injection 5-40 mL  5-40 mL IntraVENous PRN    0.9 % sodium chloride infusion   IntraVENous PRN    ondansetron (ZOFRAN-ODT) disintegrating tablet 4 mg  4 mg Oral Q8H PRN    Or    ondansetron (ZOFRAN) injection 4 mg  4 mg IntraVENous Q6H PRN    polyethylene glycol (GLYCOLAX) packet 17 g  17 g Oral Daily PRN    acetaminophen (TYLENOL) tablet 650 mg  650 mg Oral Q6H PRN    Or    acetaminophen (TYLENOL) suppository 650 mg  650 mg Rectal Q6H PRN    lactated ringers IV soln infusion   IntraVENous Continuous    magnesium sulfate 2000 mg in 50 mL IVPB premix  2,000 mg IntraVENous PRN    potassium chloride 10 mEq/100 mL IVPB (Peripheral Line)  10 mEq IntraVENous PRN    potassium chloride (KLOR-CON M) extended release tablet 40 mEq  40 mEq Oral PRN    Or    potassium bicarb-citric acid (EFFER-K) effervescent tablet 40 mEq  40 mEq Oral PRN    Or    potassium chloride 10 mEq/100 mL IVPB (Peripheral Line)  10 mEq IntraVENous PRN    pantoprazole (PROTONIX) 40 mg in sodium chloride (PF) 0.9 % 10 mL injection  40 mg IntraVENous Daily    cefTRIAXone (ROCEPHIN) 1,000 mg in sodium chloride 0.9 % 50 mL IVPB (mini-bag)  1,000 mg IntraVENous Q24H    morphine injection 2 mg  2 mg IntraVENous Q4H PRN     Allergies:   Amoxicillin and Pneumococcal vaccines    Social History     Socioeconomic History    Marital status:    Tobacco Use    Smoking status: Never    Smokeless tobacco: Never   Substance and Sexual Activity    Alcohol use: No    Drug use: No   Social History Narrative    Lives with  who is chronically ill with end stage lung disease and early dementia and depression. 5 children, daughter Faith Hurd and son Jade Eddy (PharmD) have her [de-identified], sons Santiago dAams and Enrique Florence have durable POA     Social Determinants of Health     Physical Activity: Inactive    Days of Exercise per Week: 0 days    Minutes of Exercise per Session: 0 min       Family History   Problem Relation Age of Onset    High Cholesterol Brother     Hypertension Brother     High Cholesterol Brother     Hypertension Brother     Heart Attack Brother     Heart Attack Paternal Grandmother     Heart Attack Paternal Grandfather     Breast Cancer Neg Hx     Hypertension Mother     Rheum Arthritis Mother     High Cholesterol Mother     Broken Bones Mother     Heart Failure Father     Heart Attack Father     Osteoarthritis Brother         Knee     Review of systems:  Constitutional : Negative for activity change and unexpected weight change  HENT: Negative for oral lesions, Nares with no lesions or drainage  Eyes: Negative for visual disturbance   Respiratory: Negative for cough, dyspnea   Cardiovascular: Negative for chest pain or cardiac palpitations  Gastrointestinal Positive for Nausea, vomiting and abdominal pain. Genitourinary: Positive for dysuria.  Negative for urinary frequency or hesitancy  Neurological: Negative for speech difficulty  Hematological : Negative for adenopathy or bruising   Psychiatric/Behavioral:  Negative for hallucinations and self injury. Skin: Positive for Ventral Hernia    Comprehensive review of systems was otherwise unremarkable except as noted above. Objective:   Physical Exam:   BP (!) 158/67   Pulse 92   Temp 98 °F (36.7 °C) (Oral)   Resp 17   SpO2 93%   Constitutional: Alert, oriented, cooperative  Elderly ill appearing female   Eyes:Sclera are clear. EOMs intact  ENMT: no external lesions gross hearing normal; no obvious neck masses, no ear or lip lesions, nares normal  CV: RRR. Normal perfusion No murmurs, rubs or gallops No JVD  Lungs: CTA BL No wheezing, rales or rhonchi . Symmetric expansion  GI: soft, Mild distention noted.  + Tenderness noted to umbilical area and lower quadrant. Hypoactive bowel sounds. No organomegaly, no guarding or rebound noted. Large Ventral Hernia present   Musculoskeletal: unremarkable with normal function. No embolic signs or cyanosis.    Neuro:  Oriented; moves all 4; no focal deficits  Psychiatric: normal affect and mood, no memory impairment    Recent vitals (if inpt):  Patient Vitals for the past 24 hrs:   BP Temp Temp src Pulse Resp SpO2   02/03/23 1233 (!) 158/67 98 °F (36.7 °C) Oral 92 17 93 %       Labs:   Latest Reference Range & Units 2/3/23 08:38   Sodium 133 - 143 mmol/L 144 (H)   Potassium 3.5 - 5.1 mmol/L 3.5   Chloride 98 - 107 mmol/L 104   CO2 21 - 32 mmol/L 28   BUN,BUNPL 7.0 - 18.0 MG/DL 21 (H)   Creatinine 0.6 - 1.0 MG/DL 1.34 (H)   Anion Gap 2 - 11 mmol/L 12 (H)   Est, Glom Filt Rate >60 ml/min/1.73m2 40 (L)   Glucose, Random 65 - 100 mg/dL 135 (H)   CALCIUM, SERUM, [id number] 8.3 - 10.4 MG/DL 9.7   ALBUMIN/GLOBULIN RATIO 0.4 - 1.6   1.3   Total Protein 6.4 - 8.2 g/dL 7.4   Troponin T 0 - 14 ng/L 26.7 (H)   Albumin 3.2 - 4.6 g/dL 4.2   Globulin 2.8 - 4.5 g/dL 3.2   Alk Phosphatase 45.0 - 117.0 U/L 89   ALT 13.0 - 61.0 U/L 19   AST 15 - 37 U/L 26   Bilirubin 0.2 - 1.1 MG/DL 0.5 Lipase 13 - 60 U/L 88 (H)      Latest Reference Range & Units 2/3/23 08:38   WBC 4.3 - 11.1 K/uL 10.6   RBC 4.05 - 5.20 M/uL 4.01 (L)   Hemoglobin Quant 11.7 - 15.4 g/dL 12.0   Hematocrit 35.8 - 46.3 % 36.6   MCV 82.0 - 102.0 FL 91.3   MCH 26.1 - 32.9 PG 29.9   MCHC 31.4 - 35.0 g/dL 32.8   MPV 9.4 - 12.3 FL 11.3   RDW 11.9 - 14.6 % 13.5   Platelet Count 513 - 450 K/uL 214   Absolute Mono # 0.1 - 1.3 K/UL 0.7   Eosinophils % 0.5 - 7.8 % 0 (L)   Basophils Absolute 0.0 - 0.2 K/UL 0.0   Differential Type -   AUTOMATED   Seg Neutrophils 43 - 78 % 84 (H)   Segs Absolute 1.7 - 8.2 K/UL 8.9 (H)   Lymphocytes 13 - 44 % 9 (L)      Latest Reference Range & Units 2/3/23 10:09   Color, UA -   YELLOW   Glucose, UA mg/dL Negative   Bilirubin, Urine NEG   Negative   Ketones, Urine NEG mg/dL TRACE ! Specific Gravity, UA 1.001 - 1.023   1.020   Blood, Urine NEG   SMALL ! Protein, UA NEG mg/dL 100 ! Urobilinogen, Urine 0.2 - 1.0 EU/dL 0.2   Nitrite, Urine NEG   Negative   Leukocyte Esterase, Urine NEG   LARGE ! Appearance -   CLOUDY   pH, Urine 5.0 - 9.0   7.5   Casts 0 /lpf 0   Mucus, UA 0 /lpf 0   WBC, UA 0 /hpf >100   RBC, UA 0 /hpf 3-5   Epithelial Cells, UA 0 /hpf 0-3   Bacteria, UA 0 /hpf 4+ (H)   Crystals 0 /LPF 0   Other observations -   RESULTS VERIFIED MANUALLY       I reviewed recent labs and recent radiologic studies. Admission date (for inpatients): 2/3/2023   * No surgery found *  * No surgery found *    ASSESSMENT:  Principal Problem:    SBO (small bowel obstruction) (HCC)  Active Problems:    Chronic kidney disease, stage III (moderate) (HCC)    Chronic deep vein thrombosis (DVT) of right femoral vein (HCC)    Moderate persistent asthma without complication    Hypertension, essential    Gastroesophageal reflux disease with esophagitis without hemorrhage    Hypothyroidism    Idiopathic peripheral neuropathy  Resolved Problems:    * No resolved hospital problems.  *       PLAN:  -Further input per attending surgeon, Dr. Aramis Garcia  -NPO for now  -NG tube to LIWS ordered per Admitting team   -IV Protonix   -IVF's  -Pain control  -Antiemetic PRN IV  -Admitting team for meds/labs/home meds  -Xarelto placed on hold for now  -SCD's for VTE PPX  -Primary admit team MD ordered Rocephin for UTI    Signed:  MARY De Luna - NP

## 2023-02-03 NOTE — PROGRESS NOTES
[facility] Hospitalist Service  At the heart of better care     Advance Care Planning   Admit Date:  2/3/2023 12:31 PM   Name:  Angela Ramirez   Age:  82 y.o. Sex:  female  :  1940   MRN:  [id number]   Room:  [address] [place] [address] is able to make her own decisions:   Yes    If pt unable to make decisions, POA/surrogate decision maker:  Pt's daughter, Ms Jeremie Puentes [phone]    Other people present:   Daughter, Jeremie Puentes    Patient / surrogate decision-maker directed code status:  DNR    Other ACP topics discussed, if applicable:   None    Patient or surrogate consented to discussion of the current conditions, workup, management plans, prognosis, and the risk for further deterioration. Time spent: 18 minutes in direct discussion.       Signed:  Fernanda Ortez MD

## 2023-02-04 LAB
ALBUMIN SERPL-MCNC: 2.8 G/DL (ref 3.2–4.6)
ALBUMIN/GLOB SERPL: 0.8 (ref 0.4–1.6)
ALP SERPL-CCNC: 67 U/L (ref 50–136)
ALT SERPL-CCNC: 19 U/L (ref 12–65)
ANION GAP SERPL CALC-SCNC: 7 MMOL/L (ref 2–11)
AST SERPL-CCNC: 23 U/L (ref 15–37)
BASOPHILS # BLD: 0 K/UL (ref 0–0.2)
BASOPHILS NFR BLD: 1 % (ref 0–2)
BILIRUB SERPL-MCNC: 0.4 MG/DL (ref 0.2–1.1)
BUN SERPL-MCNC: 23 MG/DL (ref 8–23)
CALCIUM SERPL-MCNC: 8.4 MG/DL (ref 8.3–10.4)
CHLORIDE SERPL-SCNC: 110 MMOL/L (ref 101–110)
CO2 SERPL-SCNC: 28 MMOL/L (ref 21–32)
CREAT SERPL-MCNC: 1.22 MG/DL (ref 0.6–1)
DIFFERENTIAL METHOD BLD: ABNORMAL
EOSINOPHIL # BLD: 0 K/UL (ref 0–0.8)
EOSINOPHIL NFR BLD: 1 % (ref 0.5–7.8)
ERYTHROCYTE [DISTWIDTH] IN BLOOD BY AUTOMATED COUNT: 13.7 % (ref 11.9–14.6)
GLOBULIN SER CALC-MCNC: 3.4 G/DL (ref 2.8–4.5)
GLUCOSE SERPL-MCNC: 87 MG/DL (ref 65–100)
HCT VFR BLD AUTO: 32.9 % (ref 35.8–46.3)
HGB BLD-MCNC: 10.4 G/DL (ref 11.7–15.4)
IMM GRANULOCYTES # BLD AUTO: 0 K/UL (ref 0–0.5)
IMM GRANULOCYTES NFR BLD AUTO: 0 % (ref 0–5)
LYMPHOCYTES # BLD: 1.5 K/UL (ref 0.5–4.6)
LYMPHOCYTES NFR BLD: 24 % (ref 13–44)
MAGNESIUM SERPL-MCNC: 1.6 MG/DL (ref 1.8–2.4)
MCH RBC QN AUTO: 29.2 PG (ref 26.1–32.9)
MCHC RBC AUTO-ENTMCNC: 31.6 G/DL (ref 31.4–35)
MCV RBC AUTO: 92.4 FL (ref 82–102)
MONOCYTES # BLD: 0.6 K/UL (ref 0.1–1.3)
MONOCYTES NFR BLD: 10 % (ref 4–12)
NEUTS SEG # BLD: 4.1 K/UL (ref 1.7–8.2)
NEUTS SEG NFR BLD: 65 % (ref 43–78)
NRBC # BLD: 0 K/UL (ref 0–0.2)
PLATELET # BLD AUTO: 177 K/UL (ref 150–450)
PMV BLD AUTO: 12 FL (ref 9.4–12.3)
POTASSIUM SERPL-SCNC: 3.3 MMOL/L (ref 3.5–5.1)
PROT SERPL-MCNC: 6.2 G/DL (ref 6.3–8.2)
RBC # BLD AUTO: 3.56 M/UL (ref 4.05–5.2)
SODIUM SERPL-SCNC: 145 MMOL/L (ref 133–143)
WBC # BLD AUTO: 6.3 K/UL (ref 4.3–11.1)

## 2023-02-04 PROCEDURE — 94760 N-INVAS EAR/PLS OXIMETRY 1: CPT

## 2023-02-04 PROCEDURE — A4216 STERILE WATER/SALINE, 10 ML: HCPCS | Performed by: HOSPITALIST

## 2023-02-04 PROCEDURE — 36415 COLL VENOUS BLD VENIPUNCTURE: CPT

## 2023-02-04 PROCEDURE — C9113 INJ PANTOPRAZOLE SODIUM, VIA: HCPCS | Performed by: HOSPITALIST

## 2023-02-04 PROCEDURE — 6360000002 HC RX W HCPCS: Performed by: HOSPITALIST

## 2023-02-04 PROCEDURE — 87086 URINE CULTURE/COLONY COUNT: CPT

## 2023-02-04 PROCEDURE — 94761 N-INVAS EAR/PLS OXIMETRY MLT: CPT

## 2023-02-04 PROCEDURE — 80053 COMPREHEN METABOLIC PANEL: CPT

## 2023-02-04 PROCEDURE — 97530 THERAPEUTIC ACTIVITIES: CPT

## 2023-02-04 PROCEDURE — 6370000000 HC RX 637 (ALT 250 FOR IP): Performed by: HOSPITALIST

## 2023-02-04 PROCEDURE — 2580000003 HC RX 258: Performed by: HOSPITALIST

## 2023-02-04 PROCEDURE — 85025 COMPLETE CBC W/AUTO DIFF WBC: CPT

## 2023-02-04 PROCEDURE — 1100000000 HC RM PRIVATE

## 2023-02-04 PROCEDURE — 94640 AIRWAY INHALATION TREATMENT: CPT

## 2023-02-04 PROCEDURE — 83735 ASSAY OF MAGNESIUM: CPT

## 2023-02-04 RX ORDER — MAGNESIUM OXIDE 400 MG/1
400 TABLET ORAL DAILY
Status: DISCONTINUED | OUTPATIENT
Start: 2023-02-04 | End: 2023-02-04

## 2023-02-04 RX ORDER — POTASSIUM CHLORIDE 7.45 MG/ML
10 INJECTION INTRAVENOUS
Status: DISPENSED | OUTPATIENT
Start: 2023-02-04 | End: 2023-02-04

## 2023-02-04 RX ORDER — LANOLIN ALCOHOL/MO/W.PET/CERES
400 CREAM (GRAM) TOPICAL DAILY
Status: DISCONTINUED | OUTPATIENT
Start: 2023-02-05 | End: 2023-02-06 | Stop reason: HOSPADM

## 2023-02-04 RX ORDER — MAGNESIUM SULFATE IN WATER 40 MG/ML
2000 INJECTION, SOLUTION INTRAVENOUS ONCE
Status: COMPLETED | OUTPATIENT
Start: 2023-02-04 | End: 2023-02-04

## 2023-02-04 RX ORDER — ATORVASTATIN CALCIUM 40 MG/1
40 TABLET, FILM COATED ORAL DAILY
Status: DISCONTINUED | OUTPATIENT
Start: 2023-02-04 | End: 2023-02-06 | Stop reason: HOSPADM

## 2023-02-04 RX ORDER — GABAPENTIN 300 MG/1
300 CAPSULE ORAL 2 TIMES DAILY
Status: DISCONTINUED | OUTPATIENT
Start: 2023-02-04 | End: 2023-02-06 | Stop reason: HOSPADM

## 2023-02-04 RX ORDER — HYDRALAZINE HYDROCHLORIDE 20 MG/ML
10 INJECTION INTRAMUSCULAR; INTRAVENOUS EVERY 6 HOURS PRN
Status: DISCONTINUED | OUTPATIENT
Start: 2023-02-04 | End: 2023-02-06 | Stop reason: HOSPADM

## 2023-02-04 RX ORDER — LEVOTHYROXINE SODIUM 0.05 MG/1
25 TABLET ORAL EVERY MORNING
Status: DISCONTINUED | OUTPATIENT
Start: 2023-02-05 | End: 2023-02-06 | Stop reason: HOSPADM

## 2023-02-04 RX ADMIN — SODIUM CHLORIDE, POTASSIUM CHLORIDE, SODIUM LACTATE AND CALCIUM CHLORIDE: 600; 310; 30; 20 INJECTION, SOLUTION INTRAVENOUS at 04:39

## 2023-02-04 RX ADMIN — POTASSIUM CHLORIDE 10 MEQ: 7.46 INJECTION, SOLUTION INTRAVENOUS at 09:32

## 2023-02-04 RX ADMIN — POTASSIUM CHLORIDE 10 MEQ: 7.46 INJECTION, SOLUTION INTRAVENOUS at 19:24

## 2023-02-04 RX ADMIN — SODIUM CHLORIDE, POTASSIUM CHLORIDE, SODIUM LACTATE AND CALCIUM CHLORIDE: 600; 310; 30; 20 INJECTION, SOLUTION INTRAVENOUS at 19:29

## 2023-02-04 RX ADMIN — CEFTRIAXONE 1000 MG: 1 INJECTION, POWDER, FOR SOLUTION INTRAMUSCULAR; INTRAVENOUS at 15:20

## 2023-02-04 RX ADMIN — SODIUM CHLORIDE 40 MG: 9 INJECTION, SOLUTION INTRAMUSCULAR; INTRAVENOUS; SUBCUTANEOUS at 09:31

## 2023-02-04 RX ADMIN — GABAPENTIN 300 MG: 300 CAPSULE ORAL at 20:52

## 2023-02-04 RX ADMIN — MAGNESIUM SULFATE HEPTAHYDRATE 2000 MG: 40 INJECTION, SOLUTION INTRAVENOUS at 09:30

## 2023-02-04 RX ADMIN — SODIUM CHLORIDE, PRESERVATIVE FREE 10 ML: 5 INJECTION INTRAVENOUS at 09:00

## 2023-02-04 RX ADMIN — POTASSIUM CHLORIDE 10 MEQ: 7.46 INJECTION, SOLUTION INTRAVENOUS at 17:07

## 2023-02-04 RX ADMIN — MAGNESIUM SULFATE HEPTAHYDRATE 2000 MG: 40 INJECTION, SOLUTION INTRAVENOUS at 09:26

## 2023-02-04 RX ADMIN — POTASSIUM CHLORIDE 10 MEQ: 7.46 INJECTION, SOLUTION INTRAVENOUS at 11:40

## 2023-02-04 RX ADMIN — FLUTICASONE PROPIONATE 2 PUFF: 110 AEROSOL, METERED RESPIRATORY (INHALATION) at 21:32

## 2023-02-04 RX ADMIN — FLUTICASONE PROPIONATE 2 PUFF: 110 AEROSOL, METERED RESPIRATORY (INHALATION) at 09:22

## 2023-02-04 RX ADMIN — POTASSIUM CHLORIDE 10 MEQ: 7.46 INJECTION, SOLUTION INTRAVENOUS at 09:26

## 2023-02-04 NOTE — PROGRESS NOTES
Hospitalist Progress Note   Admit Date:  2/3/2023 12:31 PM   Name:  Victorina Lewis   Age:  82 y.o. Sex:  female  :  1940   MRN:  [id number]   Room:  [facility]/    Presenting Complaint: No chief complaint on file. Reason(s) for Admission: SBO (small bowel obstruction) [facility]) [K56.609]     Hospital Course:   Victorina Lewis is a 82 y.o. female with medical history of hypertension, moderate persistent asthma, CKD stage III, hypothyroidism, GERD presented to the ER symptoms only because of nausea vomiting or abdominal pain. CT of the abdomen and pelvis was done on admission and showed small bowel obstruction with transition point at ventral hernia in the lower abdomen. She also has urinary tract infection. General surgery was consulted. Hernia was reduced at bedside. Patient had significantly improved abdominal pain. She  had a bowel movement. She was started on clear liquid diet today. Subjective & 24hr Events (23): Patient denies any nausea vomiting or abdominal pain. Reports significantly better compared to on admission. She had a bowel movement acid after reduction of her hernia. No fever no chills. Assessment & Plan: This is a 46-year-old female with    Small bowel obstruction: Present on admission improved  CT abdomen and pelvis with findings suggestive of acute small bowel obstruction with transition point at ventral hernia in the lower abdomen. General surgery on board. Appreciate recommendations. Patient was umbilical hernia was reduced yesterday. She had a small loose bowel movement. Started on clear liquid diet today. Continue gentle hydration with IV fluids. Lactic acid within normal limits. IV Protonix. UTI  Ceftriaxone pending culture results. CKD stage III  Creatinine at baseline. Hypertension  Monitor blood pressure.   Hydralazine as needed    Hypothyroidism  Levothyroxine     Idiopathic neuropathy  Neurontin     Moderate persistent asthma not in acute exacerbation  Continue home inhalers    History of DVT  Hold Xarelto for now. Anticipated discharge needs:    Hopefully discharge home in 48 hours if tolerating liquid diet well. Diet:  ADULT DIET; Clear Liquid  DVT PPx: SCDs  Code status: DNR    Hospital Problems:  Principal Problem:    SBO (small bowel obstruction) ([facility])  Active Problems:    Chronic kidney disease, stage III (moderate) ([facility])    Chronic deep vein thrombosis (DVT) of right femoral vein ([facility])    Moderate persistent asthma without complication    Hypertension, essential    Gastroesophageal reflux disease with esophagitis without hemorrhage    Hypothyroidism    Idiopathic peripheral neuropathy  Resolved Problems:    * No resolved hospital problems. *      Objective:   Patient Vitals for the past 24 hrs:   Temp Pulse Resp BP SpO2   02/04/23 1241 98.6 °F (37 °C) 65 16 (!) 150/67 91 %   02/04/23 0923 -- 60 16 -- 92 %   02/04/23 0745 -- -- -- (!) 157/73 94 %   02/04/23 0733 98.2 °F (36.8 °C) 64 16 (!) 172/68 91 %   02/04/23 0416 98.1 °F (36.7 °C) 80 16 (!) 152/70 98 %   02/04/23 0031 98.6 °F (37 °C) 65 16 (!) 143/60 92 %   02/03/23 2202 -- 70 18 -- 94 %   02/03/23 1941 98.1 °F (36.7 °C) 67 16 (!) 158/75 94 %   02/03/23 1556 98.4 °F (36.9 °C) 70 18 (!) 159/64 90 %       Oxygen Therapy  SpO2: 91 %  Pulse Oximeter Device Mode: Intermittent  Pulse Oximeter Device Location: Finger  O2 Device: None (Room air)    Estimated body mass index is 27.44 kg/m² as calculated from the following:    Height as of this encounter: 5' 2\" (1.575 m). Weight as of this encounter: 150 lb (68 kg). No intake or output data in the 24 hours ending 02/04/23 1411      Physical Exam:     Blood pressure (!) 150/67, pulse 65, temperature 98.6 °F (37 °C), temperature source Oral, resp. rate 16, height 5' 2\" (1.575 m), weight 150 lb (68 kg), SpO2 91 %. General:    Well nourished.   Alert awake elderly female,  Head:  Normocephalic, atraumatic  Eyes:  Sclerae appear normal.  Pupils equally round. ENT:  Nares appear normal.  Moist oral mucosa  Neck:  No restricted ROM. Trachea midline   CV:   RRR. No m/r/g. No jugular venous distension. Lungs:   CTAB. No wheezing, rhonchi, or rales. Symmetric expansion. Abdomen:   Soft, nontender, nondistended. Active bowel sounds, no organomegaly,  Extremities: No cyanosis or clubbing. No edema  Skin:     No rashes and normal coloration. Warm and dry. Neuro:  CN II-XII grossly intact. Psych:  Normal mood and affect.       I have personally reviewed labs and tests:  Recent Labs:  Recent Results (from the past 48 hour(s))   CBC with Auto Differential    Collection Time: 02/03/23  8:38 AM   Result Value Ref Range    WBC 10.6 4.3 - 11.1 K/uL    RBC 4.01 (L) 4.05 - 5.20 M/uL    Hemoglobin 12.0 11.7 - 15.4 g/dL    Hematocrit 36.6 35.8 - 46.3 %    MCV 91.3 82.0 - 102.0 FL    MCH 29.9 26.1 - 32.9 PG    MCHC 32.8 31.4 - 35.0 g/dL    RDW 13.5 11.9 - 14.6 %    Platelets 902 682 - 058 K/uL    MPV 11.3 9.4 - 12.3 FL    nRBC 0.00 0.0 - 0.2 K/uL    Differential Type AUTOMATED      Seg Neutrophils 84 (H) 43 - 78 %    Lymphocytes 9 (L) 13 - 44 %    Monocytes 6 4.0 - 12.0 %    Eosinophils % 0 (L) 0.5 - 7.8 %    Basophils 0 0.0 - 2.0 %    Immature Granulocytes 1 0.0 - 5.0 %    Segs Absolute 8.9 (H) 1.7 - 8.2 K/UL    Absolute Lymph # 1.0 0.5 - 4.6 K/UL    Absolute Mono # 0.7 0.1 - 1.3 K/UL    Absolute Eos # 0.0 0.0 - 0.8 K/UL    Basophils Absolute 0.0 0.0 - 0.2 K/UL    Absolute Immature Granulocyte 0.1 0.0 - 0.5 K/UL   CMP    Collection Time: 02/03/23  8:38 AM   Result Value Ref Range    Sodium 144 (H) 133 - 143 mmol/L    Potassium 3.5 3.5 - 5.1 mmol/L    Chloride 104 98 - 107 mmol/L    CO2 28 21 - 32 mmol/L    Anion Gap 12 (H) 2 - 11 mmol/L    Glucose 135 (H) 65 - 100 mg/dL    BUN 21 (H) 7.0 - 18.0 MG/DL    Creatinine 1.34 (H) 0.6 - 1.0 MG/DL    Est, Glom Filt Rate 40 (L) >60 ml/min/1.73m2    Calcium 9.7 8.3 - 10.4 MG/DL    Total Bilirubin 0.5 0.2 - 1.1 MG/DL    ALT 19 13.0 - 61.0 U/L    AST 26 15 - 37 U/L    Alk Phosphatase 89 45.0 - 117.0 U/L    Total Protein 7.4 6.4 - 8.2 g/dL    Albumin 4.2 3.2 - 4.6 g/dL    Globulin 3.2 2.8 - 4.5 g/dL    Albumin/Globulin Ratio 1.3 0.4 - 1.6     Troponin T    Collection Time: 02/03/23  8:38 AM   Result Value Ref Range    Troponin T 26.7 (H) 0 - 14 ng/L   Lipase    Collection Time: 02/03/23  8:38 AM   Result Value Ref Range    Lipase 88 (H) 13 - 60 U/L   EKG 12 Lead    Collection Time: 02/03/23  9:05 AM   Result Value Ref Range    Ventricular Rate 69 BPM    Atrial Rate 68 BPM    P-R Interval 188 ms    QRS Duration 91 ms    Q-T Interval 405 ms    QTc Calculation (Bazett) 434 ms    P Axis 40 degrees    R Axis 22 degrees    T Axis 31 degrees    Diagnosis Normal sinus rhythm    Urinalysis    Collection Time: 02/03/23 10:09 AM   Result Value Ref Range    Color, UA YELLOW      Appearance CLOUDY      Specific Gravity, UA 1.020 1.001 - 1.023      pH, Urine 7.5 5.0 - 9.0      Protein,  (A) NEG mg/dL    Glucose, UA Negative mg/dL    Ketones, Urine TRACE (A) NEG mg/dL    Bilirubin Urine Negative NEG      Blood, Urine SMALL (A) NEG      Urobilinogen, Urine 0.2 0.2 - 1.0 EU/dL    Nitrite, Urine Negative NEG      Leukocyte Esterase, Urine LARGE (A) NEG     Urinalysis, Micro    Collection Time: 02/03/23 10:09 AM   Result Value Ref Range    WBC, UA >100 0 /hpf    RBC, UA 3-5 0 /hpf    Epithelial Cells UA 0-3 0 /hpf    BACTERIA, URINE 4+ (H) 0 /hpf    Casts 0 0 /lpf    Crystals 0 0 /LPF    Mucus, UA 0 0 /lpf    Other observations RESULTS VERIFIED MANUALLY     Troponin T    Collection Time: 02/03/23 10:43 AM   Result Value Ref Range    Troponin T 25.9 (H) 0 - 14 ng/L   Lactic Acid    Collection Time: 02/03/23  2:05 PM   Result Value Ref Range    Lactic Acid, Plasma 1.2 0.4 - 2.0 MMOL/L   Comprehensive Metabolic Panel w/ Reflex to MG    Collection Time: 02/04/23  5:41 AM   Result Value Ref Range    Sodium 145 (H) 133 - 143 mmol/L    Potassium 3.3 (L) 3.5 - 5.1 mmol/L    Chloride 110 101 - 110 mmol/L    CO2 28 21 - 32 mmol/L    Anion Gap 7 2 - 11 mmol/L    Glucose 87 65 - 100 mg/dL    BUN 23 8 - 23 MG/DL    Creatinine 1.22 (H) 0.6 - 1.0 MG/DL    Est, Glom Filt Rate 45 (L) >60 ml/min/1.73m2    Calcium 8.4 8.3 - 10.4 MG/DL    Total Bilirubin 0.4 0.2 - 1.1 MG/DL    ALT 19 12 - 65 U/L    AST 23 15 - 37 U/L    Alk Phosphatase 67 50 - 136 U/L    Total Protein 6.2 (L) 6.3 - 8.2 g/dL    Albumin 2.8 (L) 3.2 - 4.6 g/dL    Globulin 3.4 2.8 - 4.5 g/dL    Albumin/Globulin Ratio 0.8 0.4 - 1.6     CBC with Auto Differential    Collection Time: 02/04/23  5:41 AM   Result Value Ref Range    WBC 6.3 4.3 - 11.1 K/uL    RBC 3.56 (L) 4.05 - 5.2 M/uL    Hemoglobin 10.4 (L) 11.7 - 15.4 g/dL    Hematocrit 32.9 (L) 35.8 - 46.3 %    MCV 92.4 82.0 - 102.0 FL    MCH 29.2 26.1 - 32.9 PG    MCHC 31.6 31.4 - 35.0 g/dL    RDW 13.7 11.9 - 14.6 %    Platelets 536 547 - 498 K/uL    MPV 12.0 9.4 - 12.3 FL    nRBC 0.00 0.0 - 0.2 K/uL    Differential Type AUTOMATED      Seg Neutrophils 65 43 - 78 %    Lymphocytes 24 13 - 44 %    Monocytes 10 4.0 - 12.0 %    Eosinophils % 1 0.5 - 7.8 %    Basophils 1 0.0 - 2.0 %    Immature Granulocytes 0 0.0 - 5.0 %    Segs Absolute 4.1 1.7 - 8.2 K/UL    Absolute Lymph # 1.5 0.5 - 4.6 K/UL    Absolute Mono # 0.6 0.1 - 1.3 K/UL    Absolute Eos # 0.0 0.0 - 0.8 K/UL    Basophils Absolute 0.0 0.0 - 0.2 K/UL    Absolute Immature Granulocyte 0.0 0.0 - 0.5 K/UL   Magnesium    Collection Time: 02/04/23  5:41 AM   Result Value Ref Range    Magnesium 1.6 (L) 1.8 - 2.4 mg/dL       I have personally reviewed imaging studies:  Other Studies:  No orders to display       Current Meds:  Current Facility-Administered Medications   Medication Dose Route Frequency    hydrALAZINE (APRESOLINE) injection 10 mg  10 mg IntraVENous Q6H PRN    atorvastatin (LIPITOR) tablet 40 mg  40 mg Oral Daily    gabapentin (NEURONTIN) capsule 300 mg  300 mg Oral BID    [START ON 2/5/2023] levothyroxine (SYNTHROID) tablet 25 mcg  25 mcg Oral QAM    [START ON 2/5/2023] magnesium oxide (MAG-OX) tablet 400 mg  400 mg Oral Daily    albuterol sulfate HFA (PROVENTIL;VENTOLIN;PROAIR) 108 (90 Base) MCG/ACT inhaler 2 puff  2 puff Inhalation Q6H PRN    fluticasone (FLOVENT HFA) 110 MCG/ACT inhaler 2 puff  2 puff Inhalation BID    sodium chloride flush 0.9 % injection 5-40 mL  5-40 mL IntraVENous 2 times per day    sodium chloride flush 0.9 % injection 5-40 mL  5-40 mL IntraVENous PRN    0.9 % sodium chloride infusion   IntraVENous PRN    ondansetron (ZOFRAN-ODT) disintegrating tablet 4 mg  4 mg Oral Q8H PRN    Or    ondansetron (ZOFRAN) injection 4 mg  4 mg IntraVENous Q6H PRN    polyethylene glycol (GLYCOLAX) packet 17 g  17 g Oral Daily PRN    acetaminophen (TYLENOL) tablet 650 mg  650 mg Oral Q6H PRN    Or    acetaminophen (TYLENOL) suppository 650 mg  650 mg Rectal Q6H PRN    lactated ringers IV soln infusion   IntraVENous Continuous    magnesium sulfate 2000 mg in 50 mL IVPB premix  2,000 mg IntraVENous PRN    potassium chloride 10 mEq/100 mL IVPB (Peripheral Line)  10 mEq IntraVENous PRN    potassium chloride (KLOR-CON M) extended release tablet 40 mEq  40 mEq Oral PRN    Or    potassium bicarb-citric acid (EFFER-K) effervescent tablet 40 mEq  40 mEq Oral PRN    Or    potassium chloride 10 mEq/100 mL IVPB (Peripheral Line)  10 mEq IntraVENous PRN    pantoprazole (PROTONIX) 40 mg in sodium chloride (PF) 0.9 % 10 mL injection  40 mg IntraVENous Daily    cefTRIAXone (ROCEPHIN) 1,000 mg in sodium chloride 0.9 % 50 mL IVPB (mini-bag)  1,000 mg IntraVENous Q24H    morphine injection 2 mg  2 mg IntraVENous Q4H PRN       Signed:  Braulio Bah MD    Part of this note may have been written by using a voice dictation software. The note has been proof read but may still contain some grammatical/other typographical errors.

## 2023-02-04 NOTE — PROGRESS NOTES
ACUTE PHYSICAL THERAPY GOALS:   (Developed with and agreed upon by patient and/or caregiver.)  STG:  (1.)Ms. Odalis Burk will move from supine to sit and sit to supine  with INDEPENDENCE with bed flat and no rail within 4-7 treatment day(s). (2.)Ms. Odalis Burk will transfer from bed to chair and chair to bed with MODIFIED INDEPENDENCE using the least restrictive device within 4-7 treatment day(s). (3.)Ms. Odalis Burk will ambulate with MODIFIED INDEPENDENCE for 200 feet with the least restrictive device within 4-7 treatment day(s). (4.)Ms. Odalis Burk will go up and down 4 steps with bilateral rails and SBA within 4-7 treatment days. ________________________________________________________________________________________________     PHYSICAL THERAPY Daily Note and PM  (Link to Caseload Tracking: PT Visit Days : 2  Acknowledge Orders  Time In/Out  PT Charge Capture  Rehab Caseload Tracker    Azra Mar is a 82 y.o. female   PRIMARY DIAGNOSIS: SBO (small bowel obstruction) ([facility])  SBO (small bowel obstruction) ([facility] [address] [K56.609]       Reason for Referral: Generalized Muscle Weakness (M62.81)  Difficulty in walking, Not elsewhere classified (R26.2)  Inpatient: Payor: MEDICARE / Plan: MEDICARE PART A AND B / Product Type: *No Product type* /     ASSESSMENT:     REHAB RECOMMENDATIONS:   Recommendation to date pending progress:  Setting:  Home Health Therapy    Equipment:    None     ASSESSMENT:  Ms. Odalis Burk showed increased gait distance & improved level of assist for transfers & gait. This pt had instability when changing directions & back stepping. Pt was advised to have someone with her at home until consistent stability has been established on level ground. It was also recommended for pt to initially use a cane on uneven or inclined surfaces until improved stability is achieved. PT suggested for pt to follow up with out pt PT if she is not back to her baseline function in 2-3 weeks.  PT will follow up to challenge pt's  dynamic standing balance  & gait stability while in house.      [address] [facility] [address] [address] AM-PAC 6 Clicks Basic Mobility Inpatient Short Form  AM-PAC Basic Mobility - Inpatient   How much help is needed turning from your back to your side while in a flat bed without using bedrails?: None  How much help is needed moving from lying on your back to sitting on the side of a flat bed without using bedrails?: None  How much help is needed moving to and from a bed to a chair?: None  How much help is needed standing up from a chair using your arms?: None  How much help is needed walking in hospital room?: A Little  How much help is needed climbing 3-5 steps with a railing?: A Little  AM-[facility] Inpatient Mobility Raw Score : 22  AM-PAC Inpatient T-Scale Score : 53.28  Mobility Inpatient CMS 0-100% Score: 20.91  Mobility Inpatient CMS G-Code Modifier : CJ    SUBJECTIVE:   Ms. Demetrio Barajas states, that she is agreeable to therapy activity    Social/Functional Lives With: Alone (daughter lives within walking distance)  Type of Home: House  Home Layout: Able to Live on Main level with bedroom/bathroom  Home Access: Stairs to enter with rails  Entrance Stairs - Number of Steps: 4  Entrance Stairs - Rails: Both  Bathroom Shower/Tub: Walk-in shower  Bathroom Equipment: Shower chair, Grab bars around toilet  Home Equipment: John Certain, rolling    OBJECTIVE:     PAIN: VITALS / O2: PRECAUTION / Moody Curtis / Joshua Sleet:   Pre Treatment:   Pain Assessment: None - Denies Pain      Post Treatment: 0/10 Vitals NT       Oxygen NT RA      IV    RESTRICTIONS/PRECAUTIONS:  Restrictions/Precautions: Fall Risk                 GROSS EVALUATION: Intact Impaired (Comments):   AROM [x]  Decreased but functional   PROM []    Strength []  Generally decreased but functional   Balance []  Decreased but functional   Posture [] N/A   Sensation [x]  grossly   Coordination []     Decreased but functional   Tone [x]     Edema []    Activity Tolerance [x]  Fair to fair-    []      COGNITION/  PERCEPTION: Intact Impaired (Comments):   Orientation [x]     Vision [x]     Hearing [x]     Cognition  [x]       MOBILITY: I Mod I S SBA CGA Min Mod Max Total  NT x2 Comments:   Bed Mobility    Rolling [] [] [] [] [] [] [] [] [] [x] []    Supine to Sit [] [] [] [] [] [] [] [] [] [x] []    Scooting [] [] [x] [] [] [] [] [] [] [] []    Sit to Supine [] [] [] [] [] [] [] [] [] [x] []    Transfers    Sit to Stand [] [] [] [x] [] [] [] [] [] [] []    Bed to Chair [] [] [] [x] [] [] [] [] [] [] []    Stand to Sit [] [] [] [x] [] [] [] [] [] [] []     [] [] [] [] [] [] [] [] [] [] []    I=Independent, Mod I=Modified Independent, S=Supervision, SBA=Standby Assistance, CGA=Contact Guard Assistance,   Min=Minimal Assistance, Mod=Moderate Assistance, Max=Maximal Assistance, Total=Total Assistance, NT=Not Tested    GAIT: I Mod I S SBA CGA Min Mod Max Total  NT x2 Comments:   Level of Assistance [] [] [] [x] [] [] [] [] [] [] []    Distance 300 feet    DME None    Gait Quality Decreased reinaldo , Decreased step clearance, Decreased step length, Shuffling , and mild unsteadiness &  difficulty changing directions    Weightbearing Status Restrictions/Precautions  Restrictions/Precautions: Fall Risk    Stairs Stairs/Curb  Stairs?: Yes  Stairs  # Steps : 4  Rails: Bilateral  Assistance: Contact guard assistance    I=Independent, Mod I=Modified Independent, S=Supervision, SBA=Standby Assistance, CGA=Contact Guard Assistance,   Min=Minimal Assistance, Mod=Moderate Assistance, Max=Maximal Assistance, Total=Total Assistance, NT=Not Tested    PLAN:   FREQUENCY AND DURATION: Daily for duration of hospital stay or until stated goals are met, whichever comes first.    THERAPY PROGNOSIS: Good    PROBLEM LIST:   (Skilled intervention is medically necessary to address:)  Decreased ADL/Functional Activities  Decreased Activity Tolerance  Decreased AROM/PROM  Decreased Balance  Decreased Gait Ability  Decreased Strength  Decreased Transfer Abilities INTERVENTIONS PLANNED:   (Benefits and precautions of physical therapy have been discussed with the patient.)  Therapeutic Activity  Therapeutic Exercise/HEP  Gait Training  Education       TREATMENT:       TREATMENT:   Therapeutic Activity (29 Minutes): Therapeutic activity included reviewed POC & PT expectations, pt performed standing balance acivity ( repeated reaching overhead & in front along with repeated trunk rotation, pt also worked on repeated box stepping) , progressive gait training 300 ft & stair training, reviewed expected progression after DC & how to identify when follow up therapy might be needed, reviewed home safety to improve functional Activity tolerance, Balance, Coordination, Mobility, Strength, and ROM. TREATMENT GRID:  N/A    AFTER TREATMENT PRECAUTIONS: Bed/Chair Locked, Call light within reach, Chair, Needs within reach, RN notified, and Visitors at bedside    INTERDISCIPLINARY COLLABORATION:  RN/ PCT    EDUCATION: Education Given To: Patient; Family  Education Provided: Plan of Care;Precautions;Transfer Training;IADL Safety; Family Education; Fall Prevention Strategies; Equipment  Education Method: Demonstration;Verbal;Teach Back  Education Outcome: Continued education needed    TIME IN/OUT:  Time In: 1619  Time Out: [address]  Minutes: 34    Sneha Moralez

## 2023-02-04 NOTE — CARE COORDINATION
Patient discussed in rounds with MD, anticipate d/c in the next 2-3 days. Patient evaluated by therapy, discharged from OT but PT recommending HH. SW discussed recommendations with the patient who declined. SW advised if the patient changes her mind to let SW know and services can be arranged. Patient verbalized understanding and agreement.      ASSESSMENT NOTE    Attending Physician: Obed Arriaga MD  Admit Problem: SBO (small bowel obstruction) ([facility] [K56.609]  Date/Time of Admission: 2/3/2023 12:31 PM  Problem List:  Patient Active Problem List   Diagnosis    Hypertension, essential    Colon polyps    Primary insomnia    Pure hypercholesterolemia    Acute cystitis without hematuria    Chronic kidney disease, stage III (moderate) (HCC)    Obesity    Gastroesophageal reflux disease with esophagitis without hemorrhage    Osteopenia of neck of right femur    Hypothyroidism    Venous insufficiency of both lower extremities    Idiopathic peripheral neuropathy    Encounter for immunization    Recurrent cellulitis of lower extremity    Environmental and seasonal allergies    Chronic deep vein thrombosis (DVT) of right femoral vein (HCC)    Female stress incontinence    Chronic left-sided low back pain with left-sided sciatica    Moderate persistent asthma without complication    Post-thrombotic syndrome    Raynaud's phenomenon    Skin lesion of chest wall    SBO (small bowel obstruction) [facility])       Service Assessment  Patient Orientation Place, Situation, Self, Person, Alert and Oriented   Cognition Alert   History Provided By Patient   Primary Caregiver Self   Accompanied By/Relationship     [address] is:     PCP Verified by CM Yes   Last Visit to PCP     Prior Functional Level Independent in ADLs/IADLs   Current Functional Level Independent in ADLs/IADLs   Can patient return to prior living arrangement Yes   Ability to make needs known: Good   Family able to assist with home care needs: Yes   Would you like for me to discuss the discharge plan with any other family members/significant others, and if so, who? No   [facility] None   CM/SW Referral       Social/Functional History  Lives With Alone (daughter lives within walking distance)   Type of [address] to Live on Main level with bedroom/bathroom   Home Access Stairs to enter with rails   Entrance Stairs - Number of Steps 4   Entrance Stairs - Rails Both   Bathroom Shower/Tub Walk-in shower   Bathroom Toilet     Bathroom Equipment Shower chair, Grab bars around toilet   [address] [facility], [address] [place] [address] Work     Driving     Shopping          Other (Comment)     [address] [facility] [employer] Paying/Finance [address] [facility] Management     Other (Comment)     Ambulation Assistance     Transfer Assistance     Active      Patient's  Info     Mode of Transportation     Education     Occupation     Type of Occupation       Discharge Planning   Type of [address]   Current Services Prior To Admission None   Potential Assistance Needed N/A   DME     DME     DME Ordered? Potential Assistance Purchasing Medications     Meds-to-Beds: Does the patient want to have any new prescriptions delivered to bedside prior to discharge?      Type of Home Care Services None   Patient expects to be discharged to: House   Follow Up Appointment: Best Day/Time     One/Two Story Residence:     # of [employer] Height of Each Step (in)     [employer] Available     History of Falls? Services At/After Discharge  Transition of Care Consult (CM Consult): Internal Home Health     Internal Hospice     Reason Outside Agency [address]     Partner SNF     Reason Why Partner SNF Not Chosen     Internal Comfort Care     Reason Outside [address] Discharge     [address] Provided? Mode of Transport at [facility] Time of Discharge     Confirm Follow Up Transport       Condition of Participation: Discharge Planning  The plan for Transition of Care is related to the following treatment goals: The Patient and/or Patient Representative was provided with a Choice of Provider? Name of the Patient Representative who was provided with the Choice of Provider and agrees with the Discharge Plan? The Patient and/or Patient Representative Agree with the Discharge Plan? Freedom of Choice list was provided with basic dialogue that supports the individualized plan of care/goals, treatment preferences, and shares the quality data associated with the providers?        Documentation for Discharge Appeal  Discharge Appealed by     Date notified by [facility] of appeal request:     Time notified by [facility]IO of appeal request:     Detailed Notice of Discharge given to:     Date Notice of Discharge given:     Time Notice of Discharge given:     Date records sent to QIO     Time records sent to Danny Mcgraw     Date Notified of Outcome     Time Notified of Outcome     Outcome of appeal           JACK Fuentes 02/04/23 3:50 PM      Piero Swift, [address] Work   [address] [facility]

## 2023-02-04 NOTE — PROGRESS NOTES
Admit Date: 2/3/2023  Hospital day 2    Subjective:     Patient reports that she did have a bowel movement, she feels like she can eat. She has no nausea and her pain is resolved    Scheduled Meds:   atorvastatin  40 mg Oral Daily    gabapentin  300 mg Oral BID    levothyroxine  1 tablet Oral QAM    [START ON 2/5/2023] magnesium oxide  400 mg Oral Daily    fluticasone  2 puff Inhalation BID    sodium chloride flush  5-40 mL IntraVENous 2 times per day    pantoprazole (PROTONIX) 40 mg injection  40 mg IntraVENous Daily    cefTRIAXone (ROCEPHIN) IV  1,000 mg IntraVENous Q24H     Continuous Infusions:   sodium chloride      lactated ringers IV soln 75 mL/hr at 02/04/23 0439     PRN Meds:hydrALAZINE, albuterol sulfate HFA, sodium chloride flush, sodium chloride, ondansetron **OR** ondansetron, polyethylene glycol, acetaminophen **OR** acetaminophen, magnesium sulfate, potassium chloride, potassium chloride **OR** potassium alternative oral replacement **OR** potassium chloride, morphine    Review of Systems  Constitutional: negative for chills and fevers    Objective:     Patient Vitals for the past 8 hrs:   BP Temp Temp src Pulse Resp SpO2   02/04/23 0923 -- -- -- 60 16 92 %   02/04/23 0745 (!) 157/73 -- -- -- -- 94 %   02/04/23 0733 (!) 172/68 98.2 °F (36.8 °C) Oral 64 16 91 %   02/04/23 0416 (!) 152/70 98.1 °F (36.7 °C) Oral 80 16 98 %     No intake/output data recorded. No intake/output data recorded.     WDWN in NAD  Alert and oriented x 3  Eyes: PERRL, sclerae white  ENT: external inspection of ears and nose normal, oropharynx normal  Abdomen: soft, non-tender  Musculoskeletal: gait normal, digits without clubbing or cyanosis  Neurological: CN II-XII grossly normal, normal sensation and muscle strength bilaterally     Data ReviewCBC:   Lab Results   Component Value Date/Time    WBC 6.3 02/04/2023 05:41 AM    RBC 3.56 02/04/2023 05:41 AM     BMP:   Lab Results   Component Value Date/Time    GLUCOSE 87 02/04/2023 05:41 AM    CO2 28 02/04/2023 05:41 AM    BUN 23 02/04/2023 05:41 AM    CREATININE 1.22 02/04/2023 05:41 AM    CALCIUM 8.4 02/04/2023 05:41 AM       Assessment:     Principal Problem:    SBO (small bowel obstruction) ([facility])  Active Problems:    Chronic kidney disease, stage III (moderate) ([facility])    Chronic deep vein thrombosis (DVT) of right femoral vein ([facility])    Moderate persistent asthma without complication    Hypertension, essential    Gastroesophageal reflux disease with esophagitis without hemorrhage    Hypothyroidism    Idiopathic peripheral neuropathy  Resolved Problems:    * No resolved hospital problems.  *      Plan:     Small bowel obstruction improved, I am going to start her on clear liquids    I encouraged activity and ambulation    25+ minutes were spent on the care of this patient which involved interview, examination, document review, radiology review, and chart documentation  E & M code [id number]

## 2023-02-04 NOTE — PLAN OF CARE
Problem: Discharge Planning  Goal: Discharge to home or other facility with appropriate resources  Outcome: Progressing  Flowsheets (Taken 2/3/2023 2000)  Discharge to home or other facility with appropriate resources: Identify discharge learning needs (meds, wound care, etc)     Problem: ABCDS Injury Assessment  Goal: Absence of physical injury  Outcome: Progressing

## 2023-02-05 LAB
ANION GAP SERPL CALC-SCNC: 4 MMOL/L (ref 2–11)
BASOPHILS # BLD: 0 K/UL (ref 0–0.2)
BASOPHILS NFR BLD: 1 % (ref 0–2)
BUN SERPL-MCNC: 20 MG/DL (ref 8–23)
CALCIUM SERPL-MCNC: 8.8 MG/DL (ref 8.3–10.4)
CHLORIDE SERPL-SCNC: 110 MMOL/L (ref 101–110)
CO2 SERPL-SCNC: 27 MMOL/L (ref 21–32)
CREAT SERPL-MCNC: 1.04 MG/DL (ref 0.6–1)
DIFFERENTIAL METHOD BLD: ABNORMAL
EOSINOPHIL # BLD: 0.2 K/UL (ref 0–0.8)
EOSINOPHIL NFR BLD: 3 % (ref 0.5–7.8)
ERYTHROCYTE [DISTWIDTH] IN BLOOD BY AUTOMATED COUNT: 13.5 % (ref 11.9–14.6)
GLUCOSE SERPL-MCNC: 97 MG/DL (ref 65–100)
HCT VFR BLD AUTO: 30.8 % (ref 35.8–46.3)
HGB BLD-MCNC: 9.9 G/DL (ref 11.7–15.4)
IMM GRANULOCYTES # BLD AUTO: 0 K/UL (ref 0–0.5)
IMM GRANULOCYTES NFR BLD AUTO: 0 % (ref 0–5)
LYMPHOCYTES # BLD: 1.3 K/UL (ref 0.5–4.6)
LYMPHOCYTES NFR BLD: 23 % (ref 13–44)
MCH RBC QN AUTO: 29.5 PG (ref 26.1–32.9)
MCHC RBC AUTO-ENTMCNC: 32.1 G/DL (ref 31.4–35)
MCV RBC AUTO: 91.7 FL (ref 82–102)
MONOCYTES # BLD: 0.7 K/UL (ref 0.1–1.3)
MONOCYTES NFR BLD: 11 % (ref 4–12)
NEUTS SEG # BLD: 3.7 K/UL (ref 1.7–8.2)
NEUTS SEG NFR BLD: 63 % (ref 43–78)
NRBC # BLD: 0 K/UL (ref 0–0.2)
PLATELET # BLD AUTO: 168 K/UL (ref 150–450)
PMV BLD AUTO: 11.5 FL (ref 9.4–12.3)
POTASSIUM SERPL-SCNC: 3.6 MMOL/L (ref 3.5–5.1)
RBC # BLD AUTO: 3.36 M/UL (ref 4.05–5.2)
SODIUM SERPL-SCNC: 141 MMOL/L (ref 133–143)
WBC # BLD AUTO: 5.9 K/UL (ref 4.3–11.1)

## 2023-02-05 PROCEDURE — 2580000003 HC RX 258: Performed by: HOSPITALIST

## 2023-02-05 PROCEDURE — 80048 BASIC METABOLIC PNL TOTAL CA: CPT

## 2023-02-05 PROCEDURE — 36415 COLL VENOUS BLD VENIPUNCTURE: CPT

## 2023-02-05 PROCEDURE — 6370000000 HC RX 637 (ALT 250 FOR IP): Performed by: HOSPITALIST

## 2023-02-05 PROCEDURE — 94761 N-INVAS EAR/PLS OXIMETRY MLT: CPT

## 2023-02-05 PROCEDURE — 6360000002 HC RX W HCPCS: Performed by: HOSPITALIST

## 2023-02-05 PROCEDURE — A4216 STERILE WATER/SALINE, 10 ML: HCPCS | Performed by: HOSPITALIST

## 2023-02-05 PROCEDURE — 1100000000 HC RM PRIVATE

## 2023-02-05 PROCEDURE — 94640 AIRWAY INHALATION TREATMENT: CPT

## 2023-02-05 PROCEDURE — 94760 N-INVAS EAR/PLS OXIMETRY 1: CPT

## 2023-02-05 PROCEDURE — C9113 INJ PANTOPRAZOLE SODIUM, VIA: HCPCS | Performed by: HOSPITALIST

## 2023-02-05 PROCEDURE — 85025 COMPLETE CBC W/AUTO DIFF WBC: CPT

## 2023-02-05 PROCEDURE — 97530 THERAPEUTIC ACTIVITIES: CPT

## 2023-02-05 RX ORDER — FUROSEMIDE 40 MG/1
40 TABLET ORAL DAILY
Status: DISCONTINUED | OUTPATIENT
Start: 2023-02-05 | End: 2023-02-06 | Stop reason: HOSPADM

## 2023-02-05 RX ADMIN — CEFTRIAXONE 1000 MG: 1 INJECTION, POWDER, FOR SOLUTION INTRAMUSCULAR; INTRAVENOUS at 14:23

## 2023-02-05 RX ADMIN — FUROSEMIDE 40 MG: 40 TABLET ORAL at 09:53

## 2023-02-05 RX ADMIN — FLUTICASONE PROPIONATE 2 PUFF: 110 AEROSOL, METERED RESPIRATORY (INHALATION) at 09:02

## 2023-02-05 RX ADMIN — LEVOTHYROXINE SODIUM 25 MCG: 0.05 TABLET ORAL at 05:08

## 2023-02-05 RX ADMIN — SODIUM CHLORIDE, PRESERVATIVE FREE 5 ML: 5 INJECTION INTRAVENOUS at 20:41

## 2023-02-05 RX ADMIN — FLUTICASONE PROPIONATE 2 PUFF: 110 AEROSOL, METERED RESPIRATORY (INHALATION) at 21:55

## 2023-02-05 RX ADMIN — ATORVASTATIN CALCIUM 40 MG: 40 TABLET, FILM COATED ORAL at 09:15

## 2023-02-05 RX ADMIN — GABAPENTIN 300 MG: 300 CAPSULE ORAL at 20:40

## 2023-02-05 RX ADMIN — Medication 400 MG: at 09:15

## 2023-02-05 RX ADMIN — SODIUM CHLORIDE 40 MG: 9 INJECTION, SOLUTION INTRAMUSCULAR; INTRAVENOUS; SUBCUTANEOUS at 09:15

## 2023-02-05 RX ADMIN — GABAPENTIN 300 MG: 300 CAPSULE ORAL at 09:15

## 2023-02-05 ASSESSMENT — PAIN SCALES - GENERAL: PAINLEVEL_OUTOF10: 0

## 2023-02-05 NOTE — PLAN OF CARE
Problem: Discharge Planning  Goal: Discharge to home or other facility with appropriate resources  Outcome: Progressing  Flowsheets (Taken 2/4/2023 1931)  Discharge to home or other facility with appropriate resources: Identify discharge learning needs (meds, wound care, etc)     Problem: ABCDS Injury Assessment  Goal: Absence of physical injury  Outcome: Progressing     Problem: Safety - Adult  Goal: Free from fall injury  Outcome: Progressing

## 2023-02-05 NOTE — PROGRESS NOTES
Hospitalist Progress Note   Admit Date:  2/3/2023 12:31 PM   Name:  Eder Cordon   Age:  82 y.o. Sex:  female  :  1940   MRN:  [id number]   Room:  [facility]/    Presenting Complaint: No chief complaint on file. Reason(s) for Admission: SBO (small bowel obstruction) [facility]) [K56.609]     Hospital Course:   Eder Cordon is a 82 y.o. female with medical history of hypertension, moderate persistent asthma, CKD stage III, hypothyroidism, GERD presented to the ER symptoms only because of nausea vomiting or abdominal pain. CT of the abdomen and pelvis was done on admission and showed small bowel obstruction with transition point at ventral hernia in the lower abdomen. She also has urinary tract infection. General surgery was consulted. Hernia was reduced at bedside. Patient had significantly improved abdominal pain. She  had a bowel movement. She was started on clear liquid diet today. Subjective & 24hr Events (23): Patient is tolerating clear liquid diet well. No nausea no vomiting no abdominal pain. Patient states that she still has not had bowel movement since yesterday. Not passing flatus. Assessment & Plan: This is a 33-year-old female with    Small bowel obstruction: Present on admission improved  CT abdomen and pelvis with findings suggestive of acute small bowel obstruction with transition point at ventral hernia in the lower abdomen. General surgery on board. Appreciate recommendations. Patient's umbilical hernia was reduced yesterday. She had a small loose bowel movement. Continue clear liquid diet today. Once patient has flatus and has bowel movement, will advance diet. DC IV fluids. Lactic acid within normal limits. IV Protonix. UTI  Ceftriaxone pending culture results. CKD stage III  Creatinine at baseline. Hypertension  Monitor blood pressure.   Hydralazine as needed    Hypothyroidism  Levothyroxine     Idiopathic neuropathy  Neurontin     Moderate persistent asthma not in acute exacerbation  Continue home inhalers    History of DVT  Hold Xarelto for now. Anticipated discharge needs:    Hopefully discharge home in 24- 48 hours if tolerating diet well. Diet:  ADULT DIET; Clear Liquid  DVT PPx: SCDs  Code status: DNR    Hospital Problems:  Principal Problem:    SBO (small bowel obstruction) ([facility])  Active Problems:    Chronic kidney disease, stage III (moderate) ([facility])    Chronic deep vein thrombosis (DVT) of right femoral vein ([facility])    Moderate persistent asthma without complication    Hypertension, essential    Gastroesophageal reflux disease with esophagitis without hemorrhage    Hypothyroidism    Idiopathic peripheral neuropathy  Resolved Problems:    * No resolved hospital problems. *      Objective:   Patient Vitals for the past 24 hrs:   Temp Pulse Resp BP SpO2   02/05/23 1130 97.7 °F (36.5 °C) 63 15 -- 95 %   02/05/23 0906 -- 80 16 -- 96 %   02/05/23 0804 98.8 °F (37.1 °C) 61 16 (!) 167/84 93 %   02/05/23 0337 97.7 °F (36.5 °C) 62 18 (!) 152/72 93 %   02/04/23 2355 98.4 °F (36.9 °C) 64 16 (!) 169/64 93 %   02/04/23 2132 -- 65 18 -- 97 %   02/04/23 2015 98.6 °F (37 °C) 66 16 (!) 160/65 94 %   02/04/23 1627 98.2 °F (36.8 °C) 67 16 (!) 174/79 94 %       Oxygen Therapy  SpO2: 95 %  Pulse Oximeter Device Mode: Intermittent  Pulse Oximeter Device Location: Finger  O2 Device: None (Room air)    Estimated body mass index is 27.44 kg/m² as calculated from the following:    Height as of this encounter: 5' 2\" (1.575 m). Weight as of this encounter: 150 lb (68 kg). Intake/Output Summary (Last 24 hours) at 2/5/2023 1243  Last data filed at 2/4/2023 2355  Gross per 24 hour   Intake 50 ml   Output 300 ml   Net -250 ml         Physical Exam:     Blood pressure (!) 167/84, pulse 63, temperature 97.7 °F (36.5 °C), temperature source Oral, resp. rate 15, height 5' 2\" (1.575 m), weight 150 lb (68 kg), SpO2 95 %.     General: Well nourished. Alert awake elderly female,  Head:  Normocephalic, atraumatic  Eyes:  Sclerae appear normal.  Pupils equally round. ENT:  Nares appear normal.  Moist oral mucosa  Neck:  No restricted ROM. Trachea midline   CV:   RRR. No m/r/g. No jugular venous distension. Lungs:   CTAB. No wheezing, rhonchi, or rales. Symmetric expansion. Abdomen:   Soft, nontender, nondistended. Active bowel sounds, no organomegaly,  Extremities: No cyanosis or clubbing. No edema  Skin:     No rashes and normal coloration. Warm and dry. Neuro:  CN II-XII grossly intact. Psych:  Normal mood and affect.       I have personally reviewed labs and tests:  Recent Labs:  Recent Results (from the past 48 hour(s))   Lactic Acid    Collection Time: 02/03/23  2:05 PM   Result Value Ref Range    Lactic Acid, Plasma 1.2 0.4 - 2.0 MMOL/L   Comprehensive Metabolic Panel w/ Reflex to MG    Collection Time: 02/04/23  5:41 AM   Result Value Ref Range    Sodium 145 (H) 133 - 143 mmol/L    Potassium 3.3 (L) 3.5 - 5.1 mmol/L    Chloride 110 101 - 110 mmol/L    CO2 28 21 - 32 mmol/L    Anion Gap 7 2 - 11 mmol/L    Glucose 87 65 - 100 mg/dL    BUN 23 8 - 23 MG/DL    Creatinine 1.22 (H) 0.6 - 1.0 MG/DL    Est, Glom Filt Rate 45 (L) >60 ml/min/1.73m2    Calcium 8.4 8.3 - 10.4 MG/DL    Total Bilirubin 0.4 0.2 - 1.1 MG/DL    ALT 19 12 - 65 U/L    AST 23 15 - 37 U/L    Alk Phosphatase 67 50 - 136 U/L    Total Protein 6.2 (L) 6.3 - 8.2 g/dL    Albumin 2.8 (L) 3.2 - 4.6 g/dL    Globulin 3.4 2.8 - 4.5 g/dL    Albumin/Globulin Ratio 0.8 0.4 - 1.6     CBC with Auto Differential    Collection Time: 02/04/23  5:41 AM   Result Value Ref Range    WBC 6.3 4.3 - 11.1 K/uL    RBC 3.56 (L) 4.05 - 5.2 M/uL    Hemoglobin 10.4 (L) 11.7 - 15.4 g/dL    Hematocrit 32.9 (L) 35.8 - 46.3 %    MCV 92.4 82.0 - 102.0 FL    MCH 29.2 26.1 - 32.9 PG    MCHC 31.6 31.4 - 35.0 g/dL    RDW 13.7 11.9 - 14.6 %    Platelets 890 924 - 410 K/uL    MPV 12.0 9.4 - 12.3 FL nRBC 0.00 0.0 - 0.2 K/uL    Differential Type AUTOMATED      Seg Neutrophils 65 43 - 78 %    Lymphocytes 24 13 - 44 %    Monocytes 10 4.0 - 12.0 %    Eosinophils % 1 0.5 - 7.8 %    Basophils 1 0.0 - 2.0 %    Immature Granulocytes 0 0.0 - 5.0 %    Segs Absolute 4.1 1.7 - 8.2 K/UL    Absolute Lymph # 1.5 0.5 - 4.6 K/UL    Absolute Mono # 0.6 0.1 - 1.3 K/UL    Absolute Eos # 0.0 0.0 - 0.8 K/UL    Basophils Absolute 0.0 0.0 - 0.2 K/UL    Absolute Immature Granulocyte 0.0 0.0 - 0.5 K/UL   Magnesium    Collection Time: 02/04/23  5:41 AM   Result Value Ref Range    Magnesium 1.6 (L) 1.8 - 2.4 mg/dL   CBC with Auto Differential    Collection Time: 02/05/23  5:19 AM   Result Value Ref Range    WBC 5.9 4.3 - 11.1 K/uL    RBC 3.36 (L) 4.05 - 5.2 M/uL    Hemoglobin 9.9 (L) 11.7 - 15.4 g/dL    Hematocrit 30.8 (L) 35.8 - 46.3 %    MCV 91.7 82.0 - 102.0 FL    MCH 29.5 26.1 - 32.9 PG    MCHC 32.1 31.4 - 35.0 g/dL    RDW 13.5 11.9 - 14.6 %    Platelets 283 945 - 246 K/uL    MPV 11.5 9.4 - 12.3 FL    nRBC 0.00 0.0 - 0.2 K/uL    Differential Type AUTOMATED      Seg Neutrophils 63 43 - 78 %    Lymphocytes 23 13 - 44 %    Monocytes 11 4.0 - 12.0 %    Eosinophils % 3 0.5 - 7.8 %    Basophils 1 0.0 - 2.0 %    Immature Granulocytes 0 0.0 - 5.0 %    Segs Absolute 3.7 1.7 - 8.2 K/UL    Absolute Lymph # 1.3 0.5 - 4.6 K/UL    Absolute Mono # 0.7 0.1 - 1.3 K/UL    Absolute Eos # 0.2 0.0 - 0.8 K/UL    Basophils Absolute 0.0 0.0 - 0.2 K/UL    Absolute Immature Granulocyte 0.0 0.0 - 0.5 K/UL   Basic Metabolic Panel w/ Reflex to MG    Collection Time: 02/05/23  5:19 AM   Result Value Ref Range    Sodium 141 133 - 143 mmol/L    Potassium 3.6 3.5 - 5.1 mmol/L    Chloride 110 101 - 110 mmol/L    CO2 27 21 - 32 mmol/L    Anion Gap 4 2 - 11 mmol/L    Glucose 97 65 - 100 mg/dL    BUN 20 8 - 23 MG/DL    Creatinine 1.04 (H) 0.6 - 1.0 MG/DL    Est, Glom Filt Rate 54 (L) >60 ml/min/1.73m2    Calcium 8.8 8.3 - 10.4 MG/DL       I have personally reviewed imaging studies:  Other Studies:  No orders to display       Current Meds:  Current Facility-Administered Medications   Medication Dose Route Frequency    furosemide (LASIX) tablet 40 mg  40 mg Oral Daily    hydrALAZINE (APRESOLINE) injection 10 mg  10 mg IntraVENous Q6H PRN    atorvastatin (LIPITOR) tablet 40 mg  40 mg Oral Daily    gabapentin (NEURONTIN) capsule 300 mg  300 mg Oral BID    levothyroxine (SYNTHROID) tablet 25 mcg  25 mcg Oral QAM    magnesium oxide (MAG-OX) tablet 400 mg  400 mg Oral Daily    albuterol sulfate HFA (PROVENTIL;VENTOLIN;PROAIR) 108 (90 Base) MCG/ACT inhaler 2 puff  2 puff Inhalation Q6H PRN    fluticasone (FLOVENT HFA) 110 MCG/ACT inhaler 2 puff  2 puff Inhalation BID    sodium chloride flush 0.9 % injection 5-40 mL  5-40 mL IntraVENous 2 times per day    sodium chloride flush 0.9 % injection 5-40 mL  5-40 mL IntraVENous PRN    0.9 % sodium chloride infusion   IntraVENous PRN    ondansetron (ZOFRAN-ODT) disintegrating tablet 4 mg  4 mg Oral Q8H PRN    Or    ondansetron (ZOFRAN) injection 4 mg  4 mg IntraVENous Q6H PRN    polyethylene glycol (GLYCOLAX) packet 17 g  17 g Oral Daily PRN    acetaminophen (TYLENOL) tablet 650 mg  650 mg Oral Q6H PRN    Or    acetaminophen (TYLENOL) suppository 650 mg  650 mg Rectal Q6H PRN    magnesium sulfate 2000 mg in 50 mL IVPB premix  2,000 mg IntraVENous PRN    potassium chloride 10 mEq/100 mL IVPB (Peripheral Line)  10 mEq IntraVENous PRN    potassium chloride (KLOR-CON M) extended release tablet 40 mEq  40 mEq Oral PRN    Or    potassium bicarb-citric acid (EFFER-K) effervescent tablet 40 mEq  40 mEq Oral PRN    Or    potassium chloride 10 mEq/100 mL IVPB (Peripheral Line)  10 mEq IntraVENous PRN    pantoprazole (PROTONIX) 40 mg in sodium chloride (PF) 0.9 % 10 mL injection  40 mg IntraVENous Daily    cefTRIAXone (ROCEPHIN) 1,000 mg in sodium chloride 0.9 % 50 mL IVPB (mini-bag)  1,000 mg IntraVENous Q24H    morphine injection 2 mg  2 mg IntraVENous Q4H PRN       Signed:  Tatiana Medina MD    Part of this note may have been written by using a voice dictation software. The note has been proof read but may still contain some grammatical/other typographical errors.

## 2023-02-05 NOTE — PLAN OF CARE
Problem: Respiratory - Adult  Goal: Achieves optimal ventilation and oxygenation  Outcome: Progressing  Flowsheets (Taken 2/4/2023 2134)  Achieves optimal ventilation and oxygenation:   Assess for changes in respiratory status   Position to facilitate oxygenation and minimize respiratory effort   Respiratory therapy support as indicated   Assess for changes in mentation and behavior   Oxygen supplementation based on oxygen saturation or arterial blood gases   Encourage broncho-pulmonary hygiene including cough, deep breathe, incentive spirometry   Assess and instruct to report shortness of breath or any respiratory difficulty

## 2023-02-05 NOTE — PROGRESS NOTES
Admit Date: 2/3/2023  Hospital day 2    Subjective:     Is having multiple bowel movements. She is tolerating liquids. No nausea or vomiting. I am going to advance her diet advance diet    Scheduled Meds:   furosemide  40 mg Oral Daily    atorvastatin  40 mg Oral Daily    gabapentin  300 mg Oral BID    levothyroxine  25 mcg Oral QAM    magnesium oxide  400 mg Oral Daily    fluticasone  2 puff Inhalation BID    sodium chloride flush  5-40 mL IntraVENous 2 times per day    pantoprazole (PROTONIX) 40 mg injection  40 mg IntraVENous Daily    cefTRIAXone (ROCEPHIN) IV  1,000 mg IntraVENous Q24H     Continuous Infusions:   sodium chloride       PRN Meds:hydrALAZINE, albuterol sulfate HFA, sodium chloride flush, sodium chloride, ondansetron **OR** ondansetron, polyethylene glycol, acetaminophen **OR** acetaminophen, magnesium sulfate, potassium chloride, potassium chloride **OR** potassium alternative oral replacement **OR** potassium chloride, morphine    Review of Systems  Constitutional: negative for chills and fevers    Objective:     Patient Vitals for the past 8 hrs:   BP Temp Temp src Pulse Resp SpO2   02/05/23 1130 -- 97.7 °F (36.5 °C) Oral 63 15 95 %   02/05/23 0906 -- -- -- 80 16 96 %   02/05/23 0804 (!) 167/84 98.8 °F (37.1 °C) Oral 61 16 93 %     I/O last 3 completed shifts: In: 50 [I.V.:50]  Out: 300 [Urine:300]  No intake/output data recorded.     WDWN in NAD  Alert and oriented x 3  Eyes: PERRL, sclerae white  ENT: external inspection of ears and nose normal, oropharynx normal  Abdomen: soft, non-tender  Musculoskeletal: gait normal, digits without clubbing or cyanosis  Neurological: CN II-XII grossly normal, normal sensation and muscle strength bilaterally     Data ReviewCBC:   Lab Results   Component Value Date/Time    WBC 5.9 02/05/2023 05:19 AM    RBC 3.36 02/05/2023 05:19 AM     BMP:   Lab Results   Component Value Date/Time    GLUCOSE 97 02/05/2023 05:19 AM    CO2 27 02/05/2023 05:19 AM    BUN 20 02/05/2023 05:19 AM    CREATININE 1.04 02/05/2023 05:19 AM    CALCIUM 8.8 02/05/2023 05:19 AM       Assessment:     Principal Problem:    SBO (small bowel obstruction) ([facility])  Active Problems:    Chronic kidney disease, stage III (moderate) ([facility])    Chronic deep vein thrombosis (DVT) of right femoral vein ([facility])    Moderate persistent asthma without complication    Hypertension, essential    Gastroesophageal reflux disease with esophagitis without hemorrhage    Hypothyroidism    Idiopathic peripheral neuropathy  Resolved Problems:    * No resolved hospital problems.  *      Plan:     Small bowel obstruction improved    Advance diet    I encouraged activity and ambulation    She could be discharged home if she tolerates her diet    I will sign off but please call for any further issues    25+ minutes were spent on the care of this patient which involved interview, examination, document review, radiology review, and chart documentation  E & M code [id number]

## 2023-02-05 NOTE — PROGRESS NOTES
ACUTE PHYSICAL THERAPY GOALS:   (Developed with and agreed upon by patient and/or caregiver.)  STG:  (1.)Ms. Jina Castillo will move from supine to sit and sit to supine  with INDEPENDENCE with bed flat and no rail within 4-7 treatment day(s). (2.)Ms. Jina Castillo will transfer from bed to chair and chair to bed with MODIFIED INDEPENDENCE using the least restrictive device within 4-7 treatment day(s). (3.)Ms. Jina Castillo will ambulate with MODIFIED INDEPENDENCE for 200 feet with the least restrictive device within 4-7 treatment day(s). (4.)Ms. Jina Castillo will go up and down 4 steps with bilateral rails and SBA within 4-7 treatment days. -GOAL MET 2/5/23       PHYSICAL THERAPY: Daily Note PM   (Link to Caseload Tracking: PT Visit Days : 2  Time In/Out PT Charge Capture  Rehab Caseload Tracker  Orders    Refugbilly Carpenter is a 82 y.o. female   PRIMARY DIAGNOSIS: SBO (small bowel obstruction) ([facility] [address]  SBO (small bowel obstruction) ([facility] [address] [facility] [K56.609]       Inpatient: Payor: MEDICARE / Plan: MEDICARE PART A AND B / Product Type: *No Product type* /     ASSESSMENT:     REHAB RECOMMENDATIONS:   Recommendation to date pending progress:  Setting:  Declines HH-will be fine without     Equipment:    None     ASSESSMENT:  Ms. Jina Castillo participated well today. She has been up on her own to the bathroom. She ambulated increased distance today and practiced steps. Without assistive device, patient demonstrated some path deviations/instability. She felt more stable holding IV pole. Patient has canes at home as well as RW if necessary. She does use furniture for support when in the home. Daughter reports patient has a good set up in the home, especially in the bathroom. Hoping to d/c home tomorrow. SUBJECTIVE:   Ms. Jina Castillo agreeable.     Social/Functional Lives With: Alone (daughter lives within walking distance)  Type of Home: House  Home Layout: Able to Live on Main level with bedroom/bathroom  Home Access: Stairs to enter with rails  Entrance Stairs - Number of Steps: 4  Entrance Stairs - Rails: Both  Bathroom Shower/Tub: Walk-in shower  Bathroom Equipment: Shower chair, Grab bars around toilet  Home Equipment: deb Palma  OBJECTIVE:     PAIN: VITALS / O2: [place] / [employer] / DRAINS:   Pre Treatment:   Pain Assessment: 0-10  Pain Level: 0      Post Treatment: 0 Vitals        Oxygen  O2 Device: None (Room air) IV    RESTRICTIONS/PRECAUTIONS:  Restrictions/Precautions  Restrictions/Precautions: Fall Risk  Restrictions/Precautions: Fall Risk     MOBILITY: I Mod I S SBA CGA Min Mod Max Total  NT x2 Comments:   Bed Mobility    Rolling [] [] [] [] [] [] [] [] [] [] []    Supine to Sit [] [] [] [] [] [] [] [] [] [] []    Scooting [] [] [] [] [] [] [] [] [] [] []    Sit to Supine [] [] [] [] [] [] [] [] [] [] []    Transfers    Sit to Stand [x] [] [] [] [] [] [] [] [] [] []    Bed to Chair [] [] [] [] [] [] [] [] [] [] []    Stand to Sit [x] [] [] [] [] [] [] [] [] [] []     [] [] [] [] [] [] [] [] [] [] []    I=Independent, Mod I=Modified Independent, S=Supervision, SBA=Standby Assistance, CGA=Contact Guard Assistance,   Min=Minimal Assistance, Mod=Moderate Assistance, Max=Maximal Assistance, Total=Total Assistance, NT=Not Tested    BALANCE: Good Fair+ Fair Fair- Poor NT Comments   Sitting Static [x] [] [] [] [] []    Sitting Dynamic [x] [] [] [] [] []              Standing Static [x] [] [] [] [] []    Standing Dynamic [] [] [x] [] [] []      GAIT: I Mod I S SBA CGA Min Mod Max Total  NT x2 Comments:   Level of Assistance [] [] [x] [x] [] [] [] [] [] [] []    Distance 275 (x 2) feet    DME No device x 100'; used IV pole remaining distance    Gait Quality Decreased reinaldo , Decreased step length, and Path deviations     Weightbearing Status      Stairs Stairs/Curb  Stairs?: Yes  Stairs  # Steps : 4  Rails: Bilateral  Assistance: Stand by assistance  Comment: reciprocal pattern    I=Independent, Mod I=Modified Independent, S=Supervision, SBA=Standby Assistance, CGA=Contact Guard Assistance,   Min=Minimal Assistance, Mod=Moderate Assistance, Max=Maximal Assistance, Total=Total Assistance, NT=Not Tested    PLAN:   FREQUENCY AND DURATION: Daily for duration of hospital stay or until stated goals are met, whichever comes first.    TREATMENT:   TREATMENT:   Therapeutic Activity (25 Minutes): Therapeutic activity included Transfer Training, Ambulation on level ground, Stair Training, Sitting balance , and Standing balance to improve functional Activity tolerance, Balance, Coordination, Mobility, and Strength. TREATMENT GRID:  N/A    AFTER TREATMENT PRECAUTIONS: Bed/Chair Locked, Call light within reach, Chair, Needs within reach, and Visitors at bedside    INTERDISCIPLINARY COLLABORATION:  RN/ PCT    EDUCATION: Education Given To: Patient; Family  Education Provided: Role of Therapy;Plan of Care; Fall Prevention Strategies  Education Method: Verbal  Education Outcome: Verbalized understanding    TIME IN/OUT:  Time In: [facility]  Time Out: 1500  Minutes: Joseph Bateman, PT

## 2023-02-06 VITALS
BODY MASS INDEX: 27.6 KG/M2 | TEMPERATURE: 97.7 F | OXYGEN SATURATION: 95 % | HEART RATE: 56 BPM | RESPIRATION RATE: 16 BRPM | WEIGHT: 150 LBS | SYSTOLIC BLOOD PRESSURE: 174 MMHG | DIASTOLIC BLOOD PRESSURE: 73 MMHG | HEIGHT: 62 IN

## 2023-02-06 LAB
ANION GAP SERPL CALC-SCNC: 6 MMOL/L (ref 2–11)
BASOPHILS # BLD: 0 K/UL (ref 0–0.2)
BASOPHILS NFR BLD: 0 % (ref 0–2)
BUN SERPL-MCNC: 15 MG/DL (ref 8–23)
CALCIUM SERPL-MCNC: 8.9 MG/DL (ref 8.3–10.4)
CHLORIDE SERPL-SCNC: 106 MMOL/L (ref 101–110)
CO2 SERPL-SCNC: 28 MMOL/L (ref 21–32)
CREAT SERPL-MCNC: 1.19 MG/DL (ref 0.6–1)
DIFFERENTIAL METHOD BLD: ABNORMAL
EOSINOPHIL # BLD: 0.2 K/UL (ref 0–0.8)
EOSINOPHIL NFR BLD: 4 % (ref 0.5–7.8)
ERYTHROCYTE [DISTWIDTH] IN BLOOD BY AUTOMATED COUNT: 13.3 % (ref 11.9–14.6)
GLUCOSE SERPL-MCNC: 91 MG/DL (ref 65–100)
HCT VFR BLD AUTO: 34.7 % (ref 35.8–46.3)
HGB BLD-MCNC: 11.2 G/DL (ref 11.7–15.4)
IMM GRANULOCYTES # BLD AUTO: 0 K/UL (ref 0–0.5)
IMM GRANULOCYTES NFR BLD AUTO: 0 % (ref 0–5)
LYMPHOCYTES # BLD: 1.7 K/UL (ref 0.5–4.6)
LYMPHOCYTES NFR BLD: 32 % (ref 13–44)
MAGNESIUM SERPL-MCNC: 1.7 MG/DL (ref 1.8–2.4)
MCH RBC QN AUTO: 29.2 PG (ref 26.1–32.9)
MCHC RBC AUTO-ENTMCNC: 32.3 G/DL (ref 31.4–35)
MCV RBC AUTO: 90.4 FL (ref 82–102)
MONOCYTES # BLD: 0.6 K/UL (ref 0.1–1.3)
MONOCYTES NFR BLD: 11 % (ref 4–12)
NEUTS SEG # BLD: 2.8 K/UL (ref 1.7–8.2)
NEUTS SEG NFR BLD: 52 % (ref 43–78)
NRBC # BLD: 0 K/UL (ref 0–0.2)
PLATELET # BLD AUTO: 185 K/UL (ref 150–450)
PMV BLD AUTO: 11.6 FL (ref 9.4–12.3)
POTASSIUM SERPL-SCNC: 3.3 MMOL/L (ref 3.5–5.1)
RBC # BLD AUTO: 3.84 M/UL (ref 4.05–5.2)
SODIUM SERPL-SCNC: 140 MMOL/L (ref 133–143)
WBC # BLD AUTO: 5.4 K/UL (ref 4.3–11.1)

## 2023-02-06 PROCEDURE — 94640 AIRWAY INHALATION TREATMENT: CPT

## 2023-02-06 PROCEDURE — 6360000002 HC RX W HCPCS: Performed by: HOSPITALIST

## 2023-02-06 PROCEDURE — 36415 COLL VENOUS BLD VENIPUNCTURE: CPT

## 2023-02-06 PROCEDURE — 94760 N-INVAS EAR/PLS OXIMETRY 1: CPT

## 2023-02-06 PROCEDURE — A4216 STERILE WATER/SALINE, 10 ML: HCPCS | Performed by: HOSPITALIST

## 2023-02-06 PROCEDURE — 99231 SBSQ HOSP IP/OBS SF/LOW 25: CPT | Performed by: SURGERY

## 2023-02-06 PROCEDURE — 94761 N-INVAS EAR/PLS OXIMETRY MLT: CPT

## 2023-02-06 PROCEDURE — 85025 COMPLETE CBC W/AUTO DIFF WBC: CPT

## 2023-02-06 PROCEDURE — 83735 ASSAY OF MAGNESIUM: CPT

## 2023-02-06 PROCEDURE — 2580000003 HC RX 258: Performed by: HOSPITALIST

## 2023-02-06 PROCEDURE — C9113 INJ PANTOPRAZOLE SODIUM, VIA: HCPCS | Performed by: HOSPITALIST

## 2023-02-06 PROCEDURE — 6370000000 HC RX 637 (ALT 250 FOR IP): Performed by: HOSPITALIST

## 2023-02-06 PROCEDURE — 80048 BASIC METABOLIC PNL TOTAL CA: CPT

## 2023-02-06 RX ORDER — CEFPODOXIME PROXETIL 200 MG/1
200 TABLET, FILM COATED ORAL 2 TIMES DAILY
Qty: 14 TABLET | Refills: 0 | Status: SHIPPED | OUTPATIENT
Start: 2023-02-06 | End: 2023-02-13

## 2023-02-06 RX ORDER — POTASSIUM CHLORIDE 20 MEQ/1
40 TABLET, EXTENDED RELEASE ORAL ONCE
Status: DISCONTINUED | OUTPATIENT
Start: 2023-02-06 | End: 2023-02-06 | Stop reason: HOSPADM

## 2023-02-06 RX ORDER — SACCHAROMYCES BOULARDII 250 MG
250 CAPSULE ORAL 2 TIMES DAILY
Qty: 14 CAPSULE | Refills: 0 | Status: SHIPPED | OUTPATIENT
Start: 2023-02-06 | End: 2023-02-13

## 2023-02-06 RX ADMIN — POTASSIUM CHLORIDE 40 MEQ: 1500 TABLET, EXTENDED RELEASE ORAL at 08:58

## 2023-02-06 RX ADMIN — SODIUM CHLORIDE, PRESERVATIVE FREE 10 ML: 5 INJECTION INTRAVENOUS at 09:00

## 2023-02-06 RX ADMIN — SODIUM CHLORIDE 40 MG: 9 INJECTION, SOLUTION INTRAMUSCULAR; INTRAVENOUS; SUBCUTANEOUS at 08:58

## 2023-02-06 RX ADMIN — FLUTICASONE PROPIONATE 2 PUFF: 110 AEROSOL, METERED RESPIRATORY (INHALATION) at 07:48

## 2023-02-06 RX ADMIN — Medication 400 MG: at 08:59

## 2023-02-06 RX ADMIN — FUROSEMIDE 40 MG: 40 TABLET ORAL at 08:59

## 2023-02-06 RX ADMIN — GABAPENTIN 300 MG: 300 CAPSULE ORAL at 08:58

## 2023-02-06 RX ADMIN — ATORVASTATIN CALCIUM 40 MG: 40 TABLET, FILM COATED ORAL at 08:59

## 2023-02-06 RX ADMIN — LEVOTHYROXINE SODIUM 25 MCG: 0.05 TABLET ORAL at 05:47

## 2023-02-06 ASSESSMENT — ENCOUNTER SYMPTOMS
EYE PAIN: 0
ABDOMINAL DISTENTION: 0
CONSTIPATION: 0
DIARRHEA: 0
COUGH: 0
FACIAL SWELLING: 0
EYE ITCHING: 0
VOMITING: 1
BACK PAIN: 0
ABDOMINAL PAIN: 1
CHEST TIGHTNESS: 0
SHORTNESS OF BREATH: 0
BLOOD IN STOOL: 0
NAUSEA: 1

## 2023-02-06 ASSESSMENT — PAIN SCALES - GENERAL: PAINLEVEL_OUTOF10: 0

## 2023-02-06 NOTE — PROGRESS NOTES
Reviewed discharge instructions with patient and daughter at bedside, both verbalized understanding. Time given for questions to be asked and answers given. Peripheral IV access removed, catheter tip intact. Patient will be transported to personal vehicle in wheelchair.

## 2023-02-06 NOTE — DISCHARGE SUMMARY
Hospitalist Discharge Summary   Admit Date:  2/3/2023 12:31 PM   DC Note date: 2023  Name:  Dangelo Gentile   Age:  82 y.o. Sex:  female  :  1940   MRN:  [id number]   Room:  [facility]  PCP:  Jesus Morton MD    Presenting Complaint: No chief complaint on file. Initial Admission Diagnosis: SBO (small bowel obstruction) ([facility] [address] [K56.609]     Problem List for this Hospitalization (present on admission):    Principal Problem:    SBO (small bowel obstruction) ([address]  Active Problems:    Chronic kidney disease, stage III (moderate) (HCC)    Chronic deep vein thrombosis (DVT) of right femoral vein (HCC)    Moderate persistent asthma without complication    Hypertension, essential    Gastroesophageal reflux disease with esophagitis without hemorrhage    Hypothyroidism    Idiopathic peripheral neuropathy  Resolved Problems:    * No resolved hospital problems. [facility] Course: This is a 82 y.o. female with medical history of hypertension, moderate persistent asthma, CKD stage III, hypothyroidism, GERD presented to the ER symptoms only because of nausea vomiting or abdominal pain. CT of the abdomen and pelvis was done on admission and showed small bowel obstruction with transition point at ventral hernia in the lower abdomen. She also has urinary tract infection. General surgery was consulted. Hernia was reduced at bedside. Patient had significantly improved abdominal pain. She had bowel movement. She was started on clear liquid diet and diet was advanced as tolerated. She is tolerating regular diet without nausea vomiting or abdominal pain. General surgery recommends outpatient follow-up for hernia repair after treating her urinary tract infection. She has intermittent elevated blood pressure with systolic ranging between 268-254. Previously she had hypertension but then her blood pressure improved after losing weight.   She was tapered off of her antihypertensive medications as her blood pressure was well controlled. Her home dose of Lasix was resumed. She stated she would monitor blood pressure at home and follow up with primary care physician follow-up monitoring of blood pressure. For her urinary tract infection, she was treated with ceftriaxone while inpatient and subsequently transitioned to cefpodoxime on discharge. She is discharged home today in a stable condition to follow-up with primary physician in 3 to 5 days, general surgery in 2 weeks. Disposition: Home today  Diet: ADULT DIET; Regular  Code Status: DNR    Follow Ups:   Follow-up Information     Jai Delcid MD. Schedule an appointment as soon as   possible for a visit on 2/21/2023. Specialty: General Surgery  Why: Apt time - 8:50 am  Contact information:  [facility] [address]  [address] [address] [address]             Marguerite Hoang MD. Schedule an appointment as soon as possible for a visit   in 1 week(s). Specialty: Internal Medicine  Contact information:  [address] [place] [address]  [address]  [phone]                       Time spent in patient discharge and coordination 41 minutes. Follow up labs/diagnostics (ultimately defer to outpatient provider):  CBC, BMP in 3 to 5 days. Monitor blood pressure and adjust antihypertensive medications. Plan was discussed with the patient. All questions answered. Patient was stable at time of discharge. Instructions given to call a physician or return if any concerns.     Current Discharge Medication List        START taking these medications    Details   cefpodoxime (VANTIN) 200 MG tablet Take 1 tablet by mouth 2 times daily for 7 days  Qty: 14 tablet, Refills: 0      saccharomyces boulardii (FLORASTOR) 250 MG capsule Take 1 capsule by mouth 2 times daily for 7 days  Qty: 14 capsule, Refills: 0           CONTINUE these medications which have NOT CHANGED    Details   calcium carbonate (OSCAL) 500 MG TABS tablet Take 500 mg by mouth 2 times daily      atorvastatin (LIPITOR) 40 MG tablet TAKE 1 TABLET BY MOUTH DAILY  Qty: 90 tablet, Refills: 3      levothyroxine (SYNTHROID) 25 MCG tablet TAKE 1 TABLET BY MOUTH EVERY MORNING. Qty: 90 tablet, Refills: 3      potassium chloride (KLOR-CON M) 20 MEQ extended release tablet TAKE 1 TABLET BY MOUTH TWICE A DAY  Qty: 180 tablet, Refills: 1      gabapentin (NEURONTIN) 300 MG capsule Take one capsule by mouth three times a day  Qty: 270 capsule, Refills: 2    Associated Diagnoses: Chronic left-sided low back pain with left-sided sciatica; Idiopathic peripheral neuropathy      albuterol sulfate  (90 Base) MCG/ACT inhaler Inhale 2 puffs into the lungs every 6 hours as needed      fluticasone (FLOVENT HFA) 220 MCG/ACT inhaler Inhale 2 puffs into the lungs 2 times daily      furosemide (LASIX) 40 MG tablet Take 40 mg by mouth daily      magnesium oxide (MAG-OX) 400 MG tablet Take 400 mg by mouth daily      omeprazole (PRILOSEC OTC) 20 MG tablet Take 20 mg by mouth daily      ondansetron (ZOFRAN-ODT) 4 MG disintegrating tablet Take 4 mg by mouth every 8 hours as needed      rivaroxaban (XARELTO) 10 MG TABS tablet Take by mouth Daily with supper             Some medications may have been reported old/obsolete and marked \"stop taking\" by the system; in reality pt was already off these meds; defer to outpatient or prescribing providers. Procedures done this admission:  * No surgery found *    Consults this admission:  IP CONSULT TO GENERAL SURGERY    Echocardiogram results:  No results found for this or any previous visit. Diagnostic Imaging/Tests:   CT ABDOMEN PELVIS W IV CONTRAST Additional Contrast? None    Result Date: 2/3/2023  1. Acute small bowel obstruction with the obstruction point it currently at a ventral hernia in the lower central abdomen. 2. Marked wall thickening to the bladder. Acute cystitis should be considered.  3. 4 cm simple left renal cyst.        Labs: Results:       BMP, Mg, Phos Recent Labs     02/04/23  0541 02/05/23  0519 02/06/23  0630   * 141 140   K 3.3* 3.6 3.3*    110 106   CO2 28 27 28   ANIONGAP 7 4 6   BUN 23 20 15   CREATININE 1.22* 1.04* 1.19*   LABGLOM 45* 54* 46*   CALCIUM 8.4 8.8 8.9   GLUCOSE 87 97 91   MG 1.6*  --  1.7*      CBC Recent Labs     02/04/23  0541 02/05/23  0519 02/06/23  0630   WBC 6.3 5.9 5.4   RBC 3.56* 3.36* 3.84*   HGB 10.4* 9.9* 11.2*   HCT 32.9* 30.8* 34.7*   MCV 92.4 91.7 90.4   MCH 29.2 29.5 29.2   MCHC 31.6 32.1 32.3   RDW 13.7 13.5 13.3    168 185   MPV 12.0 11.5 11.6   NRBC 0.00 0.00 0.00   SEGS 65 63 52   LYMPHOPCT 24 23 32   EOSRELPCT 1 3 4   MONOPCT 10 11 11   BASOPCT 1 1 0   IMMGRAN 0 0 0   SEGSABS 4.1 3.7 2.8   LYMPHSABS 1.5 1.3 1.7   EOSABS 0.0 0.2 0.2   MONOSABS 0.6 0.7 0.6   BASOSABS 0.0 0.0 0.0   ABSIMMGRAN 0.0 0.0 0.0      LFT Recent Labs     02/04/23  0541   BILITOT 0.4   ALKPHOS 67   AST 23   ALT 19   PROT 6.2*   LABALBU 2.8*   GLOB 3.4      Cardiac  No results found for: NTPROBNP, TROPHS   Coags No results found for: PROTIME, INR, APTT   A1c No results found for: LABA1C, EAG   Lipids Lab Results   Component Value Date/Time    CHOL 156 08/09/2022 08:19 AM    LDLCALC 72 08/09/2022 08:19 AM    LABVLDL 24 08/09/2022 08:19 AM    HDL 60 08/09/2022 08:19 AM    CHOLHDLRATIO 2.6 08/09/2022 08:19 AM    TRIG 120 08/09/2022 08:19 AM      Thyroid  No results found for: [place]     Most Recent UA Lab Results   Component Value Date/Time    COLORU YELLOW 02/03/2023 10:09 AM    APPEARANCE CLOUDY 02/03/2023 10:09 AM    SPECGRAV 1.020 02/03/2023 10:09 AM    LABPH 7.5 02/03/2023 10:09 AM    PROTEINU 100 02/03/2023 10:09 AM    GLUCOSEU Negative 02/03/2023 10:09 AM    KETUA TRACE 02/03/2023 10:09 AM    BILIRUBINUR Negative 02/03/2023 10:09 AM    BLOODU SMALL 02/03/2023 10:09 AM    UROBILINOGEN 0.2 02/03/2023 10:09 AM    NITRU Negative 02/03/2023 10:09 AM    LEUKOCYTESUR LARGE 02/03/2023 10:09 AM    WBCUA >100 02/03/2023 10:09 AM    RBCUA 3-5 02/03/2023 10:09 AM    EPITHUA 0-3 02/03/2023 10:09 AM    BACTERIA 4+ 02/03/2023 10:09 AM    LABCAST 0 02/03/2023 10:09 AM    MUCUS 0 02/03/2023 10:09 AM        Recent Labs     02/04/23  1932   CULTURE NO GROWTH 1 DAY       All Labs from Last 24 Hrs:  Recent Results (from the past 24 hour(s))   CBC with Auto Differential    Collection Time: 02/06/23  6:30 AM   Result Value Ref Range    WBC 5.4 4.3 - 11.1 K/uL    RBC 3.84 (L) 4.05 - 5.2 M/uL    Hemoglobin 11.2 (L) 11.7 - 15.4 g/dL    Hematocrit 34.7 (L) 35.8 - 46.3 %    MCV 90.4 82.0 - 102.0 FL    MCH 29.2 26.1 - 32.9 PG    MCHC 32.3 31.4 - 35.0 g/dL    RDW 13.3 11.9 - 14.6 %    Platelets 344 305 - 331 K/uL    MPV 11.6 9.4 - 12.3 FL    nRBC 0.00 0.0 - 0.2 K/uL    Differential Type AUTOMATED      Seg Neutrophils 52 43 - 78 %    Lymphocytes 32 13 - 44 %    Monocytes 11 4.0 - 12.0 %    Eosinophils % 4 0.5 - 7.8 %    Basophils 0 0.0 - 2.0 %    Immature Granulocytes 0 0.0 - 5.0 %    Segs Absolute 2.8 1.7 - 8.2 K/UL    Absolute Lymph # 1.7 0.5 - 4.6 K/UL    Absolute Mono # 0.6 0.1 - 1.3 K/UL    Absolute Eos # 0.2 0.0 - 0.8 K/UL    Basophils Absolute 0.0 0.0 - 0.2 K/UL    Absolute Immature Granulocyte 0.0 0.0 - 0.5 K/UL   Basic Metabolic Panel w/ Reflex to MG    Collection Time: 02/06/23  6:30 AM   Result Value Ref Range    Sodium 140 133 - 143 mmol/L    Potassium 3.3 (L) 3.5 - 5.1 mmol/L    Chloride 106 101 - 110 mmol/L    CO2 28 21 - 32 mmol/L    Anion Gap 6 2 - 11 mmol/L    Glucose 91 65 - 100 mg/dL    BUN 15 8 - 23 MG/DL    Creatinine 1.19 (H) 0.6 - 1.0 MG/DL    Est, Glom Filt Rate 46 (L) >60 ml/min/1.73m2    Calcium 8.9 8.3 - 10.4 MG/DL   Magnesium    Collection Time: 02/06/23  6:30 AM   Result Value Ref Range    Magnesium 1.7 (L) 1.8 - 2.4 mg/dL       Allergies   Allergen Reactions    Amoxicillin Rash     Classic drug rash    Pneumococcal Vaccines Other (See Comments)     fever  Other reaction(s):  Other (comments)  fever  Other reaction(s): Other (comments)  fever       Immunization History   Administered Date(s) Administered    COVID-19, MODERNA BLUE border, Primary or Immunocompromised, (age 12y+), IM, 100 mcg/0.5mL 01/13/2021, 02/03/2021    Influenza Trivalent 10/10/2013, 10/09/2014    Influenza Virus Vaccine 10/29/2012, 10/10/2013, 10/09/2014, 10/26/2015, 09/14/2018    Influenza, FLUARIX, FLULAVAL, Hema  (age 10 mo+) AND AFLURIA, (age 1 y+), PF, 0.5mL 09/14/2018    Influenza, High Dose (Fluzone 65 yrs and older) 09/08/2016, 11/28/2017, 10/01/2019    Influenza, Trivalent PF 10/26/2015    PPD Test 10/12/2018, 12/28/2020    Pneumococcal Polysaccharide ([id number]) 06/28/2007, 08/27/2007    Tdap (Boostrix, Adacel) 04/12/2012, 04/12/2012, 10/29/2012, 10/29/2012    Zoster Live (Zostavax) 10/04/2011, 10/04/2011       Recent Vital Data:  Patient Vitals for the past 24 hrs:   Temp Pulse Resp BP SpO2   02/06/23 0748 -- 56 16 -- 95 %   02/06/23 0734 97.7 °F (36.5 °C) 56 16 (!) 174/73 93 %   02/06/23 0249 97.9 °F (36.6 °C) 62 18 (!) 158/97 95 %   02/05/23 2338 98 °F (36.7 °C) 66 18 (!) 164/80 92 %   02/05/23 2156 -- 72 18 -- 95 %   02/05/23 2155 -- 72 18 -- 95 %   02/05/23 1921 98.1 °F (36.7 °C) 71 16 (!) 157/66 95 %   02/05/23 1604 97.3 °F (36.3 °C) 66 16 (!) 147/67 95 %   02/05/23 1130 97.7 °F (36.5 °C) 63 15 (!) 155/72 95 %       Oxygen Therapy  SpO2: 95 %  Pulse Oximeter Device Mode: Intermittent  Pulse Oximeter Device Location: Finger  O2 Device: None (Room air)    Estimated body mass index is 27.44 kg/m² as calculated from the following:    Height as of this encounter: 5' 2\" (1.575 m). Weight as of this encounter: 150 lb (68 kg). No intake or output data in the 24 hours ending 02/06/23 1038      Physical Exam:    General:    Well nourished. Alert awake elderly male, no overt distress  Head:  Normocephalic, atraumatic  Eyes:  Sclerae appear normal.  Pupils equally round. HENT:  Nares appear normal, no drainage.   Moist mucous membranes  Neck:  No restricted ROM. Trachea midline  CV:   RRR. No m/r/g. No JVD  Lungs:   CTAB. No wheezing, rhonchi, or rales. Respirations even, unlabored  Abdomen:   Soft, nontender, nondistended. Umbilical hernia reduced, active bowel sounds, no organomegaly. Extremities: Warm and dry. No cyanosis or clubbing. No edema. Skin:     No rashes. Normal coloration  Neuro:  CN II-XII grossly intact. Psych:  Normal mood and affect. Signed:  Rik Govea MD    Part of this note may have been written by using a voice dictation software. The note has been proof read but may still contain some grammatical/other typographical errors.

## 2023-02-06 NOTE — CARE COORDINATION
02/06/23 1058   Service Assessment   Patient Orientation Alert and [address] [facility]   Prior Functional Level Independent in ADLs/IADLs   Current Functional Level Independent in ADLs/IADLs   Can patient return to prior living arrangement Yes   Financial Resources Medicare   Community Resources None   Social/Functional History   Lives With Alone   Type of Home House   Bathroom Equipment Shower chair;Grab bars around toilet   ADL Assistance Independent   Mode of Transportation Car   Discharge Planning   Type of [address] [place] [address] Prior To Admission None   Potential Assistance Needed N/A  (Per chart review, the patient declined home health services.)   DME Ordered? No   Potential Assistance Purchasing Medications No   Type of Home Care Services None   Patient expects to be discharged to: [address] Discharge   Transition of Care Consult (CM Consult) N/A   Services At/After Discharge None     RN CM attempted to meet with the patient but she had already discharged from the hospital. Discharge planning was discussed with the Interdisciplinary Team. No case management needs were identified. Per therapy services, the patient declined home health.

## 2023-02-06 NOTE — PLAN OF CARE
Problem: Discharge Planning  Goal: Discharge to home or other facility with appropriate resources  Outcome: Progressing     Problem: ABCDS Injury Assessment  Goal: Absence of physical injury  Outcome: Progressing     Problem: Safety - Adult  Goal: Free from fall injury  Outcome: Progressing     Problem: Respiratory - Adult  Goal: Achieves optimal ventilation and oxygenation  Outcome: Progressing     Problem: Pain  Goal: Verbalizes/displays adequate comfort level or baseline comfort level  Outcome: Progressing

## 2023-02-06 NOTE — PLAN OF CARE
Problem: Respiratory - Adult  Goal: Achieves optimal ventilation and oxygenation  2/5/2023 2157 by Citlali Archibald RCP  Outcome: Progressing  Flowsheets (Taken 2/5/2023 2157)  Achieves optimal ventilation and oxygenation:   Assess for changes in respiratory status   Position to facilitate oxygenation and minimize respiratory effort   Respiratory therapy support as indicated   Assess for changes in mentation and behavior   Oxygen supplementation based on oxygen saturation or arterial blood gases   Encourage broncho-pulmonary hygiene including cough, deep breathe, incentive spirometry   Assess and instruct to report shortness of breath or any respiratory difficulty  2/5/2023 2016 by Akilah Gutierrez RN  Outcome: Progressing

## 2023-02-06 NOTE — PROGRESS NOTES
General Surgery Progress Note    2/6/2023    Admit Date: 2/3/2023    Chief complaint: Abdominal Pain        HPI: Victorina Lewis is a 82 y.o. female who we are asked by Hospitalist MD  to see for Abdominal Pain. Patient has past medical history of  hypertension, moderate persistent asthma, CKD stage III, hypothyroidism, GERD, history of DVT's Bilateral(2020)  and colon polyps (2016). Patient presented to ER at [facility] 2/3/2023 with abdominal pain, nausea and vomiting. Patient reports she began having generalized abdominal pain 2/1/2023 after eating dinner and then she experienced nausea with vomiting. She reports she is unable to tolerate any food or liquids. Abdominal pain has increased daily and became severe this morning. Her abdominal pain is currently 5 on scale 1 to 10. She states she is not able to pass flatus. Last bowel movement was yesterday morning. No prior constipation or diarrhea. She has some hiccups. She reports she has had some chills but no documented fevers. Patient  denies chest pain or dyspnea, no cough, no cardiac palpitations or back pain. She does reports mild dysuria with urination. She reports she has had an umbilical/Ventral  hernia since 2012 and states it has become larger and painful  over the past 2 to 3 years. Patient reports she has history of colon polyps with last colonoscopy done in 2016 and has not had repeat colonoscopy done as instructed . She reports she had history of DVT's Bilateral lower extremities in 2020 after having cellulitis  and has been on Xarelto with last dose taken on Wednesday (2/1/2023). ED workup done 2/3/2023 which includes      CT ABDOMEN PELVIS W IV CONTRAST   Result Date: 2/3/2023  CT OF THE ABDOMEN AND PELVIS WITH CONTRAST.  CLINICAL INDICATION: Left lower quadrant abdominal pain with nausea and vomiting PROCEDURE: Serial thin section axial images obtained from the lung bases through the proximal femurs following the administration of 100 cc of Isovue 370 intravenous contrast.  Radiation dose reduction techniques were used for this study. Our CT scanners use one or all of the following: Automated exposure control, adjusted of the mA and/or kV according to patient size, iterative reconstruction COMPARISON: CT abdomen and pelvis dated 12/18/2020 FINDINGS: CT ABDOMEN: A ventral hernia is appreciated in the lower central abdomen that contains a loop of obstructed small bowel with dilated fluid-filled loops of more proximal small bowel that measure up to 2.6 cm. The distal small bowel loops are decompressed. The liver, spleen, stomach, and pancreas are normal. The kidneys are symmetric in size and contrast enhancement. There is a 4 cm simple left renal cyst. The gallbladder is normal. The adrenal glands are normal. There is no ascites or adenopathy. CT PELVIS: The bladder is incompletely distended but does show thickened walls with irregularity. Cystitis is a consideration. The sigmoid colon is entirely decompressed. There is no inguinal hernia or adenopathy. No aggressive bone lesions identified. 1. Acute small bowel obstruction with the obstruction point  currently at a ventral hernia in the lower central abdomen. 2. Marked wall thickening to the bladder. Acute cystitis should be considered. 3. 4 cm simple left renal cyst.           Subjective:     Patient resting in bed. Patient reports abdominal pain has improved and she is tolerating a regular diet with no nausea or vomiting. Patient reports she is having bowel movements and passing flatus. No surgical intervention needed at this time. On admission, patient had ventral hernia reduced per Dr. Raeann Gonzalez at bedside. Patient has been instructed to wear abdominal binder for support. Patient is currently on IV antibiotic per admitting team for UTI.         Objective:     BP (!) 174/73   Pulse 56   Temp 97.7 °F (36.5 °C) (Oral)   Resp 16   Ht 5' 2\" (1.575 m)   Wt 150 lb (68 kg)   SpO2 93%   BMI 27.44 kg/m²     No intake or output data in the 24 hours ending 02/06/23 0746     Physical exam:    General:          Well nourished. Alert awake elderly female,  Head:               Normocephalic, atraumatic  Eyes:               Sclerae appear normal.  Pupils equally round. ENT:                Nares appear normal.  Moist oral mucosa  Neck:               No restricted ROM. Trachea midline   CV:                  RRR. No m/r/g. No jugular venous distension. Lungs:             CTAB. No wheezing, rhonchi, or rales. Symmetric expansion. Abdomen:        Soft, nontender, nondistended. Active bowel sounds, no organomegaly,   Extremities:     No cyanosis or clubbing. No edema  Skin:                No rashes and normal coloration. Warm and dry. Neuro:             CN II-XII grossly intact. Psych:             Normal mood and affect.        Data Review    Recent Results (from the past 24 hour(s))   CBC with Auto Differential    Collection Time: 02/06/23  6:30 AM   Result Value Ref Range    WBC 5.4 4.3 - 11.1 K/uL    RBC 3.84 (L) 4.05 - 5.2 M/uL    Hemoglobin 11.2 (L) 11.7 - 15.4 g/dL    Hematocrit 34.7 (L) 35.8 - 46.3 %    MCV 90.4 82.0 - 102.0 FL    MCH 29.2 26.1 - 32.9 PG    MCHC 32.3 31.4 - 35.0 g/dL    RDW 13.3 11.9 - 14.6 %    Platelets 680 505 - 632 K/uL    MPV 11.6 9.4 - 12.3 FL    nRBC 0.00 0.0 - 0.2 K/uL    Differential Type AUTOMATED      Seg Neutrophils 52 43 - 78 %    Lymphocytes 32 13 - 44 %    Monocytes 11 4.0 - 12.0 %    Eosinophils % 4 0.5 - 7.8 %    Basophils 0 0.0 - 2.0 %    Immature Granulocytes 0 0.0 - 5.0 %    Segs Absolute 2.8 1.7 - 8.2 K/UL    Absolute Lymph # 1.7 0.5 - 4.6 K/UL    Absolute Mono # 0.6 0.1 - 1.3 K/UL    Absolute Eos # 0.2 0.0 - 0.8 K/UL    Basophils Absolute 0.0 0.0 - 0.2 K/UL    Absolute Immature Granulocyte 0.0 0.0 - 0.5 K/UL   Basic Metabolic Panel w/ Reflex to MG    Collection Time: 02/06/23  6:30 AM   Result Value Ref Range    Sodium 140 133 - 143 mmol/L    Potassium 3.3 (L) 3.5 - 5.1 mmol/L    Chloride 106 101 - 110 mmol/L    CO2 28 21 - 32 mmol/L    Anion Gap 6 2 - 11 mmol/L    Glucose 91 65 - 100 mg/dL    BUN 15 8 - 23 MG/DL    Creatinine 1.19 (H) 0.6 - 1.0 MG/DL    Est, Glom Filt Rate 46 (L) >60 ml/min/1.73m2    Calcium 8.9 8.3 - 10.4 MG/DL      Assessment:  Principal Problem:    SBO (small bowel obstruction) ([facility])  Active Problems:    Chronic kidney disease, stage III (moderate) ([facility])    Chronic deep vein thrombosis (DVT) of right femoral vein ([facility])    Moderate persistent asthma without complication    Hypertension, essential    Gastroesophageal reflux disease with esophagitis without hemorrhage    Hypothyroidism    Idiopathic peripheral neuropathy  Resolved Problems:    * No resolved hospital problems.  *        Plan:   -Further plan of care per Dr. [place] Dodson  -Surgical team to sign off as no surgical intervention needed at this time    Betsy Waller FN-BC

## 2023-02-06 NOTE — PROGRESS NOTES
Viridiana Love MD   Bariatric & Advanced Laparoscopic Surgery & Endoscopy  [address] [facility] [address], [address] [facility]  Phone [phone]   Fax [phone]      Date of visit: 2023      Primary/Requesting provider: Claudine Brooks MD         Name: Pieter Moore      MRN: [id number]       : 1940       Age: 82 y.o. Sex: female        PCP: Claudine Brooks MD     CC:  No chief complaint on file. HPI:    Pieter Moore is a 82 y.o. female who presented to the ED with 2 day history of abdominal pain. She reports pain was worse with pressure and better with nothing. She has associated nausea and vomiting. She reports having a bulge near her umbilicus for many years but recently started hurting. She does not know if the bulge was reducible before. Previous abdominal surgeries - vaginal hysterectomy and kidney surgery  Last colonoscopy -     Blood thinners - Xarelto - off since Wed  Immunosuppressants - none    2023 - Doing well today. Having bowel movements regularly. Tolerating diet. Denies presence of bulge over the weekend. PMH:    Past Medical History:   Diagnosis Date    Acute pancreatitis 10/12/2018    Asthma     Calculus of kidney     Colon polyps     Female stress incontinence 2012    GERD (gastroesophageal reflux disease)     History of DVT (deep vein thrombosis)     2021    History of gastric ulcer     2018    History of kidney stones 2012    History of septic arthritis 09/10/2019    Right knee.   Improved with prolonged IV anitbiotics    Hypercholesterolemia     Hypertension     Hypothyroidism     Ophthalmic migraine     Osteopenia of neck of right femur 2015    Primary insomnia 2017    Raynaud's phenomenon 2015    Recurrent cellulitis of lower extremity 2020    S/P total knee arthroplasty 2012    [id number]     Umbilical hernia 5/10/8016    Venous insufficiency of both lower extremities 2014 Worse on left        PSH:    Past Surgical History:   Procedure Laterality Date    COLONOSCOPY  Jan 2013    4 polyps, repeat 5 years    COLONOSCOPY  7/5/2016    repeat 5 yrs tubular adenoma    HYSTERECTOMY, TOTAL ABDOMINAL (CERVIX REMOVED)      REMOVAL OF KIDNEY STONE      SALPINGO-OOPHORECTOMY      TOTAL KNEE ARTHROPLASTY Bilateral     partial       MEDS:    Current Facility-Administered Medications   Medication Dose Route Frequency Provider Last Rate Last Admin    furosemide (LASIX) tablet 40 mg  40 mg Oral Daily Leandro Pinzon MD   40 mg at 02/05/23 0953    hydrALAZINE (APRESOLINE) injection 10 mg  10 mg IntraVENous Q6H PRN Leandro Pinzon MD        atorvastatin (LIPITOR) tablet 40 mg  40 mg Oral Daily Leandro Pinzon MD   40 mg at 02/05/23 0915    gabapentin (NEURONTIN) capsule 300 mg  300 mg Oral BID Leandro Pinzon MD   300 mg at 02/05/23 2040    levothyroxine (SYNTHROID) tablet 25 mcg  25 mcg Oral QAM Leandro Pinzon MD   25 mcg at 02/06/23 0547    magnesium oxide (MAG-OX) tablet 400 mg  400 mg Oral Daily Leandro Pinzon MD   400 mg at 02/05/23 0915    albuterol sulfate HFA (PROVENTIL;VENTOLIN;PROAIR) 108 (90 Base) MCG/ACT inhaler 2 puff  2 puff Inhalation Q6H PRN Leandro Pinzon MD        fluticasone (FLOVENT HFA) 110 MCG/ACT inhaler 2 puff  2 puff Inhalation BID Socrates Sweet Ala, MD   2 puff at 02/06/23 0748    sodium chloride flush 0.9 % injection 5-40 mL  5-40 mL IntraVENous 2 times per day Leandro Pinzon MD   5 mL at 02/05/23 2041    sodium chloride flush 0.9 % injection 5-40 mL  5-40 mL IntraVENous PRN Leandro Pinzon MD        0.9 % sodium chloride infusion   IntraVENous PRN Leandro Pinzon MD        ondansetron (ZOFRAN-ODT) disintegrating tablet 4 mg  4 mg Oral Q8H PRN Socrates Sweet Ala, MD        Or    ondansetron (ZOFRAN) injection 4 mg  4 mg IntraVENous Q6H PRN Leandro Pinzon MD        polyethylene glycol (GLYCOLAX) packet 17 g  17 g Oral Daily PRN Leandro Pinzon MD        acetaminophen (TYLENOL) tablet 650 mg  650 mg Oral Q6H PRN Madyson Lane MD Or    acetaminophen (TYLENOL) suppository 650 mg  650 mg Rectal Q6H PRN Henry Hair Ala, MD        magnesium sulfate 2000 mg in 50 mL IVPB premix  2,000 mg IntraVENous PRN Henry Hair Ala, MD   Stopped at 02/04/23 1126    potassium chloride 10 mEq/100 mL IVPB (Peripheral Line)  10 mEq IntraVENous PRN Henry Hair Ala,  mL/hr at 02/04/23 1924 10 mEq at 02/04/23 1924    potassium chloride (KLOR-CON M) extended release tablet 40 mEq  40 mEq Oral PRN Henry Hair Ala, MD        Or    potassium bicarb-citric acid (EFFER-K) effervescent tablet 40 mEq  40 mEq Oral PRN Henry Hair Ala, MD        Or    potassium chloride 10 mEq/100 mL IVPB (Peripheral Line)  10 mEq IntraVENous PRN Henry Hair Ala, MD        pantoprazole (PROTONIX) 40 mg in sodium chloride (PF) 0.9 % 10 mL injection  40 mg IntraVENous Daily Leandro Pinzon MD   40 mg at 02/05/23 0915    cefTRIAXone (ROCEPHIN) 1,000 mg in sodium chloride 0.9 % 50 mL IVPB (mini-bag)  1,000 mg IntraVENous Q24H Leandro Pinzon MD   Stopped at 02/05/23 1525    morphine injection 2 mg  2 mg IntraVENous Q4H PRN Leandro Pinzon MD             ALLERGIES:      Allergies   Allergen Reactions    Amoxicillin Rash     Classic drug rash    Pneumococcal Vaccines Other (See Comments)     fever  Other reaction(s): Other (comments)  fever  Other reaction(s):  Other (comments)  fever         SH:    Social History     Tobacco Use    Smoking status: Never    Smokeless tobacco: Never   Substance Use Topics    Alcohol use: No    Drug use: No       FH:    Family History   Problem Relation Age of Onset    High Cholesterol Brother     Hypertension Brother     High Cholesterol Brother     Hypertension Brother     Heart Attack Brother     Heart Attack Paternal Grandmother     Heart Attack Paternal Grandfather     Breast Cancer Neg Hx     Hypertension Mother     Rheum Arthritis Mother     High Cholesterol Mother     Broken Bones Mother     Heart Failure Father     Heart Attack Father     Osteoarthritis Brother         Knee       Review of systems:  Review of Systems   Constitutional:  Negative for chills, fatigue, fever and unexpected weight change. HENT:  Negative for ear discharge, ear pain and facial swelling. Eyes:  Negative for pain and itching. Respiratory:  Negative for cough, chest tightness and shortness of breath. Cardiovascular:  Negative for chest pain. Gastrointestinal:  Positive for abdominal pain, nausea and vomiting. Negative for abdominal distention, blood in stool, constipation and diarrhea. Genitourinary:  Negative for difficulty urinating, dysuria and hematuria. Musculoskeletal:  Negative for back pain, gait problem and neck pain. Skin:  Negative for rash and wound. Neurological:  Negative for facial asymmetry, speech difficulty and weakness. Hematological:  Does not bruise/bleed easily. Psychiatric/Behavioral:  Negative for agitation, decreased concentration and sleep disturbance. Physical Exam:     BP (!) 174/73   Pulse 56   Temp 97.7 °F (36.5 °C) (Oral)   Resp 16   Ht 5' 2\" (1.575 m)   Wt 150 lb (68 kg)   SpO2 95%   BMI 27.44 kg/m²     General:  Well-developed, well-nourished, no distress. Psych:  Cooperative, good insight and judgement. Neuro:  Alert, oriented to person, place and time. HEENT:  Normocephalic, atraumatic. Sclera clear. Lungs:  Unlabored breathing. Symmetrical chest expansion. Chest wall:  No tenderness or deformity. Heart:  Regular rate and rhythm. No JVD. Abdomen:  Soft, non-tender, non-distended. Hernia currently educed. Extremities:  Extremities normal, atraumatic, no cyanosis or edema. Skin:  Skin color, texture, turgor normal. No rashes. Labs: All recent labs were reviewed. Normal WBC. Elevated Cr.      Recent Results (from the past 24 hour(s))   CBC with Auto Differential    Collection Time: 02/06/23  6:30 AM   Result Value Ref Range    WBC 5.4 4.3 - 11.1 K/uL    RBC 3.84 (L) 4.05 - 5.2 M/uL    Hemoglobin 11.2 (L) 11.7 - 15.4 g/dL    Hematocrit 34.7 (L) 35.8 - 46.3 %    MCV 90.4 82.0 - 102.0 FL    MCH 29.2 26.1 - 32.9 PG    MCHC 32.3 31.4 - 35.0 g/dL    RDW 13.3 11.9 - 14.6 %    Platelets 394 320 - 835 K/uL    MPV 11.6 9.4 - 12.3 FL    nRBC 0.00 0.0 - 0.2 K/uL    Differential Type AUTOMATED      Seg Neutrophils 52 43 - 78 %    Lymphocytes 32 13 - 44 %    Monocytes 11 4.0 - 12.0 %    Eosinophils % 4 0.5 - 7.8 %    Basophils 0 0.0 - 2.0 %    Immature Granulocytes 0 0.0 - 5.0 %    Segs Absolute 2.8 1.7 - 8.2 K/UL    Absolute Lymph # 1.7 0.5 - 4.6 K/UL    Absolute Mono # 0.6 0.1 - 1.3 K/UL    Absolute Eos # 0.2 0.0 - 0.8 K/UL    Basophils Absolute 0.0 0.0 - 0.2 K/UL    Absolute Immature Granulocyte 0.0 0.0 - 0.5 K/UL   Basic Metabolic Panel w/ Reflex to MG    Collection Time: 02/06/23  6:30 AM   Result Value Ref Range    Sodium 140 133 - 143 mmol/L    Potassium 3.3 (L) 3.5 - 5.1 mmol/L    Chloride 106 101 - 110 mmol/L    CO2 28 21 - 32 mmol/L    Anion Gap 6 2 - 11 mmol/L    Glucose 91 65 - 100 mg/dL    BUN 15 8 - 23 MG/DL    Creatinine 1.19 (H) 0.6 - 1.0 MG/DL    Est, Glom Filt Rate 46 (L) >60 ml/min/1.73m2    Calcium 8.9 8.3 - 10.4 MG/DL   Magnesium    Collection Time: 02/06/23  6:30 AM   Result Value Ref Range    Magnesium 1.7 (L) 1.8 - 2.4 mg/dL       Imaging: CT images were independently reviewed by me. Ventral hernia with loop of bowel concerning for SBO. CT ABDOMEN PELVIS W IV CONTRAST Additional Contrast? None  Narrative: CT OF THE ABDOMEN AND PELVIS WITH CONTRAST. CLINICAL INDICATION: Left lower quadrant abdominal pain with nausea and vomiting    PROCEDURE: Serial thin section axial images obtained from the lung bases through  the proximal femurs following the administration of 100 cc of Isovue 370  intravenous contrast.  Radiation dose reduction techniques were used for this  study.  Our CT scanners use one or all of the following: Automated exposure  control, adjusted of the mA and/or kV according to patient size, iterative  reconstruction    COMPARISON: CT abdomen and pelvis dated 12/18/2020    FINDINGS:     CT ABDOMEN: A ventral hernia is appreciated in the lower central abdomen that  contains a loop of obstructed small bowel with dilated fluid-filled loops of  more proximal small bowel that measure up to 2.6 cm. The distal small bowel  loops are decompressed. The liver, spleen, stomach, and pancreas are normal. The kidneys are symmetric  in size and contrast enhancement. There is a 4 cm simple left renal cyst. The  gallbladder is normal. The adrenal glands are normal. There is no ascites or  adenopathy. CT PELVIS: The bladder is incompletely distended but does show thickened walls  with irregularity. Cystitis is a consideration. The sigmoid colon is entirely  decompressed. There is no inguinal hernia or adenopathy. No aggressive bone lesions identified. Impression: 1. Acute small bowel obstruction with the obstruction point it currently at a  ventral hernia in the lower central abdomen. 2. Marked wall thickening to the bladder. Acute cystitis should be considered. 3. 4 cm simple left renal cyst.           Assessment/Plan:  Mary Chen is a 82 y.o. female who has signs and symptoms consistent with incarcerated ventral hernia with SBO. She was also found to have a UTI. Hernia continues to be reduced. Her hernia repair will require mesh placement and with active infection, she will be at risk of mesh infection. We discussed treating the infection and doing the hernia repair in the next few weeks once infection is cleared to decrease risk of mesh infection. FU with me in 10 days.          Signed: Devyn Dumas MD  Bariatric & Minimally Invasive Surgery  2/6/2023 7:52 AM

## 2023-02-06 NOTE — CARE COORDINATION
Discharge planning was discussed with the Interdisciplinary Team. Per the interdisciplinary team, the patient declined home health services. She is discharging home today.

## 2023-02-07 ENCOUNTER — CARE COORDINATION (OUTPATIENT)
Dept: CARE COORDINATION | Facility: CLINIC | Age: 83
End: 2023-02-07

## 2023-02-07 LAB
BACTERIA SPEC CULT: NORMAL
SERVICE CMNT-IMP: NORMAL

## 2023-02-07 NOTE — CARE COORDINATION
[address] Care Transitions Initial Follow Up Call    Call within 2 business days of discharge: Yes    LPN Care Coordinator contacted the patient by telephone to perform post hospital discharge assessment. Verified name and  with patient as identifiers. Provided introduction to self, and explanation of the LPN Care Coordinator role. Patient: Jaylan Beach Patient : 1940   MRN: [id number]  Reason for Admission: SBO  Discharge Date: 23 RARS: Readmission Risk Score: 9.1      Last Discharge  Street       Date Complaint Diagnosis Description Type Department Provider    2/3/23   Admission (Discharged) Kayleigh Link MD    2/3/23 Abdominal Pain, Nausea Intestinal obstruction, unspecified cause, unspecified whether partial or complete ([facility] [address] . .. ED (TRANSFER) JENNIFER Garcia MD            Was this an external facility discharge? No Discharge Facility: [place] [address] to be reviewed by the provider   Additional needs identified to be addressed with provider: No  none               Method of communication with provider: none. Spoke with patient states doing fine. Patient denies any nausea or vomiting. Patient states had breakfast this morning with no concerns. Reminded patient of PCP appointment 2023 and General surgery appointment on 2023. LPN Care Coordinator reviewed discharge instructions with patient who verbalized understanding. The patient was given an opportunity to ask questions and does not have any further questions or concerns at this time. Were discharge instructions available to patient? Yes. Reviewed appropriate site of care based on symptoms and resources available to patient including: PCP  Specialist  When to call 911. The patient agrees to contact the PCP office for questions related to their healthcare. Advance Care Planning:   Does patient have an Advance Directive: patient declined education.     Medication reconciliation was performed with patient, who verbalizes understanding of administration of home medications. Medications reviewed, 1111F entered: N/A    Was patient discharged with a pulse oximeter? no    Non-face-to-face services provided:  Obtained and reviewed discharge summary and/or continuity of care documents    Offered patient enrollment in the Remote Patient Monitoring (RPM) program for in-home monitoring: NA.    Care Transitions 24 Hour Call    Do you have a copy of your discharge instructions?: Yes  Do you have all of your prescriptions and are they filled?: Yes  Have you been contacted by a [address]?: No  Have you scheduled your follow up appointment?: Yes (Comment: PCP 2/16/2023 and General Surgery 2/21/2023)  How are you going to get to your appointment?: Car - family or friend to transport  Do you have support at home?: Alone  Do you feel like you have everything you need to keep you well at home?: Yes  Are you an active caregiver in your home?: No  Care Transitions Interventions  No Identified Needs         Follow Up  Future Appointments   Date Time Provider Luis Hodges   2/16/2023  2:40 PM Mona Hay MD [facility]   2/21/2023  8:50 AM Josh Pena MD [facility]      Goals Addressed                      This Visit's Progress      Medication Management (pt-stated)         I will take my medication as directed. Barriers: none  Plan for overcoming my barriers: Patient states take all medication as directed  Confidence: 8/10  Anticipated Goal Completion Date: within 30 days               LPN Care Coordinator provided contact information. Plan for follow-up call in 5-7 days based on severity of symptoms and risk factors. Plan for next call: self management-diet, hydration, medication compliance, fall and safety precautions and follow up appointment.     Telly De Luna LPN

## 2023-02-14 ENCOUNTER — CARE COORDINATION (OUTPATIENT)
Dept: CARE COORDINATION | Facility: CLINIC | Age: 83
End: 2023-02-14

## 2023-02-14 NOTE — CARE COORDINATION
[facility] Care Transitions Follow Up Call    Patient Current Location:  Home: [address] [facility]  [address] Coordinator contacted the patient by telephone to follow up after admission on 2/3/2023. Verified name and  with patient as identifiers. Patient: Haven Mtz  Patient : 1940   MRN: [id number]  Reason for Admission: SBO  Discharge Date: 23 RARS: Readmission Risk Score: 9.1      Needs to be reviewed by the provider   Additional needs identified to be addressed with provider: No  none             Method of communication with provider: none. Spoke with patient states fine. Patient denies any concerns during this phone call. Patient states has a [facility] hospital follow up 2023 and then appointment with [facility] on 2023. Addressed changes since last contact:  none  Discussed follow-up appointments. If no appointment was previously scheduled, appointment scheduling offered: Yes. Is follow up appointment scheduled within 7 days of discharge? Yes. Follow Up  Future Appointments   Date Time Provider Luis Hodges   2023  2:40 PM Olga Jameson MD SIM [facility] AMB   2023  8:50 AM Shireen Daily MD CSAE [facility] AMB     Non-[facility] follow up appointment(s): n/a    LPN Care Coordinator reviewed medical action plan with patient and discussed any barriers to care and/or understanding of plan of care after discharge. Discussed appropriate site of care based on symptoms and resources available to patient including: When to call 911. The patient agrees to contact the PCP office for questions related to their healthcare. Advance Care Planning:   patient declined education.      Patients top risk factors for readmission: medical condition-SBO  Interventions to address risk factors: Assessment and support for treatment adherence and medication management-     Offered patient enrollment in the Remote Patient Monitoring (RPM) program for in-home monitoring: NA.     Care Transitions Subsequent and Final Call    Subsequent and Final Calls  Do you have any ongoing symptoms?: No  Have your medications changed?: No  Do you have any questions related to your medications?: No  Do you currently have any active services?: No  Do you have any needs or concerns that I can assist you with?: No  Identified Barriers: None  Care Transitions Interventions  No Identified Needs  Other Interventions:            Goals Addressed                      This Visit's Progress      Medication Management (pt-stated)   On track      I will take my medication as directed. Barriers: none  Plan for overcoming my barriers: Patient states take all medication as directed  Confidence: 8/10  Anticipated Goal Completion Date: within 30 days               LPN Care Coordinator provided contact information for future needs. Plan for follow-up call in 10-14 days based on severity of symptoms and risk factors. Plan for next call: self management-diet, hydration, medication compliance, fall and safety precautions and follow up appointments.     Lori Orellana LPN

## 2023-02-16 ENCOUNTER — OFFICE VISIT (OUTPATIENT)
Dept: INTERNAL MEDICINE CLINIC | Facility: CLINIC | Age: 83
End: 2023-02-16
Payer: MEDICARE

## 2023-02-16 VITALS
SYSTOLIC BLOOD PRESSURE: 148 MMHG | HEIGHT: 62 IN | HEART RATE: 83 BPM | WEIGHT: 187 LBS | BODY MASS INDEX: 34.41 KG/M2 | DIASTOLIC BLOOD PRESSURE: 80 MMHG

## 2023-02-16 DIAGNOSIS — Z23 ENCOUNTER FOR IMMUNIZATION: ICD-10-CM

## 2023-02-16 DIAGNOSIS — E78.00 PURE HYPERCHOLESTEROLEMIA: ICD-10-CM

## 2023-02-16 DIAGNOSIS — I10 HYPERTENSION, ESSENTIAL: ICD-10-CM

## 2023-02-16 DIAGNOSIS — K63.5 POLYP OF COLON, UNSPECIFIED PART OF COLON, UNSPECIFIED TYPE: ICD-10-CM

## 2023-02-16 DIAGNOSIS — N18.32 STAGE 3B CHRONIC KIDNEY DISEASE (HCC): ICD-10-CM

## 2023-02-16 DIAGNOSIS — K56.609 SBO (SMALL BOWEL OBSTRUCTION) (HCC): ICD-10-CM

## 2023-02-16 DIAGNOSIS — E03.9 ACQUIRED HYPOTHYROIDISM: ICD-10-CM

## 2023-02-16 DIAGNOSIS — I87.2 VENOUS INSUFFICIENCY OF BOTH LOWER EXTREMITIES: ICD-10-CM

## 2023-02-16 DIAGNOSIS — I82.511 CHRONIC DEEP VEIN THROMBOSIS (DVT) OF RIGHT FEMORAL VEIN (HCC): Primary | ICD-10-CM

## 2023-02-16 PROBLEM — N30.00 ACUTE CYSTITIS WITHOUT HEMATURIA: Status: RESOLVED | Noted: 2021-08-17 | Resolved: 2023-02-16

## 2023-02-16 PROCEDURE — 3079F DIAST BP 80-89 MM HG: CPT | Performed by: INTERNAL MEDICINE

## 2023-02-16 PROCEDURE — 1090F PRES/ABSN URINE INCON ASSESS: CPT | Performed by: INTERNAL MEDICINE

## 2023-02-16 PROCEDURE — 3077F SYST BP >= 140 MM HG: CPT | Performed by: INTERNAL MEDICINE

## 2023-02-16 PROCEDURE — G8399 PT W/DXA RESULTS DOCUMENT: HCPCS | Performed by: INTERNAL MEDICINE

## 2023-02-16 PROCEDURE — 99214 OFFICE O/P EST MOD 30 MIN: CPT | Performed by: INTERNAL MEDICINE

## 2023-02-16 PROCEDURE — G8484 FLU IMMUNIZE NO ADMIN: HCPCS | Performed by: INTERNAL MEDICINE

## 2023-02-16 PROCEDURE — G8428 CUR MEDS NOT DOCUMENT: HCPCS | Performed by: INTERNAL MEDICINE

## 2023-02-16 PROCEDURE — 1036F TOBACCO NON-USER: CPT | Performed by: INTERNAL MEDICINE

## 2023-02-16 PROCEDURE — 1123F ACP DISCUSS/DSCN MKR DOCD: CPT | Performed by: INTERNAL MEDICINE

## 2023-02-16 PROCEDURE — 1111F DSCHRG MED/CURRENT MED MERGE: CPT | Performed by: INTERNAL MEDICINE

## 2023-02-16 PROCEDURE — G8417 CALC BMI ABV UP PARAM F/U: HCPCS | Performed by: INTERNAL MEDICINE

## 2023-02-16 RX ORDER — LOSARTAN POTASSIUM 25 MG/1
25 TABLET ORAL DAILY
Qty: 90 TABLET | Refills: 1 | Status: SHIPPED | OUTPATIENT
Start: 2023-02-16

## 2023-02-16 ASSESSMENT — ENCOUNTER SYMPTOMS
RECTAL PAIN: 0
EYE PAIN: 0
STRIDOR: 0
VOICE CHANGE: 0

## 2023-02-16 ASSESSMENT — PATIENT HEALTH QUESTIONNAIRE - PHQ9
2. FEELING DOWN, DEPRESSED OR HOPELESS: 0
SUM OF ALL RESPONSES TO PHQ9 QUESTIONS 1 & 2: 0
SUM OF ALL RESPONSES TO PHQ QUESTIONS 1-9: 0
1. LITTLE INTEREST OR PLEASURE IN DOING THINGS: 0
SUM OF ALL RESPONSES TO PHQ QUESTIONS 1-9: 0

## 2023-02-16 NOTE — PROGRESS NOTES
FOLLOWUP VISIT    Subjective:    Ms. Steve Villa is a 82 y.o., female,   Chief Complaint   Patient presents with    6 Month Follow-Up       HPI:    Patient presents today for follow up of two or more chronic medical problems and review of labs. Since her last visit the patient was hospitalized with an acute small bowel obstruction due to an incarcerated ventral hernia. Hospital records were reviewed. She was seen by general surgery Dr. Luis Angel Albert who was able to manually reduce the hernia with resolution of the small bowel obstruction. Decision was made to send her home and return in several weeks for an elective hernia repair. She is scheduled to see Dr. Luis Angel Albert next week. She has not had any recurrent small bowel obstructive or local hernia symptoms. She has had hypertension. She was previously treated with losartan 25 mg daily with reported good control but after weight loss and dietary changes her blood pressure improved and she was able to stop the losartan. Over this winter she has regained much of the lost weight and her blood pressure is now been varying between 1 64-2 70 systolic with diastolics in the 84-59 range. The patient has hypothyroidism. The patient denies any symptoms of hypo- or hyperthyroidism on the current dose of levothyroxine. The patient has GERD. The patient has been on attempted therapeutic lifestyle change including smaller more frequent meals and avoiding caffeine. The patient states that the GERD symptoms have been well controlled on current treatment and denies any dysphagia.        The following portions of the patient's history were reviewed and updated as appropriate:      Past Medical History:   Diagnosis Date    Acute pancreatitis 10/12/2018    Asthma     Calculus of kidney     Colon polyps     Female stress incontinence 8/16/2012    GERD (gastroesophageal reflux disease)     History of DVT (deep vein thrombosis)     9/2021    History of gastric ulcer 2018    History of kidney stones 8/16/2012    History of septic arthritis 09/10/2019    Right knee.   Improved with prolonged IV anitbiotics    Hypercholesterolemia     Hypertension     Hypothyroidism     Ophthalmic migraine     Osteopenia of neck of right femur 2/25/2015    Primary insomnia 07/17/2017    Raynaud's phenomenon 2/25/2015    Recurrent cellulitis of lower extremity 12/28/2020    S/P total knee arthroplasty 8/16/2012    [id number]     Umbilical hernia 0/92/0077    Venous insufficiency of both lower extremities 1/28/2014    Worse on left        Past Surgical History:   Procedure Laterality Date    COLONOSCOPY  Jan 2013    4 polyps, repeat 5 years    COLONOSCOPY  7/5/2016    repeat 5 yrs tubular adenoma    HYSTERECTOMY, TOTAL ABDOMINAL (CERVIX REMOVED)      REMOVAL OF KIDNEY STONE      SALPINGO-OOPHORECTOMY      TOTAL KNEE ARTHROPLASTY Bilateral     partial       Family History   Problem Relation Age of Onset    High Cholesterol Brother     Hypertension Brother     High Cholesterol Brother     Hypertension Brother     Heart Attack Brother     Heart Attack Paternal Grandmother     Heart Attack Paternal Grandfather     Breast Cancer Neg Hx     Hypertension Mother     Rheum Arthritis Mother     High Cholesterol Mother     Broken Bones Mother     Heart Failure Father     Heart Attack Father     Osteoarthritis Brother         Knee       Social History     Socioeconomic History    Marital status:      Spouse name: Not on file    Number of children: Not on file    Years of education: Not on file    Highest education level: Not on file   Occupational History    Not on file   Tobacco Use    Smoking status: Never    Smokeless tobacco: Never   Substance and Sexual Activity    Alcohol use: No    Drug use: No    Sexual activity: Not on file   Other Topics Concern    Not on file   Social History Narrative    Lives with  who is chronically ill with end stage lung disease and early dementia and depression. 5 children, daughter Deejay Irizarry and son Tan Schmid (PharmD) have her [de-identified], sons Maddie Mccullough and Del meredith have durable POA     Social Determinants of Health     Financial Resource Strain: Not on file   Food Insecurity: Not on file   Transportation Needs: Not on file   Physical Activity: Inactive    Days of Exercise per Week: 0 days    Minutes of Exercise per Session: 0 min   Stress: Not on file   Social Connections: Not on file   Intimate Partner Violence: Not on file   Housing Stability: Not on file       Current Outpatient Medications   Medication Sig Dispense Refill    calcium carbonate (OSCAL) 500 MG TABS tablet Take 500 mg by mouth 2 times daily      atorvastatin (LIPITOR) 40 MG tablet TAKE 1 TABLET BY MOUTH DAILY 90 tablet 3    levothyroxine (SYNTHROID) 25 MCG tablet TAKE 1 TABLET BY MOUTH EVERY MORNING. 90 tablet 3    potassium chloride (KLOR-CON M) 20 MEQ extended release tablet TAKE 1 TABLET BY MOUTH TWICE A  tablet 1    gabapentin (NEURONTIN) 300 MG capsule Take one capsule by mouth three times a day (Patient taking differently: Take 300 mg by mouth in the morning and at bedtime. Take one capsule by mouth 2 times a day) 270 capsule 2    albuterol sulfate  (90 Base) MCG/ACT inhaler Inhale 2 puffs into the lungs every 6 hours as needed      fluticasone (FLOVENT HFA) 220 MCG/ACT inhaler Inhale 2 puffs into the lungs 2 times daily      furosemide (LASIX) 40 MG tablet Take 40 mg by mouth daily      magnesium oxide (MAG-OX) 400 MG tablet Take 400 mg by mouth daily      omeprazole (PRILOSEC OTC) 20 MG tablet Take 20 mg by mouth daily      ondansetron (ZOFRAN-ODT) 4 MG disintegrating tablet Take 4 mg by mouth every 8 hours as needed      rivaroxaban (XARELTO) 10 MG TABS tablet Take by mouth Daily with supper       No current facility-administered medications for this visit.        Allergies as of 02/16/2023 - Fully Reviewed 02/16/2023   Allergen Reaction Noted    Amoxicillin Rash 12/31/2020    Pneumococcal vaccines Other (See Comments) 10/28/2012       Review of Systems   Constitutional:  Negative for activity change and appetite change. HENT:  Negative for drooling and voice change. Eyes:  Negative for pain. Respiratory:  Negative for stridor. Gastrointestinal:  Negative for rectal pain. Endocrine: Negative for polydipsia and polyphagia. Genitourinary:  Negative for dyspareunia and enuresis. Musculoskeletal:  Negative for gait problem and neck stiffness. Skin:  Negative for pallor. Neurological:  Negative for facial asymmetry and speech difficulty. Hematological:  Does not bruise/bleed easily. Psychiatric/Behavioral:  Negative for self-injury. The patient is not hyperactive. Patient Care Team:  Mark Han MD as PCP - General  Mark Han MD as PCP - Empaneled Provider  Claude Byes, LPN as Care Coordinator    Objective:    BP (!) 148/80 (Site: Right Lower Arm, Position: Sitting)   Pulse 83   Ht 5' 2\" (1.575 m)   Wt 187 lb (84.8 kg)   BMI 34.20 kg/m²     Physical Exam  Vitals reviewed. Constitutional:       General: She is not in acute distress. Appearance: She is not toxic-appearing. HENT:      Head: Normocephalic and atraumatic. Right Ear: Tympanic membrane, ear canal and external ear normal.      Left Ear: Tympanic membrane, ear canal and external ear normal.      Nose: Nose normal.      Mouth/Throat:      Mouth: Mucous membranes are moist.      Pharynx: Oropharynx is clear. Eyes:      General: No scleral icterus. Extraocular Movements: Extraocular movements intact. Pupils: Pupils are equal, round, and reactive to light. Cardiovascular:      Rate and Rhythm: Normal rate and regular rhythm. Pulses: Normal pulses. Heart sounds: Normal heart sounds. Comments: 1+ pitting edema both ankles. Pulmonary:      Effort: Pulmonary effort is normal. No respiratory distress. Breath sounds: Normal breath sounds. No stridor.    Abdominal: General: Abdomen is flat. Bowel sounds are normal.      Palpations: Abdomen is soft. There is no mass. Tenderness: There is no guarding or rebound. Musculoskeletal:         General: Normal range of motion. Cervical back: Normal range of motion and neck supple. Skin:     General: Skin is warm and dry. Coloration: Skin is not jaundiced. Neurological:      Mental Status: She is alert and oriented to person, place, and time. Mental status is at baseline. Psychiatric:         Behavior: Behavior normal.         Thought Content:  Thought content normal.            Orders Only on 02/09/2023   Component Date Value Ref Range Status    TSH, 3RD GENERATION 02/09/2023 1.920  0.358 - 3.740 uIU/mL Final    WBC 02/09/2023 6.7  4.3 - 11.1 K/uL Final    RBC 02/09/2023 3.78 (A)  4.05 - 5.2 M/uL Final    Hemoglobin 02/09/2023 11.2 (A)  11.7 - 15.4 g/dL Final    Hematocrit 02/09/2023 35.6 (A)  35.8 - 46.3 % Final    MCV 02/09/2023 94.2  82 - 102 FL Final    MCH 02/09/2023 29.6  26.1 - 32.9 PG Final    MCHC 02/09/2023 31.5  31.4 - 35.0 g/dL Final    RDW 02/09/2023 13.9  11.9 - 14.6 % Final    Platelets 15/76/7197 214  150 - 450 K/uL Final    MPV 02/09/2023 13.0 (A)  9.4 - 12.3 FL Final    nRBC 02/09/2023 0.00  0.0 - 0.2 K/uL Final    **Note: Absolute NRBC parameter is now reported with Hemogram**    Differential Type 02/09/2023 AUTOMATED    Final    Seg Neutrophils 02/09/2023 56  43 - 78 % Final    Lymphocytes 02/09/2023 28  13 - 44 % Final    Monocytes 02/09/2023 12  4.0 - 12.0 % Final    Eosinophils % 02/09/2023 3  0.5 - 7.8 % Final    Basophils 02/09/2023 1  0.0 - 2.0 % Final    Immature Granulocytes 02/09/2023 0  0.0 - 5.0 % Final    Segs Absolute 02/09/2023 3.8  1.7 - 8.2 K/UL Final    Absolute Lymph # 02/09/2023 1.9  0.5 - 4.6 K/UL Final    Absolute Mono # 02/09/2023 0.8  0.1 - 1.3 K/UL Final    Absolute Eos # 02/09/2023 0.2  0.0 - 0.8 K/UL Final    Basophils Absolute 02/09/2023 0.0  0.0 - 0.2 K/UL Final Absolute Immature Granulocyte 02/09/2023 0.0  0.0 - 0.5 K/UL Final    Sodium 02/09/2023 142  133 - 143 mmol/L Final    Potassium 02/09/2023 4.3  3.5 - 5.1 mmol/L Final    Chloride 02/09/2023 109  101 - 110 mmol/L Final    CO2 02/09/2023 24  21 - 32 mmol/L Final    Anion Gap 02/09/2023 9  2 - 11 mmol/L Final    Glucose 02/09/2023 99  65 - 100 mg/dL Final    BUN 02/09/2023 20  8 - 23 MG/DL Final    Creatinine 02/09/2023 1.30 (A)  0.6 - 1.0 MG/DL Final    Est, Glom Filt Rate 02/09/2023 41 (A)  >60 ml/min/1.73m2 Final    Comment:   Pediatric calculator link: Katina.at. org/professionals/kdoqi/gfr_calculatorped    These results are not intended for use in patients <25years of age. eGFR results are calculated without a race factor using  the 2021 CKD-EPI equation. Careful clinical correlation is recommended, particularly when comparing to results calculated using previous equations. The CKD-EPI equation is less accurate in patients with extremes of muscle mass, extra-renal metabolism of creatinine, excessive creatine ingestion, or following therapy that affects renal tubular secretion. Calcium 02/09/2023 9.4  8.3 - 10.4 MG/DL Final         Assessent & Plan:        1. Chronic deep vein thrombosis (DVT) of right femoral vein (HCC)  Overview:  The patient had unprovoked bilateral proximal femoral DVT in 2021. Treated with Xarelto 20 mg daily for 3-6 months. Repeat ultrasound revealed residual chronic right femoral DVT. Decision was made to continue indefinite low dose Xarelto 10 mg daily. The patient will continue the current treatment. 2. Stage 3b chronic kidney disease ([address]  Overview:  2/9/23 creatinine 1.30, eGFR 41  2/15/21 creatinine 1.30, eGFR 39      The patient was advised to avoid NSAIDs and other nephrotoxins. Will dose adjust medications as appropriate. Will monitor labs over time.        3. Polyp of colon, unspecified part of colon, unspecified type  Overview:  The patient has a previous history of colon polyps. Her last colonoscopy on 7/5/2016 was reviewed. Repeat was recommended in 5 years. The patient wishes to defer additional colonoscopies given age and multiple chronic health conditions. 4. Encounter for immunization  Overview:  Vaccinations reviewed / discussed. Patient declines Prevnar (she developed flu like symptoms following her initial Pneumovax 23 vaccinations). Recommended Shingrix and Covid booster. 5. Acquired hypothyroidism  Overview:  2/9/23 TSH 1.920 on levothyroxine 25 mcg daily. The patient will continue the current treatment. Labs were reviewed and discussed with the patient. 6. Hypertension, essential  Overview:  The patient's blood pressure is persistently elevated. We will resume losartan 25 mg daily. 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 Orders:  -     losartan (COZAAR) 25 MG tablet; Take 1 tablet by mouth daily, Disp-90 tablet, R-1Normal  7. SBO (small bowel obstruction) (HCC)  Overview:  Hospitalized 2/3/23 - 2/6/23 with a SBO related to a ventral hernia. Hernia was manually by Dr. Aviva Sepulveda with resolution of the SBO. She is tentatively scheduled for ventral hernia repair with Dr. Aviva Sepulveda within the next few weeks. 8. Venous insufficiency of both lower extremities  Overview:  The patient has chronic bilateral lower extremity venous insufficiency with venous stasis, left slightly worse than right. She has also had recurrent bilateral lower extremity cellulitis due to same. The patient was advised regarding elevation and compression. She was encouraged to adopt a low-sodium diet. We will continue furosemide 40 mg daily. Given her stage III chronic kidney disease we will continue to periodically monitor her electrolytes and creatinine. 9. Pure hypercholesterolemia  Overview:  8/9/22 total 156 HDL 60 LDL 72  on atorvastatin 40 mg daily. Reviewed the most recent lipid panel with the patient, and interpreted the results within the context of the patient's personal cardiovascular risk factors. Discussed/reinforced a low-cholesterol diet. The patient was advised regarding daily compliance. The patient and/or patient representative voiced understanding and agreement with the current diagnoses, recommendations, and possible side effects. Return in about 1 month (around 3/16/2023) for follow up of chronic medical problems, review labs.

## 2023-02-17 NOTE — PROGRESS NOTES
Lela Gonsalves MD   Bariatric & Advanced Laparoscopic Surgery & Endoscopy  [facility] [address], [address] [facility]  [facility] [place]  Phone [phone]   Fax [phone]      Date of visit: 2023      Primary/Requesting provider: Smita Lawson MD         Name: Hanh Bowman      MRN: [id number]       : 1940       Age: 82 y.o. Sex: female        PCP: Smita Lawson MD     CC:    Chief Complaint   Patient presents with    New Patient     NP - SBO - needs hernia repair         HPI:    Hanh Bowman is a 82 y.o. female who presented to the ED with 2 day history of abdominal pain. She reports pain was worse with pressure and better with nothing. She has associated nausea and vomiting. She reports having a bulge near her umbilicus for many years but recently started hurting. She does not know if the bulge was reducible before. 2023 - Doing well today. Having bowel movements regularly. Tolerating diet. Denies presence of bulge over the weekend. 2023 - She is here for follow up. She denies any nausea or vomiting. No abdominal pain. Bowel movements regular. She denies incarceration of hernia over the last 2 weeks. She feels well but understands that repair is necessary due to recent SBO secondary to hernia incarceration. Previous abdominal surgeries - vaginal hysterectomy and kidney surgery  Last colonoscopy -     Blood thinners - Xarelto Immunosuppressants - none      PMH:    Past Medical History:   Diagnosis Date    Acute pancreatitis 10/12/2018    Asthma     Calculus of kidney     Colon polyps     Female stress incontinence 2012    GERD (gastroesophageal reflux disease)     History of DVT (deep vein thrombosis)     2021    History of gastric ulcer     2018    History of kidney stones 2012    History of septic arthritis 09/10/2019    Right knee.   Improved with prolonged IV anitbiotics    Hypercholesterolemia     Hypertension     Hypothyroidism Ophthalmic migraine     Osteopenia of neck of right femur 2/25/2015    Primary insomnia 07/17/2017    Raynaud's phenomenon 2/25/2015    Recurrent cellulitis of lower extremity 12/28/2020    S/P total knee arthroplasty 8/16/2012    [id number]     Umbilical hernia 0/35/4985    Venous insufficiency of both lower extremities 1/28/2014    Worse on left        PSH:    Past Surgical History:   Procedure Laterality Date    CATARACT EXTRACTION Bilateral     COLONOSCOPY  Jan 2013    4 polyps, repeat 5 years    COLONOSCOPY  7/5/2016    repeat 5 yrs tubular adenoma    HYSTERECTOMY, TOTAL ABDOMINAL (CERVIX REMOVED)      REMOVAL OF KIDNEY STONE      SALPINGO-OOPHORECTOMY      TOTAL KNEE ARTHROPLASTY Bilateral     partial       MEDS:    Current Outpatient Medications   Medication Sig Dispense Refill    losartan (COZAAR) 25 MG tablet Take 1 tablet by mouth daily 90 tablet 1    calcium carbonate (OSCAL) 500 MG TABS tablet Take 500 mg by mouth 2 times daily      atorvastatin (LIPITOR) 40 MG tablet TAKE 1 TABLET BY MOUTH DAILY 90 tablet 3    levothyroxine (SYNTHROID) 25 MCG tablet TAKE 1 TABLET BY MOUTH EVERY MORNING. 90 tablet 3    potassium chloride (KLOR-CON M) 20 MEQ extended release tablet TAKE 1 TABLET BY MOUTH TWICE A  tablet 1    gabapentin (NEURONTIN) 300 MG capsule Take one capsule by mouth three times a day (Patient taking differently: Take 300 mg by mouth in the morning and at bedtime.  Take one capsule by mouth 2 times a day) 270 capsule 2    albuterol sulfate  (90 Base) MCG/ACT inhaler Inhale 2 puffs into the lungs every 6 hours as needed      fluticasone (FLOVENT HFA) 220 MCG/ACT inhaler Inhale 2 puffs into the lungs 2 times daily      furosemide (LASIX) 40 MG tablet Take 40 mg by mouth daily      magnesium oxide (MAG-OX) 400 MG tablet Take 400 mg by mouth daily      omeprazole (PRILOSEC OTC) 20 MG tablet Take 20 mg by mouth daily      ondansetron (ZOFRAN-ODT) 4 MG disintegrating tablet Take 4 mg by mouth every 8 hours as needed      rivaroxaban (XARELTO) 10 MG TABS tablet Take by mouth Daily with supper       No current facility-administered medications for this visit. ALLERGIES:      Allergies   Allergen Reactions    Amoxicillin Rash     Classic drug rash    Pneumococcal Vaccines Other (See Comments)     fever  Other reaction(s): Other (comments)  fever  Other reaction(s): Other (comments)  fever         SH:    Social History     Tobacco Use    Smoking status: Never    Smokeless tobacco: Never   Substance Use Topics    Alcohol use: No    Drug use: No       FH:    Family History   Problem Relation Age of Onset    High Cholesterol Brother     Hypertension Brother     High Cholesterol Brother     Hypertension Brother     Heart Attack Brother     Heart Attack Paternal Grandmother     Heart Attack Paternal Grandfather     Breast Cancer Neg Hx     Hypertension Mother     Rheum Arthritis Mother     High Cholesterol Mother     Broken Bones Mother     Heart Failure Father     Heart Attack Father     Osteoarthritis Brother         Knee       Review of systems:  Review of Systems   Constitutional:  Negative for chills, fatigue, fever and unexpected weight change. HENT:  Negative for ear discharge, ear pain and facial swelling. Eyes:  Negative for pain and itching. Respiratory:  Negative for cough, chest tightness and shortness of breath. Cardiovascular:  Negative for chest pain. Gastrointestinal:  Positive for abdominal pain, nausea and vomiting. Negative for abdominal distention, blood in stool, constipation and diarrhea. Genitourinary:  Negative for difficulty urinating, dysuria and hematuria. Musculoskeletal:  Negative for back pain, gait problem and neck pain. Skin:  Negative for rash and wound. Neurological:  Negative for facial asymmetry, speech difficulty and weakness. Hematological:  Does not bruise/bleed easily.    Psychiatric/Behavioral:  Negative for agitation, decreased concentration and sleep disturbance. Physical Exam:     BP (!) 145/75   Pulse 73   Wt 190 lb 4.8 oz (86.3 kg)   BMI 34.81 kg/m²     General:  Well-developed, well-nourished, no distress. Psych:  Cooperative, good insight and judgement. Neuro:  Alert, oriented to person, place and time. HEENT:  Normocephalic, atraumatic. Sclera clear. Lungs:  Unlabored breathing. Symmetrical chest expansion. Chest wall:  No tenderness or deformity. Heart:  Regular rate and rhythm. No JVD. Abdomen:  Soft, non-tender, non-distended. Hernia currently educed. Extremities:  Extremities normal, atraumatic, no cyanosis or edema. Skin:  Skin color, texture, turgor normal. No rashes. Labs: All recent labs were reviewed. Normal WBC. Elevated Cr. No results found for this or any previous visit (from the past 24 hour(s)). Imaging: CT images were independently reviewed by me. Ventral hernia with loop of bowel concerning for SBO. CT ABDOMEN PELVIS W IV CONTRAST Additional Contrast? None  Narrative: CT OF THE ABDOMEN AND PELVIS WITH CONTRAST. CLINICAL INDICATION: Left lower quadrant abdominal pain with nausea and vomiting    PROCEDURE: Serial thin section axial images obtained from the lung bases through  the proximal femurs following the administration of 100 cc of Isovue 370  intravenous contrast.  Radiation dose reduction techniques were used for this  study. Our CT scanners use one or all of the following: Automated exposure  control, adjusted of the mA and/or kV according to patient size, iterative  reconstruction    COMPARISON: CT abdomen and pelvis dated 12/18/2020    FINDINGS:     CT ABDOMEN: A ventral hernia is appreciated in the lower central abdomen that  contains a loop of obstructed small bowel with dilated fluid-filled loops of  more proximal small bowel that measure up to 2.6 cm. The distal small bowel  loops are decompressed.     The liver, spleen, stomach, and pancreas are normal. The kidneys are symmetric  in size and contrast enhancement. There is a 4 cm simple left renal cyst. The  gallbladder is normal. The adrenal glands are normal. There is no ascites or  adenopathy. CT PELVIS: The bladder is incompletely distended but does show thickened walls  with irregularity. Cystitis is a consideration. The sigmoid colon is entirely  decompressed. There is no inguinal hernia or adenopathy. No aggressive bone lesions identified. Impression: 1. Acute small bowel obstruction with the obstruction point it currently at a  ventral hernia in the lower central abdomen. 2. Marked wall thickening to the bladder. Acute cystitis should be considered. 3. 4 cm simple left renal cyst.           Assessment/Plan:  Karen Stanley is a 82 y.o. female who has signs and symptoms consistent with recent incarcerated ventral hernia with SBO, now reduced. Se also had recent UTI and this was treated. 1. I reviewed in detail with the patient what a hernia is, how it forms and the anatomy of the abdomen. I explained the symptoms of a hernia and complications including pain, incarceration, strangulation and obstruction and the symptoms to monitor for at home. 2. I discussed with the patient in detail an open vs. laparoscopic hernia repair with mesh and reviewed the risks and complications of both procedures to include bleeding, infection, mesh infection, recurrence of hernia, injury to abdominal organs and surrounding structures, deep vein thrombosis, pulmonary embolism, need for reoperation, myocardial infarction and death. She would like to proceed with a open ventral hernia repair with mesh. She demonstrated understanding of the procedure, risks and complications and I answered all of her questions. Karen Stanley  indicates that she understands the risks and elects to proceed. She has had adequate time to have her questions answered. 3. We specifically discussed light duty for 6 weeks.  No heavy lifting, not greater than 20 lbs. for 6 weeks total to enable optimal healing and prevent recurrence. 4. Follow up 2 weeks after surgery.     Signed: Annette Wharton MD  Bariatric & Minimally Invasive Surgery  2/21/2023 9:09 AM

## 2023-02-17 NOTE — H&P (VIEW-ONLY)
Gonzalez Kumar MD   Bariatric & Advanced Laparoscopic Surgery & Endoscopy  [facility] [facility] [address] [facility]  [facility] [place]  Phone [phone]   Fax [phone]      Date of visit: 2023      Primary/Requesting provider: Kevin Campbell MD         Name: Bakari Montes      MRN: [id number]       : 1940       Age: 82 y.o. Sex: female        PCP: Kevin Campbell MD     CC:    Chief Complaint   Patient presents with    New Patient     NP - SBO - needs hernia repair         HPI:    Bakari Montes is a 82 y.o. female who presented to the ED with 2 day history of abdominal pain. She reports pain was worse with pressure and better with nothing. She has associated nausea and vomiting. She reports having a bulge near her umbilicus for many years but recently started hurting. She does not know if the bulge was reducible before. 2023 - Doing well today. Having bowel movements regularly. Tolerating diet. Denies presence of bulge over the weekend. 2023 - She is here for follow up. She denies any nausea or vomiting. No abdominal pain. Bowel movements regular. She denies incarceration of hernia over the last 2 weeks. She feels well but understands that repair is necessary due to recent SBO secondary to hernia incarceration. Previous abdominal surgeries - vaginal hysterectomy and kidney surgery  Last colonoscopy -     Blood thinners - Xarelto Immunosuppressants - none      PMH:    Past Medical History:   Diagnosis Date    Acute pancreatitis 10/12/2018    Asthma     Calculus of kidney     Colon polyps     Female stress incontinence 2012    GERD (gastroesophageal reflux disease)     History of DVT (deep vein thrombosis)     2021    History of gastric ulcer     2018    History of kidney stones 2012    History of septic arthritis 09/10/2019    Right knee.   Improved with prolonged IV anitbiotics    Hypercholesterolemia     Hypertension     Hypothyroidism Ophthalmic migraine     Osteopenia of neck of right femur 2/25/2015    Primary insomnia 07/17/2017    Raynaud's phenomenon 2/25/2015    Recurrent cellulitis of lower extremity 12/28/2020    S/P total knee arthroplasty 8/16/2012    [id number]     Umbilical hernia 1/62/2907    Venous insufficiency of both lower extremities 1/28/2014    Worse on left        PSH:    Past Surgical History:   Procedure Laterality Date    CATARACT EXTRACTION Bilateral     COLONOSCOPY  Jan 2013    4 polyps, repeat 5 years    COLONOSCOPY  7/5/2016    repeat 5 yrs tubular adenoma    HYSTERECTOMY, TOTAL ABDOMINAL (CERVIX REMOVED)      REMOVAL OF KIDNEY STONE      SALPINGO-OOPHORECTOMY      TOTAL KNEE ARTHROPLASTY Bilateral     partial       MEDS:    Current Outpatient Medications   Medication Sig Dispense Refill    losartan (COZAAR) 25 MG tablet Take 1 tablet by mouth daily 90 tablet 1    calcium carbonate (OSCAL) 500 MG TABS tablet Take 500 mg by mouth 2 times daily      atorvastatin (LIPITOR) 40 MG tablet TAKE 1 TABLET BY MOUTH DAILY 90 tablet 3    levothyroxine (SYNTHROID) 25 MCG tablet TAKE 1 TABLET BY MOUTH EVERY MORNING. 90 tablet 3    potassium chloride (KLOR-CON M) 20 MEQ extended release tablet TAKE 1 TABLET BY MOUTH TWICE A  tablet 1    gabapentin (NEURONTIN) 300 MG capsule Take one capsule by mouth three times a day (Patient taking differently: Take 300 mg by mouth in the morning and at bedtime.  Take one capsule by mouth 2 times a day) 270 capsule 2    albuterol sulfate  (90 Base) MCG/ACT inhaler Inhale 2 puffs into the lungs every 6 hours as needed      fluticasone (FLOVENT HFA) 220 MCG/ACT inhaler Inhale 2 puffs into the lungs 2 times daily      furosemide (LASIX) 40 MG tablet Take 40 mg by mouth daily      magnesium oxide (MAG-OX) 400 MG tablet Take 400 mg by mouth daily      omeprazole (PRILOSEC OTC) 20 MG tablet Take 20 mg by mouth daily      ondansetron (ZOFRAN-ODT) 4 MG disintegrating tablet Take 4 mg by mouth every 8 hours as needed      rivaroxaban (XARELTO) 10 MG TABS tablet Take by mouth Daily with supper       No current facility-administered medications for this visit. ALLERGIES:      Allergies   Allergen Reactions    Amoxicillin Rash     Classic drug rash    Pneumococcal Vaccines Other (See Comments)     fever  Other reaction(s): Other (comments)  fever  Other reaction(s): Other (comments)  fever         SH:    Social History     Tobacco Use    Smoking status: Never    Smokeless tobacco: Never   Substance Use Topics    Alcohol use: No    Drug use: No       FH:    Family History   Problem Relation Age of Onset    High Cholesterol Brother     Hypertension Brother     High Cholesterol Brother     Hypertension Brother     Heart Attack Brother     Heart Attack Paternal Grandmother     Heart Attack Paternal Grandfather     Breast Cancer Neg Hx     Hypertension Mother     Rheum Arthritis Mother     High Cholesterol Mother     Broken Bones Mother     Heart Failure Father     Heart Attack Father     Osteoarthritis Brother         Knee       Review of systems:  Review of Systems   Constitutional:  Negative for chills, fatigue, fever and unexpected weight change. HENT:  Negative for ear discharge, ear pain and facial swelling. Eyes:  Negative for pain and itching. Respiratory:  Negative for cough, chest tightness and shortness of breath. Cardiovascular:  Negative for chest pain. Gastrointestinal:  Positive for abdominal pain, nausea and vomiting. Negative for abdominal distention, blood in stool, constipation and diarrhea. Genitourinary:  Negative for difficulty urinating, dysuria and hematuria. Musculoskeletal:  Negative for back pain, gait problem and neck pain. Skin:  Negative for rash and wound. Neurological:  Negative for facial asymmetry, speech difficulty and weakness. Hematological:  Does not bruise/bleed easily.    Psychiatric/Behavioral:  Negative for agitation, decreased concentration and sleep disturbance. Physical Exam:     BP (!) 145/75   Pulse 73   Wt 190 lb 4.8 oz (86.3 kg)   BMI 34.81 kg/m²     General:  Well-developed, well-nourished, no distress. Psych:  Cooperative, good insight and judgement. Neuro:  Alert, oriented to person, place and time. HEENT:  Normocephalic, atraumatic. Sclera clear. Lungs:  Unlabored breathing. Symmetrical chest expansion. Chest wall:  No tenderness or deformity. Heart:  Regular rate and rhythm. No JVD. Abdomen:  Soft, non-tender, non-distended. Hernia currently educed. Extremities:  Extremities normal, atraumatic, no cyanosis or edema. Skin:  Skin color, texture, turgor normal. No rashes. Labs: All recent labs were reviewed. Normal WBC. Elevated Cr. No results found for this or any previous visit (from the past 24 hour(s)). Imaging: CT images were independently reviewed by me. Ventral hernia with loop of bowel concerning for SBO. CT ABDOMEN PELVIS W IV CONTRAST Additional Contrast? None  Narrative: CT OF THE ABDOMEN AND PELVIS WITH CONTRAST. CLINICAL INDICATION: Left lower quadrant abdominal pain with nausea and vomiting    PROCEDURE: Serial thin section axial images obtained from the lung bases through  the proximal femurs following the administration of 100 cc of Isovue 370  intravenous contrast.  Radiation dose reduction techniques were used for this  study. Our CT scanners use one or all of the following: Automated exposure  control, adjusted of the mA and/or kV according to patient size, iterative  reconstruction    COMPARISON: CT abdomen and pelvis dated 12/18/2020    FINDINGS:     CT ABDOMEN: A ventral hernia is appreciated in the lower central abdomen that  contains a loop of obstructed small bowel with dilated fluid-filled loops of  more proximal small bowel that measure up to 2.6 cm. The distal small bowel  loops are decompressed.     The liver, spleen, stomach, and pancreas are normal. The kidneys are symmetric  in size and contrast enhancement. There is a 4 cm simple left renal cyst. The  gallbladder is normal. The adrenal glands are normal. There is no ascites or  adenopathy. CT PELVIS: The bladder is incompletely distended but does show thickened walls  with irregularity. Cystitis is a consideration. The sigmoid colon is entirely  decompressed. There is no inguinal hernia or adenopathy. No aggressive bone lesions identified. Impression: 1. Acute small bowel obstruction with the obstruction point it currently at a  ventral hernia in the lower central abdomen. 2. Marked wall thickening to the bladder. Acute cystitis should be considered. 3. 4 cm simple left renal cyst.           Assessment/Plan:  Harvey Eric is a 82 y.o. female who has signs and symptoms consistent with recent incarcerated ventral hernia with SBO, now reduced. Se also had recent UTI and this was treated. 1. I reviewed in detail with the patient what a hernia is, how it forms and the anatomy of the abdomen. I explained the symptoms of a hernia and complications including pain, incarceration, strangulation and obstruction and the symptoms to monitor for at home. 2. I discussed with the patient in detail an open vs. laparoscopic hernia repair with mesh and reviewed the risks and complications of both procedures to include bleeding, infection, mesh infection, recurrence of hernia, injury to abdominal organs and surrounding structures, deep vein thrombosis, pulmonary embolism, need for reoperation, myocardial infarction and death. She would like to proceed with a open ventral hernia repair with mesh. She demonstrated understanding of the procedure, risks and complications and I answered all of her questions. Harvey Eric  indicates that she understands the risks and elects to proceed. She has had adequate time to have her questions answered. 3. We specifically discussed light duty for 6 weeks.  No heavy lifting, not greater than 20 lbs. for 6 weeks total to enable optimal healing and prevent recurrence. 4. Follow up 2 weeks after surgery.     Signed: Cindi Watkins MD  Bariatric & Minimally Invasive Surgery  2/21/2023 9:09 AM

## 2023-02-21 ENCOUNTER — PREP FOR PROCEDURE (OUTPATIENT)
Dept: SURGERY | Age: 83
End: 2023-02-21

## 2023-02-21 ENCOUNTER — OFFICE VISIT (OUTPATIENT)
Dept: SURGERY | Age: 83
End: 2023-02-21
Payer: MEDICARE

## 2023-02-21 VITALS
HEART RATE: 73 BPM | SYSTOLIC BLOOD PRESSURE: 145 MMHG | WEIGHT: 190.3 LBS | DIASTOLIC BLOOD PRESSURE: 75 MMHG | BODY MASS INDEX: 34.81 KG/M2

## 2023-02-21 DIAGNOSIS — K56.609 SBO (SMALL BOWEL OBSTRUCTION) (HCC): Primary | ICD-10-CM

## 2023-02-21 DIAGNOSIS — K43.9 VENTRAL HERNIA WITHOUT OBSTRUCTION OR GANGRENE: ICD-10-CM

## 2023-02-21 DIAGNOSIS — R19.00 ABDOMINAL WALL BULGE: ICD-10-CM

## 2023-02-21 PROCEDURE — 1123F ACP DISCUSS/DSCN MKR DOCD: CPT | Performed by: SURGERY

## 2023-02-21 PROCEDURE — 99215 OFFICE O/P EST HI 40 MIN: CPT | Performed by: SURGERY

## 2023-02-21 PROCEDURE — 1036F TOBACCO NON-USER: CPT | Performed by: SURGERY

## 2023-02-21 PROCEDURE — G8417 CALC BMI ABV UP PARAM F/U: HCPCS | Performed by: SURGERY

## 2023-02-21 PROCEDURE — 1111F DSCHRG MED/CURRENT MED MERGE: CPT | Performed by: SURGERY

## 2023-02-21 PROCEDURE — 3078F DIAST BP <80 MM HG: CPT | Performed by: SURGERY

## 2023-02-21 PROCEDURE — G8484 FLU IMMUNIZE NO ADMIN: HCPCS | Performed by: SURGERY

## 2023-02-21 PROCEDURE — G8399 PT W/DXA RESULTS DOCUMENT: HCPCS | Performed by: SURGERY

## 2023-02-21 PROCEDURE — G8427 DOCREV CUR MEDS BY ELIG CLIN: HCPCS | Performed by: SURGERY

## 2023-02-21 PROCEDURE — 1090F PRES/ABSN URINE INCON ASSESS: CPT | Performed by: SURGERY

## 2023-02-21 PROCEDURE — 3077F SYST BP >= 140 MM HG: CPT | Performed by: SURGERY

## 2023-02-21 ASSESSMENT — ENCOUNTER SYMPTOMS
CHEST TIGHTNESS: 0
FACIAL SWELLING: 0
ABDOMINAL DISTENTION: 0
CONSTIPATION: 0
EYE PAIN: 0
BLOOD IN STOOL: 0
COUGH: 0
SHORTNESS OF BREATH: 0
NAUSEA: 1
ABDOMINAL PAIN: 1
EYE ITCHING: 0
DIARRHEA: 0
BACK PAIN: 0
VOMITING: 1

## 2023-02-22 NOTE — PERIOP NOTE
Patient verified name and . Order for consent NOT found in EHR; patient verifies procedure. Type 2 surgery, phone assessment complete. Orders NOT received. Labs per surgeon: unknown; no orders received in EHR at time of assessment. Labs per anesthesia protocol: HGB, K+, Creatinine. Had CBC and BMP drawn on 23; results within anesthesia protocols and within 30 days. EKG: completed on  2/3/23; result within anesthesia protocols. Result available in EHR for reference. Patient answered medical/surgical history questions at their best of ability. All prior to admission medications documented in [facility]. Patient instructed to take the following medications the day of surgery according to anesthesia guidelines with a small sip of water: Albuterol inhaler if needed, Flovent inhaler, Gabapentin, Levothyroxine, Omeprazole. On the day before surgery please take Acetaminophen 1000mg in the morning and then again before bed. You may substitute for Tylenol 650 mg. Hold all vitamins 7 days prior to surgery and NSAIDS 5 days prior to surgery. Prescription meds to hold: Xarelto as instructed by surgeon. Patient instructed on the following:  >Bring inhalers dos   > Arrive at A Entrance, time of arrival to be called the day before by 1700  > NPO after midnight, unless otherwise indicated, including gum, mints, and ice chips  > Responsible adult must drive patient to the hospital, stay during surgery, and patient will need supervision 24 hours after anesthesia  > Use dial antibacterial soap in shower the night before surgery and on the morning of surgery  > All piercings must be removed prior to arrival.    > Leave all valuables (money and jewelry) at home but bring insurance card and ID on DOS.   > You may be required to pay a deductible or co-pay on the day of your procedure.  You can pre-pay by calling [phone] if your surgery is at the [facility] or [phone] if your surgery is at the [facility]. > Do not wear make-up, nail polish, lotions, cologne, perfumes, powders, or oil on skin. Artificial nails are not permitted.

## 2023-02-27 RX ORDER — FLUTICASONE PROPIONATE 220 UG/1
AEROSOL, METERED RESPIRATORY (INHALATION)
Qty: 12 G | Refills: 3 | Status: SHIPPED | OUTPATIENT
Start: 2023-02-27

## 2023-02-27 NOTE — TELEPHONE ENCOUNTER
Requested Prescriptions     Pending Prescriptions Disp Refills    FLOVENT  MCG/ACT inhaler [Pharmacy Med Name: FLOVENT  MCG INHAL 220 Aerosol] 12 g 3     Sig: INHALE 2 PUFFS TWICE A DAY     Dose verified and to [facility], Patient is scheduled for follow up visit.

## 2023-02-28 ENCOUNTER — CARE COORDINATION (OUTPATIENT)
Dept: CARE COORDINATION | Facility: CLINIC | Age: 83
End: 2023-02-28

## 2023-02-28 NOTE — CARE COORDINATION
[facility] Care Transitions Follow Up Call    Patient Current Location:  Home: [address] [place] [address]  [address] Coordinator contacted the patient by telephone to follow up after admission on 2/3/2023. Verified name and  with patient as identifiers. Patient: Brett Barajas  Patient : 1940   MRN: [id number]  Reason for Admission: SBO  Discharge Date: 23 RARS: Readmission Risk Score: 9.1      Needs to be reviewed by the provider   Additional needs identified to be addressed with provider: No  none             Method of communication with provider: none. Spoke with patient states doing fine. Patient denies any concerns during this phone call. Patient states attended PCP appointment on 2023. Patient states she surgery on 3/7/2023 for a hernia repair. Addressed changes since last contact:  none  Discussed follow-up appointments. If no appointment was previously scheduled, appointment scheduling offered: Yes. Is follow up appointment scheduled within 7 days of discharge? Yes. Follow Up  Future Appointments   Date Time Provider Luis Hodges   3/16/2023  3:20 PM Piper Jean Baptiste MD SIM [facility] AMB   3/21/2023  2:00 PM MARY Bobby - NP CSAE G[facility] AMB     Non-[facility] follow up appointment(s): n/a    LPN Care Coordinator reviewed medical action plan with patient and discussed any barriers to care and/or understanding of plan of care after discharge. Discussed appropriate site of care based on symptoms and resources available to patient including: PCP  Specialist  When to call 911. The patient agrees to contact the PCP office for questions related to their healthcare. Advance Care Planning:   decision maker updated.      Patients top risk factors for readmission: medical condition-SBO  Interventions to address risk factors: Assessment and support for treatment adherence and medication management-     Offered patient enrollment in the Remote Patient Monitoring (RPM) program for in-home monitoring: NA.     Care Transitions Subsequent and Final Call    Subsequent and Final Calls  Do you have any ongoing symptoms?: No  Have your medications changed?: No  Do you have any questions related to your medications?: No  Do you currently have any active services?: No  Do you have any needs or concerns that I can assist you with?: No  Identified Barriers: None  Care Transitions Interventions  No Identified Needs  Other Interventions:            Goals Addressed                      This Visit's Progress      Medication Management (pt-stated)   On track      I will take my medication as directed. Barriers: none  Plan for overcoming my barriers: Patient states take all medication as directed  Confidence: 8/10  Anticipated Goal Completion Date: within 30 days               LPN Care Coordinator provided contact information for future needs. Plan for follow-up call in 10-14 days based on severity of symptoms and risk factors. Plan for next call: self management-diet, medication compliance, follow up appointment.     Brijesh Hill LPN

## 2023-03-06 ENCOUNTER — ANESTHESIA EVENT (OUTPATIENT)
Dept: SURGERY | Age: 83
End: 2023-03-06
Payer: MEDICARE

## 2023-03-07 ENCOUNTER — ANESTHESIA (OUTPATIENT)
Dept: SURGERY | Age: 83
End: 2023-03-07
Payer: MEDICARE

## 2023-03-07 ENCOUNTER — HOSPITAL ENCOUNTER (OUTPATIENT)
Age: 83
Setting detail: OUTPATIENT SURGERY
Discharge: HOME | End: 2023-03-07
Attending: SURGERY | Admitting: SURGERY
Payer: MEDICARE

## 2023-03-07 VITALS
HEIGHT: 62 IN | SYSTOLIC BLOOD PRESSURE: 176 MMHG | BODY MASS INDEX: 35.34 KG/M2 | DIASTOLIC BLOOD PRESSURE: 72 MMHG | TEMPERATURE: 98.4 F | OXYGEN SATURATION: 92 % | WEIGHT: 192.02 LBS | HEART RATE: 66 BPM | RESPIRATION RATE: 16 BRPM

## 2023-03-07 DIAGNOSIS — K43.2 INCISIONAL HERNIA, WITHOUT OBSTRUCTION OR GANGRENE: Primary | ICD-10-CM

## 2023-03-07 PROCEDURE — 3600000014 HC SURGERY LEVEL 4 ADDTL 15MIN: Performed by: SURGERY

## 2023-03-07 PROCEDURE — C1781 MESH (IMPLANTABLE): HCPCS | Performed by: SURGERY

## 2023-03-07 PROCEDURE — 49594 RPR AA HRN 1ST 3-10 NCR/STRN: CPT | Performed by: SURGERY

## 2023-03-07 PROCEDURE — 3700000000 HC ANESTHESIA ATTENDED CARE: Performed by: SURGERY

## 2023-03-07 PROCEDURE — 6370000000 HC RX 637 (ALT 250 FOR IP): Performed by: ANESTHESIOLOGY

## 2023-03-07 PROCEDURE — 7100000001 HC PACU RECOVERY - ADDTL 15 MIN: Performed by: SURGERY

## 2023-03-07 PROCEDURE — 7100000010 HC PHASE II RECOVERY - FIRST 15 MIN: Performed by: SURGERY

## 2023-03-07 PROCEDURE — 2500000003 HC RX 250 WO HCPCS: Performed by: ANESTHESIOLOGY

## 2023-03-07 PROCEDURE — 6360000002 HC RX W HCPCS: Performed by: SURGERY

## 2023-03-07 PROCEDURE — 2500000003 HC RX 250 WO HCPCS: Performed by: SURGERY

## 2023-03-07 PROCEDURE — 6360000002 HC RX W HCPCS: Performed by: NURSE ANESTHETIST, CERTIFIED REGISTERED

## 2023-03-07 PROCEDURE — 2580000003 HC RX 258: Performed by: ANESTHESIOLOGY

## 2023-03-07 PROCEDURE — 3600000004 HC SURGERY LEVEL 4 BASE: Performed by: SURGERY

## 2023-03-07 PROCEDURE — 7100000000 HC PACU RECOVERY - FIRST 15 MIN: Performed by: SURGERY

## 2023-03-07 PROCEDURE — 6360000002 HC RX W HCPCS: Performed by: ANESTHESIOLOGY

## 2023-03-07 PROCEDURE — 7100000011 HC PHASE II RECOVERY - ADDTL 15 MIN: Performed by: SURGERY

## 2023-03-07 PROCEDURE — 2500000003 HC RX 250 WO HCPCS: Performed by: NURSE ANESTHETIST, CERTIFIED REGISTERED

## 2023-03-07 PROCEDURE — 2709999900 HC NON-CHARGEABLE SUPPLY: Performed by: SURGERY

## 2023-03-07 PROCEDURE — 3700000001 HC ADD 15 MINUTES (ANESTHESIA): Performed by: SURGERY

## 2023-03-07 DEVICE — BARD VENTRALEX HERNIA PATCH, 6.4 CM (2.5"), MEDIUM CIRCLE WITH STRAP
Type: IMPLANTABLE DEVICE | Site: UMBILICAL | Status: FUNCTIONAL
Brand: VENTRALEX

## 2023-03-07 RX ORDER — SODIUM CHLORIDE 9 MG/ML
INJECTION, SOLUTION INTRAVENOUS PRN
Status: DISCONTINUED | OUTPATIENT
Start: 2023-03-07 | End: 2023-03-07 | Stop reason: HOSPADM

## 2023-03-07 RX ORDER — DEXTROSE MONOHYDRATE 100 MG/ML
INJECTION, SOLUTION INTRAVENOUS CONTINUOUS PRN
Status: DISCONTINUED | OUTPATIENT
Start: 2023-03-07 | End: 2023-03-07 | Stop reason: HOSPADM

## 2023-03-07 RX ORDER — SODIUM CHLORIDE 0.9 % (FLUSH) 0.9 %
5-40 SYRINGE (ML) INJECTION EVERY 12 HOURS SCHEDULED
Status: DISCONTINUED | OUTPATIENT
Start: 2023-03-07 | End: 2023-03-07 | Stop reason: HOSPADM

## 2023-03-07 RX ORDER — ROCURONIUM BROMIDE 10 MG/ML
INJECTION, SOLUTION INTRAVENOUS PRN
Status: DISCONTINUED | OUTPATIENT
Start: 2023-03-07 | End: 2023-03-07 | Stop reason: SDUPTHER

## 2023-03-07 RX ORDER — OXYCODONE HYDROCHLORIDE 5 MG/1
5 TABLET ORAL
Status: COMPLETED | OUTPATIENT
Start: 2023-03-07 | End: 2023-03-07

## 2023-03-07 RX ORDER — FENTANYL CITRATE 50 UG/ML
INJECTION, SOLUTION INTRAMUSCULAR; INTRAVENOUS PRN
Status: DISCONTINUED | OUTPATIENT
Start: 2023-03-07 | End: 2023-03-07 | Stop reason: SDUPTHER

## 2023-03-07 RX ORDER — BUPIVACAINE HYDROCHLORIDE 5 MG/ML
INJECTION, SOLUTION EPIDURAL; INTRACAUDAL PRN
Status: DISCONTINUED | OUTPATIENT
Start: 2023-03-07 | End: 2023-03-07 | Stop reason: HOSPADM

## 2023-03-07 RX ORDER — SODIUM CHLORIDE, SODIUM LACTATE, POTASSIUM CHLORIDE, CALCIUM CHLORIDE 600; 310; 30; 20 MG/100ML; MG/100ML; MG/100ML; MG/100ML
INJECTION, SOLUTION INTRAVENOUS CONTINUOUS
Status: DISCONTINUED | OUTPATIENT
Start: 2023-03-07 | End: 2023-03-07 | Stop reason: HOSPADM

## 2023-03-07 RX ORDER — FENTANYL CITRATE 50 UG/ML
100 INJECTION, SOLUTION INTRAMUSCULAR; INTRAVENOUS
Status: DISCONTINUED | OUTPATIENT
Start: 2023-03-07 | End: 2023-03-07 | Stop reason: HOSPADM

## 2023-03-07 RX ORDER — SODIUM CHLORIDE 0.9 % (FLUSH) 0.9 %
5-40 SYRINGE (ML) INJECTION PRN
Status: DISCONTINUED | OUTPATIENT
Start: 2023-03-07 | End: 2023-03-07 | Stop reason: HOSPADM

## 2023-03-07 RX ORDER — GLYCOPYRROLATE 0.2 MG/ML
INJECTION INTRAMUSCULAR; INTRAVENOUS PRN
Status: DISCONTINUED | OUTPATIENT
Start: 2023-03-07 | End: 2023-03-07 | Stop reason: SDUPTHER

## 2023-03-07 RX ORDER — ACETAMINOPHEN 500 MG
1000 TABLET ORAL ONCE
Status: COMPLETED | OUTPATIENT
Start: 2023-03-07 | End: 2023-03-07

## 2023-03-07 RX ORDER — ESMOLOL HYDROCHLORIDE 10 MG/ML
INJECTION INTRAVENOUS PRN
Status: DISCONTINUED | OUTPATIENT
Start: 2023-03-07 | End: 2023-03-07 | Stop reason: SDUPTHER

## 2023-03-07 RX ORDER — LIDOCAINE HYDROCHLORIDE 20 MG/ML
INJECTION, SOLUTION EPIDURAL; INFILTRATION; INTRACAUDAL; PERINEURAL PRN
Status: DISCONTINUED | OUTPATIENT
Start: 2023-03-07 | End: 2023-03-07 | Stop reason: SDUPTHER

## 2023-03-07 RX ORDER — DIPHENHYDRAMINE HYDROCHLORIDE 50 MG/ML
12.5 INJECTION INTRAMUSCULAR; INTRAVENOUS
Status: DISCONTINUED | OUTPATIENT
Start: 2023-03-07 | End: 2023-03-07 | Stop reason: HOSPADM

## 2023-03-07 RX ORDER — MIDAZOLAM HYDROCHLORIDE 1 MG/ML
2 INJECTION INTRAMUSCULAR; INTRAVENOUS
Status: DISCONTINUED | OUTPATIENT
Start: 2023-03-07 | End: 2023-03-07 | Stop reason: HOSPADM

## 2023-03-07 RX ORDER — DEXAMETHASONE SODIUM PHOSPHATE 10 MG/ML
INJECTION INTRAMUSCULAR; INTRAVENOUS PRN
Status: DISCONTINUED | OUTPATIENT
Start: 2023-03-07 | End: 2023-03-07 | Stop reason: SDUPTHER

## 2023-03-07 RX ORDER — PROPOFOL 10 MG/ML
INJECTION, EMULSION INTRAVENOUS PRN
Status: DISCONTINUED | OUTPATIENT
Start: 2023-03-07 | End: 2023-03-07 | Stop reason: SDUPTHER

## 2023-03-07 RX ORDER — ONDANSETRON 2 MG/ML
INJECTION INTRAMUSCULAR; INTRAVENOUS PRN
Status: DISCONTINUED | OUTPATIENT
Start: 2023-03-07 | End: 2023-03-07 | Stop reason: SDUPTHER

## 2023-03-07 RX ORDER — OXYCODONE HYDROCHLORIDE AND ACETAMINOPHEN 5; 325 MG/1; MG/1
1 TABLET ORAL EVERY 6 HOURS PRN
Qty: 20 TABLET | Refills: 0 | Status: SHIPPED | OUTPATIENT
Start: 2023-03-07 | End: 2023-03-14

## 2023-03-07 RX ORDER — PROCHLORPERAZINE EDISYLATE 5 MG/ML
5 INJECTION INTRAMUSCULAR; INTRAVENOUS
Status: DISCONTINUED | OUTPATIENT
Start: 2023-03-07 | End: 2023-03-07 | Stop reason: HOSPADM

## 2023-03-07 RX ORDER — NEOSTIGMINE METHYLSULFATE 1 MG/ML
INJECTION, SOLUTION INTRAVENOUS PRN
Status: DISCONTINUED | OUTPATIENT
Start: 2023-03-07 | End: 2023-03-07 | Stop reason: SDUPTHER

## 2023-03-07 RX ORDER — LIDOCAINE HYDROCHLORIDE 10 MG/ML
1 INJECTION, SOLUTION INFILTRATION; PERINEURAL
Status: COMPLETED | OUTPATIENT
Start: 2023-03-07 | End: 2023-03-07

## 2023-03-07 RX ORDER — EPHEDRINE SULFATE/0.9% NACL/PF 50 MG/5 ML
SYRINGE (ML) INTRAVENOUS PRN
Status: DISCONTINUED | OUTPATIENT
Start: 2023-03-07 | End: 2023-03-07 | Stop reason: SDUPTHER

## 2023-03-07 RX ADMIN — Medication 4 MG: at 12:54

## 2023-03-07 RX ADMIN — ONDANSETRON 4 MG: 2 INJECTION INTRAMUSCULAR; INTRAVENOUS at 12:02

## 2023-03-07 RX ADMIN — LIDOCAINE HYDROCHLORIDE 1 ML: 10 INJECTION, SOLUTION INFILTRATION; PERINEURAL at 11:11

## 2023-03-07 RX ADMIN — PHENYLEPHRINE HYDROCHLORIDE 25 MCG: 0.1 INJECTION, SOLUTION INTRAVENOUS at 12:24

## 2023-03-07 RX ADMIN — ACETAMINOPHEN 1000 MG: 500 TABLET, FILM COATED ORAL at 11:02

## 2023-03-07 RX ADMIN — FENTANYL CITRATE 50 MCG: 50 INJECTION, SOLUTION INTRAMUSCULAR; INTRAVENOUS at 11:54

## 2023-03-07 RX ADMIN — HYDROMORPHONE HYDROCHLORIDE 0.5 MG: 1 INJECTION, SOLUTION INTRAMUSCULAR; INTRAVENOUS; SUBCUTANEOUS at 13:30

## 2023-03-07 RX ADMIN — Medication 5 MG: at 12:14

## 2023-03-07 RX ADMIN — GLYCOPYRROLATE 0.6 MG: 0.2 INJECTION INTRAMUSCULAR; INTRAVENOUS at 12:54

## 2023-03-07 RX ADMIN — ESMOLOL HYDROCHLORIDE 15 MG: 10 INJECTION, SOLUTION INTRAVENOUS at 13:04

## 2023-03-07 RX ADMIN — FENTANYL CITRATE 25 MCG: 50 INJECTION, SOLUTION INTRAMUSCULAR; INTRAVENOUS at 13:10

## 2023-03-07 RX ADMIN — Medication 5 MG: at 12:08

## 2023-03-07 RX ADMIN — FENTANYL CITRATE 25 MCG: 50 INJECTION, SOLUTION INTRAMUSCULAR; INTRAVENOUS at 12:45

## 2023-03-07 RX ADMIN — SODIUM CHLORIDE, SODIUM LACTATE, POTASSIUM CHLORIDE, AND CALCIUM CHLORIDE: 600; 310; 30; 20 INJECTION, SOLUTION INTRAVENOUS at 11:11

## 2023-03-07 RX ADMIN — OXYCODONE HYDROCHLORIDE 5 MG: 5 TABLET ORAL at 14:09

## 2023-03-07 RX ADMIN — Medication 2000 MG: at 12:00

## 2023-03-07 RX ADMIN — PROPOFOL 160 MG: 10 INJECTION, EMULSION INTRAVENOUS at 11:55

## 2023-03-07 RX ADMIN — DEXAMETHASONE SODIUM PHOSPHATE 5 MG: 10 INJECTION INTRAMUSCULAR; INTRAVENOUS at 12:02

## 2023-03-07 RX ADMIN — SODIUM CHLORIDE, SODIUM LACTATE, POTASSIUM CHLORIDE, AND CALCIUM CHLORIDE: 600; 310; 30; 20 INJECTION, SOLUTION INTRAVENOUS at 12:39

## 2023-03-07 RX ADMIN — PHENYLEPHRINE HYDROCHLORIDE 50 MCG: 0.1 INJECTION, SOLUTION INTRAVENOUS at 12:02

## 2023-03-07 RX ADMIN — Medication 5 MG: at 12:02

## 2023-03-07 RX ADMIN — LIDOCAINE HYDROCHLORIDE 100 MG: 20 INJECTION, SOLUTION EPIDURAL; INFILTRATION; INTRACAUDAL; PERINEURAL at 11:55

## 2023-03-07 RX ADMIN — HYDROMORPHONE HYDROCHLORIDE 0.5 MG: 1 INJECTION, SOLUTION INTRAMUSCULAR; INTRAVENOUS; SUBCUTANEOUS at 13:20

## 2023-03-07 RX ADMIN — ROCURONIUM BROMIDE 40 MG: 10 INJECTION, SOLUTION INTRAVENOUS at 11:55

## 2023-03-07 RX ADMIN — PHENYLEPHRINE HYDROCHLORIDE 25 MCG: 0.1 INJECTION, SOLUTION INTRAVENOUS at 12:36

## 2023-03-07 RX ADMIN — PHENYLEPHRINE HYDROCHLORIDE 50 MCG: 0.1 INJECTION, SOLUTION INTRAVENOUS at 12:08

## 2023-03-07 ASSESSMENT — PAIN DESCRIPTION - FREQUENCY: FREQUENCY: CONTINUOUS

## 2023-03-07 ASSESSMENT — PAIN SCALES - GENERAL
PAINLEVEL_OUTOF10: 8
PAINLEVEL_OUTOF10: 5
PAINLEVEL_OUTOF10: 8
PAINLEVEL_OUTOF10: 5
PAINLEVEL_OUTOF10: 8
PAINLEVEL_OUTOF10: 6
PAINLEVEL_OUTOF10: 5
PAINLEVEL_OUTOF10: 8
PAINLEVEL_OUTOF10: 5
PAINLEVEL_OUTOF10: 6
PAINLEVEL_OUTOF10: 5
PAINLEVEL_OUTOF10: 5

## 2023-03-07 ASSESSMENT — PAIN DESCRIPTION - DESCRIPTORS: DESCRIPTORS: ACHING;SORE

## 2023-03-07 ASSESSMENT — PAIN DESCRIPTION - LOCATION
LOCATION: ABDOMEN
LOCATION: ABDOMEN

## 2023-03-07 ASSESSMENT — PAIN DESCRIPTION - PAIN TYPE: TYPE: SURGICAL PAIN

## 2023-03-07 ASSESSMENT — PAIN - FUNCTIONAL ASSESSMENT: PAIN_FUNCTIONAL_ASSESSMENT: NONE - DENIES PAIN

## 2023-03-07 ASSESSMENT — PAIN DESCRIPTION - ONSET: ONSET: ON-GOING

## 2023-03-07 NOTE — ANESTHESIA PRE PROCEDURE
Department of Anesthesiology  Preprocedure Note       Name:  Opal Deras   Age:  82 y.o.  :  1940                                          MRN:  [id number]         Date:  3/7/2023      Surgeon: Griffin Johnson):  John Dial MD    Procedure: Procedure(s):  OPEND VENTRAL HERNIA REPAIR WITH MESH    Medications prior to admission:   Prior to Admission medications    Medication Sig Start Date End Date Taking? Authorizing Provider   BIOTIN PO Take by mouth daily   Yes Historical Provider, MD   FLOVENT  MCG/ACT inhaler INHALE 2 PUFFS TWICE A DAY 23   Jordan Siegel MD   losartan (COZAAR) 25 MG tablet Take 1 tablet by mouth daily 23   Jordan Siegel MD   calcium carbonate (OSCAL) 500 MG TABS tablet Take 500 mg by mouth 2 times daily    Historical Provider, MD   atorvastatin (LIPITOR) 40 MG tablet TAKE 1 TABLET BY MOUTH DAILY 22   Jordan Siegel MD   levothyroxine (SYNTHROID) 25 MCG tablet TAKE 1 TABLET BY MOUTH EVERY MORNING. 22   Jordan Siegel MD   potassium chloride (KLOR-CON M) 20 MEQ extended release tablet TAKE 1 TABLET BY MOUTH TWICE A DAY 22   Jordan Siegel MD   gabapentin (NEURONTIN) 300 MG capsule Take one capsule by mouth three times a day  Patient taking differently: Take 300 mg by mouth in the morning and at bedtime.  Take one capsule by mouth 2 times a day 22  Jordan Siegel MD   albuterol sulfate  (90 Base) MCG/ACT inhaler Inhale 2 puffs into the lungs every 6 hours as needed    Ar Automatic Reconciliation   furosemide (LASIX) 40 MG tablet Take 40 mg by mouth daily 21   Ar Automatic Reconciliation   magnesium oxide (MAG-OX) 400 MG tablet Take 400 mg by mouth daily OTC 20   Ar Automatic Reconciliation   omeprazole (PRILOSEC OTC) 20 MG tablet Take 20 mg by mouth daily    Ar Automatic Reconciliation   ondansetron (ZOFRAN-ODT) 4 MG disintegrating tablet Take 4 mg by mouth every 8 hours as needed for Nausea 10/5/18   Ar Automatic Reconciliation rivaroxaban (XARELTO) 10 MG TABS tablet Take 10 mg by mouth Daily with supper    Ar Automatic Reconciliation       Current medications:    Current Facility-Administered Medications   Medication Dose Route Frequency Provider Last Rate Last Admin    sodium chloride flush 0.9 % injection 5-40 mL  5-40 mL IntraVENous 2 times per day Sebastián Encarnacion MD        sodium chloride flush 0.9 % injection 5-40 mL  5-40 mL IntraVENous PRN Sebastián Encarnacion MD        0.9 % sodium chloride infusion   IntraVENous PRN Sebastián Encarnacion MD        ceFAZolin (ANCEF) 2000 mg in sterile water 20 mL IV syringe  2,000 mg IntraVENous On Call to [address] [place] [address] MD Oliver        lidocaine 1 % injection 1 mL  1 mL IntraDERmal Once PRN Mari Winters MD        acetaminophen (TYLENOL) tablet 1,000 mg  1,000 mg Oral Once Mari Winters MD        fentaNYL (SUBLIMAZE) injection 100 mcg  100 mcg IntraVENous Once PRN Mari Winters MD        lactated ringers IV soln infusion   IntraVENous Continuous Mari Winters MD        sodium chloride flush 0.9 % injection 5-40 mL  5-40 mL IntraVENous 2 times per day Mari Winters MD        sodium chloride flush 0.9 % injection 5-40 mL  5-40 mL IntraVENous PRN Mari Winters MD        0.9 % sodium chloride infusion   IntraVENous PRN Mari Winters MD        midazolam (VERSED) injection 2 mg  2 mg IntraVENous Once PRN Mari Winters MD           Allergies: Allergies   Allergen Reactions    Amoxicillin Rash     Classic drug rash    Pneumococcal Vaccines Other (See Comments)     fever  Other reaction(s): Other (comments)  fever  Other reaction(s):  Other (comments)  fever         Problem List:    Patient Active Problem List   Diagnosis Code    Hypertension, essential I10    Colon polyps K63.5    Primary insomnia F51.01    Pure hypercholesterolemia E78.00    Chronic kidney disease, stage III (moderate) (HCC) N18.30    Obesity E66.9    Gastroesophageal reflux disease with esophagitis without hemorrhage K21.00    Osteopenia of neck of right femur M85.851    Hypothyroidism E03.9    Venous insufficiency of both lower extremities I87.2    Idiopathic peripheral neuropathy G60.9    Encounter for immunization Z23    Recurrent cellulitis of lower extremity L03.119    Environmental and seasonal allergies J30.89    Chronic deep vein thrombosis (DVT) of right femoral vein (HCC) I82.511    Female stress incontinence N39.3    Chronic left-sided low back pain with left-sided sciatica M54.42, G89.29    Moderate persistent asthma without complication Q24.63    Post-thrombotic syndrome I87.009    Raynaud's phenomenon I73.00    Skin lesion of chest wall L98.9    SBO (small bowel obstruction) ([facility] [address] K56.609       Past Medical History:        Diagnosis Date    Acute pancreatitis 10/12/2018    Asthma     Flovent daily; Albuterol prn-last used about 6 months ago (noted 2/22/23)    BMI 34.0-34.9,adult     Calculus of kidney     CKD (chronic kidney disease)     stage 3 per patient; followed by PCP    Clostridium difficile infection 12/2020    caused by IV antibiotics    Colon polyps     Female stress incontinence 8/16/2012    GERD (gastroesophageal reflux disease)     omeprazole daily    History of DVT (deep vein thrombosis)     9/2021; on Xarelto 10 mg daily for prevention    History of gastric ulcer     2018    History of kidney stones 8/16/2012    History of septic arthritis 09/10/2019    Right knee.   Improved with prolonged IV anitbiotics    Hypercholesterolemia     on med for control    Hypertension     on med for control    Hypothyroidism     on med for control    OA (osteoarthritis)     Ophthalmic migraine     Osteopenia of neck of right femur 2/25/2015    Primary insomnia 07/17/2017    Raynaud's phenomenon 2/25/2015    Recurrent cellulitis of lower extremity 12/28/2020    S/P total knee arthroplasty 8/16/2012    Bilateral--2009     Skin cancer ([facility]oma) ([facility]     Umbilical hernia 5/46/7123    Venous insufficiency of both lower extremities 1/28/2014    Worse on left        Past Surgical History:        Procedure Laterality Date    CATARACT EXTRACTION Bilateral     COLONOSCOPY  Jan 2013    4 polyps, repeat 5 years    COLONOSCOPY  7/5/2016    repeat 5 yrs tubular adenoma    ESOPHAGEAL DILATATION      HYSTERECTOMY, TOTAL ABDOMINAL (CERVIX REMOVED)      REMOVAL OF KIDNEY STONE      SALPINGO-OOPHORECTOMY      TOTAL KNEE ARTHROPLASTY Bilateral     partial       Social History:    Social History     Tobacco Use    Smoking status: Never    Smokeless tobacco: Never   Substance Use Topics    Alcohol use: No                                Counseling given: Not Answered      Vital Signs (Current):   Vitals:    02/22/23 1328 03/07/23 1007   BP:  127/74   Pulse:  75   Resp:  18   Temp:  98.4 °F (36.9 °C)   SpO2:  97%   Weight: 190 lb (86.2 kg) 192 lb 0.3 oz (87.1 kg)   Height: 5' 2\" (1.575 m)                                               BP Readings from Last 3 Encounters:   03/07/23 127/74   02/21/23 (!) 145/75   02/16/23 (!) 148/80       NPO Status: Time of last liquid consumption: 0800                                                 Date of last liquid consumption: 03/07/23                        Date of last solid food consumption: 03/06/23    BMI:   Wt Readings from Last 3 Encounters:   03/07/23 192 lb 0.3 oz (87.1 kg)   02/21/23 190 lb 4.8 oz (86.3 kg)   02/16/23 187 lb (84.8 kg)     Body mass index is 35.12 kg/m².     CBC:   Lab Results   Component Value Date/Time    WBC 6.7 02/09/2023 10:36 AM    RBC 3.78 02/09/2023 10:36 AM    HGB 11.2 02/09/2023 10:36 AM    HCT 35.6 02/09/2023 10:36 AM    MCV 94.2 02/09/2023 10:36 AM    RDW 13.9 02/09/2023 10:36 AM     02/09/2023 10:36 AM       CMP:   Lab Results   Component Value Date/Time     02/09/2023 10:36 AM    K 4.3 02/09/2023 10:36 AM     02/09/2023 10:36 AM    CO2 24 02/09/2023 10:36 AM    BUN 20 02/09/2023 10:36 AM    CREATININE 1.30 02/09/2023 10:36 AM    GFRAA 47 08/09/2022 08:19 AM    AGRATIO 1.5 02/08/2022 10:25 AM    LABGLOM 41 02/09/2023 10:36 AM    GLUCOSE 99 02/09/2023 10:36 AM    PROT 6.2 02/04/2023 05:41 AM    CALCIUM 9.4 02/09/2023 10:36 AM    BILITOT 0.4 02/04/2023 05:41 AM    ALKPHOS 67 02/04/2023 05:41 AM    ALKPHOS 88 02/08/2022 10:25 AM    AST 23 02/04/2023 05:41 AM    ALT 19 02/04/2023 05:41 AM       POC Tests: No results for input(s): POCGLU, POCNA, POCK, POCCL, POCBUN, POCHEMO, POCHCT in the last 72 hours.    Coags: No results found for: PROTIME, INR, APTT    HCG (If Applicable): No results found for: PREGTESTUR, PREGSERUM, HCG, HCGQUANT     ABGs: No results found for: PHART, PO2ART, SQW5MWP, YBL5SGZ, BEART, B2XNEOSO     Type & Screen (If Applicable):  No results found for: LABABO, LABRH    Drug/Infectious Status (If Applicable):  No results found for: HIV, HEPCAB    COVID-19 Screening (If Applicable):   Lab Results   Component Value Date/Time    COVID19 Not detected 12/31/2020 11:45 AM    COVID19 Negative 12/31/2020 11:45 AM           Anesthesia Evaluation  Patient summary reviewed and Nursing notes reviewed no history of anesthetic complications:   Airway: Mallampati: II  TM distance: >3 FB   Neck ROM: full  Mouth opening: > = 3 FB   Dental: normal exam         Pulmonary:normal exam    (+) asthma:                            Cardiovascular:  Exercise tolerance: good (>4 METS),   (+) hypertension:, hyperlipidemia        Rhythm: regular  Rate: normal                    Neuro/Psych:   (+) headaches:,              ROS comment: Neuropathy? GI/Hepatic/Renal:   (+) GERD: well controlled, renal disease: CRI,           Endo/Other:    (+) hypothyroidism: arthritis:., .                 Abdominal:             Vascular:   + DVT (Xarelto held for a few days), .       Other Findings:           Anesthesia Plan      general     ASA 3     (GETA)  Induction: intravenous.      Anesthetic plan and  risks discussed with patient.                         Shy Payan MD   3/7/2023

## 2023-03-07 NOTE — DISCHARGE INSTRUCTIONS
No bathtub or pool for 2 weeks. Ok to shower. No weight lifting greater than 20 lbs for 4 weeks. Remove the clear dressing and gauze dressing from your umbilicus in 2 days. Leave the Steri strips or skin glue in place. They will fall off on their own in 7-10 days. If not already scheduled, please call the office and schedule a 2 week postoperative follow up. Take tylenol or ibuprofen (if not allergic) as needed for mild pain every 4-6 hours. Percocet prescribed for mod to severe pain. This is a narcotic and can cause drowsiness, nausea and vomiting and constipation. Take Colace 100mg BID (over the counter) if constipated.

## 2023-03-07 NOTE — OP NOTE
Operative Note      Patient: Ela Dill  YOB: 1940  MRN: [id number]    Date of Procedure: 3/7/2023    Pre-Op Diagnosis: Bowel obstruction ([address] [Q54.529]    Post-Op Diagnosis: Same, incarcerated infraumbilical ventral hernia       Procedure(s):  OPEND VENTRAL [address] - incarcerated 3.5 cm [address]    Surgeon(s):  Jarett Ross MD    Assistant:   * No surgical staff found *    Anesthesia: General    Estimated Blood Loss (mL): Minimal    Complications: None    Specimens:   * No specimens in log *    Implants:  Implant Name Type Inv. Item Serial No.  Lot No. LRB No. Used Action   [address] 4CM VENTRAL POLYPR EPTFE CIR SELF EXP PTCH - [id number]  [address] 4CM VENTRAL POLYPR EPTFE CIR SELF EXP [facility] [id number] BARD DAVOL-WD [id number] N/A 1 Implanted         Drains: * No LDAs found *    Findings: incarcerated 3 cm  infraumbilical hernia    Detailed Description of Procedure:   After informed consent was taken, patient was taken to the operating room and placed in supine position. Anesthesia was induced and patient was intubated. Patient received preoperative antibiotics. Abdomen was prepped and draped in standard sterile fashion. A timeout was performed with all team member present. A curvilinear infraumbilical incisional was made. The hernia sac was immediately noticeable inferior to the umbilicus. There appear to be fat incarcerated in the hernia sac. The sac was elevated and dissected to the abdominal wall. The umbilical stalk was dissected circumferentially and carefully divided. The hernia sac was dissected circumferentially off the fascia. The hernia sac was resected and the the omentum reduced intraperitoneally. A 6.4 cm BARD Ventralex hernia patch was placed preperitoneally and secured circumferentially to the fascia with interrupted 0-Ethibond sutures.   The fascia was closed horizontally with interrupted 0-Ethibond sutures including the a piece of the mesh to prevent migration. The wound was irrigated and suctioned. The umbilical stalk was reattached with 3-0 Vicryl sutures. The deep dermis was closed with interrupted 3-0 Vicryl sutures. The skin was closed with 4-0 Monocryl in a running fashion. The wound was cleaned and dried. Mastisol and Steri strips were placed. A sterile dressing was placed at the umbilicus using 0Y0'O gauze and Tegaderm. All counts were reported as correct. The patient was awakened from anesthesia, transferred to a stretcher, and transported to the PACU in good condition.         Signed by: Adrianna Larry MD  [facility] - Bariatric & Minimally Invasive Surgery  3/7/2023 1:05 PM

## 2023-03-07 NOTE — INTERVAL H&P NOTE
Update History & Physical    The patient's History and Physical of February 21, ventral hernia repair with mesh was reviewed with the patient and I examined the patient. There was no change. The surgical site was confirmed by the patient and me. Plan: The risks, benefits, expected outcome, and alternative to the recommended procedure have been discussed with the patient. Patient understands and wants to proceed with the procedure.      Electronically signed by Jerrod Garcia MD on 3/7/2023 at 12:05 PM

## 2023-03-07 NOTE — ANESTHESIA POSTPROCEDURE EVALUATION
Department of Anesthesiology  Postprocedure Note    Patient: Jody Moyer  MRN: [id number]  YOB: 1940  Date of evaluation: 3/7/2023      Procedure Summary     Date: 03/07/23 Room / Location: [facility] MAIN OR 04 / [facility] MAIN OR    Anesthesia Start: 1140 Anesthesia Stop: 1316    Procedure: OPEND VENTRAL HERNIA REPAIR WITH MESH Diagnosis:       Bowel obstruction (HCC)      (Bowel obstruction (HCC) [K56.609])    Surgeons: Анна Arcos MD Responsible Provider: Stefano Nelson MD    Anesthesia Type: General ASA Status: 3          Anesthesia Type: General    Jazmine Phase I: Jazmine Score: 8    Jazmine Phase II:        Anesthesia Post Evaluation    Patient location during evaluation: PACU  Patient participation: complete - patient participated  Level of consciousness: awake and alert  Airway patency: patent  Nausea & Vomiting: no nausea and no vomiting  Complications: no  Cardiovascular status: hemodynamically stable  Respiratory status: acceptable, nonlabored ventilation and spontaneous ventilation  Hydration status: euvolemic  Comments: BP (!) 176/72   Pulse 66   Temp 98.4 °F (36.9 °C) (Temporal)   Resp 16   Ht 5' 2\" (1.575 m)   Wt 192 lb 0.3 oz (87.1 kg)   SpO2 92%   BMI 35.12 kg/m²     Multimodal analgesia pain management approach

## 2023-03-09 ASSESSMENT — ENCOUNTER SYMPTOMS
CONSTIPATION: 0
VOMITING: 0
ABDOMINAL PAIN: 0
NAUSEA: 0
GASTROINTESTINAL NEGATIVE: 1
DIARRHEA: 0
RESPIRATORY NEGATIVE: 1

## 2023-03-09 NOTE — PROGRESS NOTES
[address] [facility]  [phone]        Name: Leatha Norman      MRN: [id number]       : 1940             PCP: Shayy Rockwell MD       CC:    Chief Complaint   Patient presents with    Follow-up     S/p 3/7/       HPI:  .Leatha Norman is a 82y.o. year-old female who presents for a post-op visit s/p OPEN VENTRAL [address]  done per DR Miriam Carlson on 3/7/2023. Findings: incarcerated 3 cm  infraumbilical hernia     Patient reports she is doing well. Tolerating a regular diet, voiding without difficulty and having normal bowel movements. Patient denies nausea, vomiting, diarrhea, constipation or incisional drainage. Objective:   Review of Systems   Constitutional:  Negative for chills and fever. Respiratory: Negative. Cardiovascular: Negative. Gastrointestinal: Negative. Negative for abdominal pain, constipation, diarrhea, nausea and vomiting. Physical Exam  Constitutional:       Appearance: Normal appearance. Cardiovascular:      Rate and Rhythm: Normal rate and regular rhythm. Pulses: Normal pulses. Heart sounds: Normal heart sounds. Pulmonary:      Effort: Pulmonary effort is normal.      Breath sounds: Normal breath sounds. Abdominal:      General: Bowel sounds are normal.      Palpations: Abdomen is soft. Comments: Infraumbilical incision approximated with no erythema or drainage. Musculoskeletal:         General: Normal range of motion. Skin:     General: Skin is warm. Capillary Refill: Capillary refill takes less than 2 seconds. Neurological:      General: No focal deficit present. Mental Status: She is alert.    Psychiatric:         Mood and Affect: Mood normal.         Behavior: Behavior normal.       Assessment/Plan:  82y.o. year-old female who presents for a post-op visit s/p OPEN VENTRAL HERNIA REPAIR WITH MESH  done per DR Miriam Carlson on 3/7/2023.      -Follow up PRN  -No heavy

## 2023-03-15 ENCOUNTER — CARE COORDINATION (OUTPATIENT)
Dept: CARE COORDINATION | Facility: CLINIC | Age: 83
End: 2023-03-15

## 2023-03-15 NOTE — CARE COORDINATION
[facility] Care Transitions Follow Up Call    Patient Current Location:  Home: [address] [place] [address]    LPN Care Coordinator contacted the patient by telephone to follow up after admission on 2/3/2023.  Verified name and  with patient as identifiers.    Patient: Jody Moyer  Patient : 1940   MRN: [id number]  Reason for Admission: SBO  Discharge Date: 3/7/23 RARS: Readmission Risk Score: 9.1      Needs to be reviewed by the provider   Additional needs identified to be addressed with provider: No  none             Method of communication with provider: none.    Spoke with patient states doing fine. Patient denies any pain. Patient states has been moving around since the surgery. Patient states has an appointment with PCP 3/16/2023. This Care coordinator will graduate from this program.    Addressed changes since last contact:  none  Discussed follow-up appointments. If no appointment was previously scheduled, appointment scheduling offered: Yes.   Is follow up appointment scheduled within 7 days of discharge? No.    Follow Up  Future Appointments   Date Time Provider Department Center   3/16/2023  3:20 PM Perico Chau MD [facility]   3/21/2023  2:00 PM [facility]     Non-[facility] follow up appointment(s): n/a    LPN Care Coordinator reviewed medical action plan with patient and discussed any barriers to care and/or understanding of plan of care after discharge. Discussed appropriate site of care based on symptoms and resources available to patient including: PCP  Specialist  When to call 911. The patient agrees to contact the PCP office for questions related to their healthcare.     Advance Care Planning:   decision maker updated.     Patients top risk factors for readmission: medical condition-SBO  Interventions to address risk factors: Assessment and support for treatment adherence and medication management-     Offered patient enrollment in the Remote  Patient Monitoring (RPM) program for in-home monitoring: NA.     Care Transitions Subsequent and Final Call    Subsequent and Final Calls  Do you have any ongoing symptoms?: No  Have your medications changed?: No  Do you have any questions related to your medications?: No  Do you currently have any active services?: No  Do you have any needs or concerns that I can assist you with?: No  Identified Barriers: None  Care Transitions Interventions  Other Interventions:            Goals Addressed                      This Visit's Progress      Medication Management (pt-stated)   On track      I will take my medication as directed. Barriers: none  Plan for overcoming my barriers: Patient states take all medication as directed  Confidence: 8/10  Anticipated Goal Completion Date: within 30 days               LPN Care Coordinator provided contact information for future needs. No further follow-up call indicated based on severity of symptoms and risk factors.       Evelyn Sanchez LPN

## 2023-03-16 ENCOUNTER — OFFICE VISIT (OUTPATIENT)
Dept: INTERNAL MEDICINE CLINIC | Facility: CLINIC | Age: 83
End: 2023-03-16
Payer: MEDICARE

## 2023-03-16 VITALS
HEIGHT: 62 IN | DIASTOLIC BLOOD PRESSURE: 74 MMHG | HEART RATE: 80 BPM | RESPIRATION RATE: 18 BRPM | BODY MASS INDEX: 34.82 KG/M2 | TEMPERATURE: 97.9 F | SYSTOLIC BLOOD PRESSURE: 110 MMHG | WEIGHT: 189.2 LBS | OXYGEN SATURATION: 96 %

## 2023-03-16 DIAGNOSIS — E03.9 ACQUIRED HYPOTHYROIDISM: ICD-10-CM

## 2023-03-16 DIAGNOSIS — J45.40 MODERATE PERSISTENT ASTHMA WITHOUT COMPLICATION: ICD-10-CM

## 2023-03-16 DIAGNOSIS — R53.1 GENERALIZED WEAKNESS: ICD-10-CM

## 2023-03-16 DIAGNOSIS — I10 HYPERTENSION, ESSENTIAL: ICD-10-CM

## 2023-03-16 DIAGNOSIS — K56.609 SBO (SMALL BOWEL OBSTRUCTION) (HCC): ICD-10-CM

## 2023-03-16 DIAGNOSIS — F51.01 PRIMARY INSOMNIA: Primary | ICD-10-CM

## 2023-03-16 DIAGNOSIS — E78.00 PURE HYPERCHOLESTEROLEMIA: ICD-10-CM

## 2023-03-16 PROBLEM — K43.2 INCISIONAL HERNIA, WITHOUT OBSTRUCTION OR GANGRENE: Status: RESOLVED | Noted: 2023-03-07 | Resolved: 2023-03-16

## 2023-03-16 PROBLEM — L03.119 RECURRENT CELLULITIS OF LOWER EXTREMITY: Status: RESOLVED | Noted: 2020-12-28 | Resolved: 2023-03-16

## 2023-03-16 PROCEDURE — 1036F TOBACCO NON-USER: CPT | Performed by: INTERNAL MEDICINE

## 2023-03-16 PROCEDURE — 3078F DIAST BP <80 MM HG: CPT | Performed by: INTERNAL MEDICINE

## 2023-03-16 PROCEDURE — G8427 DOCREV CUR MEDS BY ELIG CLIN: HCPCS | Performed by: INTERNAL MEDICINE

## 2023-03-16 PROCEDURE — G8484 FLU IMMUNIZE NO ADMIN: HCPCS | Performed by: INTERNAL MEDICINE

## 2023-03-16 PROCEDURE — 99214 OFFICE O/P EST MOD 30 MIN: CPT | Performed by: INTERNAL MEDICINE

## 2023-03-16 PROCEDURE — 1090F PRES/ABSN URINE INCON ASSESS: CPT | Performed by: INTERNAL MEDICINE

## 2023-03-16 PROCEDURE — G8417 CALC BMI ABV UP PARAM F/U: HCPCS | Performed by: INTERNAL MEDICINE

## 2023-03-16 PROCEDURE — G8399 PT W/DXA RESULTS DOCUMENT: HCPCS | Performed by: INTERNAL MEDICINE

## 2023-03-16 PROCEDURE — 1123F ACP DISCUSS/DSCN MKR DOCD: CPT | Performed by: INTERNAL MEDICINE

## 2023-03-16 PROCEDURE — 3074F SYST BP LT 130 MM HG: CPT | Performed by: INTERNAL MEDICINE

## 2023-03-16 SDOH — ECONOMIC STABILITY: INCOME INSECURITY: HOW HARD IS IT FOR YOU TO PAY FOR THE VERY BASICS LIKE FOOD, HOUSING, MEDICAL CARE, AND HEATING?: NOT HARD AT ALL

## 2023-03-16 SDOH — ECONOMIC STABILITY: HOUSING INSECURITY
IN THE LAST 12 MONTHS, WAS THERE A TIME WHEN YOU DID NOT HAVE A STEADY PLACE TO SLEEP OR SLEPT IN A SHELTER (INCLUDING NOW)?: NO

## 2023-03-16 SDOH — ECONOMIC STABILITY: FOOD INSECURITY: WITHIN THE PAST 12 MONTHS, THE FOOD YOU BOUGHT JUST DIDN'T LAST AND YOU DIDN'T HAVE MONEY TO GET MORE.: NEVER TRUE

## 2023-03-16 SDOH — ECONOMIC STABILITY: FOOD INSECURITY: WITHIN THE PAST 12 MONTHS, YOU WORRIED THAT YOUR FOOD WOULD RUN OUT BEFORE YOU GOT MONEY TO BUY MORE.: NEVER TRUE

## 2023-03-16 ASSESSMENT — ENCOUNTER SYMPTOMS
VOICE CHANGE: 0
STRIDOR: 0
RECTAL PAIN: 0
EYE PAIN: 0

## 2023-03-16 ASSESSMENT — PATIENT HEALTH QUESTIONNAIRE - PHQ9
SUM OF ALL RESPONSES TO PHQ9 QUESTIONS 1 & 2: 0
SUM OF ALL RESPONSES TO PHQ QUESTIONS 1-9: 0
SUM OF ALL RESPONSES TO PHQ QUESTIONS 1-9: 0
1. LITTLE INTEREST OR PLEASURE IN DOING THINGS: 0
SUM OF ALL RESPONSES TO PHQ QUESTIONS 1-9: 0
SUM OF ALL RESPONSES TO PHQ QUESTIONS 1-9: 0
2. FEELING DOWN, DEPRESSED OR HOPELESS: 0

## 2023-03-16 NOTE — PROGRESS NOTES
FOLLOWUP VISIT    Subjective:    Ms. Natty Iqbal is a 82 y.o., female,   Chief Complaint   Patient presents with    1 Month Follow-Up       HPI:    Patient presents today for follow up of two or more chronic medical problems and review of labs. Since her last visit she resumed losartan and her blood pressure has normalized. She had her hernia repaired by Dr. Wander Kelsey with no complications. Finally she believes that she developed generalized weakness and deconditioning during her several recent hospitalization. She would like to try a course of PT to improve her gait and balance and exercise tolerance. The following portions of the patient's history were reviewed and updated as appropriate:      Past Medical History:   Diagnosis Date    Acute pancreatitis 10/12/2018    Asthma     BMI 34.0-34.9,adult     Calculus of kidney     CKD (chronic kidney disease)     Clostridium difficile infection 12/2020    Colon polyps     Female stress incontinence 8/16/2012    GERD (gastroesophageal reflux disease)     History of DVT (deep vein thrombosis)     9/2021; on Xarelto 10 mg daily for prevention    History of gastric ulcer     2018    History of kidney stones 8/16/2012    History of septic arthritis 09/10/2019    Right knee.   Improved with prolonged IV anitbiotics    Hypercholesterolemia     Hypertension     Hypothyroidism     OA (osteoarthritis)     Ophthalmic migraine     Osteopenia of neck of right femur 2/25/2015    Primary insomnia 07/17/2017    Raynaud's phenomenon 2/25/2015    Recurrent cellulitis of lower extremity 12/28/2020    S/P total knee arthroplasty 8/16/2012    Bilateral--2009     Skin cancer (melanoma) ([facility] [address]     Umbilical hernia 4/62/5902    Venous insufficiency of both lower extremities 1/28/2014    Worse on left        Past Surgical History:   Procedure Laterality Date    CATARACT EXTRACTION Bilateral     COLONOSCOPY  Jan 2013    4 polyps, repeat 5 years    COLONOSCOPY  7/5/2016    repeat 5 yrs tubular adenoma    ESOPHAGEAL DILATATION      HYSTERECTOMY, TOTAL ABDOMINAL (CERVIX REMOVED)      REMOVAL OF KIDNEY STONE      SALPINGO-OOPHORECTOMY      TOTAL KNEE ARTHROPLASTY Bilateral     partial    VENTRAL HERNIA REPAIR N/A 3/7/2023    OPEND VENTRAL HERNIA REPAIR WITH MESH performed by Margy Alberto MD at [facility]       Family History   Problem Relation Age of Onset    High Cholesterol Brother     Hypertension Brother     High Cholesterol Brother     Hypertension Brother     Heart Attack Brother     Heart Attack Paternal Grandmother     Heart Attack Paternal Grandfather     Breast Cancer Neg Hx     Hypertension Mother     Rheum Arthritis Mother     High Cholesterol Mother     Broken Bones Mother     Heart Failure Father     Heart Attack Father     Osteoarthritis Brother         Knee       Social History     Socioeconomic History    Marital status:      Spouse name: Not on file    Number of children: Not on file    Years of education: Not on file    Highest education level: Not on file   Occupational History    Not on file   Tobacco Use    Smoking status: Never    Smokeless tobacco: Never   Vaping Use    Vaping Use: Never used   Substance and Sexual Activity    Alcohol use: No    Drug use: No    Sexual activity: Not on file   Other Topics Concern    Not on file   Social History Narrative    Lives with  who is chronically ill with end stage lung disease and early dementia and depression. 5 children, daughter Win South and son Leo Naranjo (PharmD) have her [de-identified], sons Jeanne Pemberton and Jorden Ibanez have durable POA     Social Determinants of Health     Financial Resource Strain: Low Risk     Difficulty of Paying Living Expenses: Not hard at all   Food Insecurity: No Food Insecurity    Worried About [address] in the Last Year: Never true    [address] in the Last Year: Never true   Transportation Needs: Unknown    Lack of Transportation (Medical):  Not on file    Lack of Transportation (Non-Medical): No   Physical Activity: Inactive    Days of Exercise per Week: 0 days    Minutes of Exercise per Session: 0 min   Stress: Not on file   Social Connections: Not on file   Intimate Partner Violence: Not on file   Housing Stability: Unknown    Unable to Pay for Housing in the Last Year: Not on file    Number of Places Lived in the Last Year: Not on file    Unstable Housing in the Last Year: No       Current Outpatient Medications   Medication Sig Dispense Refill    FLOVENT  MCG/ACT inhaler INHALE 2 PUFFS TWICE A DAY 12 g 3    BIOTIN PO Take by mouth daily      losartan (COZAAR) 25 MG tablet Take 1 tablet by mouth daily 90 tablet 1    calcium carbonate (OSCAL) 500 MG TABS tablet Take 500 mg by mouth 2 times daily      atorvastatin (LIPITOR) 40 MG tablet TAKE 1 TABLET BY MOUTH DAILY 90 tablet 3    levothyroxine (SYNTHROID) 25 MCG tablet TAKE 1 TABLET BY MOUTH EVERY MORNING. 90 tablet 3    potassium chloride (KLOR-CON M) 20 MEQ extended release tablet TAKE 1 TABLET BY MOUTH TWICE A  tablet 1    gabapentin (NEURONTIN) 300 MG capsule Take one capsule by mouth three times a day (Patient taking differently: Take 300 mg by mouth in the morning and at bedtime. Take one capsule by mouth 2 times a day) 270 capsule 2    albuterol sulfate  (90 Base) MCG/ACT inhaler Inhale 2 puffs into the lungs every 6 hours as needed      furosemide (LASIX) 40 MG tablet Take 40 mg by mouth daily      magnesium oxide (MAG-OX) 400 MG tablet Take 400 mg by mouth daily OTC      omeprazole (PRILOSEC OTC) 20 MG tablet Take 20 mg by mouth daily      ondansetron (ZOFRAN-ODT) 4 MG disintegrating tablet Take 4 mg by mouth every 8 hours as needed for Nausea      rivaroxaban (XARELTO) 10 MG TABS tablet Take 10 mg by mouth Daily with supper       No current facility-administered medications for this visit.       Allergies as of 03/16/2023 - Fully Reviewed 03/16/2023   Allergen Reaction Noted    Amoxicillin Rash 12/31/2020     Pneumococcal vaccines Other (See Comments) 10/28/2012       Review of Systems   Constitutional:  Negative for activity change and appetite change. HENT:  Negative for drooling and voice change. Eyes:  Negative for pain. Respiratory:  Negative for stridor. Gastrointestinal:  Negative for rectal pain. Endocrine: Negative for polydipsia and polyphagia. Genitourinary:  Negative for dyspareunia and enuresis. Musculoskeletal:  Negative for gait problem and neck stiffness. Skin:  Negative for pallor. Neurological:  Negative for facial asymmetry and speech difficulty. Hematological:  Does not bruise/bleed easily. Psychiatric/Behavioral:  Negative for self-injury. The patient is not hyperactive. Patient Care Team:  Vicki Mckee MD as PCP - General  Vicki Mckee MD as PCP - Empaneled Provider  Viry Obrien MD (General Surgery)    Objective:    /74 (Site: Left Upper Arm, Position: Sitting)   Pulse 80   Temp 97.9 °F (36.6 °C) (Temporal)   Resp 18   Ht 5' 2\" (1.575 m)   Wt 189 lb 3.2 oz (85.8 kg)   SpO2 96%   BMI 34.61 kg/m²     Physical Exam  Vitals reviewed. Constitutional:       General: She is not in acute distress. Appearance: She is not toxic-appearing. HENT:      Head: Normocephalic and atraumatic. Right Ear: Tympanic membrane, ear canal and external ear normal.      Left Ear: Tympanic membrane, ear canal and external ear normal.      Nose: Nose normal.      Mouth/Throat:      Mouth: Mucous membranes are moist.      Pharynx: Oropharynx is clear. Eyes:      General: No scleral icterus. Extraocular Movements: Extraocular movements intact. Pupils: Pupils are equal, round, and reactive to light. Cardiovascular:      Rate and Rhythm: Normal rate and regular rhythm. Pulses: Normal pulses. Heart sounds: Normal heart sounds. Pulmonary:      Effort: Pulmonary effort is normal. No respiratory distress.       Breath sounds: Normal breath sounds. No stridor. Abdominal:      General: Abdomen is flat. Bowel sounds are normal.      Palpations: Abdomen is soft. There is no mass. Tenderness: There is no guarding or rebound. Musculoskeletal:         General: Normal range of motion. Cervical back: Normal range of motion and neck supple. Skin:     General: Skin is warm and dry. Coloration: Skin is not jaundiced. Neurological:      Mental Status: She is alert and oriented to person, place, and time. Mental status is at baseline. Psychiatric:         Behavior: Behavior normal.         Thought Content:  Thought content normal.            Orders Only on 02/09/2023   Component Date Value Ref Range Status    TSH, 3RD GENERATION 02/09/2023 1.920  0.358 - 3.740 uIU/mL Final    WBC 02/09/2023 6.7  4.3 - 11.1 K/uL Final    RBC 02/09/2023 3.78 (A)  4.05 - 5.2 M/uL Final    Hemoglobin 02/09/2023 11.2 (A)  11.7 - 15.4 g/dL Final    Hematocrit 02/09/2023 35.6 (A)  35.8 - 46.3 % Final    MCV 02/09/2023 94.2  82 - 102 FL Final    MCH 02/09/2023 29.6  26.1 - 32.9 PG Final    MCHC 02/09/2023 31.5  31.4 - 35.0 g/dL Final    RDW 02/09/2023 13.9  11.9 - 14.6 % Final    Platelets 89/68/9775 214  150 - 450 K/uL Final    MPV 02/09/2023 13.0 (A)  9.4 - 12.3 FL Final    nRBC 02/09/2023 0.00  0.0 - 0.2 K/uL Final    **Note: Absolute NRBC parameter is now reported with Hemogram**    Differential Type 02/09/2023 AUTOMATED    Final    Seg Neutrophils 02/09/2023 56  43 - 78 % Final    Lymphocytes 02/09/2023 28  13 - 44 % Final    Monocytes 02/09/2023 12  4.0 - 12.0 % Final    Eosinophils % 02/09/2023 3  0.5 - 7.8 % Final    Basophils 02/09/2023 1  0.0 - 2.0 % Final    Immature Granulocytes 02/09/2023 0  0.0 - 5.0 % Final    Segs Absolute 02/09/2023 3.8  1.7 - 8.2 K/UL Final    Absolute Lymph # 02/09/2023 1.9  0.5 - 4.6 K/UL Final    Absolute Mono # 02/09/2023 0.8  0.1 - 1.3 K/UL Final    Absolute Eos # 02/09/2023 0.2  0.0 - 0.8 K/UL Final    Basophils Absolute 02/09/2023 0.0  0.0 - 0.2 K/UL Final    Absolute Immature Granulocyte 02/09/2023 0.0  0.0 - 0.5 K/UL Final    Sodium 02/09/2023 142  133 - 143 mmol/L Final    Potassium 02/09/2023 4.3  3.5 - 5.1 mmol/L Final    Chloride 02/09/2023 109  101 - 110 mmol/L Final    CO2 02/09/2023 24  21 - 32 mmol/L Final    Anion Gap 02/09/2023 9  2 - 11 mmol/L Final    Glucose 02/09/2023 99  65 - 100 mg/dL Final    BUN 02/09/2023 20  8 - 23 MG/DL Final    Creatinine 02/09/2023 1.30 (A)  0.6 - 1.0 MG/DL Final    Est, Glom Filt Rate 02/09/2023 41 (A)  >60 ml/min/1.73m2 Final    Comment:   Pediatric calculator link: [employer]. org/professionals/kdoqi/gfr_calculatorped    These results are not intended for use in patients <25years of age. eGFR results are calculated without a race factor using  the 2021 CKD-EPI equation. Careful clinical correlation is recommended, particularly when comparing to results calculated using previous equations. The CKD-EPI equation is less accurate in patients with extremes of muscle mass, extra-renal metabolism of creatinine, excessive creatine ingestion, or following therapy that affects renal tubular secretion. Calcium 02/09/2023 9.4  8.3 - 10.4 MG/DL Final         Assessent & Plan:        1. Primary insomnia  2. SBO (small bowel obstruction) (HCC)  Overview:  Hospitalized 2/3/23 - 2/6/23 with a SBO related to a ventral hernia. Hernia was manually by Dr. Se Martin with resolution of the SBO. S/P elective outpatient incisional hernia repair 3/7/23. 3. Pure hypercholesterolemia  Overview:  8/9/22 total 156 HDL 60 LDL 72  on atorvastatin 40 mg daily. Reviewed the most recent lipid panel with the patient, and interpreted the results within the context of the patient's personal cardiovascular risk factors. Discussed/reinforced a low-cholesterol diet. The patient was advised regarding daily compliance. Orders:  -     Comprehensive Metabolic Panel;  Future  -     Lipid Panel; Future  4. Moderate persistent asthma without complication  Overview:  Asthma has been well controlled on Flovent 2 puffs twice daily, recommend continuing this medication. Continue albuterol PRN. Reviewed appropriate time to use this medication discussed difference between maintenance and rescue inhalers  5. Acquired hypothyroidism  Overview:  2/9/23 TSH 1.920 on levothyroxine 25 mcg daily. The patient will continue the current treatment. Labs were reviewed and discussed with the patient. Orders:  -     TSH; Future  6. Hypertension, essential  Overview:  The patient's blood pressure is persistently elevated. We will resume losartan 25 mg daily. 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 7. Generalized weakness  Overview:  Due to recent hospitalizations / deconditioning. Refer to PT. Orders:  [facility] - Physical Therapy, [facility]      The patient and/or patient representative voiced understanding and agreement with the current diagnoses, recommendations, and possible side effects. Return in about 6 months (around 9/16/2023) for follow up of chronic medical problems, annual wellness, review labs.

## 2023-03-21 ENCOUNTER — OFFICE VISIT (OUTPATIENT)
Dept: SURGERY | Age: 83
End: 2023-03-21

## 2023-03-21 DIAGNOSIS — Z87.19 H/O VENTRAL HERNIA REPAIR: Primary | ICD-10-CM

## 2023-03-21 DIAGNOSIS — Z98.890 H/O VENTRAL HERNIA REPAIR: Primary | ICD-10-CM

## 2023-03-21 PROCEDURE — 99024 POSTOP FOLLOW-UP VISIT: CPT | Performed by: NURSE PRACTITIONER

## 2023-03-21 NOTE — PATIENT INSTRUCTIONS
-Follow up PRN  -No heavy lifting > 20 pounds x 4 weeks.   -Resume diet as tolerated  -No tub bath until incision is completely healed  -Follow up with PCP for routine medical management

## 2023-03-30 ENCOUNTER — HOSPITAL ENCOUNTER (OUTPATIENT)
Dept: PHYSICAL THERAPY | Age: 83
Setting detail: RECURRING SERIES
End: 2023-03-30
Payer: MEDICARE

## 2023-03-30 PROCEDURE — 97161 PT EVAL LOW COMPLEX 20 MIN: CPT

## 2023-03-30 PROCEDURE — 97110 THERAPEUTIC EXERCISES: CPT

## 2023-03-30 NOTE — PROGRESS NOTES
Alanna Castro  : 1940  Primary: Medicare Part A And B (Medicare)  Secondary: Melanie @ [facility] [address]  Phone: [phone]  Fax: [phone] Plan Frequency: 2x a week for 90 days    Plan of Care/Certification Expiration Date: 23    >PT Visit Info:  Plan Frequency: 2x a week for 90 days  Plan of Care/Certification Expiration Date: 23      Visit Count:  1    OUTPATIENT PHYSICAL THERAPY:OP NOTE TYPE: Treatment Note 3/30/2023       Episode  }Appt Desk             Treatment Diagnosis:  Other abnormalities of gait and mobility (R26.89)  Generalized Muscle Weakness (M62.81)  Unsteadiness on Feet (R26.81)  Medical/Referring Diagnosis:  Generalized weakness [R53.1]  Referring Physician:  Kelsy Silva MD MD Orders:  PT Eval and Treat   Date of Onset:  Onset Date:  (Chronic; around )   Allergies:   Amoxicillin and Pneumococcal vaccines  Restrictions/Precautions:  Restrictions/Precautions: None  No data recorded     Interventions Planned (Treatment may consist of any combination of the following):    Current Treatment Recommendations: Strengthening; ROM; Balance training; Functional mobility training; Transfer training; ADL/Self-care training; Endurance training; Gait training; Stair training; Neuromuscular re-education; Manual; Home exercise program; Pain management; Safety education & training; Dry needling; Equipment evaluation, education, & procurement; Modalities     >Subjective Comments: See eval     >Initial:      0/10>Post Session:        0/10  Medications Last Reviewed:  3/30/2023  Updated Objective Findings:  See evaluation note from today  Treatment   THERAPEUTIC EXERCISE: (15 minutes):  Exercises per grid below to improve mobility and strength. Required moderate visual, verbal, manual and tactile cues to promote proper body alignment, promote proper body posture and promote proper body mechanics. [facility]  }Post Session Pain  PT Visit [address] Portal  MD Guidelines  Scanned Media  Benefits  MyChart    Future Appointments   Date Time Provider [place]   4/4/2023  2:30 PM [place] Michael, PT [facility]   4/6/2023  2:30 PM [place] Michael, PT [facility]   4/11/2023  2:30 PM [place] Michael, PT [facility]   4/13/2023  2:30 PM [place] Michael, PT [facility]   4/17/2023  1:30 PM [place] Michael, PT [facility]   4/19/2023  1:30 PM [place] Michael, PT [facility]   4/25/2023  2:30 PM [place] Michael, PT [facility]   4/27/2023  2:30 PM [place] Michael, PT [facility]   5/2/2023  2:30 PM [place] Michael, PT [facility]   5/4/2023  2:30 PM [place] Michael, PT [facility]   5/9/2023  1:30 PM [place] Michael, PT [facility]   5/11/2023  1:30 PM [place] Michael, PT [facility]   9/21/2023  3:20 PM Morteza Drake MD [facility]

## 2023-03-30 NOTE — THERAPY EVALUATION
Kolton Benson  : 1940  Primary: Medicare Part A And B (Medicare)  Secondary: Melanie @ [facility] [address]  Phone: [phone]  Fax: [phone] Plan Frequency: 2x a week for 90 days  Plan of Care/Certification Expiration Date: 23    PT Visit Info:  Plan Frequency: 2x a week for 90 days  Plan of Care/Certification Expiration Date: 23    Visit Count:  1                OUTPATIENT PHYSICAL THERAPY:             OP NOTE TYPE: Initial Assessment 3/30/2023               Episode (Weakness and balance) Appt Desk         Treatment Diagnosis:  Other abnormalities of gait and mobility (R26.89)  Generalized Muscle Weakness (M62.81)  Unsteadiness on Feet (R26.81)  Medical/Referring Diagnosis:  Generalized weakness [R53.1]  Referring Physician:  Dior Triplett MD MD Orders:  PT Eval and Treat   Return MD Appt:  23  Date of Onset:  Onset Date:  (Chronic; around )    Allergies:  Amoxicillin and Pneumococcal vaccines  Restrictions/Precautions:    Restrictions/Precautions: None      Medications Last Reviewed:  3/30/2023     SUBJECTIVE   History of Injury/Illness (Reason for Referral):  Pt states she has had many health complications since  which has debilitated her. States she had Cellulitis in Lt LE worse than Rt. States that has left her Lt LE weaker. Difficulty with walking long distances, standing long periods of time, bending over to pick something up, getting in and out of the car, balance, quick fatigue. Denies pain at this time. States she has not fallen since 7 months ago when she tripped over her feet. States she has neuropathy in her feet which causes her feet to swell and be painful at times as well as numb with Lt being worse than the Right. Patient Stated Goal(s):   \"Improve balance\"  Initial:      010 Post Session:      010  Past Medical History/Comorbidities:   Ms. Armida Melvin  has a past medical risk for falls. MEDICAL NECESSITY:  Skilled intervention continues to be required due to above deficits affecting participation in basic ADLs and overall functional tolerance. REASON FOR SERVICES/OTHER COMMENTS:  Patient continues to require skilled intervention due to  above deficits affecting participation in basic ADLs and overall functional tolerance. Total Duration:  Time In: 1030  Time Out: 36    Regarding [de-identified] M Comfort's therapy, I certify that the treatment plan above will be carried out by a therapist or under their direction.   Thank you for this referral,  Olinda Duane, PT     Referring Physician Signature: Justus Alves MD _______________________________ Date _____________        Post Session Pain  Charge Capture  PT Visit Info MD Guidelines  MyChart

## 2023-04-03 RX ORDER — POTASSIUM CHLORIDE 20 MEQ/1
TABLET, EXTENDED RELEASE ORAL
Qty: 180 TABLET | Refills: 1 | Status: SHIPPED | OUTPATIENT
Start: 2023-04-03

## 2023-04-03 NOTE — TELEPHONE ENCOUNTER
Requested Prescriptions     Pending Prescriptions Disp Refills    potassium chloride (KLOR-CON M) 20 MEQ extended release tablet [Pharmacy Med Name: POTASSIUM CHLORIDE RONAK ER 20 Tablet] 180 tablet 1     Sig: TAKE 1 TABLET BY MOUTH TWICE A DAY

## 2023-04-04 ENCOUNTER — HOSPITAL ENCOUNTER (OUTPATIENT)
Dept: PHYSICAL THERAPY | Age: 83
Setting detail: RECURRING SERIES
Discharge: HOME | End: 2023-04-07
Payer: MEDICARE

## 2023-04-04 PROCEDURE — 97110 THERAPEUTIC EXERCISES: CPT

## 2023-04-04 NOTE — PROGRESS NOTES
cues to promote proper body alignment, promote proper body posture and promote proper body mechanics. Progressed resistance, range, repetitions and complexity of movement as indicated. Date:  4/4/2023   Activity/Exercise Parameters   Bike 6min  Level 3.5   Bridge 2x10  Hold 3s   Hip adduction 1x10  Hold 10s   SLR flexion 2x10 B   Sidelying clamshell 2x10 B   SLR abduction 2x10 B   LTR 2x10  Hold 3s   Tandem balance 3x30s B   Static balance feet together 3x30s   [employer] Portal   Access Code: [email or web address]  URL: [email or web address]  Date: 03/30/2023  Prepared by: Rossi Neal  Exercises  - Supine Posterior Pelvic Tilt  - 2 x daily - 7 x weekly - 2 sets - 10 reps - 10s hold  - Supine Hip Adduction Isometric with Ball  - 2 x daily - 7 x weekly - 2 sets - 10 reps - 10s hold  - Supine Lower Trunk Rotation  - 2 x daily - 7 x weekly - 2 sets - 10 reps - 5s hold    MANUAL THERAPY: (0 minutes): Joint mobilization, Soft tissue mobilization and Manipulation was utilized and necessary because of the patient's restricted joint motion, painful spasm, loss of articular motion and restricted motion of soft tissue. Commonly used abbreviations that may be included in this note:  STM- Soft tissue mobilization, R>L or L>R- right greater than left or vice versa, HEP - Home exercise program, CPA/UPA - Central or unilateral posterior-anterior mobilization, SLS- single leg stance, SKTC - Single leg to chest, SNAGS/NAGS- (sustained) Natural apophyseal glides, TKE- Terminal knee extension, ER- External rotation, IR- Internal rotation, B - Bilateral, sec- seconds, Lb- pounds, min - minutes, HA- Headache, OP- Over pressure, tband- theraband, fwd/bwd- Forward/Backward, TA- Transversus Abdominus, dbl- Double      TREATMENT/SESSION SUMMARY:     Response to Treatment:  No reports of pain. Increased trunk mobility and fluidity with motion following LTR exercise.  Decreased stamina and control during SLR exercises due to

## 2023-04-13 ENCOUNTER — HOSPITAL ENCOUNTER (OUTPATIENT)
Dept: PHYSICAL THERAPY | Age: 83
Setting detail: RECURRING SERIES
Discharge: HOME | End: 2023-04-16
Payer: MEDICARE

## 2023-04-13 PROCEDURE — 97140 MANUAL THERAPY 1/> REGIONS: CPT

## 2023-04-13 PROCEDURE — 97110 THERAPEUTIC EXERCISES: CPT

## 2023-04-17 ENCOUNTER — HOSPITAL ENCOUNTER (OUTPATIENT)
Dept: PHYSICAL THERAPY | Age: 83
Setting detail: RECURRING SERIES
Discharge: HOME | End: 2023-04-20
Payer: MEDICARE

## 2023-04-17 PROCEDURE — 97140 MANUAL THERAPY 1/> REGIONS: CPT

## 2023-04-17 PROCEDURE — 97110 THERAPEUTIC EXERCISES: CPT

## 2023-04-17 NOTE — PROGRESS NOTES
Albert Apley  : 1940  Primary: Medicare Part A And B (Medicare)  Secondary: Melanie @ [facility] [address]  Phone: [phone]  Fax: [phone] Plan Frequency: 2x a week for 90 days    Plan of Care/Certification Expiration Date: 23      >PT Visit Info:  Plan Frequency: 2x a week for 90 days  Plan of Care/Certification Expiration Date: 23      Visit Count:  6    OUTPATIENT PHYSICAL THERAPY:OP NOTE TYPE: Treatment Note 2023       Episode  }Appt Desk             Treatment Diagnosis:  Other abnormalities of gait and mobility (R26.89)  Generalized Muscle Weakness (M62.81)  Unsteadiness on Feet (R26.81)  Medical/Referring Diagnosis:  Generalized weakness [R53.1]  Referring Physician:  Guillermo Evans MD MD Orders:  PT Eval and Treat   Date of Onset:  Onset Date:  (Chronic; around )     Allergies:   Amoxicillin and Pneumococcal vaccines  Restrictions/Precautions:  Restrictions/Precautions: None  No data recorded     Interventions Planned (Treatment may consist of any combination of the following):    Current Treatment Recommendations: Strengthening; ROM; Balance training; Functional mobility training; Transfer training; ADL/Self-care training; Endurance training; Gait training; Stair training; Neuromuscular re-education; Manual; Home exercise program; Pain management; Safety education & training; Dry needling; Equipment evaluation, education, & procurement; Modalities     >Subjective Comments: Pt notes started wearing her compression socks which helped reduce her LE swelling. >Initial:      0/10>Post Session:        0/10  Medications Last Reviewed:  2023  Updated Objective Findings:  None Today  Treatment   THERAPEUTIC EXERCISE: (38 minutes):  Exercises per grid below to improve mobility and strength.   Required moderate visual, verbal, manual and tactile cues to promote proper body alignment, promote

## 2023-04-19 ENCOUNTER — HOSPITAL ENCOUNTER (OUTPATIENT)
Dept: PHYSICAL THERAPY | Age: 83
Setting detail: RECURRING SERIES
Discharge: HOME | End: 2023-04-22
Payer: MEDICARE

## 2023-04-19 PROCEDURE — 97140 MANUAL THERAPY 1/> REGIONS: CPT

## 2023-04-19 PROCEDURE — 97110 THERAPEUTIC EXERCISES: CPT

## 2023-04-19 NOTE — PROGRESS NOTES
Home exercise program, CPA/UPA - Central or unilateral posterior-anterior mobilization, SLS- single leg stance, SKTC - Single leg to chest, SNAGS/NAGS- (sustained) Natural apophyseal glides, TKE- Terminal knee extension, ER- External rotation, IR- Internal rotation, B - Bilateral, sec- seconds, Lb- pounds, min - minutes, HA- Headache, OP- Over pressure, tband- theraband, fwd/bwd- Forward/Backward, TA- Transversus Abdominus, dbl- Double      TREATMENT/SESSION SUMMARY:     Response to Treatment:  Pt able to perform sit to stand from chair without use of hands after cues and demonstration. Still unsteady during side stepping especially when holding object. Decreased strength noted in Lt LE during side step up. Continue working on strengthening and balance. Communication/Consultation:  None today  Equipment provided today:  None  Recommendations/Intent for next treatment session: Next visit will focus on progressing strength and ROM.      Total Treatment Billable Duration:  53 minutes  Time In: 1330  Time Out: 333 [facility]  }Post Session Pain  PT Visit [facility] Portal  MD Guidelines  Scanned Media  Benefits  MyChart    Future Appointments   Date Time Provider Luis Hodges   4/19/2023  1:30 PM Gary Huerta, PT OFF [facility]   4/25/2023  2:30 PM Gary Huerta, PT [facility]   4/27/2023  2:30 PM Gary Huerta, PT [facility]   5/2/2023  2:30 PM Gary Huerta, PT [facility]   5/4/2023  2:30 PM Gary Huerta, PT [facility]   5/9/2023  1:30 PM Gary Huerta, PT [facility]   5/11/2023  1:30 PM Gary Huerta, PT [facility]   9/21/2023  3:20 PM Herbert Desir MD [facility]

## 2023-04-25 ENCOUNTER — HOSPITAL ENCOUNTER (OUTPATIENT)
Dept: PHYSICAL THERAPY | Age: 83
Setting detail: RECURRING SERIES
Discharge: HOME | End: 2023-04-28
Payer: MEDICARE

## 2023-04-25 PROCEDURE — 97110 THERAPEUTIC EXERCISES: CPT

## 2023-04-25 PROCEDURE — 97140 MANUAL THERAPY 1/> REGIONS: CPT

## 2023-04-25 NOTE — PROGRESS NOTES
Ailyn Brittni  : 1940  Primary: Medicare Part A And B (Medicare)  Secondary: Melanie @ [facility] [address]  Phone: [phone]  Fax: [phone] Plan Frequency: 2x a week for 90 days    Plan of Care/Certification Expiration Date: 23      >PT Visit Info:  Plan Frequency: 2x a week for 90 days  Plan of Care/Certification Expiration Date: 23      Visit Count:  8    OUTPATIENT PHYSICAL THERAPY:OP NOTE TYPE: Treatment Note 2023       Episode  }Appt Desk             Treatment Diagnosis:  Other abnormalities of gait and mobility (R26.89)  Generalized Muscle Weakness (M62.81)  Unsteadiness on Feet (R26.81)  Medical/Referring Diagnosis:  Generalized weakness [R53.1]  Referring Physician:  Anyi Davis MD MD Orders:  PT Eval and Treat   Date of Onset:  Onset Date:  (Chronic; around )     Allergies:   Amoxicillin and Pneumococcal vaccines  Restrictions/Precautions:  Restrictions/Precautions: None  No data recorded     Interventions Planned (Treatment may consist of any combination of the following):    Current Treatment Recommendations: Strengthening; ROM; Balance training; Functional mobility training; Transfer training; ADL/Self-care training; Endurance training; Gait training; Stair training; Neuromuscular re-education; Manual; Home exercise program; Pain management; Safety education & training; Dry needling; Equipment evaluation, education, & procurement; Modalities     >Subjective Comments: Pt notes she feels unsteady today and is unsure why. Notes swelling in her LEs has continued to improve. >Initial:      0/10>Post Session:        0/10  Medications Last Reviewed:  2023  Updated Objective Findings:  None Today  Treatment   THERAPEUTIC EXERCISE: (38 minutes):  Exercises per grid below to improve mobility and strength.   Required moderate visual, verbal, manual and tactile cues to promote proper

## 2023-04-27 ENCOUNTER — HOSPITAL ENCOUNTER (OUTPATIENT)
Dept: PHYSICAL THERAPY | Age: 83
Setting detail: RECURRING SERIES
Discharge: HOME | End: 2023-04-30
Payer: MEDICARE

## 2023-04-27 PROCEDURE — 97140 MANUAL THERAPY 1/> REGIONS: CPT

## 2023-04-27 PROCEDURE — 97110 THERAPEUTIC EXERCISES: CPT

## 2023-05-02 ENCOUNTER — HOSPITAL ENCOUNTER (OUTPATIENT)
Dept: PHYSICAL THERAPY | Age: 83
Setting detail: RECURRING SERIES
Discharge: HOME | End: 2023-05-05
Payer: MEDICARE

## 2023-05-02 PROCEDURE — 97110 THERAPEUTIC EXERCISES: CPT

## 2023-05-02 NOTE — PROGRESS NOTES
Edilberto Valle  : 1940  Primary: Medicare Part A And B (Medicare)  Secondary: Melanie @ [facility] [address]  Phone: [phone]  Fax: [phone] Plan Frequency: 2x a week for 90 days    Plan of Care/Certification Expiration Date: 23      >PT Visit Info:  Plan Frequency: 2x a week for 90 days  Plan of Care/Certification Expiration Date: 23      Visit Count:  10    OUTPATIENT PHYSICAL THERAPY:OP NOTE TYPE: Treatment Note 2023       Episode  }Appt Desk             Treatment Diagnosis:  Other abnormalities of gait and mobility (R26.89)  Generalized Muscle Weakness (M62.81)  Unsteadiness on Feet (R26.81)  Medical/Referring Diagnosis:  Generalized weakness [R53.1]  Referring Physician:  Alex Wheeler MD MD Orders:  PT Eval and Treat   Date of Onset:  Onset Date:  (Chronic; around )     Allergies:   Amoxicillin and Pneumococcal vaccines  Restrictions/Precautions:  Restrictions/Precautions: None  No data recorded     Interventions Planned (Treatment may consist of any combination of the following):    Current Treatment Recommendations: Strengthening; ROM; Balance training; Functional mobility training; Transfer training; ADL/Self-care training; Endurance training; Gait training; Stair training; Neuromuscular re-education; Manual; Home exercise program; Pain management; Safety education & training; Dry needling; Equipment evaluation, education, & procurement; Modalities     >Subjective Comments: Pt notes the swelling in her Lt LE has improved and states she feels stronger overall since beginning physical therapy. >Initial:      0/10>Post Session:        0/10  Medications Last Reviewed:  2023  Updated Objective Findings:  None Today  Treatment   THERAPEUTIC EXERCISE: (53 minutes):  Exercises per grid below to improve mobility and strength.   Required moderate visual, verbal, manual and tactile cues to

## 2023-05-02 NOTE — THERAPY RECERTIFICATION
4-/5   Hip Flexion 4/5 4-/5   Knee Extension 4+/5 4+/5   Knee Flexion 4+/5 4+/5   Ankle DF 4+/5 4+/5   Ankle PF 4+/5 4+/5   Ankle IV 4+/5 4+/5   Ankle EV 4+/5 4+/5        ASSESSMENT   PROGRESS ASSESSMENT:  Ms. Pieter Moore has attended 10 physical therapy session including initial evaluation as of 5/2/2023. Pieter Moore has made great improvements in her balance and LE strength. She does continue to have glute med weakness especially in Lt LE. We have discussed changing shoes to a more secure sneaker or sandal with a back that would prevent excessive ankle stabilizer recruitment and decrease fall risk. Pt has met 2/3 long term goals with more goals established. Pt would continue to benefit from skilled physical therapy to improve deficits in balance and overall endurance for daily tasks and activities. Pieter Moore will benefit from home exercise program, therapeutic and postural strengthening exercises, manual therapeutic techniques (ie. Distraction, SOR, myofascial release/soft tissue mobilization) as appropriate to address Layne Moyer's current condition. Problem List: (Impacting functional limitations): Body Structures, Functions, Activity Limitations Requiring Skilled Therapeutic Intervention: Decreased functional mobility ; Decreased ADL status; Decreased ROM; Decreased body mechanics;  Decreased strength; Decreased endurance; Decreased sensation; Decreased balance; Decreased coordination; Decreased posture     Therapy Prognosis:   Therapy Prognosis: Good     Initial Assessment Complexity:   Decision Making: Low Complexity    PLAN   Effective Dates: 3/30/2023 TO Plan of Care/Certification Expiration Date: 06/28/23     Frequency/Duration: Plan Frequency: 2x a week for 90 days     Interventions Planned (Treatment may consist of any combination of the following):    Current Treatment Recommendations: Strengthening; ROM; Balance training; Functional mobility training; Transfer

## 2023-05-04 ENCOUNTER — HOSPITAL ENCOUNTER (OUTPATIENT)
Dept: PHYSICAL THERAPY | Age: 83
Setting detail: RECURRING SERIES
Discharge: HOME | End: 2023-05-07
Payer: MEDICARE

## 2023-05-04 PROCEDURE — 97140 MANUAL THERAPY 1/> REGIONS: CPT

## 2023-05-04 PROCEDURE — 97110 THERAPEUTIC EXERCISES: CPT

## 2023-05-09 ENCOUNTER — HOSPITAL ENCOUNTER (OUTPATIENT)
Dept: PHYSICAL THERAPY | Age: 83
Setting detail: RECURRING SERIES
Discharge: HOME | End: 2023-05-12
Payer: MEDICARE

## 2023-05-09 PROCEDURE — 97110 THERAPEUTIC EXERCISES: CPT

## 2023-05-09 PROCEDURE — 97140 MANUAL THERAPY 1/> REGIONS: CPT

## 2023-05-09 NOTE — PROGRESS NOTES
Sofi Guillen  : 1940  Primary: Medicare Part A And B (Medicare)  Secondary: Melanie @ [facility] [address]  Phone: [phone]  Fax: [phone] Plan Frequency: 2x a week for 90 days    Plan of Care/Certification Expiration Date: 23      >PT Visit Info:  Plan Frequency: 2x a week for 90 days  Plan of Care/Certification Expiration Date: 23      Visit Count:  12    OUTPATIENT PHYSICAL THERAPY:OP NOTE TYPE: Treatment Note 2023       Episode  }Appt Desk             Treatment Diagnosis:  Other abnormalities of gait and mobility (R26.89)  Generalized Muscle Weakness (M62.81)  Unsteadiness on Feet (R26.81)  Medical/Referring Diagnosis:  Generalized weakness [R53.1]  Referring Physician:  Bulmaro Hurd MD MD Orders:  PT Eval and Treat   Date of Onset:  Onset Date:  (Chronic; around )     Allergies:   Amoxicillin and Pneumococcal vaccines  Restrictions/Precautions:  Restrictions/Precautions: None  No data recorded     Interventions Planned (Treatment may consist of any combination of the following):    Current Treatment Recommendations: Strengthening; ROM; Balance training; Functional mobility training; Transfer training; ADL/Self-care training; Endurance training; Gait training; Stair training; Neuromuscular re-education; Manual; Home exercise program; Pain management; Safety education & training; Dry needling; Equipment evaluation, education, & procurement; Modalities     >Subjective Comments: Pt notes she has gotten new sneakers. Notes her knees feel good overall but is having some lower back pain. >Initial:      0/10>Post Session:        0/10  Medications Last Reviewed:  2023  Updated Objective Findings:  None Today  Treatment   THERAPEUTIC EXERCISE: (38 minutes):  Exercises per grid below to improve mobility and strength.   Required moderate visual, verbal, manual and tactile cues to promote proper

## 2023-05-11 ENCOUNTER — HOSPITAL ENCOUNTER (OUTPATIENT)
Dept: PHYSICAL THERAPY | Age: 83
Setting detail: RECURRING SERIES
Discharge: HOME | End: 2023-05-14
Payer: MEDICARE

## 2023-05-11 PROCEDURE — 97110 THERAPEUTIC EXERCISES: CPT

## 2023-05-11 PROCEDURE — 97140 MANUAL THERAPY 1/> REGIONS: CPT

## 2023-05-16 ENCOUNTER — HOSPITAL ENCOUNTER (OUTPATIENT)
Dept: PHYSICAL THERAPY | Age: 83
Setting detail: RECURRING SERIES
Discharge: HOME | End: 2023-05-19
Payer: MEDICARE

## 2023-05-16 PROCEDURE — 97110 THERAPEUTIC EXERCISES: CPT

## 2023-05-16 PROCEDURE — 97140 MANUAL THERAPY 1/> REGIONS: CPT

## 2023-05-16 NOTE — PROGRESS NOTES
Jabier Lebron  : 1940  Primary: Medicare Part A And B (Medicare)  Secondary: Melanie @ [facility] [address]  Phone: [phone]  Fax: [phone] Plan Frequency: 2x a week for 90 days    Plan of Care/Certification Expiration Date: 23      >PT Visit Info:  Plan Frequency: 2x a week for 90 days  Plan of Care/Certification Expiration Date: 23      Visit Count:  14    OUTPATIENT PHYSICAL THERAPY:OP NOTE TYPE: Treatment Note 2023       Episode  }Appt Desk             Treatment Diagnosis:  Other abnormalities of gait and mobility (R26.89)  Generalized Muscle Weakness (M62.81)  Unsteadiness on Feet (R26.81)  Medical/Referring Diagnosis:  Generalized weakness [R53.1]  Referring Physician:  Uzair Denny MD MD Orders:  PT Eval and Treat   Date of Onset:  Onset Date:  (Chronic; around )     Allergies:   Amoxicillin and Pneumococcal vaccines  Restrictions/Precautions:  Restrictions/Precautions: None  No data recorded     Interventions Planned (Treatment may consist of any combination of the following):    Current Treatment Recommendations: Strengthening; ROM; Balance training; Functional mobility training; Transfer training; ADL/Self-care training; Endurance training; Gait training; Stair training; Neuromuscular re-education; Manual; Home exercise program; Pain management; Safety education & training; Dry needling; Equipment evaluation, education, & procurement; Modalities     >Subjective Comments: Pt notes her knees and legs have been feeling good with no pain. Notes swelling is also well managed at this time. >Initial:      0/10>Post Session:        0/10  Medications Last Reviewed:  2023  Updated Objective Findings:  None Today  Treatment   THERAPEUTIC EXERCISE: (30 minutes):  Exercises per grid below to improve mobility and strength.   Required moderate visual, verbal, manual and tactile cues to

## 2023-05-18 ENCOUNTER — HOSPITAL ENCOUNTER (OUTPATIENT)
Dept: PHYSICAL THERAPY | Age: 83
Setting detail: RECURRING SERIES
Discharge: HOME | End: 2023-05-21
Payer: MEDICARE

## 2023-05-18 PROCEDURE — 97140 MANUAL THERAPY 1/> REGIONS: CPT

## 2023-05-18 PROCEDURE — 97110 THERAPEUTIC EXERCISES: CPT

## 2023-05-22 ENCOUNTER — TELEPHONE (OUTPATIENT)
Dept: INTERNAL MEDICINE CLINIC | Facility: CLINIC | Age: 83
End: 2023-05-22

## 2023-05-22 NOTE — TELEPHONE ENCOUNTER
C/o URI symptoms and wants to be seen. Could you call patient to see if patient did home covid test done and schedule possible tomorrow ?

## 2023-05-23 ENCOUNTER — HOSPITAL ENCOUNTER (OUTPATIENT)
Dept: PHYSICAL THERAPY | Age: 83
Setting detail: RECURRING SERIES
End: 2023-05-23
Payer: MEDICARE

## 2023-05-23 ENCOUNTER — OFFICE VISIT (OUTPATIENT)
Dept: INTERNAL MEDICINE CLINIC | Facility: CLINIC | Age: 83
End: 2023-05-23
Payer: MEDICARE

## 2023-05-23 VITALS
OXYGEN SATURATION: 99 % | HEART RATE: 116 BPM | WEIGHT: 180.4 LBS | BODY MASS INDEX: 33.2 KG/M2 | HEIGHT: 62 IN | SYSTOLIC BLOOD PRESSURE: 126 MMHG | TEMPERATURE: 97.7 F | DIASTOLIC BLOOD PRESSURE: 66 MMHG

## 2023-05-23 DIAGNOSIS — J01.00 ACUTE NON-RECURRENT MAXILLARY SINUSITIS: ICD-10-CM

## 2023-05-23 DIAGNOSIS — J45.31 MILD PERSISTENT ASTHMA WITH EXACERBATION: Primary | ICD-10-CM

## 2023-05-23 PROCEDURE — 99213 OFFICE O/P EST LOW 20 MIN: CPT | Performed by: INTERNAL MEDICINE

## 2023-05-23 PROCEDURE — 1123F ACP DISCUSS/DSCN MKR DOCD: CPT | Performed by: INTERNAL MEDICINE

## 2023-05-23 PROCEDURE — 1036F TOBACCO NON-USER: CPT | Performed by: INTERNAL MEDICINE

## 2023-05-23 PROCEDURE — 3078F DIAST BP <80 MM HG: CPT | Performed by: INTERNAL MEDICINE

## 2023-05-23 PROCEDURE — 1090F PRES/ABSN URINE INCON ASSESS: CPT | Performed by: INTERNAL MEDICINE

## 2023-05-23 PROCEDURE — G8399 PT W/DXA RESULTS DOCUMENT: HCPCS | Performed by: INTERNAL MEDICINE

## 2023-05-23 PROCEDURE — G8428 CUR MEDS NOT DOCUMENT: HCPCS | Performed by: INTERNAL MEDICINE

## 2023-05-23 PROCEDURE — 3074F SYST BP LT 130 MM HG: CPT | Performed by: INTERNAL MEDICINE

## 2023-05-23 PROCEDURE — G8417 CALC BMI ABV UP PARAM F/U: HCPCS | Performed by: INTERNAL MEDICINE

## 2023-05-23 RX ORDER — PREDNISONE 20 MG/1
20 TABLET ORAL 2 TIMES DAILY
Qty: 10 TABLET | Refills: 0 | Status: SHIPPED | OUTPATIENT
Start: 2023-05-23 | End: 2023-05-28

## 2023-05-23 RX ORDER — DOXYCYCLINE HYCLATE 100 MG
100 TABLET ORAL 2 TIMES DAILY
Qty: 20 TABLET | Refills: 0 | Status: SHIPPED | OUTPATIENT
Start: 2023-05-23 | End: 2023-06-02

## 2023-05-23 ASSESSMENT — PATIENT HEALTH QUESTIONNAIRE - PHQ9
SUM OF ALL RESPONSES TO PHQ9 QUESTIONS 1 & 2: 0
1. LITTLE INTEREST OR PLEASURE IN DOING THINGS: 0
2. FEELING DOWN, DEPRESSED OR HOPELESS: 0
SUM OF ALL RESPONSES TO PHQ QUESTIONS 1-9: 0

## 2023-05-23 ASSESSMENT — ENCOUNTER SYMPTOMS
RECTAL PAIN: 0
EYE PAIN: 0
VOICE CHANGE: 0
STRIDOR: 0

## 2023-05-23 NOTE — PROGRESS NOTES
FOLLOWUP VISIT    Subjective:    Ms. Bernard Miller is a 82 y.o., female,   Chief Complaint   Patient presents with    Chest Congestion     Productive cough, frontal headache       HPI:    Patient has been sick for two weeks. Has had cough and bilateral sinus pressure. Continues to have low subjective grade fever at night. Has been treating herself with OTC medications but not getting better. Plans to visit her new great grand baby in [place] this weekend but hopes to be feeling better before going. Home Covid test negative. Has asthma and has been having some mild wheezing. No SOB or FELICIANO. PCN allergy (rash). The following portions of the patient's history were reviewed and updated as appropriate:      Past Medical History:   Diagnosis Date    Acute pancreatitis 10/12/2018    Asthma     BMI 34.0-34.9,adult     Calculus of kidney     CKD (chronic kidney disease)     Clostridium difficile infection 12/2020    Colon polyps     Female stress incontinence 8/16/2012    GERD (gastroesophageal reflux disease)     History of DVT (deep vein thrombosis)     9/2021; on Xarelto 10 mg daily for prevention    History of gastric ulcer     2018    History of kidney stones 8/16/2012    History of septic arthritis 09/10/2019    Right knee.   Improved with prolonged IV anitbiotics    Hypercholesterolemia     Hypertension     Hypothyroidism     OA (osteoarthritis)     Ophthalmic migraine     Osteopenia of neck of right femur 2/25/2015    Primary insomnia 07/17/2017    Raynaud's phenomenon 2/25/2015    Recurrent cellulitis of lower extremity 12/28/2020    S/P total knee arthroplasty 8/16/2012    Bilateral--2009     Skin cancer (melanoma) ([facility] [address]     Umbilical hernia 4/83/3361    Venous insufficiency of both lower extremities 1/28/2014    Worse on left        Past Surgical History:   Procedure Laterality Date    CATARACT EXTRACTION Bilateral     COLONOSCOPY  Jan 2013    4 polyps, repeat 5 years    COLONOSCOPY  7/5/2016    repeat 5

## 2023-05-26 ENCOUNTER — HOSPITAL ENCOUNTER (OUTPATIENT)
Dept: PHYSICAL THERAPY | Age: 83
Setting detail: RECURRING SERIES
Discharge: HOME | End: 2023-05-29
Payer: MEDICARE

## 2023-05-26 PROCEDURE — 97110 THERAPEUTIC EXERCISES: CPT

## 2023-05-26 PROCEDURE — 97140 MANUAL THERAPY 1/> REGIONS: CPT

## 2023-05-30 ENCOUNTER — HOSPITAL ENCOUNTER (OUTPATIENT)
Dept: PHYSICAL THERAPY | Age: 83
Setting detail: RECURRING SERIES
Discharge: HOME | End: 2023-06-02
Payer: MEDICARE

## 2023-05-30 PROCEDURE — 97140 MANUAL THERAPY 1/> REGIONS: CPT

## 2023-05-30 PROCEDURE — 97110 THERAPEUTIC EXERCISES: CPT

## 2023-05-30 NOTE — PROGRESS NOTES
Juleen Severs  : 1940  Primary: Medicare Part A And B (Medicare)  Secondary: Melanie @ [facility] [address]  Phone: [phone]  Fax: [phone] Plan Frequency: 2x a week for 90 days    Plan of Care/Certification Expiration Date: 23      >PT Visit Info:  Plan Frequency: 2x a week for 90 days  Plan of Care/Certification Expiration Date: 23      Visit Count:  17    OUTPATIENT PHYSICAL THERAPY:OP NOTE TYPE: Treatment Note 2023       Episode  }Appt Desk             Treatment Diagnosis:  Other abnormalities of gait and mobility (R26.89)  Generalized Muscle Weakness (M62.81)  Unsteadiness on Feet (R26.81)  Medical/Referring Diagnosis:  Generalized weakness [R53.1]  Referring Physician:  Letty Hutchinson MD MD Orders:  PT Eval and Treat   Date of Onset:  Onset Date:  (Chronic; around )     Allergies:   Amoxicillin and Pneumococcal vaccines  Restrictions/Precautions:  Restrictions/Precautions: None  No data recorded     Interventions Planned (Treatment may consist of any combination of the following):    Current Treatment Recommendations: Strengthening; ROM; Balance training; Functional mobility training; Transfer training; ADL/Self-care training; Endurance training; Gait training; Stair training; Neuromuscular re-education; Manual; Home exercise program; Pain management; Safety education & training; Dry needling; Equipment evaluation, education, & procurement; Modalities     >Subjective Comments: Pt notes she is still recovering from being sick. Notes her biggest complaint is still feeling unsteady on her feet. >Initial:      0/10>Post Session:        0/10  Medications Last Reviewed:  2023  Updated Objective Findings:  None Today  Treatment   THERAPEUTIC EXERCISE: (38 minutes):  Exercises per grid below to improve mobility and strength.   Required moderate visual, verbal, manual and tactile cues to

## 2023-05-31 RX ORDER — FUROSEMIDE 40 MG/1
40 TABLET ORAL DAILY
Qty: 90 TABLET | Refills: 3 | Status: SHIPPED | OUTPATIENT
Start: 2023-05-31

## 2023-06-01 ENCOUNTER — HOSPITAL ENCOUNTER (OUTPATIENT)
Dept: PHYSICAL THERAPY | Age: 83
Setting detail: RECURRING SERIES
Discharge: HOME | End: 2023-06-04
Payer: MEDICARE

## 2023-06-01 PROCEDURE — 97140 MANUAL THERAPY 1/> REGIONS: CPT

## 2023-06-01 PROCEDURE — 97110 THERAPEUTIC EXERCISES: CPT

## 2023-06-06 ENCOUNTER — HOSPITAL ENCOUNTER (OUTPATIENT)
Dept: PHYSICAL THERAPY | Age: 83
Setting detail: RECURRING SERIES
Discharge: HOME | End: 2023-06-09
Payer: MEDICARE

## 2023-06-06 PROCEDURE — 97140 MANUAL THERAPY 1/> REGIONS: CPT

## 2023-06-06 PROCEDURE — 97110 THERAPEUTIC EXERCISES: CPT

## 2023-06-06 NOTE — THERAPY DISCHARGE
Hip Flexion 4/5 4/5   Knee Extension 4+/5 4+/5   Knee Flexion 4+/5 4+/5   Ankle DF 4+/5 4+/5   Ankle PF 4+/5 4+/5   Ankle IV 4+/5 4+/5   Ankle EV 4+/5 4+/5        ASSESSMENT   DISCHARGE ASSESSMENT:  Ms. Ronald Hernandez has attended 19 physical therapy session including initial evaluation as of 6/6/2023. Ronald Hernandez has made great improvements in her balance and LE strength. She is independent with her HEP and understands importance of continuing them at home. She has met all goals that were set for discharge. PLAN     Goals: (Goals have been discussed and agreed upon with patient.)  Discharge Goals: Time Frame: 90 days from evaluation 09/04/23  Pt will improve TUG score by 15s to demonstrate improvements in functional mobility. -MET 5/2/2023  Pt will improve on Hollis score by 10 points to show improvements in balance and decrease fall risk. -MET 5/2/2023  Pt will increase LE strength to 4/5 or greater to demonstrate increased strength for daily activities and ADLs. -MET 6/6/2023  Pt will perform 20 sit to stands without use of UE to demonstrate improvements in functional mobility. -MET 6/6/2023  Pt will ambulate for 10min without a sitting break to show independence and ability to perform ADLs with more ease. -MET 6/6/2023       Outcome Measure: Tool Used: Hollis Balance Scale  Score:  Initial: 21/56 43/56 (Date: 5/2/2023 ) Most Recent: 43/56 (Date: 6/6/2023)   Interpretation of Score: Each section is scored on a 0-4 scale, 0 representing the patients inability to perform the task and 4 representing independence. The scores of each section are added together for a total score of 56. The higher the patients score, the more independent the patient is. Any score below 45 indicates increased risk for falls.     Tool Used: Lower Extremity Functional Scale (LEFS)  Score:  Initial: 38/80 45/80 (Date: 5/2/2023 ) Most Recent: 49/80 (Date: 6/6/2023)   Interpretation of Score: 20 questions each scored on a 5

## 2023-06-06 NOTE — PROGRESS NOTES
(15 minutes): Joint mobilization, Soft tissue mobilization and Manipulation was utilized and necessary because of the patient's restricted joint motion, painful spasm, loss of articular motion and restricted motion of soft tissue. -STM B quad, adductors with hands and thera-roll  -patellar mobility all directions     Commonly used abbreviations that may be included in this note:  STM- Soft tissue mobilization, R>L or L>R- right greater than left or vice versa, HEP - Home exercise program, CPA/UPA - Central or unilateral posterior-anterior mobilization, SLS- single leg stance, SKTC - Single leg to chest, SNAGS/NAGS- (sustained) Natural apophyseal glides, TKE- Terminal knee extension, ER- External rotation, IR- Internal rotation, B - Bilateral, sec- seconds, Lb- pounds, min - minutes, HA- Headache, OP- Over pressure, tband- theraband, fwd/bwd- Forward/Backward, TA- Transversus Abdominus, dbl- Double      TREATMENT/SESSION SUMMARY:     Response to Treatment:  Pt slightly off balance at times due to weakness and getting over her cold. Reviewed HEP and pt agreeable to discharge to continue working on exercises at home. See discharge note for objective measures. Communication/Consultation:  None today  Equipment provided today:  None  Recommendations/Intent for next treatment session: Next visit will focus on progressing strength and ROM.      Total Treatment Billable Duration:  53 minutes  Time In: 1430  Time Out: 1200 [facility], PT         Charge Capture  }Post Session Pain  PT Visit [facility] Portal  MD Guidelines  Scanned Media  Benefits  MyChart    Future Appointments   Date Time Provider Luis Hodges   9/21/2023  3:20 PM Yoly Ch MD [facility]

## 2023-08-14 DIAGNOSIS — I10 HYPERTENSION, ESSENTIAL: ICD-10-CM

## 2023-08-14 NOTE — TELEPHONE ENCOUNTER
Requested Prescriptions     Pending Prescriptions Disp Refills    losartan (COZAAR) 25 MG tablet [Pharmacy Med Name: LOSARTAN POTASSIUM 25 MG TA 25 Tablet] 90 tablet 1     Sig: Take 1 tablet by mouth daily

## 2023-08-15 RX ORDER — LOSARTAN POTASSIUM 25 MG/1
25 TABLET ORAL DAILY
Qty: 90 TABLET | Refills: 1 | Status: SHIPPED | OUTPATIENT
Start: 2023-08-15

## 2023-08-31 ENCOUNTER — TELEPHONE (OUTPATIENT)
Dept: INTERNAL MEDICINE CLINIC | Facility: CLINIC | Age: 83
End: 2023-08-31

## 2023-08-31 DIAGNOSIS — J45.40 MODERATE PERSISTENT ASTHMA WITHOUT COMPLICATION: Primary | ICD-10-CM

## 2023-08-31 RX ORDER — ALBUTEROL SULFATE 90 UG/1
2 AEROSOL, METERED RESPIRATORY (INHALATION) 4 TIMES DAILY PRN
Qty: 18 G | Refills: 5 | Status: SHIPPED | OUTPATIENT
Start: 2023-08-31

## 2023-08-31 RX ORDER — FLUTICASONE PROPIONATE 220 UG/1
AEROSOL, METERED RESPIRATORY (INHALATION)
Qty: 12 G | Refills: 3 | Status: SHIPPED | OUTPATIENT
Start: 2023-08-31

## 2023-08-31 NOTE — TELEPHONE ENCOUNTER
Patient called requesting refill on rescue inhaler, (Albuterol). States all of hers are out of date. I did not see this in her chart. Is it ok to refill?

## 2023-08-31 NOTE — TELEPHONE ENCOUNTER
Requested Prescriptions     Pending Prescriptions Disp Refills    FLOVENT  MCG/ACT inhaler [Pharmacy Med Name: FLOVENT  MCG INHAL 220 Aerosol] 12 g 3     Sig: INHALE 2 PUFFS TWICE A DAY

## 2023-09-14 DIAGNOSIS — E78.00 PURE HYPERCHOLESTEROLEMIA: ICD-10-CM

## 2023-09-14 DIAGNOSIS — E03.9 ACQUIRED HYPOTHYROIDISM: ICD-10-CM

## 2023-09-14 LAB
ALBUMIN SERPL-MCNC: 4 G/DL (ref 3.2–4.6)
ALBUMIN/GLOB SERPL: 1.3 (ref 0.4–1.6)
ALP SERPL-CCNC: 74 U/L (ref 50–136)
ALT SERPL-CCNC: 21 U/L (ref 12–65)
ANION GAP SERPL CALC-SCNC: 7 MMOL/L (ref 2–11)
AST SERPL-CCNC: 27 U/L (ref 15–37)
BILIRUB SERPL-MCNC: 0.4 MG/DL (ref 0.2–1.1)
BUN SERPL-MCNC: 25 MG/DL (ref 8–23)
CALCIUM SERPL-MCNC: 9.4 MG/DL (ref 8.3–10.4)
CHLORIDE SERPL-SCNC: 108 MMOL/L (ref 101–110)
CHOLEST SERPL-MCNC: 142 MG/DL
CO2 SERPL-SCNC: 29 MMOL/L (ref 21–32)
CREAT SERPL-MCNC: 1.4 MG/DL (ref 0.6–1)
GLOBULIN SER CALC-MCNC: 3.2 G/DL (ref 2.8–4.5)
GLUCOSE SERPL-MCNC: 98 MG/DL (ref 65–100)
HDLC SERPL-MCNC: 72 MG/DL (ref 40–60)
HDLC SERPL: 2
LDLC SERPL CALC-MCNC: 56.6 MG/DL
POTASSIUM SERPL-SCNC: 4.2 MMOL/L (ref 3.5–5.1)
PROT SERPL-MCNC: 7.2 G/DL (ref 6.3–8.2)
SODIUM SERPL-SCNC: 144 MMOL/L (ref 133–143)
TRIGL SERPL-MCNC: 67 MG/DL (ref 35–150)
TSH, 3RD GENERATION: 1.93 UIU/ML (ref 0.36–3.74)
VLDLC SERPL CALC-MCNC: 13.4 MG/DL (ref 6–23)

## 2023-09-21 ENCOUNTER — OFFICE VISIT (OUTPATIENT)
Dept: INTERNAL MEDICINE CLINIC | Facility: CLINIC | Age: 83
End: 2023-09-21
Payer: MEDICARE

## 2023-09-21 VITALS
DIASTOLIC BLOOD PRESSURE: 80 MMHG | SYSTOLIC BLOOD PRESSURE: 130 MMHG | HEIGHT: 62 IN | HEART RATE: 91 BPM | BODY MASS INDEX: 34.63 KG/M2 | WEIGHT: 188.2 LBS | OXYGEN SATURATION: 97 %

## 2023-09-21 DIAGNOSIS — G60.9 IDIOPATHIC PERIPHERAL NEUROPATHY: ICD-10-CM

## 2023-09-21 DIAGNOSIS — E78.00 PURE HYPERCHOLESTEROLEMIA: ICD-10-CM

## 2023-09-21 DIAGNOSIS — I87.2 VENOUS INSUFFICIENCY OF BOTH LOWER EXTREMITIES: ICD-10-CM

## 2023-09-21 DIAGNOSIS — I10 HYPERTENSION, ESSENTIAL: ICD-10-CM

## 2023-09-21 DIAGNOSIS — M54.42 CHRONIC LEFT-SIDED LOW BACK PAIN WITH LEFT-SIDED SCIATICA: ICD-10-CM

## 2023-09-21 DIAGNOSIS — Z23 ENCOUNTER FOR IMMUNIZATION: ICD-10-CM

## 2023-09-21 DIAGNOSIS — J45.40 MODERATE PERSISTENT ASTHMA WITHOUT COMPLICATION: ICD-10-CM

## 2023-09-21 DIAGNOSIS — G89.29 CHRONIC LEFT-SIDED LOW BACK PAIN WITH LEFT-SIDED SCIATICA: ICD-10-CM

## 2023-09-21 DIAGNOSIS — N18.32 STAGE 3B CHRONIC KIDNEY DISEASE (HCC): ICD-10-CM

## 2023-09-21 DIAGNOSIS — E03.9 ACQUIRED HYPOTHYROIDISM: ICD-10-CM

## 2023-09-21 DIAGNOSIS — I82.511 CHRONIC DEEP VEIN THROMBOSIS (DVT) OF RIGHT FEMORAL VEIN (HCC): ICD-10-CM

## 2023-09-21 DIAGNOSIS — K21.00 GASTROESOPHAGEAL REFLUX DISEASE WITH ESOPHAGITIS WITHOUT HEMORRHAGE: Primary | ICD-10-CM

## 2023-09-21 PROCEDURE — 1123F ACP DISCUSS/DSCN MKR DOCD: CPT | Performed by: INTERNAL MEDICINE

## 2023-09-21 PROCEDURE — G8417 CALC BMI ABV UP PARAM F/U: HCPCS | Performed by: INTERNAL MEDICINE

## 2023-09-21 PROCEDURE — 1090F PRES/ABSN URINE INCON ASSESS: CPT | Performed by: INTERNAL MEDICINE

## 2023-09-21 PROCEDURE — 90694 VACC AIIV4 NO PRSRV 0.5ML IM: CPT | Performed by: INTERNAL MEDICINE

## 2023-09-21 PROCEDURE — G8428 CUR MEDS NOT DOCUMENT: HCPCS | Performed by: INTERNAL MEDICINE

## 2023-09-21 PROCEDURE — G0008 ADMIN INFLUENZA VIRUS VAC: HCPCS | Performed by: INTERNAL MEDICINE

## 2023-09-21 PROCEDURE — 3075F SYST BP GE 130 - 139MM HG: CPT | Performed by: INTERNAL MEDICINE

## 2023-09-21 PROCEDURE — 99214 OFFICE O/P EST MOD 30 MIN: CPT | Performed by: INTERNAL MEDICINE

## 2023-09-21 PROCEDURE — 3079F DIAST BP 80-89 MM HG: CPT | Performed by: INTERNAL MEDICINE

## 2023-09-21 PROCEDURE — G8399 PT W/DXA RESULTS DOCUMENT: HCPCS | Performed by: INTERNAL MEDICINE

## 2023-09-21 PROCEDURE — 1036F TOBACCO NON-USER: CPT | Performed by: INTERNAL MEDICINE

## 2023-09-21 RX ORDER — GABAPENTIN 300 MG/1
300 CAPSULE ORAL 2 TIMES DAILY
Qty: 180 CAPSULE | Refills: 1 | Status: SHIPPED | OUTPATIENT
Start: 2023-09-21 | End: 2024-09-20

## 2023-09-21 RX ORDER — ATORVASTATIN CALCIUM 40 MG/1
40 TABLET, FILM COATED ORAL DAILY
Qty: 90 TABLET | Refills: 1 | Status: SHIPPED | OUTPATIENT
Start: 2023-09-21

## 2023-09-21 RX ORDER — POTASSIUM CHLORIDE 20 MEQ/1
20 TABLET, EXTENDED RELEASE ORAL 2 TIMES DAILY
Qty: 180 TABLET | Refills: 1 | Status: SHIPPED | OUTPATIENT
Start: 2023-09-21

## 2023-09-21 ASSESSMENT — ENCOUNTER SYMPTOMS
EYE PAIN: 0
VOICE CHANGE: 0
RECTAL PAIN: 0
STRIDOR: 0

## 2023-09-21 NOTE — PROGRESS NOTES
patient will continue the current treatment. Labs were reviewed and discussed with the patient. Orders:  -     TSH with Reflex; Future  8. Moderate persistent asthma without complication  Overview:  Asthma has been well controlled on Flovent 2 puffs twice daily, recommend continuing this medication. Continue albuterol PRN. Reviewed appropriate time to use this medication discussed difference between maintenance and rescue inhalers  9. Pure hypercholesterolemia  Overview:  9/14/23 total 142 HDL 72 LDL 57 TG 67 on atorvastatin 40 mg daily. Reviewed the most recent lipid panel with the patient, and interpreted the results within the context of the patient's personal cardiovascular risk factors. Discussed/reinforced a low-cholesterol diet. The patient was advised regarding daily compliance. Orders:  -     atorvastatin (LIPITOR) 40 MG tablet; Take 1 tablet by mouth daily, Disp-90 tablet, R-1Normal  10. Venous insufficiency of both lower extremities  Overview:  The patient has chronic bilateral lower extremity venous insufficiency with venous stasis, left slightly worse than right. She has also had recurrent bilateral lower extremity cellulitis due to same. The patient was advised regarding elevation and compression. She was encouraged to adopt a low-sodium diet. We will continue furosemide 40 mg daily. Given her stage III chronic kidney disease we will continue to periodically monitor her electrolytes and creatinine. Orders:  -     potassium chloride (KLOR-CON M) 20 MEQ extended release tablet; Take 1 tablet by mouth 2 times daily, Disp-180 tablet, R-1Normal  11. Encounter for immunization  Overview:  Vaccinations reviewed / discussed. Patient declines Prevnar (she developed flu like symptoms following her initial Pneumovax 23 vaccinations). Recommended Shingrix and Covid booster.       Orders:  -     Influenza, FLUAD, (age 72 y+), IM, Preservative Free, 0.5 mL        The patient and/or

## 2024-01-08 RX ORDER — LEVOTHYROXINE SODIUM 0.03 MG/1
TABLET ORAL
Qty: 90 TABLET | Refills: 3 | Status: SHIPPED | OUTPATIENT
Start: 2024-01-08

## 2024-01-08 NOTE — TELEPHONE ENCOUNTER
Requested Prescriptions     Pending Prescriptions Disp Refills    levothyroxine (SYNTHROID) 25 MCG tablet [Pharmacy Med Name: LEVOTHYROXINE SODIUM 25 MCG 25 Tablet] 90 tablet 3     Sig: TAKE 1 TABLET BY MOUTH EVERY MORNING.     Dose verified and to [place], Patient is scheduled for follow up visit.

## 2024-02-03 DIAGNOSIS — I10 HYPERTENSION, ESSENTIAL: ICD-10-CM

## 2024-02-05 RX ORDER — LOSARTAN POTASSIUM 25 MG/1
25 TABLET ORAL DAILY
Qty: 90 TABLET | Refills: 1 | Status: SHIPPED | OUTPATIENT
Start: 2024-02-05

## 2024-03-14 DIAGNOSIS — I10 HYPERTENSION, ESSENTIAL: ICD-10-CM

## 2024-03-14 DIAGNOSIS — E03.9 ACQUIRED HYPOTHYROIDISM: ICD-10-CM

## 2024-03-14 LAB
ALBUMIN SERPL-MCNC: 3.9 G/DL (ref 3.2–4.6)
ALBUMIN/GLOB SERPL: 1.1 (ref 0.4–1.6)
ALP SERPL-CCNC: 74 U/L (ref 50–136)
ALT SERPL-CCNC: 20 U/L (ref 12–65)
ANION GAP SERPL CALC-SCNC: 5 MMOL/L (ref 2–11)
AST SERPL-CCNC: 22 U/L (ref 15–37)
BASOPHILS # BLD: 0 K/UL (ref 0–0.2)
BASOPHILS NFR BLD: 1 % (ref 0–2)
BILIRUB SERPL-MCNC: 0.5 MG/DL (ref 0.2–1.1)
BUN SERPL-MCNC: 38 MG/DL (ref 8–23)
CALCIUM SERPL-MCNC: 10 MG/DL (ref 8.3–10.4)
CHLORIDE SERPL-SCNC: 109 MMOL/L (ref 103–113)
CO2 SERPL-SCNC: 28 MMOL/L (ref 21–32)
CREAT SERPL-MCNC: 1.8 MG/DL (ref 0.6–1)
DIFFERENTIAL METHOD BLD: ABNORMAL
EOSINOPHIL # BLD: 0.1 K/UL (ref 0–0.8)
EOSINOPHIL NFR BLD: 2 % (ref 0.5–7.8)
ERYTHROCYTE [DISTWIDTH] IN BLOOD BY AUTOMATED COUNT: 13.1 % (ref 11.9–14.6)
GLOBULIN SER CALC-MCNC: 3.4 G/DL (ref 2.8–4.5)
GLUCOSE SERPL-MCNC: 99 MG/DL (ref 65–100)
HCT VFR BLD AUTO: 34.2 % (ref 35.8–46.3)
HGB BLD-MCNC: 10.9 G/DL (ref 11.7–15.4)
IMM GRANULOCYTES # BLD AUTO: 0 K/UL (ref 0–0.5)
IMM GRANULOCYTES NFR BLD AUTO: 0 % (ref 0–5)
LYMPHOCYTES # BLD: 1.5 K/UL (ref 0.5–4.6)
LYMPHOCYTES NFR BLD: 26 % (ref 13–44)
MCH RBC QN AUTO: 30.5 PG (ref 26.1–32.9)
MCHC RBC AUTO-ENTMCNC: 31.9 G/DL (ref 31.4–35)
MCV RBC AUTO: 95.8 FL (ref 82–102)
MONOCYTES # BLD: 0.7 K/UL (ref 0.1–1.3)
MONOCYTES NFR BLD: 12 % (ref 4–12)
NEUTS SEG # BLD: 3.3 K/UL (ref 1.7–8.2)
NEUTS SEG NFR BLD: 59 % (ref 43–78)
NRBC # BLD: 0 K/UL (ref 0–0.2)
PLATELET # BLD AUTO: 179 K/UL (ref 150–450)
PMV BLD AUTO: 12.3 FL (ref 9.4–12.3)
POTASSIUM SERPL-SCNC: 4.7 MMOL/L (ref 3.5–5.1)
PROT SERPL-MCNC: 7.3 G/DL (ref 6.3–8.2)
RBC # BLD AUTO: 3.57 M/UL (ref 4.05–5.2)
SODIUM SERPL-SCNC: 142 MMOL/L (ref 136–146)
TSH W FREE THYROID IF ABNORMAL: 2.07 UIU/ML (ref 0.36–3.74)
WBC # BLD AUTO: 5.6 K/UL (ref 4.3–11.1)

## 2024-03-21 ENCOUNTER — OFFICE VISIT (OUTPATIENT)
Dept: INTERNAL MEDICINE CLINIC | Facility: CLINIC | Age: 84
End: 2024-03-21

## 2024-03-21 VITALS
WEIGHT: 187.2 LBS | DIASTOLIC BLOOD PRESSURE: 66 MMHG | BODY MASS INDEX: 34.45 KG/M2 | HEART RATE: 72 BPM | SYSTOLIC BLOOD PRESSURE: 120 MMHG | HEIGHT: 62 IN

## 2024-03-21 DIAGNOSIS — I10 HYPERTENSION, ESSENTIAL: Primary | ICD-10-CM

## 2024-03-21 DIAGNOSIS — I87.2 VENOUS INSUFFICIENCY OF BOTH LOWER EXTREMITIES: ICD-10-CM

## 2024-03-21 DIAGNOSIS — K21.00 GASTROESOPHAGEAL REFLUX DISEASE WITH ESOPHAGITIS WITHOUT HEMORRHAGE: ICD-10-CM

## 2024-03-21 DIAGNOSIS — N18.32 STAGE 3B CHRONIC KIDNEY DISEASE (HCC): ICD-10-CM

## 2024-03-21 DIAGNOSIS — J45.40 MODERATE PERSISTENT ASTHMA WITHOUT COMPLICATION: ICD-10-CM

## 2024-03-21 DIAGNOSIS — E03.9 ACQUIRED HYPOTHYROIDISM: ICD-10-CM

## 2024-03-21 DIAGNOSIS — I82.511 CHRONIC DEEP VEIN THROMBOSIS (DVT) OF RIGHT FEMORAL VEIN (HCC): ICD-10-CM

## 2024-03-21 DIAGNOSIS — E78.00 PURE HYPERCHOLESTEROLEMIA: ICD-10-CM

## 2024-03-21 PROBLEM — K56.609 SBO (SMALL BOWEL OBSTRUCTION) (HCC): Status: RESOLVED | Noted: 2023-02-03 | Resolved: 2024-03-21

## 2024-03-21 RX ORDER — FUROSEMIDE 40 MG/1
20 TABLET ORAL DAILY
Qty: 90 TABLET | Refills: 3 | Status: SHIPPED
Start: 2024-03-21

## 2024-03-21 ASSESSMENT — ENCOUNTER SYMPTOMS
EYE PAIN: 0
RECTAL PAIN: 0
STRIDOR: 0
VOICE CHANGE: 0

## 2024-03-21 ASSESSMENT — PATIENT HEALTH QUESTIONNAIRE - PHQ9
SUM OF ALL RESPONSES TO PHQ QUESTIONS 1-9: 0
SUM OF ALL RESPONSES TO PHQ QUESTIONS 1-9: 0
1. LITTLE INTEREST OR PLEASURE IN DOING THINGS: NOT AT ALL
SUM OF ALL RESPONSES TO PHQ QUESTIONS 1-9: 0
2. FEELING DOWN, DEPRESSED OR HOPELESS: NOT AT ALL
SUM OF ALL RESPONSES TO PHQ9 QUESTIONS 1 & 2: 0
SUM OF ALL RESPONSES TO PHQ QUESTIONS 1-9: 0

## 2024-03-27 NOTE — PROGRESS NOTES
FOLLOWUP VISIT    Subjective:    Ms. Moyer is a 84 y.o., female,   Chief Complaint   Patient presents with    6 Month Follow-Up       HPI:    Patient presents today for follow up of two or more chronic medical problems and review of labs.       The patient has hypertension.  The patient has been on an attempted low sodium diet and has been trying to exercise and maintain a healthy weight.  The patient reports good compliance with the blood pressure medications.       The patient has hyperlipidemia.  The patient has been following a low cholesterol diet and denies any myalgias or weakness on current lipid lowering therapy.       The patient has hypothyroidism.  The patient denies any symptoms of hypo- or hyperthyroidism on the current dose of levothyroxine.       The patient reports good asthma control on current therapy with rare need for rescue inhaler use.     The following portions of the patient's history were reviewed and updated as appropriate:      Past Medical History:   Diagnosis Date    Acute pancreatitis 10/12/2018    Asthma     BMI 34.0-34.9,adult     Calculus of kidney     CKD (chronic kidney disease)     Clostridium difficile infection 12/2020    Colon polyps     Female stress incontinence 8/16/2012    GERD (gastroesophageal reflux disease)     History of DVT (deep vein thrombosis)     9/2021; on Xarelto 10 mg daily for prevention    History of gastric ulcer     2018    History of kidney stones 8/16/2012    History of septic arthritis 09/10/2019    Right knee.  Improved with prolonged IV anitbiotics    Hypercholesterolemia     Hypertension     Hypothyroidism     OA (osteoarthritis)     Ophthalmic migraine     Osteopenia of neck of right femur 2/25/2015    Primary insomnia 07/17/2017    Raynaud's phenomenon 2/25/2015    Recurrent cellulitis of lower extremity 12/28/2020    S/P total knee arthroplasty 8/16/2012    Bilateral--2009     Skin cancer (melanoma) ([facility])     Umbilical hernia 8/16/2012     yes

## 2024-04-02 DIAGNOSIS — I82.511 CHRONIC DEEP VEIN THROMBOSIS (DVT) OF RIGHT FEMORAL VEIN (HCC): ICD-10-CM

## 2024-04-02 NOTE — TELEPHONE ENCOUNTER
Requested Prescriptions     Pending Prescriptions Disp Refills    XARELTO 10 MG TABS tablet [Pharmacy Med Name: XARELTO 10 MG TABLET 10 Tablet] 30 tablet 1     Sig: Take 1 tablet by mouth Daily with supper     Dose verified and to Katy Patient is scheduled for follow up visit.

## 2024-04-03 RX ORDER — RIVAROXABAN 10 MG/1
10 TABLET, FILM COATED ORAL
Qty: 30 TABLET | Refills: 1 | Status: SHIPPED | OUTPATIENT
Start: 2024-04-03

## 2024-04-07 NOTE — PROGRESS NOTES
Patient made a appointment 01/22/2019 to discuss DEXA scan which showed some worsening of her bone density.
Patient needs to have appointment to discuss DEXA scan results showing some worsening of her bone density
06-Apr-2024 22:21

## 2024-04-17 DIAGNOSIS — I87.2 VENOUS INSUFFICIENCY OF BOTH LOWER EXTREMITIES: ICD-10-CM

## 2024-04-17 NOTE — TELEPHONE ENCOUNTER
Requested Prescriptions     Pending Prescriptions Disp Refills    potassium chloride (KLOR-CON M) 20 MEQ extended release tablet [Pharmacy Med Name: POTASSIUM CHLORIDE ER 20MEQ 20 Tablet] 180 tablet 1     Sig: Take 1 tablet by mouth 2 times daily     Dose verified and to Katy Patient is scheduled for follow up visit.

## 2024-04-18 RX ORDER — POTASSIUM CHLORIDE 20 MEQ/1
20 TABLET, EXTENDED RELEASE ORAL 2 TIMES DAILY
Qty: 180 TABLET | Refills: 1 | Status: SHIPPED | OUTPATIENT
Start: 2024-04-18

## 2024-04-18 RX ORDER — FLUTICASONE PROPIONATE 220 UG/1
AEROSOL, METERED RESPIRATORY (INHALATION)
Qty: 12 G | Refills: 3 | Status: SHIPPED | OUTPATIENT
Start: 2024-04-18 | End: 2024-04-18 | Stop reason: RX

## 2024-04-18 RX ORDER — FLUTICASONE PROPIONATE 220 UG/1
1 AEROSOL, METERED RESPIRATORY (INHALATION) 2 TIMES DAILY
Qty: 12 G | Refills: 3 | Status: SHIPPED | OUTPATIENT
Start: 2024-04-18

## 2024-04-18 NOTE — TELEPHONE ENCOUNTER
[facility] called states that BRAND name of Flovent is discontinued by manufacture and requesting generic one.     Requested Prescriptions     Pending Prescriptions Disp Refills    fluticasone (FLOVENT HFA) 220 MCG/ACT inhaler 12 g 3     Sig: Inhale 1 puff into the lungs 2 times daily     To Torey.

## 2024-04-25 DIAGNOSIS — N18.32 STAGE 3B CHRONIC KIDNEY DISEASE (HCC): ICD-10-CM

## 2024-04-25 LAB
ANION GAP SERPL CALC-SCNC: 11 MMOL/L (ref 9–18)
BUN SERPL-MCNC: 19 MG/DL (ref 8–23)
CALCIUM SERPL-MCNC: 9.8 MG/DL (ref 8.8–10.2)
CHLORIDE SERPL-SCNC: 104 MMOL/L (ref 98–107)
CO2 SERPL-SCNC: 26 MMOL/L (ref 20–28)
CREAT SERPL-MCNC: 1.3 MG/DL (ref 0.6–1.1)
CREAT UR-MCNC: 89.7 MG/DL (ref 28–217)
GLUCOSE SERPL-MCNC: 94 MG/DL (ref 70–99)
MICROALBUMIN UR-MCNC: 22.7 MG/DL (ref 0–20)
MICROALBUMIN/CREAT UR-RTO: 253 MG/G (ref 0–30)
POTASSIUM SERPL-SCNC: 4.1 MMOL/L (ref 3.5–5.1)
SODIUM SERPL-SCNC: 141 MMOL/L (ref 136–145)

## 2024-05-02 ENCOUNTER — OFFICE VISIT (OUTPATIENT)
Dept: INTERNAL MEDICINE CLINIC | Facility: CLINIC | Age: 84
End: 2024-05-02

## 2024-05-02 VITALS
BODY MASS INDEX: 34.37 KG/M2 | SYSTOLIC BLOOD PRESSURE: 112 MMHG | OXYGEN SATURATION: 94 % | HEIGHT: 62 IN | WEIGHT: 186.8 LBS | DIASTOLIC BLOOD PRESSURE: 62 MMHG | HEART RATE: 95 BPM

## 2024-05-02 DIAGNOSIS — I87.2 VENOUS INSUFFICIENCY OF BOTH LOWER EXTREMITIES: ICD-10-CM

## 2024-05-02 DIAGNOSIS — K21.00 GASTROESOPHAGEAL REFLUX DISEASE WITH ESOPHAGITIS WITHOUT HEMORRHAGE: ICD-10-CM

## 2024-05-02 DIAGNOSIS — Z00.00 MEDICARE ANNUAL WELLNESS VISIT, SUBSEQUENT: Primary | Chronic | ICD-10-CM

## 2024-05-02 DIAGNOSIS — E03.9 ACQUIRED HYPOTHYROIDISM: ICD-10-CM

## 2024-05-02 DIAGNOSIS — G60.9 IDIOPATHIC PERIPHERAL NEUROPATHY: ICD-10-CM

## 2024-05-02 DIAGNOSIS — I10 HYPERTENSION, ESSENTIAL: ICD-10-CM

## 2024-05-02 DIAGNOSIS — N18.32 STAGE 3B CHRONIC KIDNEY DISEASE (HCC): ICD-10-CM

## 2024-05-02 DIAGNOSIS — E78.00 PURE HYPERCHOLESTEROLEMIA: ICD-10-CM

## 2024-05-02 DIAGNOSIS — I82.511 CHRONIC DEEP VEIN THROMBOSIS (DVT) OF RIGHT FEMORAL VEIN (HCC): ICD-10-CM

## 2024-05-02 RX ORDER — RIVAROXABAN 10 MG/1
10 TABLET, FILM COATED ORAL
Qty: 90 TABLET | Refills: 2 | Status: SHIPPED | OUTPATIENT
Start: 2024-05-02

## 2024-05-02 SDOH — ECONOMIC STABILITY: INCOME INSECURITY: HOW HARD IS IT FOR YOU TO PAY FOR THE VERY BASICS LIKE FOOD, HOUSING, MEDICAL CARE, AND HEATING?: NOT HARD AT ALL

## 2024-05-02 SDOH — ECONOMIC STABILITY: FOOD INSECURITY: WITHIN THE PAST 12 MONTHS, THE FOOD YOU BOUGHT JUST DIDN'T LAST AND YOU DIDN'T HAVE MONEY TO GET MORE.: NEVER TRUE

## 2024-05-02 SDOH — ECONOMIC STABILITY: FOOD INSECURITY: WITHIN THE PAST 12 MONTHS, YOU WORRIED THAT YOUR FOOD WOULD RUN OUT BEFORE YOU GOT MONEY TO BUY MORE.: NEVER TRUE

## 2024-05-02 ASSESSMENT — LIFESTYLE VARIABLES
HOW MANY STANDARD DRINKS CONTAINING ALCOHOL DO YOU HAVE ON A TYPICAL DAY: PATIENT DOES NOT DRINK
HOW OFTEN DO YOU HAVE A DRINK CONTAINING ALCOHOL: NEVER

## 2024-05-02 ASSESSMENT — PATIENT HEALTH QUESTIONNAIRE - PHQ9
SUM OF ALL RESPONSES TO PHQ QUESTIONS 1-9: 0
SUM OF ALL RESPONSES TO PHQ9 QUESTIONS 1 & 2: 0
SUM OF ALL RESPONSES TO PHQ QUESTIONS 1-9: 0
1. LITTLE INTEREST OR PLEASURE IN DOING THINGS: NOT AT ALL
2. FEELING DOWN, DEPRESSED OR HOPELESS: NOT AT ALL

## 2024-05-02 ASSESSMENT — ENCOUNTER SYMPTOMS
RECTAL PAIN: 0
STRIDOR: 0
VOICE CHANGE: 0
EYE PAIN: 0

## 2024-05-02 NOTE — PROGRESS NOTES
will continue the current treatment.              The patient and/or patient representative voiced understanding and agreement with the current diagnoses, recommendations, and possible side effects.    Return in about 6 months (around 11/2/2024) for follow up of chronic medical problems, review labs.          Medicare Annual Wellness Visit    Jody Moyer is here for Follow-up    Assessment & Plan   Medicare annual wellness visit, subsequent  Chronic deep vein thrombosis (DVT) of right femoral vein (HCC)  -     rivaroxaban (XARELTO) 10 MG TABS tablet; Take 1 tablet by mouth Daily with supper, Disp-90 tablet, R-2Normal  Hypertension, essential  -     CBC with Auto Differential; Future  -     Comprehensive Metabolic Panel; Future  Stage 3b chronic kidney disease (HCC)  -     Microalbumin / Creatinine Urine Ratio; Future  Acquired hypothyroidism  -     TSH; Future  Pure hypercholesterolemia  -     Lipid Panel; Future  Gastroesophageal reflux disease with esophagitis without hemorrhage  Venous insufficiency of both lower extremities  Idiopathic peripheral neuropathy    Recommendations for Preventive Services Due: see orders and patient instructions/AVS.  Recommended screening schedule for the next 5-10 years is provided to the patient in written form: see Patient Instructions/AVS.     Return in about 6 months (around 11/2/2024) for follow up of chronic medical problems, review labs.     Subjective       Patient's complete Health Risk Assessment and screening values have been reviewed and are found in Flowsheets. The following problems were reviewed today and where indicated follow up appointments were made and/or referrals ordered.    Positive Risk Factor Screenings with Interventions:                Activity, Diet, and Weight:  On average, how many days per week do you engage in moderate to strenuous exercise (like a brisk walk)?: 7 days  On average, how many minutes do you engage in exercise at this level?: 10

## 2024-05-29 DIAGNOSIS — G89.29 CHRONIC LEFT-SIDED LOW BACK PAIN WITH LEFT-SIDED SCIATICA: ICD-10-CM

## 2024-05-29 DIAGNOSIS — G60.9 IDIOPATHIC PERIPHERAL NEUROPATHY: ICD-10-CM

## 2024-05-29 DIAGNOSIS — M54.42 CHRONIC LEFT-SIDED LOW BACK PAIN WITH LEFT-SIDED SCIATICA: ICD-10-CM

## 2024-05-29 RX ORDER — GABAPENTIN 300 MG/1
CAPSULE ORAL
Qty: 180 CAPSULE | Refills: 1 | Status: SHIPPED | OUTPATIENT
Start: 2024-05-29 | End: 2024-11-25

## 2024-05-29 NOTE — TELEPHONE ENCOUNTER
Requested Prescriptions     Pending Prescriptions Disp Refills    gabapentin (NEURONTIN) 300 MG capsule [Pharmacy Med Name: GABAPENTIN 300 MG CAPS 300 Capsule] 180 capsule 1     Sig: TAKE 1 CAPSULE BY MOUTH EVERY MORNING AND AT BEDTIME     Dose verified and to Katy  Patient is scheduled for follow up visit.

## 2024-07-22 ENCOUNTER — TELEPHONE (OUTPATIENT)
Dept: INTERNAL MEDICINE CLINIC | Facility: CLINIC | Age: 84
End: 2024-07-22

## 2024-07-22 DIAGNOSIS — J45.40 MODERATE PERSISTENT ASTHMA WITHOUT COMPLICATION: Primary | ICD-10-CM

## 2024-07-22 NOTE — TELEPHONE ENCOUNTER
Daughter called requesting alternative inhaler since fluticasone inhaler is not in formulary. Requesting alternative close to this inhaler.   Daughter states that she looked medicare website for possible alternative inhaler but she was not able to find one.

## 2024-07-23 DIAGNOSIS — E78.00 PURE HYPERCHOLESTEROLEMIA: ICD-10-CM

## 2024-07-23 RX ORDER — MOMETASONE FUROATE 200 UG/1
1 AEROSOL RESPIRATORY (INHALATION) 2 TIMES DAILY
Qty: 1 EACH | Refills: 1 | Status: SHIPPED | OUTPATIENT
Start: 2024-07-23

## 2024-07-23 NOTE — TELEPHONE ENCOUNTER
Requested Prescriptions     Pending Prescriptions Disp Refills    atorvastatin (LIPITOR) 40 MG tablet 90 tablet 1     Sig: Take 1 tablet by mouth daily

## 2024-07-24 RX ORDER — ATORVASTATIN CALCIUM 40 MG/1
40 TABLET, FILM COATED ORAL DAILY
Qty: 90 TABLET | Refills: 0 | Status: SHIPPED | OUTPATIENT
Start: 2024-07-24

## 2024-10-07 DIAGNOSIS — I87.2 VENOUS INSUFFICIENCY OF BOTH LOWER EXTREMITIES: ICD-10-CM

## 2024-10-07 RX ORDER — FUROSEMIDE 40 MG
40 TABLET ORAL DAILY
Qty: 90 TABLET | Refills: 3 | Status: SHIPPED | OUTPATIENT
Start: 2024-10-07

## 2024-10-29 DIAGNOSIS — E78.00 PURE HYPERCHOLESTEROLEMIA: ICD-10-CM

## 2024-10-29 DIAGNOSIS — I10 HYPERTENSION, ESSENTIAL: ICD-10-CM

## 2024-10-29 DIAGNOSIS — N18.32 STAGE 3B CHRONIC KIDNEY DISEASE (HCC): ICD-10-CM

## 2024-10-29 DIAGNOSIS — E03.9 ACQUIRED HYPOTHYROIDISM: ICD-10-CM

## 2024-10-29 LAB
ALBUMIN SERPL-MCNC: 3.7 G/DL (ref 3.2–4.6)
ALBUMIN/GLOB SERPL: 1.1 (ref 1–1.9)
ALP SERPL-CCNC: 67 U/L (ref 35–104)
ALT SERPL-CCNC: 13 U/L (ref 8–45)
ANION GAP SERPL CALC-SCNC: 12 MMOL/L (ref 7–16)
AST SERPL-CCNC: 26 U/L (ref 15–37)
BASOPHILS # BLD: 0 K/UL (ref 0–0.2)
BASOPHILS NFR BLD: 1 % (ref 0–2)
BILIRUB SERPL-MCNC: 0.3 MG/DL (ref 0–1.2)
BUN SERPL-MCNC: 22 MG/DL (ref 8–23)
CALCIUM SERPL-MCNC: 9.6 MG/DL (ref 8.8–10.2)
CHLORIDE SERPL-SCNC: 106 MMOL/L (ref 98–107)
CHOLEST SERPL-MCNC: 145 MG/DL (ref 0–200)
CO2 SERPL-SCNC: 24 MMOL/L (ref 20–29)
CREAT SERPL-MCNC: 1.55 MG/DL (ref 0.6–1.1)
CREAT UR-MCNC: 64.5 MG/DL (ref 28–217)
DIFFERENTIAL METHOD BLD: ABNORMAL
EOSINOPHIL # BLD: 0.1 K/UL (ref 0–0.8)
EOSINOPHIL NFR BLD: 3 % (ref 0.5–7.8)
ERYTHROCYTE [DISTWIDTH] IN BLOOD BY AUTOMATED COUNT: 14.2 % (ref 11.9–14.6)
GLOBULIN SER CALC-MCNC: 3.2 G/DL (ref 2.3–3.5)
GLUCOSE SERPL-MCNC: 86 MG/DL (ref 70–99)
HCT VFR BLD AUTO: 33.4 % (ref 35.8–46.3)
HDLC SERPL-MCNC: 61 MG/DL (ref 40–60)
HDLC SERPL: 2.4 (ref 0–5)
HGB BLD-MCNC: 10.2 G/DL (ref 11.7–15.4)
IMM GRANULOCYTES # BLD AUTO: 0 K/UL (ref 0–0.5)
IMM GRANULOCYTES NFR BLD AUTO: 0 % (ref 0–5)
LDLC SERPL CALC-MCNC: 68 MG/DL (ref 0–100)
LYMPHOCYTES # BLD: 1.8 K/UL (ref 0.5–4.6)
LYMPHOCYTES NFR BLD: 32 % (ref 13–44)
MCH RBC QN AUTO: 29.4 PG (ref 26.1–32.9)
MCHC RBC AUTO-ENTMCNC: 30.5 G/DL (ref 31.4–35)
MCV RBC AUTO: 96.3 FL (ref 82–102)
MICROALBUMIN UR-MCNC: 8.07 MG/DL (ref 0–20)
MICROALBUMIN/CREAT UR-RTO: 125 MG/G (ref 0–30)
MONOCYTES # BLD: 0.7 K/UL (ref 0.1–1.3)
MONOCYTES NFR BLD: 13 % (ref 4–12)
NEUTS SEG # BLD: 2.8 K/UL (ref 1.7–8.2)
NEUTS SEG NFR BLD: 51 % (ref 43–78)
NRBC # BLD: 0 K/UL (ref 0–0.2)
PLATELET # BLD AUTO: 179 K/UL (ref 150–450)
PMV BLD AUTO: 12.4 FL (ref 9.4–12.3)
POTASSIUM SERPL-SCNC: 4.2 MMOL/L (ref 3.5–5.1)
PROT SERPL-MCNC: 6.9 G/DL (ref 6.3–8.2)
RBC # BLD AUTO: 3.47 M/UL (ref 4.05–5.2)
SODIUM SERPL-SCNC: 142 MMOL/L (ref 136–145)
TRIGL SERPL-MCNC: 81 MG/DL (ref 0–150)
TSH, 3RD GENERATION: 3.08 UIU/ML (ref 0.27–4.2)
VLDLC SERPL CALC-MCNC: 16 MG/DL (ref 6–23)
WBC # BLD AUTO: 5.5 K/UL (ref 4.3–11.1)

## 2024-11-04 ENCOUNTER — OFFICE VISIT (OUTPATIENT)
Dept: INTERNAL MEDICINE CLINIC | Facility: CLINIC | Age: 84
End: 2024-11-04

## 2024-11-04 VITALS
HEART RATE: 82 BPM | SYSTOLIC BLOOD PRESSURE: 116 MMHG | BODY MASS INDEX: 35.15 KG/M2 | WEIGHT: 191 LBS | HEIGHT: 62 IN | DIASTOLIC BLOOD PRESSURE: 60 MMHG

## 2024-11-04 DIAGNOSIS — I87.2 VENOUS INSUFFICIENCY OF BOTH LOWER EXTREMITIES: ICD-10-CM

## 2024-11-04 DIAGNOSIS — N18.32 STAGE 3B CHRONIC KIDNEY DISEASE (HCC): ICD-10-CM

## 2024-11-04 DIAGNOSIS — I82.511 CHRONIC DEEP VEIN THROMBOSIS (DVT) OF RIGHT FEMORAL VEIN (HCC): ICD-10-CM

## 2024-11-04 DIAGNOSIS — D64.9 ANEMIA, UNSPECIFIED TYPE: Primary | ICD-10-CM

## 2024-11-04 DIAGNOSIS — Z23 NEED FOR INFLUENZA VACCINATION: ICD-10-CM

## 2024-11-04 DIAGNOSIS — G60.9 IDIOPATHIC PERIPHERAL NEUROPATHY: ICD-10-CM

## 2024-11-04 DIAGNOSIS — E78.00 PURE HYPERCHOLESTEROLEMIA: ICD-10-CM

## 2024-11-04 DIAGNOSIS — J45.40 MODERATE PERSISTENT ASTHMA WITHOUT COMPLICATION: ICD-10-CM

## 2024-11-04 DIAGNOSIS — I10 HYPERTENSION, ESSENTIAL: ICD-10-CM

## 2024-11-04 DIAGNOSIS — G89.29 CHRONIC LEFT-SIDED LOW BACK PAIN WITH LEFT-SIDED SCIATICA: ICD-10-CM

## 2024-11-04 DIAGNOSIS — M54.42 CHRONIC LEFT-SIDED LOW BACK PAIN WITH LEFT-SIDED SCIATICA: ICD-10-CM

## 2024-11-04 DIAGNOSIS — E03.9 ACQUIRED HYPOTHYROIDISM: ICD-10-CM

## 2024-11-04 RX ORDER — RIVAROXABAN 10 MG/1
10 TABLET, FILM COATED ORAL
Qty: 90 TABLET | Refills: 2 | Status: SHIPPED | OUTPATIENT
Start: 2024-11-04

## 2024-11-04 RX ORDER — LEVOTHYROXINE SODIUM 25 UG/1
25 TABLET ORAL EVERY MORNING
Qty: 90 TABLET | Refills: 3 | Status: SHIPPED | OUTPATIENT
Start: 2024-11-04

## 2024-11-04 RX ORDER — LOSARTAN POTASSIUM 25 MG/1
25 TABLET ORAL DAILY
Qty: 90 TABLET | Refills: 1 | Status: SHIPPED | OUTPATIENT
Start: 2024-11-04

## 2024-11-04 RX ORDER — MOMETASONE FUROATE 200 UG/1
1 AEROSOL RESPIRATORY (INHALATION) 2 TIMES DAILY
Qty: 1 EACH | Refills: 1 | Status: SHIPPED | OUTPATIENT
Start: 2024-11-04

## 2024-11-04 RX ORDER — GABAPENTIN 300 MG/1
300 CAPSULE ORAL 2 TIMES DAILY
Qty: 180 CAPSULE | Refills: 1 | Status: SHIPPED | OUTPATIENT
Start: 2024-11-04 | End: 2025-05-03

## 2024-11-04 RX ORDER — ATORVASTATIN CALCIUM 40 MG/1
40 TABLET, FILM COATED ORAL DAILY
Qty: 90 TABLET | Refills: 1 | Status: SHIPPED | OUTPATIENT
Start: 2024-11-04

## 2024-11-04 RX ORDER — POTASSIUM CHLORIDE 1500 MG/1
20 TABLET, EXTENDED RELEASE ORAL 2 TIMES DAILY
Qty: 180 TABLET | Refills: 1 | Status: SHIPPED | OUTPATIENT
Start: 2024-11-04

## 2024-11-04 ASSESSMENT — ENCOUNTER SYMPTOMS
RECTAL PAIN: 0
STRIDOR: 0
VOICE CHANGE: 0
EYE PAIN: 0

## 2024-11-04 NOTE — PROGRESS NOTES
FOLLOWUP VISIT    Subjective:    Ms. Moyer is a 84 y.o., female,   Chief Complaint   Patient presents with    6 Month Follow-Up    Medication Refill    Immunizations     Influenza vaccine        HPI:    Patient presents today for follow up of two or more chronic medical problems and review of labs.       The patient has hypertension.  The patient has been on an attempted low sodium diet and has been trying to exercise and maintain a healthy weight.  The patient reports good compliance with the blood pressure medications.       The patient has hyperlipidemia.  The patient has been attempting to follow a low cholesterol diet and denies any myalgias or weakness on current lipid lowering therapy.       The patient has hypothyroidism.  The patient denies any symptoms of hypo- or hyperthyroidism on the current dose of levothyroxine.       Interval history  and review of symptoms otherwise unchanged.       The following portions of the patient's history were reviewed and updated as appropriate:      Past Medical History:   Diagnosis Date    Acute pancreatitis 10/12/2018    Asthma     BMI 34.0-34.9,adult     Calculus of kidney     CKD (chronic kidney disease)     Clostridium difficile infection 12/2020    Colon polyps     Female stress incontinence 8/16/2012    GERD (gastroesophageal reflux disease)     History of DVT (deep vein thrombosis)     9/2021; on Xarelto 10 mg daily for prevention    History of gastric ulcer     2018    History of kidney stones 8/16/2012    History of septic arthritis 09/10/2019    Right knee.  Improved with prolonged IV anitbiotics    Hypercholesterolemia     Hypertension     Hypothyroidism     OA (osteoarthritis)     Ophthalmic migraine     Osteopenia of neck of right femur 2/25/2015    Primary insomnia 07/17/2017    Raynaud's phenomenon 2/25/2015    Recurrent cellulitis of lower extremity 12/28/2020    S/P total knee arthroplasty 8/16/2012    Bilateral--2009     Skin cancer (melanoma) ([facility])

## 2024-11-26 ENCOUNTER — TELEPHONE (OUTPATIENT)
Dept: INTERNAL MEDICINE CLINIC | Facility: CLINIC | Age: 84
End: 2024-11-26

## 2024-11-26 NOTE — TELEPHONE ENCOUNTER
Ref # [id number]    [facility] called wanted to follow up with the 7 pages lab forms they faxed over

## 2024-12-03 NOTE — TELEPHONE ENCOUNTER
At this time there is still limited data on broad use of pharmacogenetic testing in clinical practice and I have not yet chosen to routinely order pharmacogenetic testing.

## 2025-01-30 DIAGNOSIS — D64.9 ANEMIA, UNSPECIFIED TYPE: ICD-10-CM

## 2025-01-30 DIAGNOSIS — N18.32 STAGE 3B CHRONIC KIDNEY DISEASE (HCC): ICD-10-CM

## 2025-01-30 LAB
ANION GAP SERPL CALC-SCNC: 10 MMOL/L (ref 7–16)
BASOPHILS # BLD: 0.04 K/UL (ref 0–0.2)
BASOPHILS NFR BLD: 0.7 % (ref 0–2)
BUN SERPL-MCNC: 23 MG/DL (ref 8–23)
CALCIUM SERPL-MCNC: 10.4 MG/DL (ref 8.8–10.2)
CHLORIDE SERPL-SCNC: 107 MMOL/L (ref 98–107)
CO2 SERPL-SCNC: 26 MMOL/L (ref 20–29)
CREAT SERPL-MCNC: 1.33 MG/DL (ref 0.6–1.1)
DIFFERENTIAL METHOD BLD: ABNORMAL
EOSINOPHIL # BLD: 0.15 K/UL (ref 0–0.8)
EOSINOPHIL NFR BLD: 2.5 % (ref 0.5–7.8)
ERYTHROCYTE [DISTWIDTH] IN BLOOD BY AUTOMATED COUNT: 13.9 % (ref 11.9–14.6)
FOLATE SERPL-MCNC: >40 NG/ML (ref 3.1–17.5)
GLUCOSE SERPL-MCNC: 97 MG/DL (ref 70–99)
HCT VFR BLD AUTO: 32.7 % (ref 35.8–46.3)
HGB BLD-MCNC: 10.4 G/DL (ref 11.7–15.4)
HGB RETIC QN AUTO: 35 PG (ref 29–35)
IMM GRANULOCYTES # BLD AUTO: 0.01 K/UL (ref 0–0.5)
IMM GRANULOCYTES NFR BLD AUTO: 0.2 % (ref 0–5)
IMM RETICS NFR: 14.2 % (ref 3–15.9)
IRON SATN MFR SERPL: 27 % (ref 20–50)
IRON SERPL-MCNC: 75 UG/DL (ref 35–100)
LYMPHOCYTES # BLD: 1.28 K/UL (ref 0.5–4.6)
LYMPHOCYTES NFR BLD: 21.4 % (ref 13–44)
MCH RBC QN AUTO: 29.5 PG (ref 26.1–32.9)
MCHC RBC AUTO-ENTMCNC: 31.8 G/DL (ref 31.4–35)
MCV RBC AUTO: 92.9 FL (ref 82–102)
MONOCYTES # BLD: 0.71 K/UL (ref 0.1–1.3)
MONOCYTES NFR BLD: 11.9 % (ref 4–12)
NEUTS SEG # BLD: 3.79 K/UL (ref 1.7–8.2)
NEUTS SEG NFR BLD: 63.3 % (ref 43–78)
NRBC # BLD: 0 K/UL (ref 0–0.2)
PLATELET # BLD AUTO: 168 K/UL (ref 150–450)
PMV BLD AUTO: 12.6 FL (ref 9.4–12.3)
POTASSIUM SERPL-SCNC: 4.3 MMOL/L (ref 3.5–5.1)
RBC # BLD AUTO: 3.52 M/UL (ref 4.05–5.2)
RETICS # AUTO: 0.05 M/UL (ref 0.03–0.1)
RETICS/RBC NFR AUTO: 1.4 % (ref 0.3–2)
SODIUM SERPL-SCNC: 143 MMOL/L (ref 136–145)
TIBC SERPL-MCNC: 279 UG/DL (ref 240–450)
UIBC SERPL-MCNC: 204 UG/DL (ref 112–347)
VIT B12 SERPL-MCNC: 671 PG/ML (ref 193–986)
WBC # BLD AUTO: 6 K/UL (ref 4.3–11.1)

## 2025-02-02 SDOH — ECONOMIC STABILITY: FOOD INSECURITY: WITHIN THE PAST 12 MONTHS, YOU WORRIED THAT YOUR FOOD WOULD RUN OUT BEFORE YOU GOT MONEY TO BUY MORE.: NEVER TRUE

## 2025-02-02 SDOH — ECONOMIC STABILITY: FOOD INSECURITY: WITHIN THE PAST 12 MONTHS, THE FOOD YOU BOUGHT JUST DIDN'T LAST AND YOU DIDN'T HAVE MONEY TO GET MORE.: NEVER TRUE

## 2025-02-02 SDOH — ECONOMIC STABILITY: INCOME INSECURITY: IN THE LAST 12 MONTHS, WAS THERE A TIME WHEN YOU WERE NOT ABLE TO PAY THE MORTGAGE OR RENT ON TIME?: NO

## 2025-02-02 SDOH — ECONOMIC STABILITY: TRANSPORTATION INSECURITY
IN THE PAST 12 MONTHS, HAS LACK OF TRANSPORTATION KEPT YOU FROM MEETINGS, WORK, OR FROM GETTING THINGS NEEDED FOR DAILY LIVING?: NO

## 2025-02-02 SDOH — ECONOMIC STABILITY: TRANSPORTATION INSECURITY
IN THE PAST 12 MONTHS, HAS THE LACK OF TRANSPORTATION KEPT YOU FROM MEDICAL APPOINTMENTS OR FROM GETTING MEDICATIONS?: NO

## 2025-02-02 ASSESSMENT — PATIENT HEALTH QUESTIONNAIRE - PHQ9
1. LITTLE INTEREST OR PLEASURE IN DOING THINGS: NOT AT ALL
SUM OF ALL RESPONSES TO PHQ QUESTIONS 1-9: 0
1. LITTLE INTEREST OR PLEASURE IN DOING THINGS: NOT AT ALL
2. FEELING DOWN, DEPRESSED OR HOPELESS: NOT AT ALL
2. FEELING DOWN, DEPRESSED OR HOPELESS: NOT AT ALL
SUM OF ALL RESPONSES TO PHQ9 QUESTIONS 1 & 2: 0
SUM OF ALL RESPONSES TO PHQ9 QUESTIONS 1 & 2: 0
SUM OF ALL RESPONSES TO PHQ QUESTIONS 1-9: 0

## 2025-02-04 ENCOUNTER — OFFICE VISIT (OUTPATIENT)
Dept: INTERNAL MEDICINE CLINIC | Facility: CLINIC | Age: 85
End: 2025-02-04
Payer: MEDICARE

## 2025-02-04 VITALS
HEART RATE: 90 BPM | WEIGHT: 197.2 LBS | DIASTOLIC BLOOD PRESSURE: 79 MMHG | HEIGHT: 62 IN | SYSTOLIC BLOOD PRESSURE: 130 MMHG | BODY MASS INDEX: 36.29 KG/M2

## 2025-02-04 DIAGNOSIS — E78.00 PURE HYPERCHOLESTEROLEMIA: ICD-10-CM

## 2025-02-04 DIAGNOSIS — E03.9 ACQUIRED HYPOTHYROIDISM: ICD-10-CM

## 2025-02-04 DIAGNOSIS — D64.9 CHRONIC ANEMIA: ICD-10-CM

## 2025-02-04 DIAGNOSIS — I82.511 CHRONIC DEEP VEIN THROMBOSIS (DVT) OF RIGHT FEMORAL VEIN (HCC): ICD-10-CM

## 2025-02-04 DIAGNOSIS — I10 HYPERTENSION, ESSENTIAL: ICD-10-CM

## 2025-02-04 DIAGNOSIS — N18.32 STAGE 3B CHRONIC KIDNEY DISEASE (HCC): ICD-10-CM

## 2025-02-04 DIAGNOSIS — R30.0 DYSURIA: Primary | ICD-10-CM

## 2025-02-04 LAB
BILIRUBIN, URINE, POC: NEGATIVE
BLOOD URINE, POC: NORMAL
GLUCOSE URINE, POC: NEGATIVE
KETONES, URINE, POC: NEGATIVE
LEUKOCYTE ESTERASE, URINE, POC: NORMAL
NITRITE, URINE, POC: NEGATIVE
PH, URINE, POC: 5.5 (ref 4.6–8)
PROTEIN,URINE, POC: NEGATIVE
SPECIFIC GRAVITY, URINE, POC: 1.01 (ref 1–1.03)
URINALYSIS CLARITY, POC: NORMAL
URINALYSIS COLOR, POC: YELLOW
UROBILINOGEN, POC: NORMAL

## 2025-02-04 PROCEDURE — 3075F SYST BP GE 130 - 139MM HG: CPT | Performed by: INTERNAL MEDICINE

## 2025-02-04 PROCEDURE — G2211 COMPLEX E/M VISIT ADD ON: HCPCS | Performed by: INTERNAL MEDICINE

## 2025-02-04 PROCEDURE — 99214 OFFICE O/P EST MOD 30 MIN: CPT | Performed by: INTERNAL MEDICINE

## 2025-02-04 PROCEDURE — 3078F DIAST BP <80 MM HG: CPT | Performed by: INTERNAL MEDICINE

## 2025-02-04 PROCEDURE — 81003 URINALYSIS AUTO W/O SCOPE: CPT | Performed by: INTERNAL MEDICINE

## 2025-02-04 PROCEDURE — 1123F ACP DISCUSS/DSCN MKR DOCD: CPT | Performed by: INTERNAL MEDICINE

## 2025-02-04 RX ORDER — CIPROFLOXACIN 250 MG/1
250 TABLET, FILM COATED ORAL 2 TIMES DAILY
Qty: 14 TABLET | Refills: 0 | Status: SHIPPED | OUTPATIENT
Start: 2025-02-04 | End: 2025-02-11

## 2025-02-04 ASSESSMENT — ENCOUNTER SYMPTOMS
RECTAL PAIN: 0
EYE PAIN: 0
VOICE CHANGE: 0
STRIDOR: 0

## 2025-02-04 NOTE — PROGRESS NOTES
FOLLOWUP VISIT    Subjective:    Ms. Moyer is a 85 y.o., female,   Chief Complaint   Patient presents with    3 Month Follow-Up    Incontinence     Urine     Dysuria       HPI:    Patient presents today for follow up of two or more chronic medical problems and review of labs.       The patient has hypertension.  The patient has been on an attempted low sodium diet and has been trying to exercise and maintain a healthy weight.  The patient reports good compliance with the blood pressure medications.       The patient has hyperlipidemia.  The patient has been attempting to follow a low cholesterol diet and denies any myalgias or weakness on current lipid lowering therapy.       The patient has hypothyroidism.  The patient denies any symptoms of hypo- or hyperthyroidism on the current dose of levothyroxine.       She complains of dysuria and urgency and frequency for one week.  No fever, hematuria, or flank pain.    The following portions of the patient's history were reviewed and updated as appropriate:      Past Medical History:   Diagnosis Date    Acute pancreatitis 10/12/2018    Asthma     BMI 34.0-34.9,adult     Calculus of kidney     CKD (chronic kidney disease)     Clostridium difficile infection 12/2020    Colon polyps     Female stress incontinence 8/16/2012    GERD (gastroesophageal reflux disease)     History of DVT (deep vein thrombosis)     9/2021; on Xarelto 10 mg daily for prevention    History of gastric ulcer     2018    History of kidney stones 8/16/2012    History of septic arthritis 09/10/2019    Right knee.  Improved with prolonged IV anitbiotics    Hypercholesterolemia     Hypertension     Hypothyroidism     OA (osteoarthritis)     Ophthalmic migraine     Osteopenia of neck of right femur 2/25/2015    Primary insomnia 07/17/2017    Raynaud's phenomenon 2/25/2015    Recurrent cellulitis of lower extremity 12/28/2020    S/P total knee arthroplasty 8/16/2012    Bilateral--2009     Skin cancer

## 2025-02-14 ENCOUNTER — TELEPHONE (OUTPATIENT)
Dept: INTERNAL MEDICINE CLINIC | Facility: CLINIC | Age: 85
End: 2025-02-14

## 2025-02-14 NOTE — TELEPHONE ENCOUNTER
Patient called states that her Asmanex HFA is no longer covered by her insurance. Patient called insurance company and found out that   Fluticasone propionate / Salmeterol is covered.

## 2025-02-17 DIAGNOSIS — J45.40 MODERATE PERSISTENT ASTHMA WITHOUT COMPLICATION: Primary | ICD-10-CM

## 2025-02-17 RX ORDER — FLUTICASONE PROPIONATE AND SALMETEROL XINAFOATE 230; 21 UG/1; UG/1
2 AEROSOL, METERED RESPIRATORY (INHALATION) 2 TIMES DAILY
Qty: 12 G | Refills: 3 | Status: SHIPPED | OUTPATIENT
Start: 2025-02-17

## 2025-02-18 ENCOUNTER — TELEPHONE (OUTPATIENT)
Dept: INTERNAL MEDICINE CLINIC | Facility: CLINIC | Age: 85
End: 2025-02-18

## 2025-04-02 ENCOUNTER — TELEPHONE (OUTPATIENT)
Dept: INTERNAL MEDICINE CLINIC | Facility: CLINIC | Age: 85
End: 2025-04-02

## 2025-04-04 ENCOUNTER — TELEPHONE (OUTPATIENT)
Dept: INTERNAL MEDICINE CLINIC | Facility: CLINIC | Age: 85
End: 2025-04-04

## 2025-04-04 NOTE — TELEPHONE ENCOUNTER
PA on   fluticasone-salmeterol (ADVAIR HFA) 230-21 MCG/ACT inhaler  needs extra information.     PA submitted. [id number].     Patient is aware that PA was submitted. Pending outcome.     Alternative medications are Breo ellipta, [facility], Symbicort 160-4.5 mg, Dulera inhaler.

## 2025-04-23 DIAGNOSIS — I87.2 VENOUS INSUFFICIENCY OF BOTH LOWER EXTREMITIES: ICD-10-CM

## 2025-04-23 DIAGNOSIS — E78.00 PURE HYPERCHOLESTEROLEMIA: ICD-10-CM

## 2025-04-23 NOTE — TELEPHONE ENCOUNTER
Requested Prescriptions     Pending Prescriptions Disp Refills    atorvastatin (LIPITOR) 40 MG tablet [Pharmacy Med Name: ATORVASTATIN CALCIUM 40 MG 40 Tablet] 90 tablet 2     Sig: Take 1 tablet by mouth daily    potassium chloride (KLOR-CON M) 20 MEQ extended release tablet [Pharmacy Med Name: POTASSIUM CHLORIDE ER 20MEQ 20 Tablet] 180 tablet 2     Sig: Take 1 tablet by mouth 2 times daily     Dose verified and to Katy Patient is scheduled for follow up visit.

## 2025-04-24 RX ORDER — POTASSIUM CHLORIDE 1500 MG/1
20 TABLET, EXTENDED RELEASE ORAL 2 TIMES DAILY
Qty: 180 TABLET | Refills: 2 | Status: SHIPPED | OUTPATIENT
Start: 2025-04-24

## 2025-04-24 RX ORDER — ATORVASTATIN CALCIUM 40 MG/1
40 TABLET, FILM COATED ORAL DAILY
Qty: 90 TABLET | Refills: 2 | Status: SHIPPED | OUTPATIENT
Start: 2025-04-24

## 2025-06-03 DIAGNOSIS — G89.29 CHRONIC LEFT-SIDED LOW BACK PAIN WITH LEFT-SIDED SCIATICA: ICD-10-CM

## 2025-06-03 DIAGNOSIS — G60.9 IDIOPATHIC PERIPHERAL NEUROPATHY: ICD-10-CM

## 2025-06-03 DIAGNOSIS — M54.42 CHRONIC LEFT-SIDED LOW BACK PAIN WITH LEFT-SIDED SCIATICA: ICD-10-CM

## 2025-06-04 RX ORDER — GABAPENTIN 300 MG/1
300 CAPSULE ORAL 2 TIMES DAILY
Qty: 180 CAPSULE | Refills: 1 | Status: SHIPPED | OUTPATIENT
Start: 2025-06-04 | End: 2025-12-01

## 2025-07-30 DIAGNOSIS — D64.9 CHRONIC ANEMIA: ICD-10-CM

## 2025-07-30 DIAGNOSIS — I10 HYPERTENSION, ESSENTIAL: ICD-10-CM

## 2025-07-30 DIAGNOSIS — E03.9 ACQUIRED HYPOTHYROIDISM: ICD-10-CM

## 2025-07-30 DIAGNOSIS — E78.00 PURE HYPERCHOLESTEROLEMIA: ICD-10-CM

## 2025-07-30 LAB
ALT SERPL-CCNC: 11 U/L (ref 8–45)
ANION GAP SERPL CALC-SCNC: 12 MMOL/L (ref 7–16)
BASOPHILS # BLD: 0.03 K/UL (ref 0–0.2)
BASOPHILS NFR BLD: 0.5 % (ref 0–2)
BUN SERPL-MCNC: 24 MG/DL (ref 8–23)
CALCIUM SERPL-MCNC: 9.6 MG/DL (ref 8.8–10.2)
CHLORIDE SERPL-SCNC: 105 MMOL/L (ref 98–107)
CHOLEST SERPL-MCNC: 155 MG/DL (ref 0–200)
CO2 SERPL-SCNC: 26 MMOL/L (ref 20–29)
CREAT SERPL-MCNC: 1.36 MG/DL (ref 0.6–1.1)
DIFFERENTIAL METHOD BLD: ABNORMAL
EOSINOPHIL # BLD: 0.11 K/UL (ref 0–0.8)
EOSINOPHIL NFR BLD: 1.7 % (ref 0.5–7.8)
ERYTHROCYTE [DISTWIDTH] IN BLOOD BY AUTOMATED COUNT: 13.5 % (ref 11.9–14.6)
GLUCOSE SERPL-MCNC: 97 MG/DL (ref 70–99)
HCT VFR BLD AUTO: 31.4 % (ref 35.8–46.3)
HDLC SERPL-MCNC: 58 MG/DL (ref 40–60)
HDLC SERPL: 2.7 (ref 0–5)
HGB BLD-MCNC: 9.8 G/DL (ref 11.7–15.4)
IMM GRANULOCYTES # BLD AUTO: 0.01 K/UL (ref 0–0.5)
IMM GRANULOCYTES NFR BLD AUTO: 0.2 % (ref 0–5)
LDLC SERPL CALC-MCNC: 78 MG/DL (ref 0–100)
LYMPHOCYTES # BLD: 1.09 K/UL (ref 0.5–4.6)
LYMPHOCYTES NFR BLD: 16.6 % (ref 13–44)
MCH RBC QN AUTO: 29.6 PG (ref 26.1–32.9)
MCHC RBC AUTO-ENTMCNC: 31.2 G/DL (ref 31.4–35)
MCV RBC AUTO: 94.9 FL (ref 82–102)
MONOCYTES # BLD: 0.74 K/UL (ref 0.1–1.3)
MONOCYTES NFR BLD: 11.3 % (ref 4–12)
NEUTS SEG # BLD: 4.59 K/UL (ref 1.7–8.2)
NEUTS SEG NFR BLD: 69.7 % (ref 43–78)
NRBC # BLD: 0 K/UL (ref 0–0.2)
PLATELET # BLD AUTO: 179 K/UL (ref 150–450)
PMV BLD AUTO: 12.3 FL (ref 9.4–12.3)
POTASSIUM SERPL-SCNC: 4.4 MMOL/L (ref 3.5–5.1)
RBC # BLD AUTO: 3.31 M/UL (ref 4.05–5.2)
SODIUM SERPL-SCNC: 142 MMOL/L (ref 136–145)
TRIGL SERPL-MCNC: 96 MG/DL (ref 0–150)
TSH, 3RD GENERATION: 2.49 UIU/ML (ref 0.27–4.2)
VLDLC SERPL CALC-MCNC: 19 MG/DL (ref 6–23)
WBC # BLD AUTO: 6.6 K/UL (ref 4.3–11.1)

## 2025-08-04 ENCOUNTER — OFFICE VISIT (OUTPATIENT)
Dept: INTERNAL MEDICINE CLINIC | Facility: CLINIC | Age: 85
End: 2025-08-04
Payer: COMMERCIAL

## 2025-08-04 VITALS
HEIGHT: 62 IN | OXYGEN SATURATION: 97 % | WEIGHT: 191 LBS | DIASTOLIC BLOOD PRESSURE: 66 MMHG | BODY MASS INDEX: 35.15 KG/M2 | SYSTOLIC BLOOD PRESSURE: 120 MMHG | HEART RATE: 90 BPM

## 2025-08-04 DIAGNOSIS — Z00.00 MEDICARE ANNUAL WELLNESS VISIT, SUBSEQUENT: Primary | Chronic | ICD-10-CM

## 2025-08-04 DIAGNOSIS — E78.00 PURE HYPERCHOLESTEROLEMIA: ICD-10-CM

## 2025-08-04 DIAGNOSIS — R30.0 DYSURIA: ICD-10-CM

## 2025-08-04 DIAGNOSIS — R35.0 URINARY FREQUENCY: ICD-10-CM

## 2025-08-04 DIAGNOSIS — K21.00 GASTROESOPHAGEAL REFLUX DISEASE WITH ESOPHAGITIS WITHOUT HEMORRHAGE: ICD-10-CM

## 2025-08-04 DIAGNOSIS — G60.9 IDIOPATHIC PERIPHERAL NEUROPATHY: ICD-10-CM

## 2025-08-04 DIAGNOSIS — G89.29 CHRONIC LEFT-SIDED LOW BACK PAIN WITH LEFT-SIDED SCIATICA: ICD-10-CM

## 2025-08-04 DIAGNOSIS — I87.2 VENOUS INSUFFICIENCY OF BOTH LOWER EXTREMITIES: ICD-10-CM

## 2025-08-04 DIAGNOSIS — J45.40 MODERATE PERSISTENT ASTHMA WITHOUT COMPLICATION: ICD-10-CM

## 2025-08-04 DIAGNOSIS — E03.9 ACQUIRED HYPOTHYROIDISM: ICD-10-CM

## 2025-08-04 DIAGNOSIS — D64.9 CHRONIC ANEMIA: ICD-10-CM

## 2025-08-04 DIAGNOSIS — I10 HYPERTENSION, ESSENTIAL: ICD-10-CM

## 2025-08-04 DIAGNOSIS — M54.42 CHRONIC LEFT-SIDED LOW BACK PAIN WITH LEFT-SIDED SCIATICA: ICD-10-CM

## 2025-08-04 DIAGNOSIS — I82.511 CHRONIC DEEP VEIN THROMBOSIS (DVT) OF RIGHT FEMORAL VEIN (HCC): ICD-10-CM

## 2025-08-04 DIAGNOSIS — M85.851 OSTEOPENIA OF NECK OF RIGHT FEMUR: ICD-10-CM

## 2025-08-04 DIAGNOSIS — Z23 ENCOUNTER FOR IMMUNIZATION: ICD-10-CM

## 2025-08-04 DIAGNOSIS — N18.32 STAGE 3B CHRONIC KIDNEY DISEASE (HCC): ICD-10-CM

## 2025-08-04 LAB
BILIRUBIN, URINE, POC: NEGATIVE
BLOOD URINE, POC: NORMAL
GLUCOSE URINE, POC: NEGATIVE
KETONES, URINE, POC: NEGATIVE
LEUKOCYTE ESTERASE, URINE, POC: NORMAL
NITRITE, URINE, POC: NEGATIVE
PH, URINE, POC: 5.5 (ref 4.6–8)
PROTEIN,URINE, POC: NEGATIVE
SPECIFIC GRAVITY, URINE, POC: 1.01 (ref 1–1.03)
URINALYSIS CLARITY, POC: CLEAR
URINALYSIS COLOR, POC: YELLOW
UROBILINOGEN, POC: NORMAL

## 2025-08-04 PROCEDURE — 3074F SYST BP LT 130 MM HG: CPT | Performed by: INTERNAL MEDICINE

## 2025-08-04 PROCEDURE — 81003 URINALYSIS AUTO W/O SCOPE: CPT | Performed by: INTERNAL MEDICINE

## 2025-08-04 PROCEDURE — 99214 OFFICE O/P EST MOD 30 MIN: CPT | Performed by: INTERNAL MEDICINE

## 2025-08-04 PROCEDURE — G2211 COMPLEX E/M VISIT ADD ON: HCPCS | Performed by: INTERNAL MEDICINE

## 2025-08-04 PROCEDURE — 1123F ACP DISCUSS/DSCN MKR DOCD: CPT | Performed by: INTERNAL MEDICINE

## 2025-08-04 PROCEDURE — 3078F DIAST BP <80 MM HG: CPT | Performed by: INTERNAL MEDICINE

## 2025-08-04 PROCEDURE — G0439 PPPS, SUBSEQ VISIT: HCPCS | Performed by: INTERNAL MEDICINE

## 2025-08-04 RX ORDER — FUROSEMIDE 40 MG/1
40 TABLET ORAL DAILY
Qty: 90 TABLET | Refills: 3 | Status: SHIPPED | OUTPATIENT
Start: 2025-08-04

## 2025-08-04 RX ORDER — POTASSIUM CHLORIDE 1500 MG/1
20 TABLET, EXTENDED RELEASE ORAL 2 TIMES DAILY
Qty: 180 TABLET | Refills: 2 | Status: SHIPPED | OUTPATIENT
Start: 2025-08-04

## 2025-08-04 RX ORDER — LEVOTHYROXINE SODIUM 25 UG/1
25 TABLET ORAL EVERY MORNING
Qty: 90 TABLET | Refills: 3 | Status: SHIPPED | OUTPATIENT
Start: 2025-08-04

## 2025-08-04 RX ORDER — ATORVASTATIN CALCIUM 40 MG/1
40 TABLET, FILM COATED ORAL DAILY
Qty: 90 TABLET | Refills: 2 | Status: SHIPPED | OUTPATIENT
Start: 2025-08-04

## 2025-08-04 RX ORDER — CIPROFLOXACIN 250 MG/1
250 TABLET, FILM COATED ORAL 2 TIMES DAILY
Qty: 14 TABLET | Refills: 0 | Status: SHIPPED | OUTPATIENT
Start: 2025-08-04 | End: 2025-08-11

## 2025-08-04 RX ORDER — LOSARTAN POTASSIUM 25 MG/1
25 TABLET ORAL DAILY
Qty: 90 TABLET | Refills: 1 | Status: SHIPPED | OUTPATIENT
Start: 2025-08-04

## 2025-08-04 RX ORDER — GABAPENTIN 300 MG/1
300 CAPSULE ORAL 2 TIMES DAILY
Qty: 180 CAPSULE | Refills: 1 | Status: SHIPPED | OUTPATIENT
Start: 2025-08-04 | End: 2026-01-31

## 2025-08-04 RX ORDER — ALBUTEROL SULFATE 90 UG/1
2 INHALANT RESPIRATORY (INHALATION) 4 TIMES DAILY PRN
Qty: 18 G | Refills: 5 | Status: SHIPPED | OUTPATIENT
Start: 2025-08-04

## 2025-08-04 RX ORDER — FLUTICASONE PROPIONATE AND SALMETEROL XINAFOATE 230; 21 UG/1; UG/1
2 AEROSOL, METERED RESPIRATORY (INHALATION) 2 TIMES DAILY
Qty: 12 G | Refills: 3 | Status: SHIPPED | OUTPATIENT
Start: 2025-08-04

## 2025-08-04 ASSESSMENT — ENCOUNTER SYMPTOMS
VOICE CHANGE: 0
STRIDOR: 0
RECTAL PAIN: 0
EYE PAIN: 0

## 2025-08-04 ASSESSMENT — PATIENT HEALTH QUESTIONNAIRE - PHQ9
2. FEELING DOWN, DEPRESSED OR HOPELESS: NOT AT ALL
SUM OF ALL RESPONSES TO PHQ QUESTIONS 1-9: 0
1. LITTLE INTEREST OR PLEASURE IN DOING THINGS: NOT AT ALL

## (undated) DEVICE — BLOCK BITE AD 60FR W/ VELC STRP ADDRESSES MOST PT AND

## (undated) DEVICE — TROCAR: Brand: KII FIOS FIRST ENTRY

## (undated) DEVICE — GENERAL LAPAROSCOPY: Brand: MEDLINE INDUSTRIES, INC.

## (undated) DEVICE — APPLICATOR MEDICATED 26 CC SOLUTION HI LT ORNG CHLORAPREP

## (undated) DEVICE — CANNULA NSL ORAL AD FOR CAPNOFLEX CO2 O2 AIRLFE

## (undated) DEVICE — TROCAR: Brand: KII® SLEEVE

## (undated) DEVICE — STRIP,CLOSURE,WOUND,MEDI-STRIP,1/2X4: Brand: MEDLINE

## (undated) DEVICE — GLOVE SURG SZ 7 L12IN FNGR THK79MIL GRN LTX FREE

## (undated) DEVICE — SOLUTION IRRIG 1000ML 0.9% SOD CHL USP POUR PLAS BTL

## (undated) DEVICE — TUBING INSUFFLATION SMK EVAC HI FLO SET PNEUMOCLEAR

## (undated) DEVICE — KENDALL RADIOLUCENT FOAM MONITORING ELECTRODE RECTANGULAR SHAPE: Brand: KENDALL

## (undated) DEVICE — GLOVE SURG SZ 65 THK91MIL LTX FREE SYN POLYISOPRENE

## (undated) DEVICE — SPONGE: SPECIALTY TONSIL XR MED 100/CS: Brand: MEDICAL ACTION INDUSTRIES

## (undated) DEVICE — SUTURE ETHBND EXCEL SZ 0 L18IN NONABSORBABLE GRN L26MM MO-6 CX45D

## (undated) DEVICE — CONNECTOR TBNG OD5-7MM O2 END DISP

## (undated) DEVICE — GARMENT,MEDLINE,DVT,INT,CALF,MED, GEN2: Brand: MEDLINE

## (undated) DEVICE — SUTURE VCRL SZ 3-0 L27IN ABSRB VLT L26MM SH 1/2 CIR J316H

## (undated) DEVICE — MINOR SPLIT GENERAL: Brand: MEDLINE INDUSTRIES, INC.

## (undated) DEVICE — SUTURE MCRYL SZ 4-0 L18IN ABSRB UD L19MM PS-2 3/8 CIR PRIM Y496G

## (undated) DEVICE — 3M™ TEGADERM™ TRANSPARENT FILM DRESSING FRAME STYLE, 1626W, 4 IN X 4-3/4 IN (10 CM X 12 CM), 50/CT 4CT/CASE: Brand: 3M™ TEGADERM™

## (undated) DEVICE — CONTAINER PREFIL FRMLN 40ML --

## (undated) DEVICE — FORCEPS BX L240CM JAW DIA2.8MM L CAP W/ NDL MIC MESH TOOTH